# Patient Record
Sex: MALE | Race: ASIAN | NOT HISPANIC OR LATINO
[De-identification: names, ages, dates, MRNs, and addresses within clinical notes are randomized per-mention and may not be internally consistent; named-entity substitution may affect disease eponyms.]

---

## 2017-06-21 ENCOUNTER — RESULT CHARGE (OUTPATIENT)
Age: 77
End: 2017-06-21

## 2017-06-22 ENCOUNTER — APPOINTMENT (OUTPATIENT)
Dept: INTERNAL MEDICINE | Facility: CLINIC | Age: 77
End: 2017-06-22

## 2017-06-22 ENCOUNTER — NON-APPOINTMENT (OUTPATIENT)
Age: 77
End: 2017-06-22

## 2017-06-22 VITALS
HEIGHT: 65.5 IN | WEIGHT: 184 LBS | DIASTOLIC BLOOD PRESSURE: 71 MMHG | SYSTOLIC BLOOD PRESSURE: 123 MMHG | BODY MASS INDEX: 30.29 KG/M2

## 2017-06-22 DIAGNOSIS — G89.29 LOW BACK PAIN: ICD-10-CM

## 2017-06-22 DIAGNOSIS — M62.9 DISORDER OF MUSCLE, UNSPECIFIED: ICD-10-CM

## 2017-06-22 DIAGNOSIS — Z87.891 PERSONAL HISTORY OF NICOTINE DEPENDENCE: ICD-10-CM

## 2017-06-22 DIAGNOSIS — M54.5 LOW BACK PAIN: ICD-10-CM

## 2017-06-27 LAB
25(OH)D3 SERPL-MCNC: 24.7 NG/ML
ALBUMIN SERPL ELPH-MCNC: 3.9 G/DL
ALP BLD-CCNC: 68 U/L
ALT SERPL-CCNC: 44 U/L
ANION GAP SERPL CALC-SCNC: 12 MMOL/L
APPEARANCE: CLEAR
AST SERPL-CCNC: 33 U/L
BACTERIA: NEGATIVE
BASOPHILS # BLD AUTO: 0.03 K/UL
BASOPHILS NFR BLD AUTO: 0.6 %
BILIRUB SERPL-MCNC: 1 MG/DL
BILIRUBIN URINE: NEGATIVE
BLOOD URINE: ABNORMAL
BUN SERPL-MCNC: 15 MG/DL
CALCIUM SERPL-MCNC: 8.9 MG/DL
CHLORIDE SERPL-SCNC: 106 MMOL/L
CHOLEST SERPL-MCNC: 181 MG/DL
CHOLEST/HDLC SERPL: 3.7 RATIO
CO2 SERPL-SCNC: 25 MMOL/L
COLOR: YELLOW
CREAT SERPL-MCNC: 1.13 MG/DL
EOSINOPHIL # BLD AUTO: 0.18 K/UL
EOSINOPHIL NFR BLD AUTO: 3.5 %
GLUCOSE QUALITATIVE U: NORMAL MG/DL
GLUCOSE SERPL-MCNC: 111 MG/DL
HBA1C MFR BLD HPLC: 5.8 %
HCT VFR BLD CALC: 39.3 %
HDLC SERPL-MCNC: 49 MG/DL
HGB BLD-MCNC: 13 G/DL
IMM GRANULOCYTES NFR BLD AUTO: 0.2 %
KETONES URINE: NEGATIVE
LDLC SERPL CALC-MCNC: 109 MG/DL
LEUKOCYTE ESTERASE URINE: NEGATIVE
LYMPHOCYTES # BLD AUTO: 1.58 K/UL
LYMPHOCYTES NFR BLD AUTO: 30.4 %
MAN DIFF?: NORMAL
MCHC RBC-ENTMCNC: 29.8 PG
MCHC RBC-ENTMCNC: 33.1 GM/DL
MCV RBC AUTO: 90.1 FL
MICROSCOPIC-UA: NORMAL
MONOCYTES # BLD AUTO: 0.59 K/UL
MONOCYTES NFR BLD AUTO: 11.3 %
NEUTROPHILS # BLD AUTO: 2.81 K/UL
NEUTROPHILS NFR BLD AUTO: 54 %
NITRITE URINE: NEGATIVE
PH URINE: 6
PLATELET # BLD AUTO: 165 K/UL
POTASSIUM SERPL-SCNC: 4.7 MMOL/L
PROT SERPL-MCNC: 7.3 G/DL
PROTEIN URINE: NEGATIVE MG/DL
PSA FREE FLD-MCNC: 37.6
PSA FREE SERPL-MCNC: 1.81 NG/ML
PSA SERPL-MCNC: 4.81 NG/ML
RBC # BLD: 4.36 M/UL
RBC # FLD: 15.3 %
RED BLOOD CELLS URINE: 1 /HPF
SODIUM SERPL-SCNC: 143 MMOL/L
SPECIFIC GRAVITY URINE: 1.02
SQUAMOUS EPITHELIAL CELLS: 1 /HPF
TRIGL SERPL-MCNC: 114 MG/DL
TSH SERPL-ACNC: 1.72 UIU/ML
URATE SERPL-MCNC: 8.8 MG/DL
UROBILINOGEN URINE: 1 MG/DL
WBC # FLD AUTO: 5.2 K/UL
WHITE BLOOD CELLS URINE: 1 /HPF

## 2017-12-13 DIAGNOSIS — Z78.9 OTHER SPECIFIED HEALTH STATUS: ICD-10-CM

## 2017-12-15 ENCOUNTER — APPOINTMENT (OUTPATIENT)
Dept: INTERNAL MEDICINE | Facility: CLINIC | Age: 77
End: 2017-12-15
Payer: MEDICARE

## 2017-12-15 VITALS
HEIGHT: 65.5 IN | OXYGEN SATURATION: 98 % | WEIGHT: 184 LBS | SYSTOLIC BLOOD PRESSURE: 124 MMHG | BODY MASS INDEX: 30.29 KG/M2 | DIASTOLIC BLOOD PRESSURE: 60 MMHG | HEART RATE: 85 BPM

## 2017-12-15 PROCEDURE — 99214 OFFICE O/P EST MOD 30 MIN: CPT | Mod: 25

## 2017-12-15 PROCEDURE — G0008: CPT

## 2017-12-15 PROCEDURE — 90688 IIV4 VACCINE SPLT 0.5 ML IM: CPT

## 2017-12-15 RX ORDER — TRIAMCINOLONE ACETONIDE 1 MG/G
0.1 OINTMENT TOPICAL TWICE DAILY
Qty: 2 | Refills: 1 | Status: ACTIVE | COMMUNITY
Start: 2017-12-15 | End: 1900-01-01

## 2018-03-25 ENCOUNTER — FORM ENCOUNTER (OUTPATIENT)
Age: 78
End: 2018-03-25

## 2018-03-26 ENCOUNTER — APPOINTMENT (OUTPATIENT)
Dept: RADIOLOGY | Facility: IMAGING CENTER | Age: 78
End: 2018-03-26

## 2018-03-26 ENCOUNTER — OUTPATIENT (OUTPATIENT)
Dept: OUTPATIENT SERVICES | Facility: HOSPITAL | Age: 78
LOS: 1 days | End: 2018-03-26
Payer: MEDICARE

## 2018-03-26 DIAGNOSIS — M54.5 LOW BACK PAIN: ICD-10-CM

## 2018-03-26 DIAGNOSIS — R05 COUGH: ICD-10-CM

## 2018-03-26 PROCEDURE — 72020 X-RAY EXAM OF SPINE 1 VIEW: CPT | Mod: 26

## 2018-03-26 PROCEDURE — 71046 X-RAY EXAM CHEST 2 VIEWS: CPT | Mod: 26

## 2018-03-26 PROCEDURE — 71046 X-RAY EXAM CHEST 2 VIEWS: CPT

## 2018-03-26 PROCEDURE — 72020 X-RAY EXAM OF SPINE 1 VIEW: CPT

## 2018-06-25 ENCOUNTER — APPOINTMENT (OUTPATIENT)
Dept: INTERNAL MEDICINE | Facility: CLINIC | Age: 78
End: 2018-06-25
Payer: MEDICARE

## 2018-06-25 VITALS
BODY MASS INDEX: 29.63 KG/M2 | HEIGHT: 65.25 IN | HEART RATE: 67 BPM | WEIGHT: 180 LBS | DIASTOLIC BLOOD PRESSURE: 60 MMHG | SYSTOLIC BLOOD PRESSURE: 120 MMHG | OXYGEN SATURATION: 98 %

## 2018-06-25 DIAGNOSIS — R73.01 IMPAIRED FASTING GLUCOSE: ICD-10-CM

## 2018-06-25 DIAGNOSIS — K13.70 UNSPECIFIED LESIONS OF ORAL MUCOSA: ICD-10-CM

## 2018-06-25 PROCEDURE — G0439: CPT

## 2018-06-29 LAB
25(OH)D3 SERPL-MCNC: 25.2 NG/ML
ALBUMIN SERPL ELPH-MCNC: 4 G/DL
ALP BLD-CCNC: 77 U/L
ALT SERPL-CCNC: 29 U/L
ANION GAP SERPL CALC-SCNC: 16 MMOL/L
AST SERPL-CCNC: 25 U/L
BASOPHILS # BLD AUTO: 0.02 K/UL
BASOPHILS NFR BLD AUTO: 0.4 %
BILIRUB SERPL-MCNC: 0.6 MG/DL
BUN SERPL-MCNC: 17 MG/DL
CALCIUM SERPL-MCNC: 9.1 MG/DL
CHLORIDE SERPL-SCNC: 106 MMOL/L
CHOLEST SERPL-MCNC: 163 MG/DL
CHOLEST/HDLC SERPL: 3.4 RATIO
CO2 SERPL-SCNC: 22 MMOL/L
CREAT SERPL-MCNC: 1.01 MG/DL
EOSINOPHIL # BLD AUTO: 0.15 K/UL
EOSINOPHIL NFR BLD AUTO: 2.7 %
GLUCOSE SERPL-MCNC: 102 MG/DL
HBA1C MFR BLD HPLC: 5.8 %
HCT VFR BLD CALC: 38.9 %
HDLC SERPL-MCNC: 48 MG/DL
HGB BLD-MCNC: 13 G/DL
IMM GRANULOCYTES NFR BLD AUTO: 0.2 %
LDLC SERPL CALC-MCNC: 97 MG/DL
LYMPHOCYTES # BLD AUTO: 1.68 K/UL
LYMPHOCYTES NFR BLD AUTO: 30.3 %
MAN DIFF?: NORMAL
MCHC RBC-ENTMCNC: 30.4 PG
MCHC RBC-ENTMCNC: 33.4 GM/DL
MCV RBC AUTO: 90.9 FL
MONOCYTES # BLD AUTO: 0.45 K/UL
MONOCYTES NFR BLD AUTO: 8.1 %
NEUTROPHILS # BLD AUTO: 3.23 K/UL
NEUTROPHILS NFR BLD AUTO: 58.3 %
PLATELET # BLD AUTO: 183 K/UL
POTASSIUM SERPL-SCNC: 4.3 MMOL/L
PROT SERPL-MCNC: 7.5 G/DL
PSA SERPL-MCNC: 5.52 NG/ML
RBC # BLD: 4.28 M/UL
RBC # FLD: 15.2 %
SODIUM SERPL-SCNC: 144 MMOL/L
TRIGL SERPL-MCNC: 90 MG/DL
TSH SERPL-ACNC: 2.08 UIU/ML
URATE SERPL-MCNC: 6.4 MG/DL
WBC # FLD AUTO: 5.54 K/UL

## 2018-07-02 PROBLEM — K13.70 ORAL MUCOSAL LESION: Status: ACTIVE | Noted: 2018-06-25

## 2018-07-02 NOTE — HEALTH RISK ASSESSMENT
[HIV test declined] : HIV test declined [] : No [No falls in past year] : Patient reported no falls in the past year [0] : 2) Feeling down, depressed, or hopeless: Not at all (0) [[id number]] : 0 [Fully functional (bathing, dressing, toileting, transferring, walking, feeding)] : Fully functional (bathing, dressing, toileting, transferring, walking, feeding) [Fully functional (using the telephone, shopping, preparing meals, housekeeping, doing laundry, using] : Fully functional and needs no help or supervision to perform IADLs (using the telephone, shopping, preparing meals, housekeeping, doing laundry, using transportation, managing medications and managing finances) [BoneDensityDate] : 07/16 [ColonoscopyDate] : 08/16 [HepatitisCDate] : 06/14 [Discussed at today's visit] : Advance Directives Discussed at today's visit

## 2018-07-02 NOTE — HISTORY OF PRESENT ILLNESS
[FreeTextEntry1] : 77 year old male with history of hyperlipidemia, was taking Simvastatin in the past, but had elevated LFTs in the past - currently off his statin.  Here for repeat fasting blood work.\par Other issues:\par 1. lipid-off statin-f/u labs-fair diet\par 2. Hx of gout-off allopurinol-stable-trying to stay on low purine diet. Coffee-2 cups. tea 3-4 cups\par 3. Onychomycosis-both big toenails involved with residual scarring noted at tip of nails, normal nail growing at base. Requesting more penlac\par 4. BPH/elevated PSA -stable nocturia-borderline elevated PSA in 2017 but did not f/u with urology as advised. Last saw urologist Dr Reddy in 2014\par 5. MiId low back pain R>L for few years, xray compression fx. Plays basketball few times a week\par 6. Rare BRBPR-possibly from hemorrhoids-colonoscopy with dr flowers 2007 negative, 8/4/2016-polyp in ascending colon-pathology tubular adenoma

## 2018-07-05 ENCOUNTER — APPOINTMENT (OUTPATIENT)
Dept: UROLOGY | Facility: CLINIC | Age: 78
End: 2018-07-05
Payer: MEDICARE

## 2018-07-05 VITALS
WEIGHT: 182 LBS | HEIGHT: 65 IN | TEMPERATURE: 98.2 F | SYSTOLIC BLOOD PRESSURE: 133 MMHG | HEART RATE: 75 BPM | BODY MASS INDEX: 30.32 KG/M2 | DIASTOLIC BLOOD PRESSURE: 65 MMHG

## 2018-07-05 PROCEDURE — 99213 OFFICE O/P EST LOW 20 MIN: CPT

## 2018-07-24 PROBLEM — Z78.9 ALCOHOL USE: Status: ACTIVE | Noted: 2017-12-13

## 2019-01-10 ENCOUNTER — APPOINTMENT (OUTPATIENT)
Dept: INTERNAL MEDICINE | Facility: CLINIC | Age: 79
End: 2019-01-10
Payer: MEDICARE

## 2019-01-10 VITALS
OXYGEN SATURATION: 96 % | BODY MASS INDEX: 28.99 KG/M2 | WEIGHT: 174 LBS | DIASTOLIC BLOOD PRESSURE: 70 MMHG | HEART RATE: 67 BPM | HEIGHT: 65 IN | SYSTOLIC BLOOD PRESSURE: 134 MMHG

## 2019-01-10 DIAGNOSIS — K92.1 MELENA: ICD-10-CM

## 2019-01-10 DIAGNOSIS — Z87.891 PERSONAL HISTORY OF NICOTINE DEPENDENCE: ICD-10-CM

## 2019-01-10 PROCEDURE — G0008: CPT

## 2019-01-10 PROCEDURE — 90662 IIV NO PRSV INCREASED AG IM: CPT

## 2019-01-10 PROCEDURE — 99213 OFFICE O/P EST LOW 20 MIN: CPT | Mod: 25

## 2019-01-10 RX ORDER — PREDNISONE 20 MG/1
20 TABLET ORAL
Qty: 10 | Refills: 0 | Status: DISCONTINUED | COMMUNITY
Start: 2017-12-13 | End: 2019-01-10

## 2019-01-10 NOTE — HISTORY OF PRESENT ILLNESS
[FreeTextEntry1] : 78 year old male with BPH and elevated PSA, here for 6 monht f/u.\par Needs flu vaccine\par Routine labs,\par 1. lipid-off statin-f/u labs-fair diet\par 2. Hx of gout-back on allopurinol-stable-trying to stay on low purine diet. Coffee-2 cups. tea 3-4 cups\par 3. Onychomycosis-both big toenails involved with residual scarring noted at tip of nails, normal nail growing at base. trial of penlac\par 4. BPH/elevated PSA -stable nocturiax2-borderline elevated PSA in 2017 -urologist Dr Reddy\par 5. Hx low back painfor few years, xray compression fx. Plays basketball few times a week\par 6. Rare BRBPR-possibly from hemorrhoids-colonoscopy with dr flowers 2007 negative, 8/4/2016-polyp in ascending colon-pathology tubular adenoma

## 2019-01-16 LAB
ALBUMIN SERPL ELPH-MCNC: 4.2 G/DL
ALP BLD-CCNC: 79 U/L
ALT SERPL-CCNC: 19 U/L
ANION GAP SERPL CALC-SCNC: 11 MMOL/L
AST SERPL-CCNC: 24 U/L
BASOPHILS # BLD AUTO: 0.02 K/UL
BASOPHILS NFR BLD AUTO: 0.5 %
BILIRUB SERPL-MCNC: 0.9 MG/DL
BUN SERPL-MCNC: 19 MG/DL
CALCIUM SERPL-MCNC: 9.5 MG/DL
CHLORIDE SERPL-SCNC: 107 MMOL/L
CHOLEST SERPL-MCNC: 159 MG/DL
CHOLEST/HDLC SERPL: 3.2 RATIO
CO2 SERPL-SCNC: 25 MMOL/L
CREAT SERPL-MCNC: 1.16 MG/DL
EOSINOPHIL # BLD AUTO: 0.13 K/UL
EOSINOPHIL NFR BLD AUTO: 3 %
GLUCOSE SERPL-MCNC: 103 MG/DL
HCT VFR BLD CALC: 38 %
HDLC SERPL-MCNC: 49 MG/DL
HGB BLD-MCNC: 12.6 G/DL
IMM GRANULOCYTES NFR BLD AUTO: 0.2 %
LDLC SERPL CALC-MCNC: 95 MG/DL
LYMPHOCYTES # BLD AUTO: 1.42 K/UL
LYMPHOCYTES NFR BLD AUTO: 32.3 %
MAN DIFF?: NORMAL
MCHC RBC-ENTMCNC: 30 PG
MCHC RBC-ENTMCNC: 33.2 GM/DL
MCV RBC AUTO: 90.5 FL
MONOCYTES # BLD AUTO: 0.34 K/UL
MONOCYTES NFR BLD AUTO: 7.7 %
NEUTROPHILS # BLD AUTO: 2.47 K/UL
NEUTROPHILS NFR BLD AUTO: 56.3 %
PLATELET # BLD AUTO: 169 K/UL
POTASSIUM SERPL-SCNC: 4.1 MMOL/L
PROT SERPL-MCNC: 7.5 G/DL
RBC # BLD: 4.2 M/UL
RBC # FLD: 15.3 %
SODIUM SERPL-SCNC: 143 MMOL/L
TRIGL SERPL-MCNC: 77 MG/DL
URATE SERPL-MCNC: 7.1 MG/DL
WBC # FLD AUTO: 4.39 K/UL

## 2019-07-11 ENCOUNTER — APPOINTMENT (OUTPATIENT)
Dept: INTERNAL MEDICINE | Facility: CLINIC | Age: 79
End: 2019-07-11
Payer: MEDICARE

## 2019-07-11 VITALS
SYSTOLIC BLOOD PRESSURE: 120 MMHG | WEIGHT: 185 LBS | OXYGEN SATURATION: 95 % | HEIGHT: 65 IN | DIASTOLIC BLOOD PRESSURE: 70 MMHG | HEART RATE: 63 BPM | BODY MASS INDEX: 30.82 KG/M2

## 2019-07-11 DIAGNOSIS — L30.9 DERMATITIS, UNSPECIFIED: ICD-10-CM

## 2019-07-11 DIAGNOSIS — R35.1 NOCTURIA: ICD-10-CM

## 2019-07-11 PROCEDURE — G0442 ANNUAL ALCOHOL SCREEN 15 MIN: CPT | Mod: 59

## 2019-07-11 PROCEDURE — G0444 DEPRESSION SCREEN ANNUAL: CPT | Mod: 59

## 2019-07-11 PROCEDURE — 99397 PER PM REEVAL EST PAT 65+ YR: CPT

## 2019-07-15 PROBLEM — L30.9 DERMATITIS, ECZEMATOID: Status: ACTIVE | Noted: 2017-06-22

## 2019-07-15 NOTE — COUNSELING
[Healthy eating counseling provided] : healthy eating [ - Annual Alcohol Misuse Screening] : Annual Alcohol Misuse Screening [Activity counseling provided] : activity [ - Annual Depression Screening] : Annual Depression Screening

## 2019-07-15 NOTE — HISTORY OF PRESENT ILLNESS
[FreeTextEntry1] : 79 year old male with history of hyperlipidemia, was taking Simvastatin in the past, but had elevated LFTs in the past - currently off his statin.  Here for repeat fasting blood work.\par Other issues:\par 1. lipid-off statin-f/u labs-fair diet\par 2. Hx of gout-off allopurinol-stable-trying to stay on low purine diet. Coffee-2 cups. tea 3-4 cups\par 3. Onychomycosis-both big toenails involved with residual scarring noted at tip of nails, normal nail growing at base. Requesting more penlac\par 4. BPH/elevated PSA -stable nocturia-borderline elevated PSA in 2017, f/u with urologist Dr Reddy noted-no pSA needed, routine jose f/u as stable symptoms\par 5. MiId low back pain R>L for few years, xray compression fx. Plays basketball few times a week\par 6. Rare BRBPR-possibly from hemorrhoids-colonoscopy with dr flowers 2007 negative, 8/4/2016-polyp in ascending colon-pathology tubular adenoma, f/u as per GI\par 7. Dry patchy are on left palm -does not use moisturing or steroid cream by derm. does ontlike rinking water

## 2019-07-15 NOTE — PHYSICAL EXAM
[No Acute Distress] : no acute distress [Well Nourished] : well nourished [Normal Sclera/Conjunctiva] : normal sclera/conjunctiva [Well Developed] : well developed [Well-Appearing] : well-appearing [EOMI] : extraocular movements intact [PERRL] : pupils equal round and reactive to light [Normal Outer Ear/Nose] : the outer ears and nose were normal in appearance [Normal Oropharynx] : the oropharynx was normal [No JVD] : no jugular venous distention [Supple] : supple [No Lymphadenopathy] : no lymphadenopathy [Thyroid Normal, No Nodules] : the thyroid was normal and there were no nodules present [No Respiratory Distress] : no respiratory distress  [No Accessory Muscle Use] : no accessory muscle use [Clear to Auscultation] : lungs were clear to auscultation bilaterally [Normal Rate] : normal rate  [Regular Rhythm] : with a regular rhythm [No Murmur] : no murmur heard [Normal S1, S2] : normal S1 and S2 [No Carotid Bruits] : no carotid bruits [No Abdominal Bruit] : a ~M bruit was not heard ~T in the abdomen [No Varicosities] : no varicosities [Pedal Pulses Present] : the pedal pulses are present [No Edema] : there was no peripheral edema [No Extremity Clubbing/Cyanosis] : no extremity clubbing/cyanosis [No Palpable Aorta] : no palpable aorta [Soft] : abdomen soft [Non Tender] : non-tender [Non-distended] : non-distended [No Masses] : no abdominal mass palpated [No HSM] : no HSM [Normal Bowel Sounds] : normal bowel sounds [Normal Posterior Cervical Nodes] : no posterior cervical lymphadenopathy [Normal Anterior Cervical Nodes] : no anterior cervical lymphadenopathy [No Spinal Tenderness] : no spinal tenderness [No CVA Tenderness] : no CVA  tenderness [No Joint Swelling] : no joint swelling [Grossly Normal Strength/Tone] : grossly normal strength/tone [Normal Gait] : normal gait [No Rash] : no rash [No Focal Deficits] : no focal deficits [Coordination Grossly Intact] : coordination grossly intact [Normal Affect] : the affect was normal [Normal Insight/Judgement] : insight and judgment were intact [Deep Tendon Reflexes (DTR)] : deep tendon reflexes were 2+ and symmetric [de-identified] : circulat quarter shaper area on left palm

## 2019-07-15 NOTE — HEALTH RISK ASSESSMENT
[Yes] : Yes [] : No [Monthly or less (1 pt)] : Monthly or less (1 point) [No falls in past year] : Patient reported no falls in the past year [0] : 1) Little interest or pleasure doing things: Not at all (0) [1] : 2) Feeling down, depressed, or hopeless for several days (1) [[id number]] : 1 [HIV test declined] : HIV test declined [Fully functional (bathing, dressing, toileting, transferring, walking, feeding)] : Fully functional (bathing, dressing, toileting, transferring, walking, feeding) [Fully functional (using the telephone, shopping, preparing meals, housekeeping, doing laundry, using] : Fully functional and needs no help or supervision to perform IADLs (using the telephone, shopping, preparing meals, housekeeping, doing laundry, using transportation, managing medications and managing finances) [BoneDensityDate] : 07/16 [ColonoscopyDate] : 08/16 [HepatitisCDate] : 06/14 [Discussed at today's visit] : Advance Directives Discussed at today's visit

## 2019-07-22 LAB
25(OH)D3 SERPL-MCNC: 24.4 NG/ML
ALBUMIN SERPL ELPH-MCNC: 4.3 G/DL
ALP BLD-CCNC: 73 U/L
ALT SERPL-CCNC: 25 U/L
ANION GAP SERPL CALC-SCNC: 12 MMOL/L
APPEARANCE: CLEAR
AST SERPL-CCNC: 22 U/L
BACTERIA: NEGATIVE
BASOPHILS # BLD AUTO: 0.05 K/UL
BASOPHILS NFR BLD AUTO: 1 %
BILIRUB SERPL-MCNC: 0.7 MG/DL
BILIRUBIN URINE: NEGATIVE
BLOOD URINE: NORMAL
BUN SERPL-MCNC: 16 MG/DL
CALCIUM SERPL-MCNC: 9.4 MG/DL
CHLORIDE SERPL-SCNC: 106 MMOL/L
CHOLEST SERPL-MCNC: 177 MG/DL
CHOLEST/HDLC SERPL: 3.7 RATIO
CO2 SERPL-SCNC: 22 MMOL/L
COLOR: NORMAL
CREAT SERPL-MCNC: 1.06 MG/DL
EOSINOPHIL # BLD AUTO: 0.06 K/UL
EOSINOPHIL NFR BLD AUTO: 1.2 %
ESTIMATED AVERAGE GLUCOSE: 111 MG/DL
GLUCOSE QUALITATIVE U: NEGATIVE
GLUCOSE SERPL-MCNC: 109 MG/DL
HBA1C MFR BLD HPLC: 5.5 %
HCT VFR BLD CALC: 39.6 %
HDLC SERPL-MCNC: 48 MG/DL
HGB BLD-MCNC: 12.7 G/DL
HYALINE CASTS: 1 /LPF
IMM GRANULOCYTES NFR BLD AUTO: 0.4 %
KETONES URINE: NEGATIVE
LDLC SERPL CALC-MCNC: 106 MG/DL
LEUKOCYTE ESTERASE URINE: NEGATIVE
LYMPHOCYTES # BLD AUTO: 1.32 K/UL
LYMPHOCYTES NFR BLD AUTO: 25.7 %
MAN DIFF?: NORMAL
MCHC RBC-ENTMCNC: 30.3 PG
MCHC RBC-ENTMCNC: 32.1 GM/DL
MCV RBC AUTO: 94.5 FL
MICROSCOPIC-UA: NORMAL
MONOCYTES # BLD AUTO: 0.51 K/UL
MONOCYTES NFR BLD AUTO: 9.9 %
NEUTROPHILS # BLD AUTO: 3.18 K/UL
NEUTROPHILS NFR BLD AUTO: 61.8 %
NITRITE URINE: NEGATIVE
PH URINE: 6
PLATELET # BLD AUTO: 168 K/UL
POTASSIUM SERPL-SCNC: 4.3 MMOL/L
PROT SERPL-MCNC: 7.1 G/DL
PROTEIN URINE: NEGATIVE
RBC # BLD: 4.19 M/UL
RBC # FLD: 14.8 %
RED BLOOD CELLS URINE: 1 /HPF
SODIUM SERPL-SCNC: 140 MMOL/L
SPECIFIC GRAVITY URINE: 1.01
SQUAMOUS EPITHELIAL CELLS: 1 /HPF
TRIGL SERPL-MCNC: 115 MG/DL
TSH SERPL-ACNC: 1.51 UIU/ML
URATE SERPL-MCNC: 6.8 MG/DL
UROBILINOGEN URINE: NORMAL
WBC # FLD AUTO: 5.14 K/UL
WHITE BLOOD CELLS URINE: 2 /HPF

## 2019-12-17 ENCOUNTER — APPOINTMENT (OUTPATIENT)
Dept: INTERNAL MEDICINE | Facility: CLINIC | Age: 79
End: 2019-12-17
Payer: MEDICARE

## 2019-12-17 VITALS
OXYGEN SATURATION: 97 % | DIASTOLIC BLOOD PRESSURE: 70 MMHG | SYSTOLIC BLOOD PRESSURE: 124 MMHG | BODY MASS INDEX: 29.32 KG/M2 | HEIGHT: 65 IN | TEMPERATURE: 98.4 F | WEIGHT: 176 LBS | HEART RATE: 75 BPM

## 2019-12-17 DIAGNOSIS — R05 COUGH: ICD-10-CM

## 2019-12-17 DIAGNOSIS — Z23 ENCOUNTER FOR IMMUNIZATION: ICD-10-CM

## 2019-12-17 PROCEDURE — 99213 OFFICE O/P EST LOW 20 MIN: CPT | Mod: 25

## 2019-12-17 PROCEDURE — G0008: CPT

## 2019-12-17 PROCEDURE — 90662 IIV NO PRSV INCREASED AG IM: CPT

## 2019-12-17 RX ORDER — BENZONATATE 100 MG/1
100 CAPSULE ORAL 3 TIMES DAILY
Qty: 21 | Refills: 0 | Status: COMPLETED | COMMUNITY
Start: 2019-12-17 | End: 2019-12-24

## 2019-12-17 NOTE — PHYSICAL EXAM
[No Acute Distress] : no acute distress [PERRL] : pupils equal round and reactive to light [Normal Outer Ear/Nose] : the outer ears and nose were normal in appearance [Normal TMs] : both tympanic membranes were normal [Normal Oropharynx] : the oropharynx was normal [Normal Nasal Mucosa] : the nasal mucosa was normal [No JVD] : no jugular venous distention [No Lymphadenopathy] : no lymphadenopathy [Supple] : supple [No Respiratory Distress] : no respiratory distress  [No Accessory Muscle Use] : no accessory muscle use [Clear to Auscultation] : lungs were clear to auscultation bilaterally [Regular Rhythm] : with a regular rhythm [Normal Rate] : normal rate  [Soft] : abdomen soft [Normal S1, S2] : normal S1 and S2 [de-identified] : Bilateral cerumen throat mildly erythematous no exudates

## 2019-12-17 NOTE — REVIEW OF SYSTEMS
[Fever] : no fever [Earache] : no earache [Hearing Loss] : no hearing loss [Nosebleeds] : nosebleeds [Postnasal Drip] : no postnasal drip [Nasal Discharge] : no nasal discharge [Sore Throat] : no sore throat [Wheezing] : wheezing [Hoarseness] : no hoarseness [Shortness Of Breath] : no shortness of breath [Dyspnea on Exertion] : not dyspnea on exertion [Cough] : cough [Negative] : Genitourinary

## 2019-12-17 NOTE — HISTORY OF PRESENT ILLNESS
[FreeTextEntry8] : \par CC: dry cough mild shortness of breath for two months\par \par HPI: patient is a 79-year-old  male with history of elevated PSA BPH, hyperlipidemia, hyperuricemia, prediabetes who presents with a two month history of cough after returning from [facility] patient was seen in urgent care center treated with medications slowly improved however notes persistent cough epistaxis and some wheezy like feeling denies fever, chills, sick contacts.

## 2020-02-04 ENCOUNTER — APPOINTMENT (OUTPATIENT)
Dept: INTERNAL MEDICINE | Facility: CLINIC | Age: 80
End: 2020-02-04
Payer: MEDICARE

## 2020-02-04 VITALS
OXYGEN SATURATION: 98 % | SYSTOLIC BLOOD PRESSURE: 140 MMHG | TEMPERATURE: 98.7 F | BODY MASS INDEX: 29.99 KG/M2 | HEIGHT: 65 IN | HEART RATE: 74 BPM | WEIGHT: 180 LBS | DIASTOLIC BLOOD PRESSURE: 60 MMHG

## 2020-02-04 PROCEDURE — 99213 OFFICE O/P EST LOW 20 MIN: CPT

## 2020-03-02 NOTE — PHYSICAL EXAM
[Normal Oropharynx] : the oropharynx was normal [Normal Outer Ear/Nose] : the outer ears and nose were normal in appearance [Normal TMs] : both tympanic membranes were normal [Normal] : normal rate, regular rhythm, normal S1 and S2 and no murmur heard [No Edema] : there was no peripheral edema [de-identified] : boggy  turbinates with scnt yelow secretions

## 2020-03-02 NOTE — HISTORY OF PRESENT ILLNESS
[FreeTextEntry8] : Her with wife, both with simiar resp symptoms, here to evalaute dry cough, bland phlegm for past 1-2 weeks.\par Improving, cough worse at night,  post nasla drip, blowing nose with yelow secretion\par no fever/chill, no travel hx\par Contact with 3 grandchilden with had viral illneses

## 2020-03-02 NOTE — REVIEW OF SYSTEMS
[Shortness Of Breath] : shortness of breath [Wheezing] : no wheezing [Negative] : Constitutional [Cough] : cough [FreeTextEntry4] : see hpi

## 2020-09-09 ENCOUNTER — APPOINTMENT (OUTPATIENT)
Dept: INTERNAL MEDICINE | Facility: CLINIC | Age: 80
End: 2020-09-09
Payer: MEDICARE

## 2020-09-09 VITALS
BODY MASS INDEX: 30.49 KG/M2 | DIASTOLIC BLOOD PRESSURE: 60 MMHG | SYSTOLIC BLOOD PRESSURE: 140 MMHG | OXYGEN SATURATION: 98 % | WEIGHT: 183 LBS | HEART RATE: 68 BPM | HEIGHT: 65 IN

## 2020-09-09 PROCEDURE — 90662 IIV NO PRSV INCREASED AG IM: CPT

## 2020-09-09 PROCEDURE — G0442 ANNUAL ALCOHOL SCREEN 15 MIN: CPT

## 2020-09-09 PROCEDURE — G0439: CPT | Mod: 25

## 2020-09-09 PROCEDURE — G0444 DEPRESSION SCREEN ANNUAL: CPT

## 2020-09-09 PROCEDURE — G0008: CPT

## 2020-09-18 LAB
25(OH)D3 SERPL-MCNC: 20.9 NG/ML
ALBUMIN SERPL ELPH-MCNC: 4.3 G/DL
ALP BLD-CCNC: 84 U/L
ALT SERPL-CCNC: 27 U/L
ANION GAP SERPL CALC-SCNC: 13 MMOL/L
AST SERPL-CCNC: 21 U/L
BASOPHILS # BLD AUTO: 0.06 K/UL
BASOPHILS NFR BLD AUTO: 1.2 %
BILIRUB SERPL-MCNC: 0.8 MG/DL
BUN SERPL-MCNC: 19 MG/DL
CALCIUM SERPL-MCNC: 9.5 MG/DL
CHLORIDE SERPL-SCNC: 105 MMOL/L
CHOLEST SERPL-MCNC: 163 MG/DL
CHOLEST/HDLC SERPL: 3.7 RATIO
CO2 SERPL-SCNC: 22 MMOL/L
CREAT SERPL-MCNC: 1.06 MG/DL
EOSINOPHIL # BLD AUTO: 0.16 K/UL
EOSINOPHIL NFR BLD AUTO: 3.1 %
ESTIMATED AVERAGE GLUCOSE: 117 MG/DL
GLUCOSE SERPL-MCNC: 119 MG/DL
HBA1C MFR BLD HPLC: 5.7 %
HCT VFR BLD CALC: 39.2 %
HDLC SERPL-MCNC: 44 MG/DL
HGB BLD-MCNC: 12.7 G/DL
IMM GRANULOCYTES NFR BLD AUTO: 0.2 %
LDLC SERPL CALC-MCNC: 91 MG/DL
LYMPHOCYTES # BLD AUTO: 1.8 K/UL
LYMPHOCYTES NFR BLD AUTO: 34.9 %
MAN DIFF?: NORMAL
MCHC RBC-ENTMCNC: 30.4 PG
MCHC RBC-ENTMCNC: 32.4 GM/DL
MCV RBC AUTO: 93.8 FL
MONOCYTES # BLD AUTO: 0.66 K/UL
MONOCYTES NFR BLD AUTO: 12.8 %
NEUTROPHILS # BLD AUTO: 2.47 K/UL
NEUTROPHILS NFR BLD AUTO: 47.8 %
PLATELET # BLD AUTO: 185 K/UL
POTASSIUM SERPL-SCNC: 4.1 MMOL/L
PROT SERPL-MCNC: 6.9 G/DL
PSA FREE FLD-MCNC: 29 %
PSA FREE SERPL-MCNC: 1.75 NG/ML
PSA SERPL-MCNC: 6.11 NG/ML
RBC # BLD: 4.18 M/UL
RBC # FLD: 14.8 %
SARS-COV-2 IGG SERPL IA-ACNC: <0.1 INDEX
SARS-COV-2 IGG SERPL QL IA: NEGATIVE
SODIUM SERPL-SCNC: 140 MMOL/L
TRIGL SERPL-MCNC: 139 MG/DL
TSH SERPL-ACNC: 1.56 UIU/ML
URATE SERPL-MCNC: 6.4 MG/DL
WBC # FLD AUTO: 5.16 K/UL

## 2020-09-18 NOTE — HISTORY OF PRESENT ILLNESS
[FreeTextEntry1] : 80 year old male with history of hyperlipidemia, was taking Simvastatin in the past, but had elevated LFTs in the past - currently off his statin.  Here for repeat fasting blood work.\par Other issues:\par 1. lipid-off statin-f/u labs-fair diet\par 2. Hx of gout-off allopurinol-stable-trying to stay on low purine diet. Coffee-2 cups. tea 3-4 cups\par 3. Onychomycosis-both big toenails involved with residual scarring noted at tip of nails, normal nail growing at base. Requesting more penlac\par 4. BPH/elevated PSA -stable nocturia-borderline elevated PSA in 2017, f/u with urologist Dr Reddy noted-no pSA needed, routine jose f/u as stable symptoms\par 5. MiId low back pain R>L for few years, xray compression fx. Plays basketball few times a week\par 6. Rare BRBPR-possibly from hemorrhoids-colonoscopy with dr floewrs 2007 negative, 8/4/2016-polyp in ascending colon-pathology tubular adenoma, f/u as per GI\par 7. Dry patchy are on left palm -does not use moisturing or steroid cream by derm. does ontlike rinking water

## 2020-09-18 NOTE — PHYSICAL EXAM
[No Acute Distress] : no acute distress [Well Nourished] : well nourished [Well Developed] : well developed [Well-Appearing] : well-appearing [Normal Sclera/Conjunctiva] : normal sclera/conjunctiva [PERRL] : pupils equal round and reactive to light [EOMI] : extraocular movements intact [Normal Outer Ear/Nose] : the outer ears and nose were normal in appearance [Normal Oropharynx] : the oropharynx was normal [No JVD] : no jugular venous distention [Supple] : supple [No Lymphadenopathy] : no lymphadenopathy [No Respiratory Distress] : no respiratory distress  [Clear to Auscultation] : lungs were clear to auscultation bilaterally [Thyroid Normal, No Nodules] : the thyroid was normal and there were no nodules present [No Accessory Muscle Use] : no accessory muscle use [Normal Rate] : normal rate  [Regular Rhythm] : with a regular rhythm [No Murmur] : no murmur heard [Normal S1, S2] : normal S1 and S2 [No Abdominal Bruit] : a ~M bruit was not heard ~T in the abdomen [No Carotid Bruits] : no carotid bruits [No Varicosities] : no varicosities [Pedal Pulses Present] : the pedal pulses are present [No Edema] : there was no peripheral edema [No Extremity Clubbing/Cyanosis] : no extremity clubbing/cyanosis [Soft] : abdomen soft [Non Tender] : non-tender [No Palpable Aorta] : no palpable aorta [No Masses] : no abdominal mass palpated [Non-distended] : non-distended [No HSM] : no HSM [Normal Posterior Cervical Nodes] : no posterior cervical lymphadenopathy [Normal Bowel Sounds] : normal bowel sounds [No CVA Tenderness] : no CVA  tenderness [No Spinal Tenderness] : no spinal tenderness [Normal Anterior Cervical Nodes] : no anterior cervical lymphadenopathy [No Joint Swelling] : no joint swelling [Grossly Normal Strength/Tone] : grossly normal strength/tone [Normal Gait] : normal gait [No Rash] : no rash [No Focal Deficits] : no focal deficits [Coordination Grossly Intact] : coordination grossly intact [Normal Affect] : the affect was normal [Deep Tendon Reflexes (DTR)] : deep tendon reflexes were 2+ and symmetric [Normal Insight/Judgement] : insight and judgment were intact [de-identified] : circulat quarter shaper area on left palm

## 2020-09-18 NOTE — HEALTH RISK ASSESSMENT
[Yes] : Yes [Monthly or less (1 pt)] : Monthly or less (1 point) [No falls in past year] : Patient reported no falls in the past year [HIV test declined] : HIV test declined [Fully functional (bathing, dressing, toileting, transferring, walking, feeding)] : Fully functional (bathing, dressing, toileting, transferring, walking, feeding) [Fully functional (using the telephone, shopping, preparing meals, housekeeping, doing laundry, using] : Fully functional and needs no help or supervision to perform IADLs (using the telephone, shopping, preparing meals, housekeeping, doing laundry, using transportation, managing medications and managing finances) [Discussed at today's visit] : Advance Directives Discussed at today's visit [] : No [0] : 2) Feeling down, depressed, or hopeless: Not at all (0) [Audit-CScore] : 1 [[id number]] : 0 [BoneDensityDate] : 07/16 [ColonoscopyDate] : 08/16 [HepatitisCDate] : 06/14

## 2020-10-05 ENCOUNTER — APPOINTMENT (OUTPATIENT)
Dept: UROLOGY | Facility: CLINIC | Age: 80
End: 2020-10-05
Payer: MEDICARE

## 2020-10-05 VITALS — TEMPERATURE: 98.1 F

## 2020-10-05 DIAGNOSIS — Z87.09 PERSONAL HISTORY OF OTHER DISEASES OF THE RESPIRATORY SYSTEM: ICD-10-CM

## 2020-10-05 PROCEDURE — 51798 US URINE CAPACITY MEASURE: CPT

## 2020-10-05 PROCEDURE — 99213 OFFICE O/P EST LOW 20 MIN: CPT | Mod: 25

## 2020-10-05 NOTE — LETTER BODY
[FreeTextEntry1] : Sruthi Ma MD\par [address] [facility]\[place] [address]\par P [phone]\par F [phone]\par \par Dear Dr. Ma,\par \par Reason for visit: Elevated PSA. BPH.\par \par This is a 77 -year-old gentleman with a history of acute sinusitis, presenting with elevated PSA and symptoms of BPH. He returns for follow-up, last seen by me 2 years ago. Since his last visit, patient reports he has weak uroflow, frequency, and hesitancy. He denies any hematuria or urinary incontinence. His symptoms are aggravated by hydration. He denies any alleviating factors. He has not tried any medical therapy previously. Patient denies any changes in health. He reports no pain. He denies any hematuria or dysuria. He denies any urinary difficulties. The patient is entirely asymptomatic. The past medical history, family history and social history are unchanged. All other review of systems are negative. Patient denies any changes in medications. Medication list was reconciled.\par \par On examination, the patient is a healthy-appearing gentleman in no acute distress. He is alert and oriented follows commands. He has normal mood and affect. He is normocephalic. Neck is supple. Oral no thrush. Respirations are unlabored. His abdomen is soft and nontender. Bladder is nonpalpable. No CVA tenderness. Neurologically he is grossly intact. No peripheral edema. Skin without gross abnormality. He has normal male external genitalia. Normal meatus. Bilateral testes are descended intrascrotally and normal to palpation. On rectal examination, there is normal sphincter tone. The prostate is clinically benign without focal induration or nodularity.\par \par His recent CMP demonstrated normal renal functions, creatinine 1.06. His PSA was 6.11, which is elevated. \par \par Assessment: Elevated PSA. BPH.\par \par I counseled the patient on the various etiology of his symptoms. I discussed the natural history of BPH and the treatment options available. I discussed the options of conservative management with fluid in dietary restrictions, herbal therapy, medical therapy, and minimally invasive procedures. Risk and benefits were discussed. I answered his questions. I recommended he begin a trial of Flomax. I discussed the potential side effects of the medication. I counseled the patient on its use and side effects. If the patient develops any side effects, the patient will discontinue the medication and contact me. In terms of his elevated PSA, I discussed the risk of occult malignancy. Risks and alternatives were discussed. I answered the patient questions. The patient will follow-up in one year and will contact me with any questions or concerns. Thank you for the opportunity to participate in the care of Mr. LOGAN. I will keep you updated on his progress.\par \par Plan: Trial of Flomax. Follow up in 1 year.

## 2020-10-05 NOTE — ADDENDUM
[FreeTextEntry1] : Entered by Jose Cary, acting as scribe for Dr. Don Reddy.\par \par The documentation recorded by the scribe accurately reflects the service I personally performed and the decisions made by me.\par

## 2020-12-23 PROBLEM — Z87.09 HISTORY OF ACUTE SINUSITIS: Status: RESOLVED | Noted: 2020-02-04 | Resolved: 2020-12-23

## 2021-01-26 ENCOUNTER — RX RENEWAL (OUTPATIENT)
Age: 81
End: 2021-01-26

## 2021-03-18 ENCOUNTER — APPOINTMENT (OUTPATIENT)
Dept: UROLOGY | Facility: CLINIC | Age: 81
End: 2021-03-18
Payer: MEDICARE

## 2021-03-18 PROCEDURE — 99072 ADDL SUPL MATRL&STAF TM PHE: CPT

## 2021-03-18 PROCEDURE — 99214 OFFICE O/P EST MOD 30 MIN: CPT

## 2021-03-20 LAB
ANION GAP SERPL CALC-SCNC: 12 MMOL/L
BUN SERPL-MCNC: 17 MG/DL
CALCIUM SERPL-MCNC: 9.1 MG/DL
CHLORIDE SERPL-SCNC: 107 MMOL/L
CO2 SERPL-SCNC: 21 MMOL/L
CREAT SERPL-MCNC: 1.09 MG/DL
GLUCOSE SERPL-MCNC: 147 MG/DL
POTASSIUM SERPL-SCNC: 3.9 MMOL/L
PSA FREE FLD-MCNC: 30 %
PSA FREE SERPL-MCNC: 1.81 NG/ML
PSA SERPL-MCNC: 6.11 NG/ML
SODIUM SERPL-SCNC: 140 MMOL/L

## 2021-03-20 NOTE — LETTER BODY
[FreeTextEntry1] : Sruthi Ma MD\par [address] [facility]\[place] [address]\par P [phone]\par F [phone]\par \par Dear Dr. Ma,\par \par Reason for visit: Elevated PSA. BPH.\par \par This is a 80 year-old gentleman with a history of acute sinusitis, presenting with elevated PSA and chronic BPH. The patient returns for follow-up. Since he was last seen, the patient reports taking Flomax QD regularly without any side effects or difficulties with the medication. However, he complains of weak urine flow and progressive urinary symptoms despite the medication. He denies any hematuria or urinary incontinence. Patient denies any other changes in health. He reports no pain. He denies any hematuria or dysuria. The patient is entirely asymptomatic. The past medical history, family history and social history are unchanged. All other review of systems are negative. Patient denies any changes in medications. Medication list was reconciled.\par \par On examination, the patient is a healthy-appearing gentleman in no acute distress. He is alert and oriented follows commands. He has normal mood and affect. He is normocephalic. Neck is supple. Oral no thrush. Respirations are unlabored. His abdomen is soft and nontender. Bladder is nonpalpable. No CVA tenderness. Neurologically he is grossly intact. No peripheral edema. Skin without gross abnormality. He has normal male external genitalia. Normal meatus. Bilateral testes are descended intrascrotally and normal to palpation. On rectal examination, there is normal sphincter tone. The prostate is clinically benign without focal induration or nodularity.\par \par His last PSA in Sept 2020 was 6.11, which is elevated.\par \par Assessment: Elevated PSA. BPH, progressive symptoms on Flomax QD.\par \par I counseled the patient. In terms of his BPH, he has progressive symptoms. I recommended the patient increase taking Flomax to BID. I renewed the patient's prescription for Flomax today. I encouraged the patient to continue medications regularly as directed. In terms of his elevated PSA, I recommended he repeat PSA and BMP today to ensure stability. I discussed with the patient that if his PSA remains elevated, then he may consider undergoing prostate MRI. Risks and alternatives were discussed. I answered the patient questions. The patient will follow-up in 6 months and will contact me with any questions or concerns. Thank you for the opportunity to participate in the care of Mr. LOGAN. I will keep you updated on his progress.\par \par Plan: Increase Flomax to BID. BMP. PSA. Follow-up in 6 months.

## 2021-06-12 ENCOUNTER — NON-APPOINTMENT (OUTPATIENT)
Age: 81
End: 2021-06-12

## 2021-09-14 ENCOUNTER — APPOINTMENT (OUTPATIENT)
Dept: INTERNAL MEDICINE | Facility: CLINIC | Age: 81
End: 2021-09-14

## 2021-10-21 ENCOUNTER — RX RENEWAL (OUTPATIENT)
Age: 81
End: 2021-10-21

## 2022-03-04 ENCOUNTER — NON-APPOINTMENT (OUTPATIENT)
Age: 82
End: 2022-03-04

## 2022-03-04 ENCOUNTER — APPOINTMENT (OUTPATIENT)
Dept: INTERNAL MEDICINE | Facility: CLINIC | Age: 82
End: 2022-03-04
Payer: MEDICARE

## 2022-03-04 VITALS
BODY MASS INDEX: 29.16 KG/M2 | WEIGHT: 175 LBS | SYSTOLIC BLOOD PRESSURE: 130 MMHG | DIASTOLIC BLOOD PRESSURE: 80 MMHG | HEART RATE: 77 BPM | HEIGHT: 65 IN | OXYGEN SATURATION: 98 %

## 2022-03-04 DIAGNOSIS — E55.9 VITAMIN D DEFICIENCY, UNSPECIFIED: ICD-10-CM

## 2022-03-04 PROCEDURE — G0442 ANNUAL ALCOHOL SCREEN 15 MIN: CPT

## 2022-03-04 PROCEDURE — G0439: CPT

## 2022-03-04 PROCEDURE — G0444 DEPRESSION SCREEN ANNUAL: CPT

## 2022-03-04 PROCEDURE — 93000 ELECTROCARDIOGRAM COMPLETE: CPT | Mod: 59

## 2022-03-05 RX ORDER — MUPIROCIN 20 MG/G
2 OINTMENT TOPICAL 3 TIMES DAILY
Qty: 2 | Refills: 2 | Status: DISCONTINUED | COMMUNITY
Start: 2017-06-22 | End: 2022-03-05

## 2022-03-05 RX ORDER — AZITHROMYCIN 250 MG/1
250 TABLET, FILM COATED ORAL
Qty: 1 | Refills: 0 | Status: DISCONTINUED | COMMUNITY
Start: 2020-02-04 | End: 2022-03-05

## 2022-03-05 RX ORDER — ALBUTEROL SULFATE 90 UG/1
108 (90 BASE) AEROSOL, METERED RESPIRATORY (INHALATION) EVERY 6 HOURS
Qty: 1 | Refills: 3 | Status: DISCONTINUED | COMMUNITY
Start: 2019-12-17 | End: 2022-03-05

## 2022-03-05 RX ORDER — FLUOCINONIDE 0.05 MG/G
0.05 OINTMENT TOPICAL
Qty: 1 | Refills: 3 | Status: DISCONTINUED | COMMUNITY
Start: 2017-06-22 | End: 2022-03-05

## 2022-03-05 NOTE — PHYSICAL EXAM

## 2022-03-05 NOTE — HISTORY OF PRESENT ILLNESS
[FreeTextEntry1] : 81 year old male with history of hyperlipidemia, was taking Simvastatin in the past, but had elevated LFTs in the past - currently off his statin.  Here for repeat fasting blood work.\par Notes occasioanl palpitations-drinks 1 cup coffee but 4-5 cups of oolong tea sreaped all day\par Other issues:\par 1. HLD-off statin-f/u labs-fair diet\par 2. Hx of gout-off allopurinol-stable-trying to stay on low purine diet. Coffee-2 cups. tea 3-4 cups\par 3. Onychomycosis-both big toenails involved-used penlac\par 4. BPH/elevated but stable PSA 6 -annual f/u due March 2022 with urology-on Tamsulosin.4mg bid\par 5. colonoscopy with dr flowers 2007 negative, 8/4/2016-polyp in ascending colon-pathology tubular adenoma, f/u as per GI\par 6. eczema -left palm\par

## 2022-03-05 NOTE — HEALTH RISK ASSESSMENT
[Yes] : Yes [Monthly or less (1 pt)] : Monthly or less (1 point) [No falls in past year] : Patient reported no falls in the past year [0] : 2) Feeling down, depressed, or hopeless: Not at all (0) [HIV test declined] : HIV test declined [Fully functional (bathing, dressing, toileting, transferring, walking, feeding)] : Fully functional (bathing, dressing, toileting, transferring, walking, feeding) [Fully functional (using the telephone, shopping, preparing meals, housekeeping, doing laundry, using] : Fully functional and needs no help or supervision to perform IADLs (using the telephone, shopping, preparing meals, housekeeping, doing laundry, using transportation, managing medications and managing finances) [Discussed at today's visit] : Advance Directives Discussed at today's visit [Former] : Former [PHQ-2 Negative - No further assessment needed] : PHQ-2 Negative - No further assessment needed [Audit-CScore] : 1 [[id number]] : 0 [BoneDensityDate] : 07/16 [ColonoscopyDate] : 08/16 [HepatitisCDate] : 06/14

## 2022-03-06 LAB
25(OH)D3 SERPL-MCNC: 22.1 NG/ML
ALBUMIN SERPL ELPH-MCNC: 4.2 G/DL
ALP BLD-CCNC: 83 U/L
ALT SERPL-CCNC: 16 U/L
ANION GAP SERPL CALC-SCNC: 11 MMOL/L
APPEARANCE: CLEAR
AST SERPL-CCNC: 17 U/L
BACTERIA: NEGATIVE
BASOPHILS # BLD AUTO: 0.02 K/UL
BASOPHILS NFR BLD AUTO: 0.4 %
BILIRUB SERPL-MCNC: 1.3 MG/DL
BILIRUBIN URINE: NEGATIVE
BLOOD URINE: NORMAL
BUN SERPL-MCNC: 18 MG/DL
CALCIUM SERPL-MCNC: 9.4 MG/DL
CHLORIDE SERPL-SCNC: 105 MMOL/L
CHOLEST SERPL-MCNC: 167 MG/DL
CO2 SERPL-SCNC: 24 MMOL/L
COLOR: YELLOW
CREAT SERPL-MCNC: 1.16 MG/DL
EGFR: 64 ML/MIN/1.73M2
EOSINOPHIL # BLD AUTO: 0.09 K/UL
EOSINOPHIL NFR BLD AUTO: 1.8 %
ESTIMATED AVERAGE GLUCOSE: 120 MG/DL
GLUCOSE QUALITATIVE U: NEGATIVE
GLUCOSE SERPL-MCNC: 108 MG/DL
HBA1C MFR BLD HPLC: 5.8 %
HCT VFR BLD CALC: 38.3 %
HDLC SERPL-MCNC: 46 MG/DL
HGB BLD-MCNC: 12.7 G/DL
HYALINE CASTS: 0 /LPF
IMM GRANULOCYTES NFR BLD AUTO: 0.2 %
KETONES URINE: NEGATIVE
LDLC SERPL CALC-MCNC: 91 MG/DL
LEUKOCYTE ESTERASE URINE: ABNORMAL
LYMPHOCYTES # BLD AUTO: 1.32 K/UL
LYMPHOCYTES NFR BLD AUTO: 25.8 %
MAN DIFF?: NORMAL
MCHC RBC-ENTMCNC: 31 PG
MCHC RBC-ENTMCNC: 33.2 GM/DL
MCV RBC AUTO: 93.4 FL
MICROSCOPIC-UA: NORMAL
MONOCYTES # BLD AUTO: 0.64 K/UL
MONOCYTES NFR BLD AUTO: 12.5 %
NEUTROPHILS # BLD AUTO: 3.03 K/UL
NEUTROPHILS NFR BLD AUTO: 59.3 %
NITRITE URINE: NEGATIVE
NONHDLC SERPL-MCNC: 120 MG/DL
PH URINE: 6
PLATELET # BLD AUTO: 150 K/UL
POTASSIUM SERPL-SCNC: 4.3 MMOL/L
PROT SERPL-MCNC: 6.9 G/DL
PROTEIN URINE: NEGATIVE
PSA FREE FLD-MCNC: 35 %
PSA FREE SERPL-MCNC: 2.13 NG/ML
PSA SERPL-MCNC: 6.02 NG/ML
RBC # BLD: 4.1 M/UL
RBC # FLD: 14.6 %
RED BLOOD CELLS URINE: 4 /HPF
SODIUM SERPL-SCNC: 140 MMOL/L
SPECIFIC GRAVITY URINE: 1.02
SQUAMOUS EPITHELIAL CELLS: 0 /HPF
TRIGL SERPL-MCNC: 148 MG/DL
TSH SERPL-ACNC: 1.92 UIU/ML
URATE SERPL-MCNC: 6 MG/DL
UROBILINOGEN URINE: NORMAL
WBC # FLD AUTO: 5.11 K/UL
WHITE BLOOD CELLS URINE: 3 /HPF

## 2022-03-21 ENCOUNTER — APPOINTMENT (OUTPATIENT)
Dept: UROLOGY | Facility: CLINIC | Age: 82
End: 2022-03-21
Payer: MEDICARE

## 2022-03-21 VITALS
SYSTOLIC BLOOD PRESSURE: 173 MMHG | WEIGHT: 160 LBS | HEART RATE: 73 BPM | DIASTOLIC BLOOD PRESSURE: 97 MMHG | BODY MASS INDEX: 26.66 KG/M2 | HEIGHT: 65 IN

## 2022-03-21 PROCEDURE — 99214 OFFICE O/P EST MOD 30 MIN: CPT

## 2022-03-21 NOTE — ADDENDUM
[FreeTextEntry1] : Entered by Addy Canchola, acting as scribe for Dr. Don Reddy.\par \par The documentation recorded by the scribe accurately reflects the service I personally performed and the decisions made by me.

## 2022-03-21 NOTE — HISTORY OF PRESENT ILLNESS
[FreeTextEntry1] : Please refer to U[facility] Consult note \par \par \par Follow-up BPH.  He has progressive symptoms on Flomax twice daily.  Add Proscar.\par Follow-up elevated PSA.  PSA 6.01.  This is age-appropriate.  Repeat PSA 1 year.\par Follow-up 1 year

## 2022-03-21 NOTE — LETTER BODY
[FreeTextEntry1] : Sruthi Ma MD\par [address] [facility]\[place] [address]\par P [phone]\par F [phone]\par \par Dear Dr. Ma,\par \par Reason for visit: Elevated PSA. BPH.\par \par This is a 81 year-old gentleman with a history of acute sinusitis, presenting with elevated PSA and chronic BPH. The patient returns for follow-up. Since he was last seen, the patient reports taking Flomax BID regularly without any side effects or difficulties with the medication. However, he continues to complain of weak urine flow and progressive urinary symptoms despite the medication. He denies any hematuria or urinary incontinence. Patient denies any other changes in health. He reports no pain. He denies any hematuria or dysuria. The past medical history, family history and social history are unchanged. All other review of systems are negative. Patient denies any changes in medications. Medication list was reconciled.\par \par On examination, the patient is a healthy-appearing gentleman in no acute distress. He is alert and oriented follows commands. He has normal mood and affect. He is normocephalic. Neck is supple. Oral no thrush. Respirations are unlabored. His abdomen is soft and nontender. Bladder is nonpalpable. No CVA tenderness. Neurologically he is grossly intact. No peripheral edema. Skin without gross abnormality. He has normal male external genitalia. Normal meatus. Bilateral testes are descended intrascrotally and normal to palpation. On rectal examination, there is normal sphincter tone. The prostate is clinically benign without focal induration or nodularity.\par \par His CMP demonstrated normal renal functions, creatinine 1.16. His PSA was 6.02, which is elevated but age appropriate. His urinalysis was unremarkable. \par \par Assessment: Elevated PSA. BPH, progressive symptoms on Flomax BID.\par \par I counseled the patient. In terms of his BPH, he has progressive symptoms despite medical therapy. I recommended the patient begin a trial of Proscar. I discussed the potential side effects of the medication. I counseled the patient on its use and side effects. If the patient develops any side effects, the patient will discontinue the medication and contact me.  I also recommended the patient continue taking Flomax BID. I renewed the patient's prescription for Flomax today. I encouraged the patient to continue medications regularly as directed. In terms of his elevated PSA, I reassured the patient as his PSA was age appropriate. I encouraged the patient to follow up in 1 year for repeat PSA testing to ensure stability.  Risks and alternatives were discussed. I answered the patient questions. The patient will follow-up in 6 months and will contact me with any questions or concerns. Thank you for the opportunity to participate in the care of Mr. LOGAN. I will keep you updated on his progress.\par \par Plan: Trial of Proscar. Continue Flomax BID. Follow-up in 1 year.

## 2022-03-29 ENCOUNTER — APPOINTMENT (OUTPATIENT)
Dept: CARDIOLOGY | Facility: CLINIC | Age: 82
End: 2022-03-29
Payer: MEDICARE

## 2022-03-29 ENCOUNTER — NON-APPOINTMENT (OUTPATIENT)
Age: 82
End: 2022-03-29

## 2022-03-29 VITALS — SYSTOLIC BLOOD PRESSURE: 151 MMHG | DIASTOLIC BLOOD PRESSURE: 71 MMHG

## 2022-03-29 VITALS
HEART RATE: 101 BPM | BODY MASS INDEX: 29.29 KG/M2 | RESPIRATION RATE: 18 BRPM | OXYGEN SATURATION: 95 % | WEIGHT: 176 LBS | TEMPERATURE: 97.6 F | DIASTOLIC BLOOD PRESSURE: 75 MMHG | SYSTOLIC BLOOD PRESSURE: 152 MMHG

## 2022-03-29 DIAGNOSIS — R07.9 CHEST PAIN, UNSPECIFIED: ICD-10-CM

## 2022-03-29 DIAGNOSIS — R42 DIZZINESS AND GIDDINESS: ICD-10-CM

## 2022-03-29 PROCEDURE — 93015 CV STRESS TEST SUPVJ I&R: CPT

## 2022-03-29 PROCEDURE — 99204 OFFICE O/P NEW MOD 45 MIN: CPT | Mod: 25

## 2022-03-29 PROCEDURE — 93000 ELECTROCARDIOGRAM COMPLETE: CPT | Mod: 59

## 2022-03-29 PROCEDURE — 93306 TTE W/DOPPLER COMPLETE: CPT

## 2022-03-29 RX ORDER — ASPIRIN 81 MG/1
81 TABLET ORAL DAILY
Qty: 60 | Refills: 1 | Status: ACTIVE | COMMUNITY
Start: 2022-03-29 | End: 1900-01-01

## 2022-03-29 NOTE — DISCUSSION/SUMMARY
[Non-specific ECG Changes] : abnormal ECG [Bundle Branch Block] : ~T bundle branch block [Coronary Artery Disease] : coronary artery disease [None] : There are no changes in medication management [Coronary Artery Catheterization] : coronary artery catheterization [Hypertension] : hypertension [Stable] : stable [Medication Changes Per Orders] : Medication changes are as documented in orders [Exercise Regimen] : an exercise regimen [Acetaminophen Avoidance] : acetaminophen  avoidance [Low Sodium Diet] : low sodium diet [NSAIDs Avoidance] : non-steroidal anti-inflammatory drugs avoidance [Patient] : the patient [Family] : the patient's family [Minutes Spent: ___] : for [unfilled] ~Uminutes [With Me] : with me [___ Month(s)] : in [unfilled] month(s) [de-identified] : abnormal stress ECG responses with SOB, ST depression, and poor  tolerance. [de-identified] : exercise  induced hypertension [de-identified] : adding beta-blocker.

## 2022-03-29 NOTE — CARDIOLOGY SUMMARY
[de-identified] : 3-: RSR with  incomplete RBBB, nonspecific STTC [de-identified] : 3-: GXT done with  very limited exertion/tolerance ST depression at V3-5 [de-identified] : 3-: TTE preliminary findings  negative

## 2022-03-29 NOTE — REASON FOR VISIT
[Symptom and Test Evaluation] : symptom and test evaluation [Hypertension] : hypertension [Spouse] : spouse

## 2022-03-29 NOTE — REVIEW OF SYSTEMS
[Fever] : no fever [Headache] : no headache [Chills] : no chills [Feeling Fatigued] : not feeling fatigued [Blurry Vision] : no blurred vision [Seeing Double (Diplopia)] : no diplopia [Eye Pain] : no eye pain [Earache] : no earache [Discharge From Ears] : no discharge from the ears [Hearing Loss] : no hearing loss [Mouth Sores] : no mouth sores [Sore Throat] : no sore throat [Sinus Pressure] : no sinus pressure [Tinnitus] : no tinnitus [Vertigo] : no vertigo [SOB] : no shortness of breath [Dyspnea on exertion] : dyspnea during exertion [Chest Discomfort] : chest discomfort [Lower Ext Edema] : no extremity edema [Leg Claudication] : no intermittent leg claudication [Palpitations] : no palpitations [Orthopnea] : no orthopnea [PND] : no PND [Syncope] : no syncope [Cough] : no cough [Wheezing] : no wheezing [Coughing Up Blood] : no hemoptysis [Snoring] : no snoring [Abdominal Pain] : no abdominal pain [Nausea] : no nausea [Vomiting] : no vomiting [Heartburn] : no heartburn [Change in Appetite] : no change in appetite [Change In The Stool] : no change in stool [Dysphagia] : no dysphagia [Diarrhea] : diarrhea [Constipation] : no constipation [Blood in stool] : no blood in stoo [Urinary Frequency] : no change in urinary frequency [Hematuria] : no hematuria [Pain During Urination] : no dysuria [Nocturia] : no nocturia [Joint Pain] : no joint pain [Joint Swelling] : no joint swelling [Joint Stiffness] : no joint stiffness [Muscle Cramps] : no muscle cramps [Myalgia] : no myalgia [Rash] : no rash [Itching] : no itching [Change In Color Of Skin] : change in skin color [Skin Lesions] : no skin lesions [Telangiectasias] : no telangiectasias [Dizziness] : no dizziness [Tremor] : no tremor was seen [Numbness (Hypoesthesia)] : no numbness [Convulsions] : no convulsions [Tingling (Paresthesia)] : no tingling [Weakness] : no weakness [Limb Weakness (Paresis)] : no limb weakness (Paresis) [Speech Disturbance] : no speech disturbance [Confusion] : no confusion was observed [Memory Lapses Or Loss] : no memory lapses or loss [Depression] : no depression [Anxiety] : no anxiety [Under Stress] : not under stress [Suicidal] : not suicidal [Easy Bleeding] : no tendency for easy bleeding [Swollen Glands] : no swollen glands [Easy Bruising] : no tendency for easy bruising [Negative] : Heme/Lymph [FreeTextEntry5] : dizziness

## 2022-03-29 NOTE — HISTORY OF PRESENT ILLNESS
[FreeTextEntry1] : Patient, a  81 year old male with medical treatment for prostate problem and uric acid is here for the evaluation of manifestation with  chest pressure, something wrong of the heart beat, dizziness and exertional exacerbation of symptoms for some time. He claims  he is  sedentary, but without true chest pain except dyspnea and pressure sensation. He  has history of smoking.

## 2022-03-30 ENCOUNTER — RX RENEWAL (OUTPATIENT)
Age: 82
End: 2022-03-30

## 2022-04-07 ENCOUNTER — OUTPATIENT (OUTPATIENT)
Dept: OUTPATIENT SERVICES | Facility: HOSPITAL | Age: 82
LOS: 1 days | End: 2022-04-07
Payer: MEDICARE

## 2022-04-07 DIAGNOSIS — Z11.52 ENCOUNTER FOR SCREENING FOR COVID-19: ICD-10-CM

## 2022-04-07 LAB — SARS-COV-2 RNA SPEC QL NAA+PROBE: SIGNIFICANT CHANGE UP

## 2022-04-07 PROCEDURE — U0003: CPT

## 2022-04-07 PROCEDURE — U0005: CPT

## 2022-04-07 PROCEDURE — C9803: CPT

## 2022-04-26 ENCOUNTER — OUTPATIENT (OUTPATIENT)
Dept: OUTPATIENT SERVICES | Facility: HOSPITAL | Age: 82
LOS: 1 days | End: 2022-04-26
Payer: MEDICARE

## 2022-04-26 ENCOUNTER — TRANSCRIPTION ENCOUNTER (OUTPATIENT)
Age: 82
End: 2022-04-26

## 2022-04-26 VITALS
TEMPERATURE: 99 F | HEIGHT: 66 IN | SYSTOLIC BLOOD PRESSURE: 165 MMHG | OXYGEN SATURATION: 98 % | WEIGHT: 315 LBS | DIASTOLIC BLOOD PRESSURE: 72 MMHG | RESPIRATION RATE: 16 BRPM | HEART RATE: 76 BPM

## 2022-04-26 VITALS
DIASTOLIC BLOOD PRESSURE: 66 MMHG | OXYGEN SATURATION: 100 % | SYSTOLIC BLOOD PRESSURE: 139 MMHG | HEART RATE: 62 BPM | RESPIRATION RATE: 13 BRPM

## 2022-04-26 DIAGNOSIS — R94.39 ABNORMAL RESULT OF OTHER CARDIOVASCULAR FUNCTION STUDY: ICD-10-CM

## 2022-04-26 LAB
ANION GAP SERPL CALC-SCNC: 15 MMOL/L — SIGNIFICANT CHANGE UP (ref 5–17)
BUN SERPL-MCNC: 16 MG/DL — SIGNIFICANT CHANGE UP (ref 7–23)
CALCIUM SERPL-MCNC: 9.5 MG/DL — SIGNIFICANT CHANGE UP (ref 8.4–10.5)
CHLORIDE SERPL-SCNC: 107 MMOL/L — SIGNIFICANT CHANGE UP (ref 96–108)
CO2 SERPL-SCNC: 21 MMOL/L — LOW (ref 22–31)
CREAT SERPL-MCNC: 0.97 MG/DL — SIGNIFICANT CHANGE UP (ref 0.5–1.3)
EGFR: 79 ML/MIN/1.73M2 — SIGNIFICANT CHANGE UP
GLUCOSE SERPL-MCNC: 112 MG/DL — HIGH (ref 70–99)
HCT VFR BLD CALC: 37.9 % — LOW (ref 39–50)
HGB BLD-MCNC: 12.6 G/DL — LOW (ref 13–17)
MCHC RBC-ENTMCNC: 30.1 PG — SIGNIFICANT CHANGE UP (ref 27–34)
MCHC RBC-ENTMCNC: 33.2 GM/DL — SIGNIFICANT CHANGE UP (ref 32–36)
MCV RBC AUTO: 90.5 FL — SIGNIFICANT CHANGE UP (ref 80–100)
NRBC # BLD: 0 /100 WBCS — SIGNIFICANT CHANGE UP (ref 0–0)
PLATELET # BLD AUTO: 203 K/UL — SIGNIFICANT CHANGE UP (ref 150–400)
POTASSIUM SERPL-MCNC: 3.9 MMOL/L — SIGNIFICANT CHANGE UP (ref 3.5–5.3)
POTASSIUM SERPL-SCNC: 3.9 MMOL/L — SIGNIFICANT CHANGE UP (ref 3.5–5.3)
RBC # BLD: 4.19 M/UL — LOW (ref 4.2–5.8)
RBC # FLD: 14.3 % — SIGNIFICANT CHANGE UP (ref 10.3–14.5)
SODIUM SERPL-SCNC: 143 MMOL/L — SIGNIFICANT CHANGE UP (ref 135–145)
WBC # BLD: 5.74 K/UL — SIGNIFICANT CHANGE UP (ref 3.8–10.5)
WBC # FLD AUTO: 5.74 K/UL — SIGNIFICANT CHANGE UP (ref 3.8–10.5)

## 2022-04-26 PROCEDURE — C1887: CPT

## 2022-04-26 PROCEDURE — 93454 CORONARY ARTERY ANGIO S&I: CPT

## 2022-04-26 PROCEDURE — 93454 CORONARY ARTERY ANGIO S&I: CPT | Mod: 26

## 2022-04-26 PROCEDURE — 80048 BASIC METABOLIC PNL TOTAL CA: CPT

## 2022-04-26 PROCEDURE — 99152 MOD SED SAME PHYS/QHP 5/>YRS: CPT

## 2022-04-26 PROCEDURE — 93005 ELECTROCARDIOGRAM TRACING: CPT

## 2022-04-26 PROCEDURE — 85027 COMPLETE CBC AUTOMATED: CPT

## 2022-04-26 PROCEDURE — 93010 ELECTROCARDIOGRAM REPORT: CPT

## 2022-04-26 PROCEDURE — C1894: CPT

## 2022-04-26 PROCEDURE — 36415 COLL VENOUS BLD VENIPUNCTURE: CPT

## 2022-04-26 PROCEDURE — C1769: CPT

## 2022-04-26 RX ORDER — SODIUM CHLORIDE 9 MG/ML
500 INJECTION INTRAMUSCULAR; INTRAVENOUS; SUBCUTANEOUS
Refills: 0 | Status: DISCONTINUED | OUTPATIENT
Start: 2022-04-26 | End: 2022-05-10

## 2022-04-26 RX ORDER — TAMSULOSIN HYDROCHLORIDE 0.4 MG/1
1 CAPSULE ORAL
Qty: 0 | Refills: 0 | DISCHARGE

## 2022-04-26 RX ORDER — ASPIRIN/CALCIUM CARB/MAGNESIUM 324 MG
1 TABLET ORAL
Qty: 0 | Refills: 0 | DISCHARGE

## 2022-04-26 RX ORDER — OMEGA-3 ACID ETHYL ESTERS 1 G
1 CAPSULE ORAL
Qty: 0 | Refills: 0 | DISCHARGE

## 2022-04-26 RX ORDER — ALLOPURINOL 300 MG
1 TABLET ORAL
Qty: 0 | Refills: 0 | DISCHARGE

## 2022-04-26 RX ORDER — FINASTERIDE 5 MG/1
1 TABLET, FILM COATED ORAL
Qty: 0 | Refills: 0 | DISCHARGE

## 2022-04-26 RX ORDER — SODIUM CHLORIDE 9 MG/ML
250 INJECTION INTRAMUSCULAR; INTRAVENOUS; SUBCUTANEOUS ONCE
Refills: 0 | Status: COMPLETED | OUTPATIENT
Start: 2022-04-26 | End: 2022-04-26

## 2022-04-26 RX ADMIN — SODIUM CHLORIDE 75 MILLILITER(S): 9 INJECTION INTRAMUSCULAR; INTRAVENOUS; SUBCUTANEOUS at 11:21

## 2022-04-26 RX ADMIN — SODIUM CHLORIDE 750 MILLILITER(S): 9 INJECTION INTRAMUSCULAR; INTRAVENOUS; SUBCUTANEOUS at 11:21

## 2022-04-26 NOTE — H&P CARDIOLOGY - HISTORY OF PRESENT ILLNESS
This is a 80y/o Mandarin/English speaking Chinese male with no known implantable devices noted Covid 19 negative Vaccinated with Pfizer and Booster in Nov 2021 with PMHX of HTN, HLD BPH , Gout and former smoker. Pt presented to Cardiologist  This is a 80y/o Mandarin/English speaking Chinese male with no known implantable devices noted Covid 19 negative Vaccinated with Pfizer and Booster in Nov 2021 with PMHX of HTN, HLD BPH , Gout and former smoker. Pt presented to Cardiologist Dr. Luis Antonio Carpio with recent evaluation of manifestations with chest pressure +pressure sensation and dyspnea on exertion. Pt had +stress test on 3/29/22 revealed ST depressions at V3-V5 . Now presents for scheduled LHC today with Dr. Osorio . Yamile CP free no sob no palpitations no lightheadedness of dizziness noted.

## 2022-04-26 NOTE — ASU DISCHARGE PLAN (ADULT/PEDIATRIC) - CARE PROVIDER_API CALL
Luis Antonio Carpio; MBBS)  Cardiovascular Disease; Internal Medicine  [address] [place] [address], [address] [facility], [address] [place] [address]  Phone: [phone]  Fax: [phone]  Follow Up Time:

## 2022-04-26 NOTE — ASU PATIENT PROFILE, ADULT - FALL HARM RISK - UNIVERSAL INTERVENTIONS
Bed in lowest position, wheels locked, appropriate side rails in place/Call bell, personal items and telephone in reach/Instruct patient to call for assistance before getting out of bed or chair/Non-slip footwear when patient is out of bed/[place] to call system/Physically safe environment - no spills, clutter or unnecessary equipment/Purposeful Proactive Rounding/Room/bathroom lighting operational, light cord in reach

## 2022-04-26 NOTE — ASU DISCHARGE PLAN (ADULT/PEDIATRIC) - ASU DC SPECIAL INSTRUCTIONSFT
Wound Care:   the day AFTER your procedure remove bandage GENTLY, and clean using  mild soap and gentle warm, water stream, pat dry. leave OPEN to air. YOU MAY SHOWER   DO NOT apply lotions, creams, ointments, powder, perfumes to your incision site  DO NOT SOAK your site for 1 week ( no baths, no pools, no tubs, etc...)  Check  your groin and /or wrist daily.A small amount of bruising, and soarness are normal    ACTIVITY: for 24 hours   - DO NOT DRIVE  - DO NOT make any important decisions or sign legal documents   - DO NOT operate heavy machineries   - you may resume sexual activity in 48 hours, unless otherwise instructed by your cardiologist     If your procedure was done through the WRIST: for the NEXT 3DAYS:  - avoid pushing, pulling, with that affected wrist   - avoid repeated movement of that hand and wrist ( eg: typing, hammering)  - DO NOT LIFT anything more than 5 lbs     If your procedure was done through the GROIN: for the NEXT 5 DAYS  - Limit climbing stairs, DO NOT soak in bathtub or pool  - no strenuous activities, pushing, pulling, straining  - Do not lift anything 10lbs or heavier     MEDICATION:   take your medications as explained ( see discharge paperwork)   If you received a STENT, you will be taking antiplatelet medications to KEEP YOUR STENT OPEN ( eg: Aspirin, Plavix, Brilinta, Effient, etc).  Take as prescribed DO NOT STOP taking them without consulting with your cardiologist first.     Follow heart healthy diet recommended by your doctor, , if you smoke STOP SMOKING ( may call [phone] for center of tobacco control if you need assistance)     CALL your doctor to make appointment in 2 WEEKS     ***CALL YOUR DOCTOR***  if you experience: fever, chills, body aches, or severe pain, swelling, redness, heat or yellow discharge at incision site  If you experience Bleeding or excruciating pain at the procedural site, swelling ( golf ball size) at your procedural site  If you experience CHEST PAIN  If you experience extremity numbness, tingling, temperature change ( of your procedural site)   If you are unable to reach your doctor, you may contact:   -Cardiology Office at [facility] at [phone] or   - [facility] [phone]  - [facility] [phone]

## 2022-04-26 NOTE — ASU DISCHARGE PLAN (ADULT/PEDIATRIC) - NS MD DC FALL RISK RISK
For information on Fall & Injury Prevention, visit: [email or web address] OR  [email or web address] OR  https://www.cdc.gov/steadi/patient.html

## 2022-04-26 NOTE — H&P CARDIOLOGY - NSICDXPASTMEDICALHX_GEN_ALL_CORE_FT
PAST MEDICAL HISTORY:  Former smoker     Gout     History of BPH     HLD (hyperlipidemia)     HTN (hypertension)

## 2022-04-28 DIAGNOSIS — Z11.59 ENCOUNTER FOR SCREENING FOR OTHER VIRAL DISEASES: ICD-10-CM

## 2022-04-29 ENCOUNTER — NON-APPOINTMENT (OUTPATIENT)
Age: 82
End: 2022-04-29

## 2022-04-29 LAB — SARS-COV-2 N GENE NPH QL NAA+PROBE: DETECTED

## 2022-05-13 PROBLEM — Z87.891 PERSONAL HISTORY OF NICOTINE DEPENDENCE: Chronic | Status: ACTIVE | Noted: 2022-04-26

## 2022-05-13 PROBLEM — Z87.438 PERSONAL HISTORY OF OTHER DISEASES OF MALE GENITAL ORGANS: Chronic | Status: ACTIVE | Noted: 2022-04-26

## 2022-05-13 PROBLEM — M10.9 GOUT, UNSPECIFIED: Chronic | Status: ACTIVE | Noted: 2022-04-26

## 2022-05-13 PROBLEM — E78.5 HYPERLIPIDEMIA, UNSPECIFIED: Chronic | Status: ACTIVE | Noted: 2022-04-26

## 2022-05-13 PROBLEM — I10 ESSENTIAL (PRIMARY) HYPERTENSION: Chronic | Status: ACTIVE | Noted: 2022-04-26

## 2022-05-16 ENCOUNTER — NON-APPOINTMENT (OUTPATIENT)
Age: 82
End: 2022-05-16

## 2022-05-16 ENCOUNTER — APPOINTMENT (OUTPATIENT)
Dept: CARDIOLOGY | Facility: CLINIC | Age: 82
End: 2022-05-16
Payer: MEDICARE

## 2022-05-16 VITALS
TEMPERATURE: 97.8 F | DIASTOLIC BLOOD PRESSURE: 76 MMHG | OXYGEN SATURATION: 95 % | SYSTOLIC BLOOD PRESSURE: 150 MMHG | WEIGHT: 174 LBS | RESPIRATION RATE: 18 BRPM | HEART RATE: 107 BPM | BODY MASS INDEX: 28.96 KG/M2

## 2022-05-16 VITALS — DIASTOLIC BLOOD PRESSURE: 71 MMHG | SYSTOLIC BLOOD PRESSURE: 139 MMHG

## 2022-05-16 DIAGNOSIS — R06.02 SHORTNESS OF BREATH: ICD-10-CM

## 2022-05-16 DIAGNOSIS — R00.2 PALPITATIONS: ICD-10-CM

## 2022-05-16 DIAGNOSIS — Z98.890 OTHER SPECIFIED POSTPROCEDURAL STATES: ICD-10-CM

## 2022-05-16 PROCEDURE — 93000 ELECTROCARDIOGRAM COMPLETE: CPT | Mod: 59

## 2022-05-16 PROCEDURE — 99214 OFFICE O/P EST MOD 30 MIN: CPT

## 2022-05-16 RX ORDER — COVID-19 MOLECULAR TEST ASSAY
KIT MISCELLANEOUS
Qty: 8 | Refills: 0 | Status: ACTIVE | COMMUNITY
Start: 2022-04-27

## 2022-05-16 RX ORDER — METOPROLOL TARTRATE 50 MG/1
50 TABLET, FILM COATED ORAL TWICE DAILY
Qty: 180 | Refills: 3 | Status: ACTIVE | COMMUNITY
Start: 2022-03-29 | End: 1900-01-01

## 2022-05-16 NOTE — HISTORY OF PRESENT ILLNESS
[FreeTextEntry1] : Patient, a  81 year old male with medical treatment for prostate problem and uric acid was here for the evaluation of manifestation with  chest pressure, something wrong of the heart beat, dizziness and exertional exacerbation of symptoms for some time. He claims  he is  sedentary, but without true chest pain except dyspnea and pressure sensation. He  has history of smoking.\par He had angiogram done on 4-. The result discussed and explained.

## 2022-05-16 NOTE — REASON FOR VISIT
[Symptom and Test Evaluation] : symptom and test evaluation [Hypertension] : hypertension [Spouse] : spouse [Other: ____] : [unfilled]

## 2022-05-16 NOTE — DISCUSSION/SUMMARY
[Non-specific ECG Changes] : abnormal ECG [Bundle Branch Block] : ~T bundle branch block [Coronary Artery Disease] : coronary artery disease [None] : There are no changes in medication management [Coronary Artery Catheterization] : coronary artery catheterization [Hypertension] : hypertension [Stable] : stable [Medication Changes Per Orders] : Medication changes are as documented in orders [Acetaminophen Avoidance] : acetaminophen  avoidance [Low Sodium Diet] : low sodium diet [NSAIDs Avoidance] : non-steroidal anti-inflammatory drugs avoidance [Patient] : the patient [Family] : the patient's family [Minutes Spent: ___] : for [unfilled] ~Uminutes [With Me] : with me [___ Month(s)] : in [unfilled] month(s) [Dietary Modification] : dietary modification [Exercise Regimen] : an exercise regimen [Weight Reduction] : weight reduction [de-identified] : abnormal stress ECG responses with SOB, ST depression, and poor  tolerance. negative cardiac findings ( 4-) [de-identified] : exercise  induced hypertension [de-identified] : adding beta-blocker.

## 2022-05-16 NOTE — CARDIOLOGY SUMMARY
[de-identified] : 3-: RSR with  incomplete RBBB, nonspecific STTC 5- no interval changes. [de-identified] : 3-: GXT done with  very limited exertion/tolerance ST depression at V3-5 [de-identified] : 3-: TTE  findings  negative, EF 70%, mild LAE,  mod. AR

## 2022-05-16 NOTE — REVIEW OF SYSTEMS
[Dyspnea on exertion] : dyspnea during exertion [Negative] : Heme/Lymph [Fever] : no fever [Headache] : no headache [Chills] : no chills [Feeling Fatigued] : not feeling fatigued [Blurry Vision] : no blurred vision [Seeing Double (Diplopia)] : no diplopia [Eye Pain] : no eye pain [Earache] : no earache [Discharge From Ears] : no discharge from the ears [Hearing Loss] : no hearing loss [Mouth Sores] : no mouth sores [Sore Throat] : no sore throat [Sinus Pressure] : no sinus pressure [Tinnitus] : no tinnitus [Vertigo] : no vertigo [SOB] : no shortness of breath [Chest Discomfort] : no chest discomfort [Lower Ext Edema] : no extremity edema [Leg Claudication] : no intermittent leg claudication [Palpitations] : no palpitations [Orthopnea] : no orthopnea [PND] : no PND [Syncope] : no syncope [Cough] : no cough [Wheezing] : no wheezing [Coughing Up Blood] : no hemoptysis [Snoring] : no snoring [Abdominal Pain] : no abdominal pain [Nausea] : no nausea [Vomiting] : no vomiting [Heartburn] : no heartburn [Change in Appetite] : no change in appetite [Change In The Stool] : no change in stool [Dysphagia] : no dysphagia [Diarrhea] : diarrhea [Constipation] : no constipation [Blood in stool] : no blood in stoo [Urinary Frequency] : no change in urinary frequency [Hematuria] : no hematuria [Pain During Urination] : no dysuria [Nocturia] : no nocturia [Joint Pain] : no joint pain [Joint Swelling] : no joint swelling [Joint Stiffness] : no joint stiffness [Muscle Cramps] : no muscle cramps [Myalgia] : no myalgia [Rash] : no rash [Itching] : no itching [Change In Color Of Skin] : change in skin color [Skin Lesions] : no skin lesions [Telangiectasias] : no telangiectasias [Dizziness] : no dizziness [Tremor] : no tremor was seen [Numbness (Hypoesthesia)] : no numbness [Convulsions] : no convulsions [Tingling (Paresthesia)] : no tingling [Weakness] : no weakness [Limb Weakness (Paresis)] : no limb weakness (Paresis) [Speech Disturbance] : no speech disturbance [Confusion] : no confusion was observed [Memory Lapses Or Loss] : no memory lapses or loss [Depression] : no depression [Anxiety] : no anxiety [Under Stress] : not under stress [Suicidal] : not suicidal [Easy Bleeding] : no tendency for easy bleeding [Swollen Glands] : no swollen glands [Easy Bruising] : no tendency for easy bruising [FreeTextEntry5] : dizziness

## 2022-07-07 ENCOUNTER — RX RENEWAL (OUTPATIENT)
Age: 82
End: 2022-07-07

## 2022-07-07 RX ORDER — METOPROLOL TARTRATE 25 MG/1
25 TABLET, FILM COATED ORAL
Qty: 180 | Refills: 1 | Status: ACTIVE | COMMUNITY
Start: 2022-07-07 | End: 1900-01-01

## 2022-10-25 NOTE — ASU PATIENT PROFILE, ADULT - PRO INTERPRETER NEED 2
Patient was here in office & educated on Peripheral angiogram for Dx: PAD. Procedure is scheduled for 10/25/22 @ 1300, w/arrival @ 1100, @ [facility]. Pre-admission orders were given to patient for labs & CXR, which are due 10/25//22 @ [address] [facility]. Procedure and risks were explained to patient. Consent forms were signed. Instructions were given to patient to remain NPO after midnight the night before procedure. Patient may take morning meds the morning of procedure with small amount of water. Patient is asked to call hospital @ [phone], 1 to 2 days before procedure to pre-register. Patient was notified that procedure could be delayed due to an emergency. Patient voiced understanding.  Copies of consent, pre-testing orders, & instructions scanned into media English

## 2022-11-01 ENCOUNTER — MED ADMIN CHARGE (OUTPATIENT)
Age: 82
End: 2022-11-01

## 2022-11-01 ENCOUNTER — APPOINTMENT (OUTPATIENT)
Dept: INTERNAL MEDICINE | Facility: CLINIC | Age: 82
End: 2022-11-01

## 2022-11-01 PROCEDURE — G0008: CPT

## 2022-11-01 PROCEDURE — 90662 IIV NO PRSV INCREASED AG IM: CPT

## 2023-03-07 ENCOUNTER — APPOINTMENT (OUTPATIENT)
Dept: INTERNAL MEDICINE | Facility: CLINIC | Age: 83
End: 2023-03-07
Payer: MEDICARE

## 2023-03-07 VITALS
HEART RATE: 87 BPM | WEIGHT: 174 LBS | DIASTOLIC BLOOD PRESSURE: 60 MMHG | HEIGHT: 65 IN | SYSTOLIC BLOOD PRESSURE: 140 MMHG | OXYGEN SATURATION: 97 % | BODY MASS INDEX: 28.99 KG/M2

## 2023-03-07 VITALS — SYSTOLIC BLOOD PRESSURE: 136 MMHG | DIASTOLIC BLOOD PRESSURE: 70 MMHG

## 2023-03-07 DIAGNOSIS — E79.0 HYPERURICEMIA W/OUT SIGNS OF INFLAMMATORY ARTHRITIS AND TOPHACEOUS DISEASE: ICD-10-CM

## 2023-03-07 PROCEDURE — G0444 DEPRESSION SCREEN ANNUAL: CPT

## 2023-03-07 PROCEDURE — G0439: CPT

## 2023-03-07 NOTE — PHYSICAL EXAM

## 2023-03-07 NOTE — HISTORY OF PRESENT ILLNESS
[FreeTextEntry1] : 82 year old male with history of hyperlipidemia, was taking Simvastatin in the past, but had elevated LFTs in the past - currently off his statin.  Here for repeat fasting blood work.\par Notes occasional palpitations-drinks 1 cup coffee but 4-5 cups of oolong tea steaped all day\par Other issues:\par 1. HTN/HLD-off statin-f/u labs-fair diet. Abnormal EST March 2022, negative cardiac cath April 2022, started on Metoprolol-50mg bid in 2022\par 2. Hx of gout-off allopurinol-stable-trying to stay on low purine diet. Coffee-2 cups. tea 3-4 cups\par 3. Onychomycosis-both big toenails involved-used penlac\par 4. BPH/elevated but stable PSA 6 -annual f/u with urology-on Tamsulosin.4mg bid and Finasteride\par 5. colonoscopy with dr flowers 2007 negative, 8/4/2016-polyp in ascending colon-pathology tubular adenoma, f/u as per GI\par 6. eczema -left palm\par

## 2023-03-07 NOTE — HEALTH RISK ASSESSMENT
[Yes] : Yes [Monthly or less (1 pt)] : Monthly or less (1 point) [No falls in past year] : Patient reported no falls in the past year [0] : 2) Feeling down, depressed, or hopeless: Not at all (0) [PHQ-2 Negative - No further assessment needed] : PHQ-2 Negative - No further assessment needed [HIV test declined] : HIV test declined [Fully functional (bathing, dressing, toileting, transferring, walking, feeding)] : Fully functional (bathing, dressing, toileting, transferring, walking, feeding) [Fully functional (using the telephone, shopping, preparing meals, housekeeping, doing laundry, using] : Fully functional and needs no help or supervision to perform IADLs (using the telephone, shopping, preparing meals, housekeeping, doing laundry, using transportation, managing medications and managing finances) [Discussed at today's visit] : Advance Directives Discussed at today's visit [Former] : Former [Audit-CScore] : 1 [[id number]] : 0 [BoneDensityDate] : 07/16 [ColonoscopyDate] : 08/16 [HepatitisCDate] : 06/14

## 2023-04-04 ENCOUNTER — RX RENEWAL (OUTPATIENT)
Age: 83
End: 2023-04-04

## 2023-05-11 ENCOUNTER — APPOINTMENT (OUTPATIENT)
Dept: UROLOGY | Facility: CLINIC | Age: 83
End: 2023-05-11
Payer: MEDICARE

## 2023-05-11 VITALS — DIASTOLIC BLOOD PRESSURE: 69 MMHG | SYSTOLIC BLOOD PRESSURE: 170 MMHG | OXYGEN SATURATION: 96 % | HEART RATE: 89 BPM

## 2023-05-11 DIAGNOSIS — R94.39 ABNORMAL RESULT OF OTHER CARDIOVASCULAR FUNCTION STUDY: ICD-10-CM

## 2023-05-11 PROCEDURE — 99214 OFFICE O/P EST MOD 30 MIN: CPT

## 2023-05-11 RX ORDER — TAMSULOSIN HYDROCHLORIDE 0.4 MG/1
0.4 CAPSULE ORAL
Qty: 180 | Refills: 3 | Status: ACTIVE | COMMUNITY
Start: 2020-10-05 | End: 1900-01-01

## 2023-05-13 DIAGNOSIS — R74.01 ELEVATION OF LEVELS OF LIVER TRANSAMINASE LEVELS: ICD-10-CM

## 2023-05-13 LAB
ANION GAP SERPL CALC-SCNC: 12 MMOL/L
BUN SERPL-MCNC: 16 MG/DL
CALCIUM SERPL-MCNC: 9.2 MG/DL
CHLORIDE SERPL-SCNC: 109 MMOL/L
CO2 SERPL-SCNC: 23 MMOL/L
CREAT SERPL-MCNC: 1.15 MG/DL
EGFR: 64 ML/MIN/1.73M2
GLUCOSE SERPL-MCNC: 147 MG/DL
POTASSIUM SERPL-SCNC: 3.9 MMOL/L
PSA FREE FLD-MCNC: 30 %
PSA FREE SERPL-MCNC: 0.75 NG/ML
PSA SERPL-MCNC: 2.47 NG/ML
SODIUM SERPL-SCNC: 143 MMOL/L

## 2023-05-13 NOTE — LETTER BODY
[FreeTextEntry1] : Sruthi Ma MD\par [address] [facility]\[place] [address]\par P [phone]\par F [phone]\par \par Dear Dr. Ma,\par \par Reason for visit: Elevated PSA. BPH.\par \par This is an 82 year-old gentleman with a history of acute sinusitis, presenting with elevated PSA and chronic BPH. The patient returns for follow-up. Since he was last seen, the patient reports taking Flomax BID regularly without any side effects or difficulties with the medication. He denies any hematuria or urinary incontinence. Patient denies any other changes in health. He reports no pain. He denies any hematuria or dysuria. The past medical history, family history and social history are unchanged. All other review of systems are negative. Patient denies any changes in medications. Medication list was reconciled.\par \par On examination, the patient is a well-appearing gentleman in no acute distress. He is alert and oriented follows commands. He has normal mood and affect. He is normocephalic. Neck is supple. Oral no thrush. Respirations are unlabored. His abdomen is soft and nontender. Bladder is nonpalpable. No CVA tenderness. Neurologically he is grossly intact. No peripheral edema. Skin without gross abnormality. He has normal male external genitalia. Normal meatus. Bilateral testes are descended intrascrotally and normal to palpation. On rectal examination, there is normal sphincter tone. The prostate is clinically benign without focal induration or nodularity.\par \par His PSA was 6.02, which is elevated but age appropriate.\par \par Assessment: Elevated PSA. BPH. \par \par I counseled the patient. In terms of his BPH, I recommended that he continue taking Flomax BID. If the patient develops any side effects, the patient will discontinue the medication and contact me. I encouraged the patient to continue medications regularly as directed. In terms of his elevated PSA, I reassured the patient as his PSA was age appropriate. I encouraged the patient to follow up in 1 year for repeat PSA testing to ensure stability.  Risks and alternatives were discussed. I answered the patient questions. The patient will follow-up in 6 months and will contact me with any questions or concerns. Thank you for the opportunity to participate in the care of Mr. LOGAN. I will keep you updated on his progress.\par \par Plan: Continue Flomax BID. Follow-up in 1 year.

## 2023-05-13 NOTE — ADDENDUM
[FreeTextEntry1] : Entered by Mike Winn, acting as scribe for Dr. Don Reddy.\par The documentation recorded by the scribe accurately reflects the service I personally performed and the decisions made by me.

## 2023-09-05 ENCOUNTER — APPOINTMENT (OUTPATIENT)
Dept: INTERNAL MEDICINE | Facility: CLINIC | Age: 83
End: 2023-09-05
Payer: MEDICARE

## 2023-09-05 ENCOUNTER — OUTPATIENT (OUTPATIENT)
Dept: OUTPATIENT SERVICES | Facility: HOSPITAL | Age: 83
LOS: 1 days | End: 2023-09-05
Payer: MEDICARE

## 2023-09-05 VITALS
WEIGHT: 181 LBS | OXYGEN SATURATION: 98 % | HEIGHT: 65 IN | BODY MASS INDEX: 30.16 KG/M2 | DIASTOLIC BLOOD PRESSURE: 60 MMHG | HEART RATE: 81 BPM | SYSTOLIC BLOOD PRESSURE: 124 MMHG

## 2023-09-05 DIAGNOSIS — I10 ESSENTIAL (PRIMARY) HYPERTENSION: ICD-10-CM

## 2023-09-05 PROCEDURE — 99213 OFFICE O/P EST LOW 20 MIN: CPT

## 2023-09-05 PROCEDURE — G0463: CPT

## 2023-11-14 PROBLEM — I10 HYPERTENSION: Status: ACTIVE | Noted: 2022-03-29

## 2024-02-21 ENCOUNTER — RX RENEWAL (OUTPATIENT)
Age: 84
End: 2024-02-21

## 2024-03-03 ENCOUNTER — RX RENEWAL (OUTPATIENT)
Age: 84
End: 2024-03-03

## 2024-03-03 RX ORDER — FINASTERIDE 5 MG/1
5 TABLET, FILM COATED ORAL DAILY
Qty: 90 | Refills: 1 | Status: ACTIVE | COMMUNITY
Start: 2022-03-21 | End: 1900-01-01

## 2024-03-14 ENCOUNTER — OUTPATIENT (OUTPATIENT)
Dept: OUTPATIENT SERVICES | Facility: HOSPITAL | Age: 84
LOS: 1 days | End: 2024-03-14
Payer: MEDICARE

## 2024-03-14 ENCOUNTER — APPOINTMENT (OUTPATIENT)
Dept: INTERNAL MEDICINE | Facility: CLINIC | Age: 84
End: 2024-03-14
Payer: MEDICARE

## 2024-03-14 ENCOUNTER — NON-APPOINTMENT (OUTPATIENT)
Age: 84
End: 2024-03-14

## 2024-03-14 VITALS
BODY MASS INDEX: 29.49 KG/M2 | HEIGHT: 65 IN | OXYGEN SATURATION: 98 % | DIASTOLIC BLOOD PRESSURE: 60 MMHG | HEART RATE: 73 BPM | WEIGHT: 177 LBS | SYSTOLIC BLOOD PRESSURE: 140 MMHG

## 2024-03-14 DIAGNOSIS — Z00.00 ENCOUNTER FOR GENERAL ADULT MEDICAL EXAMINATION WITHOUT ABNORMAL FINDINGS: ICD-10-CM

## 2024-03-14 DIAGNOSIS — E78.5 HYPERLIPIDEMIA, UNSPECIFIED: ICD-10-CM

## 2024-03-14 DIAGNOSIS — M10.9 GOUT, UNSPECIFIED: ICD-10-CM

## 2024-03-14 DIAGNOSIS — Z00.00 ENCOUNTER FOR GENERAL ADULT MEDICAL EXAMINATION W/OUT ABNORMAL FINDINGS: ICD-10-CM

## 2024-03-14 DIAGNOSIS — R97.20 ELEVATED PROSTATE SPECIFIC ANTIGEN [PSA]: ICD-10-CM

## 2024-03-14 DIAGNOSIS — I10 ESSENTIAL (PRIMARY) HYPERTENSION: ICD-10-CM

## 2024-03-14 DIAGNOSIS — N40.0 BENIGN PROSTATIC HYPERPLASIA WITHOUT LOWER URINARY TRACT SYMPTOMS: ICD-10-CM

## 2024-03-14 DIAGNOSIS — Z13.31 ENCOUNTER FOR SCREENING FOR DEPRESSION: ICD-10-CM

## 2024-03-14 LAB
APPEARANCE: CLEAR
BACTERIA: NEGATIVE /HPF
BILIRUBIN URINE: NEGATIVE
BLOOD URINE: ABNORMAL
CAST: 0 /LPF
COLOR: YELLOW
EPITHELIAL CELLS: 0 /HPF
ESTIMATED AVERAGE GLUCOSE: 117 MG/DL
GLUCOSE QUALITATIVE U: NEGATIVE MG/DL
HBA1C MFR BLD HPLC: 5.7 %
HCT VFR BLD CALC: 38.9 %
HGB BLD-MCNC: 13.1 G/DL
KETONES URINE: NEGATIVE MG/DL
LEUKOCYTE ESTERASE URINE: NEGATIVE
MCHC RBC-ENTMCNC: 30.2 PG
MCHC RBC-ENTMCNC: 33.7 GM/DL
MCV RBC AUTO: 89.6 FL
MICROSCOPIC-UA: NORMAL
NITRITE URINE: NEGATIVE
PH URINE: 5.5
PLATELET # BLD AUTO: 162 K/UL
PROTEIN URINE: NEGATIVE MG/DL
PSA SERPL-MCNC: 2.56 NG/ML
RBC # BLD: 4.34 M/UL
RBC # FLD: 14.9 %
RED BLOOD CELLS URINE: 2 /HPF
REVIEW: NORMAL
SPECIFIC GRAVITY URINE: 1.02
UROBILINOGEN URINE: 0.2 MG/DL
WBC # FLD AUTO: 4.56 K/UL
WHITE BLOOD CELLS URINE: 0 /HPF

## 2024-03-14 PROCEDURE — 93010 ELECTROCARDIOGRAM REPORT: CPT

## 2024-03-14 PROCEDURE — G0444 DEPRESSION SCREEN ANNUAL: CPT

## 2024-03-14 PROCEDURE — G0439: CPT

## 2024-03-15 LAB
ALBUMIN SERPL ELPH-MCNC: 4.2 G/DL
ALP BLD-CCNC: 90 U/L
ALT SERPL-CCNC: 31 U/L
ANION GAP SERPL CALC-SCNC: 14 MMOL/L
AST SERPL-CCNC: 28 U/L
BILIRUB SERPL-MCNC: 0.8 MG/DL
BUN SERPL-MCNC: 15 MG/DL
CALCIUM SERPL-MCNC: 9.1 MG/DL
CHLORIDE SERPL-SCNC: 107 MMOL/L
CHOLEST SERPL-MCNC: 166 MG/DL
CO2 SERPL-SCNC: 20 MMOL/L
CREAT SERPL-MCNC: 1.07 MG/DL
EGFR: 70 ML/MIN/1.73M2
GLUCOSE SERPL-MCNC: 137 MG/DL
HDLC SERPL-MCNC: 51 MG/DL
LDLC SERPL CALC-MCNC: 85 MG/DL
NONHDLC SERPL-MCNC: 115 MG/DL
POTASSIUM SERPL-SCNC: 4 MMOL/L
PROT SERPL-MCNC: 7 G/DL
SODIUM SERPL-SCNC: 141 MMOL/L
TRIGL SERPL-MCNC: 176 MG/DL
TSH SERPL-ACNC: 2.37 UIU/ML

## 2024-03-22 NOTE — HEALTH RISK ASSESSMENT
[Yes] : Yes [Monthly or less (1 pt)] : Monthly or less (1 point) [No falls in past year] : Patient reported no falls in the past year [0] : 2) Feeling down, depressed, or hopeless: Not at all (0) [PHQ-2 Negative - No further assessment needed] : PHQ-2 Negative - No further assessment needed [Fully functional (using the telephone, shopping, preparing meals, housekeeping, doing laundry, using] : Fully functional and needs no help or supervision to perform IADLs (using the telephone, shopping, preparing meals, housekeeping, doing laundry, using transportation, managing medications and managing finances) [HIV test declined] : HIV test declined [Fully functional (bathing, dressing, toileting, transferring, walking, feeding)] : Fully functional (bathing, dressing, toileting, transferring, walking, feeding) [Former] : Former [Discussed at today's visit] : Advance Directives Discussed at today's visit [[id number]] : 0 [Audit-CScore] : 1 [BoneDensityDate] : 07/16 [ColonoscopyDate] : 08/16 [HepatitisCDate] : 06/14

## 2024-03-22 NOTE — HISTORY OF PRESENT ILLNESS
[FreeTextEntry1] : 83 year old male with history of hyperlipidemia, was taking Simvastatin in the past, but had elevated LFTs in the past - currently off his statin.  Here for repeat fasting blood work. Notes occasional palpitations-drinks 1 cup coffee but 4-5 cups of oolong tea steaped all day Other issues: 1. HTN/HLD-off statin-f/u labs-fair diet. Abnormal EST March 2022, negative cardiac cath April 2022, started on Metoprolol-50mg bid in 2022 2. Hx of gout-off allopurinol-stable-trying to stay on low purine diet. Coffee-2 cups. tea 3-4 cups 3. Onychomycosis-both big toenails involved-used penlac 4. BPH/elevated but stable PSA 6 -annual f/u with urology-on Tamsulosin.4mg bid and Finasteride 5. colonoscopy with dr flowers 2007 negative, 8/4/2016-polyp in ascending colon-pathology tubular adenoma, f/u as per GI 6. eczema -left palm stable

## 2024-03-22 NOTE — PHYSICAL EXAM
[Well Nourished] : well nourished [No Acute Distress] : no acute distress [Well Developed] : well developed [Well-Appearing] : well-appearing [Normal Sclera/Conjunctiva] : normal sclera/conjunctiva [PERRL] : pupils equal round and reactive to light [Normal Outer Ear/Nose] : the outer ears and nose were normal in appearance [EOMI] : extraocular movements intact [No JVD] : no jugular venous distention [Normal Oropharynx] : the oropharynx was normal [Supple] : supple [Thyroid Normal, No Nodules] : the thyroid was normal and there were no nodules present [No Respiratory Distress] : no respiratory distress  [No Lymphadenopathy] : no lymphadenopathy [Clear to Auscultation] : lungs were clear to auscultation bilaterally [No Accessory Muscle Use] : no accessory muscle use [Normal Rate] : normal rate  [Regular Rhythm] : with a regular rhythm [Normal S1, S2] : normal S1 and S2 [No Abdominal Bruit] : a ~M bruit was not heard ~T in the abdomen [No Murmur] : no murmur heard [No Carotid Bruits] : no carotid bruits [No Varicosities] : no varicosities [Pedal Pulses Present] : the pedal pulses are present [No Extremity Clubbing/Cyanosis] : no extremity clubbing/cyanosis [No Edema] : there was no peripheral edema [No Palpable Aorta] : no palpable aorta [Soft] : abdomen soft [Non Tender] : non-tender [Non-distended] : non-distended [No Masses] : no abdominal mass palpated [No HSM] : no HSM [Normal Posterior Cervical Nodes] : no posterior cervical lymphadenopathy [Normal Bowel Sounds] : normal bowel sounds [No Spinal Tenderness] : no spinal tenderness [Normal Anterior Cervical Nodes] : no anterior cervical lymphadenopathy [No CVA Tenderness] : no CVA  tenderness [No Rash] : no rash [No Joint Swelling] : no joint swelling [Grossly Normal Strength/Tone] : grossly normal strength/tone [Normal Gait] : normal gait [No Focal Deficits] : no focal deficits [Coordination Grossly Intact] : coordination grossly intact [Normal Affect] : the affect was normal [Normal Insight/Judgement] : insight and judgment were intact [Deep Tendon Reflexes (DTR)] : deep tendon reflexes were 2+ and symmetric [de-identified] : circulat quarter shaper area on left palm

## 2024-05-01 ENCOUNTER — APPOINTMENT (OUTPATIENT)
Dept: INTERNAL MEDICINE | Facility: CLINIC | Age: 84
End: 2024-05-01
Payer: MEDICARE

## 2024-05-01 ENCOUNTER — OUTPATIENT (OUTPATIENT)
Dept: OUTPATIENT SERVICES | Facility: HOSPITAL | Age: 84
LOS: 1 days | End: 2024-05-01
Payer: MEDICARE

## 2024-05-01 VITALS
BODY MASS INDEX: 29.49 KG/M2 | OXYGEN SATURATION: 98 % | DIASTOLIC BLOOD PRESSURE: 60 MMHG | HEIGHT: 65 IN | WEIGHT: 177 LBS | HEART RATE: 80 BPM | SYSTOLIC BLOOD PRESSURE: 144 MMHG

## 2024-05-01 DIAGNOSIS — R04.0 EPISTAXIS: ICD-10-CM

## 2024-05-01 DIAGNOSIS — H91.90 UNSPECIFIED HEARING LOSS, UNSPECIFIED EAR: ICD-10-CM

## 2024-05-01 DIAGNOSIS — R05.9 COUGH, UNSPECIFIED: ICD-10-CM

## 2024-05-01 DIAGNOSIS — I10 ESSENTIAL (PRIMARY) HYPERTENSION: ICD-10-CM

## 2024-05-01 PROCEDURE — 99214 OFFICE O/P EST MOD 30 MIN: CPT

## 2024-05-01 PROCEDURE — G2211 COMPLEX E/M VISIT ADD ON: CPT

## 2024-05-01 PROCEDURE — G0463: CPT

## 2024-05-01 NOTE — HISTORY OF PRESENT ILLNESS
[de-identified] : hearing reduced on left ear, with rining in ears x over a month NOse running from allergoes and sometimes bloody secretions couogh phlegm  with certain foods  CXR

## 2024-05-20 PROBLEM — H91.90 DECREASED HEARING, UNSPECIFIED LATERALITY: Status: ACTIVE | Noted: 2024-05-01

## 2024-06-13 ENCOUNTER — APPOINTMENT (OUTPATIENT)
Dept: OTOLARYNGOLOGY | Facility: CLINIC | Age: 84
End: 2024-06-13
Payer: MEDICARE

## 2024-06-13 VITALS — HEART RATE: 79 BPM | DIASTOLIC BLOOD PRESSURE: 63 MMHG | SYSTOLIC BLOOD PRESSURE: 140 MMHG | TEMPERATURE: 97.6 F

## 2024-06-13 DIAGNOSIS — H90.3 SENSORINEURAL HEARING LOSS, BILATERAL: ICD-10-CM

## 2024-06-13 DIAGNOSIS — J34.2 DEVIATED NASAL SEPTUM: ICD-10-CM

## 2024-06-13 DIAGNOSIS — H93.293 OTHER ABNORMAL AUDITORY PERCEPTIONS, BILATERAL: ICD-10-CM

## 2024-06-13 DIAGNOSIS — R04.0 EPISTAXIS: ICD-10-CM

## 2024-06-13 PROCEDURE — 31231 NASAL ENDOSCOPY DX: CPT

## 2024-06-13 PROCEDURE — 92557 COMPREHENSIVE HEARING TEST: CPT

## 2024-06-13 PROCEDURE — 99203 OFFICE O/P NEW LOW 30 MIN: CPT | Mod: 25

## 2024-06-13 PROCEDURE — 92567 TYMPANOMETRY: CPT

## 2024-06-13 NOTE — REVIEW OF SYSTEMS
[Ear Noises] : ear noises [As Noted in HPI] : as noted in HPI [Nasal Congestion] : nasal congestion [Nose Bleeds] : nose bleeds [Negative] : Heme/Lymph

## 2024-06-13 NOTE — CONSULT LETTER
[Dear  ___] : Dear  [unfilled], [Consult Letter:] : I had the pleasure of evaluating your patient, [unfilled]. [Please see my note below.] : Please see my note below. [Consult Closing:] : Thank you very much for allowing me to participate in the care of this patient.  If you have any questions, please do not hesitate to contact me. [Sincerely,] : Sincerely, [FreeTextEntry3] : Madhu Talley MD [facility] Associated Professor, [place]

## 2024-06-13 NOTE — HISTORY OF PRESENT ILLNESS
[de-identified] : Patient reports intermittent noises in the left ear for the last few months at least. He feels that the hearing in the left ear has diminished progressively over the years as well  He also has issues with bloody noses in the winter. When he cleans the nose after a nosebleed he notes its sore inside the nostril. He uses an antibiotic cream from time to time but then a few days after stopping the cream it will run.  He does not use any nasal sprays right now. No recent bleeding from the nose

## 2024-06-13 NOTE — ASSESSMENT
[FreeTextEntry1] : Patient diminished hearing here for evaluation and possibly hearing aids on examination minimal amount of wax which was curetted out audiogram showed asymmetrical moderate sensorineural hearing loss bilaterally sent her for an MRI to rule out an acoustic neuroma and referred him over to her hearing aid dispensary to evaluate him for hearing aid.  Patient gives a history of having nosebleeds in the winter endoscopically has a deviated nasal septum to the right.

## 2024-06-17 ENCOUNTER — APPOINTMENT (OUTPATIENT)
Dept: ULTRASOUND IMAGING | Facility: IMAGING CENTER | Age: 84
End: 2024-06-17
Payer: MEDICARE

## 2024-06-17 ENCOUNTER — APPOINTMENT (OUTPATIENT)
Dept: UROLOGY | Facility: CLINIC | Age: 84
End: 2024-06-17
Payer: MEDICARE

## 2024-06-17 ENCOUNTER — OUTPATIENT (OUTPATIENT)
Dept: OUTPATIENT SERVICES | Facility: HOSPITAL | Age: 84
LOS: 1 days | End: 2024-06-17
Payer: MEDICARE

## 2024-06-17 DIAGNOSIS — N45.2 ORCHITIS: ICD-10-CM

## 2024-06-17 DIAGNOSIS — N52.9 MALE ERECTILE DYSFUNCTION, UNSPECIFIED: ICD-10-CM

## 2024-06-17 DIAGNOSIS — N40.0 BENIGN PROSTATIC HYPERPLASIA WITHOUT LOWER URINARY TRACT SYMPMS: ICD-10-CM

## 2024-06-17 PROCEDURE — 51798 US URINE CAPACITY MEASURE: CPT

## 2024-06-17 PROCEDURE — G2211 COMPLEX E/M VISIT ADD ON: CPT

## 2024-06-17 PROCEDURE — 99214 OFFICE O/P EST MOD 30 MIN: CPT

## 2024-06-17 PROCEDURE — 76870 US EXAM SCROTUM: CPT

## 2024-06-17 PROCEDURE — 76870 US EXAM SCROTUM: CPT | Mod: 26

## 2024-06-17 RX ORDER — SULFAMETHOXAZOLE AND TRIMETHOPRIM 800; 160 MG/1; MG/1
800-160 TABLET ORAL
Qty: 20 | Refills: 0 | Status: ACTIVE | COMMUNITY
Start: 2024-06-17 | End: 1900-01-01

## 2024-06-18 LAB
APPEARANCE: ABNORMAL
BACTERIA: ABNORMAL /HPF
BILIRUBIN URINE: ABNORMAL
BLOOD URINE: ABNORMAL
CAST: 4 /LPF
COARSE GRANULAR CASTS: PRESENT
COLOR: NORMAL
EPITHELIAL CELLS: 1 /HPF
GLUCOSE QUALITATIVE U: NEGATIVE MG/DL
KETONES URINE: ABNORMAL MG/DL
LEUKOCYTE ESTERASE URINE: ABNORMAL
MICROSCOPIC-UA: NORMAL
NITRITE URINE: POSITIVE
PH URINE: 5.5
PROTEIN URINE: 100 MG/DL
RED BLOOD CELLS URINE: 6 /HPF
REVIEW: NORMAL
SPECIFIC GRAVITY URINE: 1.03
URINE CYTOLOGY: NORMAL
UROBILINOGEN URINE: 1 MG/DL
WHITE BLOOD CELLS URINE: >998 /HPF

## 2024-06-18 NOTE — LETTER BODY
[FreeTextEntry1] : [place] [address] P [phone] F [phone]    Dear Dr. Ma,    Reason for visit: Elevated PSA. BPH. Testicular pain.    This is an 83 year-old gentleman with a history of acute sinusitis, presenting with elevated PSA and chronic BPH. The patient returns for follow-up. Since he was last seen, the patient reports taking Flomax BID and Proscar regularly without any side effects or difficulties with the medication. He reports stable symptoms on medication.  He denies any hematuria or urinary incontinence. He complains of left testicular pain after eating at a barbecue. He reports no pain. He denies any hematuria or dysuria. The past medical history, family history and social history are unchanged. All other review of systems are negative. Patient denies any changes in medications. Medication list was reconciled.    On examination, the patient is a well-appearing gentleman in no acute distress. He is alert and oriented follows commands. He has normal mood and affect. He is normocephalic. Neck is supple. Oral no thrush. Respirations are unlabored. His abdomen is soft and nontender. Bladder is nonpalpable. No CVA tenderness. Neurologically he is grossly intact. No peripheral edema. Skin without gross abnormality. He has normal male external genitalia. Normal meatus. Bilateral testes are descended intrascrotally and normal to palpation. On testicular examination, patient has left orchitis.   His PSA was 6.02, which is elevated but age appropriate.   Post-void residual on bladder scan today was 5 cc.   Assessment: Elevated PSA. BPH. Left orchitis.    I counseled the patient. In terms of his BPH, I recommended that he continue taking Flomax BID and Proscar. If the patient develops any side effects, the patient will discontinue the medication and contact me. I encouraged the patient to continue medications regularly as directed. In terms of his left orchitis, I recommended the patient obtain urinalysis and urine culture to look for infections. I recommended the patient start a trial of Bactrim. I discussed the potential side effects of the medication. I counseled the patient on its use and side effects. If the patient develops any side effects, the patient will discontinue medication and contact me. I recommended the patient obtain scrotal ultrasound to ensure stability. I counseled the patient regarding the procedure. The patient will take the necessary preparations for the procedure. Risks and alternatives were discussed. I answered the patient questions. The patient will follow-up as directed and will contact me with any questions or concerns. Thank you for the opportunity to participate in the care of Mr. LOGAN. I will keep you updated on his progress.  Patient will continue longitudinal care for their complex and chronic condition.  Plan: Trial of Bactrim. Continue Flomax BID and Proscar. Urinalysis. Urine culture. Scrotal ultrasound. Follow-up in 2 weeks.

## 2024-06-18 NOTE — ADDENDUM
[FreeTextEntry1] : Entered by Jose Luis Scherer, acting as scribe for Dr. Don Reddy. The documentation recorded by the scribe accurately reflects the service I personally performed and the decisions made by me.

## 2024-06-18 NOTE — HISTORY OF PRESENT ILLNESS
[FreeTextEntry1] : Patient complains of left testicular pain after eating at a barbecue. PVR 5 cc.  In terms of BPH, continue Flomax Proscar.  On exam patient has left orchitis.  Urine culture.  Bactrim.  Scrotal ultrasound.  Follow-up 2 weeks.  Please refer to URO Consult Note.

## 2024-06-21 LAB — BACTERIA UR CULT: ABNORMAL

## 2024-06-21 RX ORDER — AMOXICILLIN AND CLAVULANATE POTASSIUM 875; 125 MG/1; MG/1
875-125 TABLET, COATED ORAL TWICE DAILY
Qty: 28 | Refills: 0 | Status: ACTIVE | COMMUNITY
Start: 2024-06-21 | End: 1900-01-01

## 2024-06-29 DIAGNOSIS — N45.2 ORCHITIS: ICD-10-CM

## 2024-06-29 DIAGNOSIS — R97.20 ELEVATED PROSTATE, SPECIFIC ANTIGEN [PSA]: ICD-10-CM

## 2024-07-01 ENCOUNTER — OUTPATIENT (OUTPATIENT)
Dept: OUTPATIENT SERVICES | Facility: HOSPITAL | Age: 84
LOS: 1 days | End: 2024-07-01
Payer: MEDICARE

## 2024-07-01 ENCOUNTER — APPOINTMENT (OUTPATIENT)
Dept: MRI IMAGING | Facility: CLINIC | Age: 84
End: 2024-07-01
Payer: MEDICARE

## 2024-07-01 ENCOUNTER — APPOINTMENT (OUTPATIENT)
Dept: UROLOGY | Facility: CLINIC | Age: 84
End: 2024-07-01
Payer: MEDICARE

## 2024-07-01 DIAGNOSIS — K50.80 CROHN'S DISEASE OF BOTH SMALL AND LARGE INTESTINE WITHOUT COMPLICATIONS: ICD-10-CM

## 2024-07-01 DIAGNOSIS — H90.3 SENSORINEURAL HEARING LOSS, BILATERAL: ICD-10-CM

## 2024-07-01 LAB
APPEARANCE: ABNORMAL
BACTERIA: ABNORMAL /HPF
BILIRUBIN URINE: ABNORMAL
BLOOD URINE: ABNORMAL
CAST: 8 /LPF
COLOR: ABNORMAL
EPITHELIAL CELLS: 1 /HPF
FINE GRANULAR CASTS: PRESENT
GLUCOSE QUALITATIVE U: NEGATIVE MG/DL
HYALINE CASTS: PRESENT
KETONES URINE: NEGATIVE MG/DL
LEUKOCYTE ESTERASE URINE: ABNORMAL
MICROSCOPIC-UA: NORMAL
NITRITE URINE: NEGATIVE
PH URINE: 5.5
PROTEIN URINE: 30 MG/DL
RED BLOOD CELLS URINE: 2 /HPF
REVIEW: NORMAL
SPECIFIC GRAVITY URINE: 1.02
UROBILINOGEN URINE: 1 MG/DL
WHITE BLOOD CELLS URINE: 160 /HPF

## 2024-07-01 PROCEDURE — G2211 COMPLEX E/M VISIT ADD ON: CPT

## 2024-07-01 PROCEDURE — 70553 MRI BRAIN STEM W/O & W/DYE: CPT | Mod: 26

## 2024-07-01 PROCEDURE — 99214 OFFICE O/P EST MOD 30 MIN: CPT

## 2024-07-01 PROCEDURE — 51798 US URINE CAPACITY MEASURE: CPT

## 2024-07-01 PROCEDURE — A9585: CPT

## 2024-07-01 PROCEDURE — 70553 MRI BRAIN STEM W/O & W/DYE: CPT

## 2024-07-02 LAB — BACTERIA UR CULT: ABNORMAL

## 2024-07-03 ENCOUNTER — NON-APPOINTMENT (OUTPATIENT)
Age: 84
End: 2024-07-03

## 2024-07-06 DIAGNOSIS — K92.1 MELENA: ICD-10-CM

## 2024-07-06 RX ORDER — AMOXICILLIN AND CLAVULANATE POTASSIUM 875; 125 MG/1; MG/1
875-125 TABLET, COATED ORAL TWICE DAILY
Qty: 10 | Refills: 0 | Status: ACTIVE | COMMUNITY
Start: 2024-07-06 | End: 1900-01-01

## 2024-07-08 ENCOUNTER — NON-APPOINTMENT (OUTPATIENT)
Age: 84
End: 2024-07-08

## 2024-07-15 ENCOUNTER — NON-APPOINTMENT (OUTPATIENT)
Age: 84
End: 2024-07-15

## 2024-07-18 ENCOUNTER — INPATIENT (INPATIENT)
Facility: HOSPITAL | Age: 84
LOS: 21 days | Discharge: NURSING, REHAB OR LONG-TERM CARE | End: 2024-08-09
Attending: INTERNAL MEDICINE | Admitting: INTERNAL MEDICINE
Payer: MEDICARE

## 2024-07-18 VITALS
OXYGEN SATURATION: 99 % | HEART RATE: 90 BPM | DIASTOLIC BLOOD PRESSURE: 72 MMHG | TEMPERATURE: 97 F | SYSTOLIC BLOOD PRESSURE: 136 MMHG | RESPIRATION RATE: 17 BRPM

## 2024-07-18 PROCEDURE — 99285 EMERGENCY DEPT VISIT HI MDM: CPT

## 2024-07-18 PROCEDURE — 71045 X-RAY EXAM CHEST 1 VIEW: CPT | Mod: 26

## 2024-07-18 RX ORDER — BACTERIOSTATIC SODIUM CHLORIDE 0.9 %
500 VIAL (ML) INJECTION ONCE
Refills: 0 | Status: COMPLETED | OUTPATIENT
Start: 2024-07-18 | End: 2024-07-18

## 2024-07-18 RX ORDER — ACETAMINOPHEN 500 MG
1000 TABLET ORAL ONCE
Refills: 0 | Status: COMPLETED | OUTPATIENT
Start: 2024-07-18 | End: 2024-07-18

## 2024-07-18 NOTE — ED PROVIDER NOTE - PHYSICAL EXAMINATION
PHYSICAL EXAM:  GENERAL: In no acute distress  HENMT: Atraumatic, dry mucous membranes  EYES: Clear bilaterally, PERRL, EOMs intact b/l  HEART: Slightly tachycardic and regular rhythm, S1/S2  RESPIRATORY: Clear to auscultation bilaterally, no wheezes/rhonchi/rales  ABDOMEN: Soft, mild suprapubic ttp, nondistended, b/l CVA ttp  EXTREMITIES: B/l non-pitting lower extremity edema  NEURO: A&Ox4, no focal neuro deficits, moving all extremities symmetrically   SKIN: Skin normal color for race, warm, dry

## 2024-07-18 NOTE — ED ADULT TRIAGE NOTE - CHIEF COMPLAINT QUOTE
patient c/o back pain, patient was diagnosed with a UTI in the beginning of July, had +ecoli in his urine- patient has been noncompliant with antibiotics and his symptoms are worsening. per report from [facility]- his UTI is resistant to bactrim and ampicillin. denies past medical history

## 2024-07-18 NOTE — ED PROVIDER NOTE - CLINICAL SUMMARY MEDICAL DECISION MAKING FREE TEXT BOX
84 male past medical history hypertension, hyperlipidemia, BPH, chronic back pain presenting with urinary symptoms, b/l back pain, and generalized weakness. DDx includes but not limited to: UTI, ascending UTI, pyelo, other infectious source, renal failure, CHF. Plan: blood work, UA, CXR, viral swab,  IVF, ofirmev. Will re-assess.

## 2024-07-18 NOTE — ED PROVIDER NOTE - ATTENDING CONTRIBUTION TO CARE
I personally made the management plan, and take responsibility for the patient's management. I have discussed with and reviewed the Resident's note and agree with the History, ROS, Physical Exam and MDM unless otherwise indicated. A brief summary of my personal evaluation and impression can be found below.     84-year-old male with a past medical history of hypertension, hyperlipidemia, BPH presenting with concern of lethargy, decreased appetite, decreased p.o. intake, suprapubic discomfort and back pain.  Patient was seeing his urologist who diagnosed him with a UTI around the beginning of July and he was started on Augmentin and Bactrim.  The pills were too big so he did not take the medications.  His son came to visit a few days ago and noticed that he has become very thin and is less active than he usually is. Pt has chronic back pain but it has been worse than prior. He has been taking tylenol and motrin for pain. No F/C/N/V/D. Per son he can get up as needed to go to the bathroom but is slower than prior. Wife is a poor historian due to memory loss.    General: cachectic and pale appearing, no acute distress  Head: Atraumatic, normocephalic  Eyes: EOM grossly in tact, no scleral icterus, no discharge  ENT: moist mucous membranes    Respiratory: CTAB, no wheezing, normal respiratory effort  CV: RRR, good s1/s2, no S3, Extremities warm and well perfused  Abdominal: Soft, non-distended, non-tender, no masses  Extremities: No edema, no deformities  Skin: warm and dry. No rashes I personally made the management plan, and take responsibility for the patient's management. I have discussed with and reviewed the Resident's note and agree with the History, ROS, Physical Exam and MDM unless otherwise indicated. A brief summary of my personal evaluation and impression can be found below.     84-year-old male with a past medical history of hypertension, hyperlipidemia, BPH presenting with concern of lethargy, decreased appetite, decreased p.o. intake, suprapubic discomfort and back pain.  Patient was seeing his urologist who diagnosed him with a UTI around the beginning of July and he was started on Augmentin and Bactrim.  The pills were too big so he did not take the medications.  His son came to visit a few days ago and noticed that he has become very thin and is less active than he usually is. Pt has chronic back pain but it has been worse than prior. He has been taking tylenol and motrin for pain. No F/C/N/V/D. Per son he can get up as needed to go to the bathroom but is slower than prior. Wife is a poor historian due to memory loss.    General: cachectic and pale appearing, no acute distress  Head: Atraumatic, normocephalic  Eyes: EOM grossly in tact, no scleral icterus, no discharge  ENT: dry mucous membranes  Respiratory: CTAB, no wheezing, normal respiratory effort  CV: RRR, good s1/s2, no S3, Extremities warm and well perfused  Abdominal: Soft, non-distended, non-tender, no masses  Extremities: No edema, no deformities  Skin: warm and dry. No rashes    Differential diagnosis includes but is not limited to UTI, malignancy, electrolyte abnormality, failure to thrive. will check urine studies, urine cultures, labs, cxr, viral swab. if UA negative and no other clear etiology for his sx would check CTAP and consider CTH. dispo pending w/u.

## 2024-07-18 NOTE — ED PROVIDER NOTE - OBJECTIVE STATEMENT
84 male past medical history hypertension, hyperlipidemia, BPH, chronic back pain presenting with urinary symptoms and back pain.  Son and wife at bedside.  Patient was diagnosed with a UTI at the beginning of July and has not been taking the antibiotic because he says that the pill is too large for him to swallow.  Coming in today because he has had worsening dysuria and urinary frequency.  Also endorsing lower abdominal pain and bilateral back pain.  Patient has not been eating or drinking much for the past couple of days and has generalized weakness.  No falls, fever, chest pain, shortness of breath.  His urologist is Dr. Don Reddy.  Outpatient urine culture was resistant to Bactrim and ampicillin.

## 2024-07-19 ENCOUNTER — NON-APPOINTMENT (OUTPATIENT)
Age: 84
End: 2024-07-19

## 2024-07-19 DIAGNOSIS — N12 TUBULO-INTERSTITIAL NEPHRITIS, NOT SPECIFIED AS ACUTE OR CHRONIC: ICD-10-CM

## 2024-07-19 DIAGNOSIS — N39.0 URINARY TRACT INFECTION, SITE NOT SPECIFIED: ICD-10-CM

## 2024-07-19 DIAGNOSIS — I10 ESSENTIAL (PRIMARY) HYPERTENSION: ICD-10-CM

## 2024-07-19 DIAGNOSIS — E88.09 OTHER DISORDERS OF PLASMA-PROTEIN METABOLISM, NOT ELSEWHERE CLASSIFIED: ICD-10-CM

## 2024-07-19 DIAGNOSIS — E87.1 HYPO-OSMOLALITY AND HYPONATREMIA: ICD-10-CM

## 2024-07-19 DIAGNOSIS — Z29.9 ENCOUNTER FOR PROPHYLACTIC MEASURES, UNSPECIFIED: ICD-10-CM

## 2024-07-19 DIAGNOSIS — N40.0 BENIGN PROSTATIC HYPERPLASIA WITHOUT LOWER URINARY TRACT SYMPTOMS: ICD-10-CM

## 2024-07-19 DIAGNOSIS — D64.9 ANEMIA, UNSPECIFIED: ICD-10-CM

## 2024-07-19 DIAGNOSIS — J98.11 ATELECTASIS: ICD-10-CM

## 2024-07-19 LAB
ALBUMIN SERPL ELPH-MCNC: 2.4 G/DL — LOW (ref 3.3–5)
ALP SERPL-CCNC: 83 U/L — SIGNIFICANT CHANGE UP (ref 40–120)
ALT FLD-CCNC: 21 U/L — SIGNIFICANT CHANGE UP (ref 4–41)
ANION GAP SERPL CALC-SCNC: 13 MMOL/L — SIGNIFICANT CHANGE UP (ref 7–14)
APPEARANCE UR: ABNORMAL
AST SERPL-CCNC: 30 U/L — SIGNIFICANT CHANGE UP (ref 4–40)
BACTERIA # UR AUTO: ABNORMAL /HPF
BASE EXCESS BLDV CALC-SCNC: -2 MMOL/L — SIGNIFICANT CHANGE UP (ref -2–3)
BASOPHILS # BLD AUTO: 0.03 K/UL — SIGNIFICANT CHANGE UP (ref 0–0.2)
BASOPHILS NFR BLD AUTO: 0.3 % — SIGNIFICANT CHANGE UP (ref 0–2)
BILIRUB SERPL-MCNC: 1.2 MG/DL — SIGNIFICANT CHANGE UP (ref 0.2–1.2)
BILIRUB UR-MCNC: NEGATIVE — SIGNIFICANT CHANGE UP
BUN SERPL-MCNC: 24 MG/DL — HIGH (ref 7–23)
CA-I SERPL-SCNC: 1.22 MMOL/L — SIGNIFICANT CHANGE UP (ref 1.15–1.33)
CALCIUM SERPL-MCNC: 8.2 MG/DL — LOW (ref 8.4–10.5)
CAST: 4 /LPF — SIGNIFICANT CHANGE UP (ref 0–4)
CHLORIDE BLDV-SCNC: 98 MMOL/L — SIGNIFICANT CHANGE UP (ref 96–108)
CHLORIDE SERPL-SCNC: 95 MMOL/L — LOW (ref 98–107)
CO2 BLDV-SCNC: 24.3 MMOL/L — SIGNIFICANT CHANGE UP (ref 22–26)
CO2 SERPL-SCNC: 20 MMOL/L — LOW (ref 22–31)
COLOR SPEC: YELLOW — SIGNIFICANT CHANGE UP
CREAT SERPL-MCNC: 0.89 MG/DL — SIGNIFICANT CHANGE UP (ref 0.5–1.3)
DIFF PNL FLD: NEGATIVE — SIGNIFICANT CHANGE UP
EGFR: 86 ML/MIN/1.73M2 — SIGNIFICANT CHANGE UP
EOSINOPHIL # BLD AUTO: 0.02 K/UL — SIGNIFICANT CHANGE UP (ref 0–0.5)
EOSINOPHIL NFR BLD AUTO: 0.2 % — SIGNIFICANT CHANGE UP (ref 0–6)
FLUAV AG NPH QL: SIGNIFICANT CHANGE UP
FLUBV AG NPH QL: SIGNIFICANT CHANGE UP
GAS PNL BLDV: 126 MMOL/L — LOW (ref 136–145)
GAS PNL BLDV: SIGNIFICANT CHANGE UP
GLUCOSE BLDV-MCNC: 106 MG/DL — HIGH (ref 70–99)
GLUCOSE SERPL-MCNC: 106 MG/DL — HIGH (ref 70–99)
GLUCOSE UR QL: NEGATIVE MG/DL — SIGNIFICANT CHANGE UP
HCO3 BLDV-SCNC: 23 MMOL/L — SIGNIFICANT CHANGE UP (ref 22–29)
HCT VFR BLD CALC: 21.6 % — LOW (ref 39–50)
HCT VFR BLDA CALC: 22 % — LOW (ref 39–51)
HGB BLD CALC-MCNC: 7.4 G/DL — LOW (ref 12.6–17.4)
HGB BLD-MCNC: 7.4 G/DL — LOW (ref 13–17)
IANC: 6.43 K/UL — SIGNIFICANT CHANGE UP (ref 1.8–7.4)
IMM GRANULOCYTES NFR BLD AUTO: 0.6 % — SIGNIFICANT CHANGE UP (ref 0–0.9)
KETONES UR-MCNC: ABNORMAL MG/DL
LACTATE BLDV-MCNC: 2 MMOL/L — SIGNIFICANT CHANGE UP (ref 0.5–2)
LEUKOCYTE ESTERASE UR-ACNC: ABNORMAL
LYMPHOCYTES # BLD AUTO: 1.58 K/UL — SIGNIFICANT CHANGE UP (ref 1–3.3)
LYMPHOCYTES # BLD AUTO: 18 % — SIGNIFICANT CHANGE UP (ref 13–44)
MCHC RBC-ENTMCNC: 30.6 PG — SIGNIFICANT CHANGE UP (ref 27–34)
MCHC RBC-ENTMCNC: 34.3 GM/DL — SIGNIFICANT CHANGE UP (ref 32–36)
MCV RBC AUTO: 89.3 FL — SIGNIFICANT CHANGE UP (ref 80–100)
MONOCYTES # BLD AUTO: 0.68 K/UL — SIGNIFICANT CHANGE UP (ref 0–0.9)
MONOCYTES NFR BLD AUTO: 7.7 % — SIGNIFICANT CHANGE UP (ref 2–14)
NEUTROPHILS # BLD AUTO: 6.43 K/UL — SIGNIFICANT CHANGE UP (ref 1.8–7.4)
NEUTROPHILS NFR BLD AUTO: 73.2 % — SIGNIFICANT CHANGE UP (ref 43–77)
NITRITE UR-MCNC: POSITIVE
NRBC # BLD: 0 /100 WBCS — SIGNIFICANT CHANGE UP (ref 0–0)
NRBC # FLD: 0 K/UL — SIGNIFICANT CHANGE UP (ref 0–0)
NT-PROBNP SERPL-SCNC: 693 PG/ML — HIGH
PCO2 BLDV: 40 MMHG — LOW (ref 42–55)
PH BLDV: 7.37 — SIGNIFICANT CHANGE UP (ref 7.32–7.43)
PH UR: 5.5 — SIGNIFICANT CHANGE UP (ref 5–8)
PLATELET # BLD AUTO: 253 K/UL — SIGNIFICANT CHANGE UP (ref 150–400)
PO2 BLDV: 44 MMHG — SIGNIFICANT CHANGE UP (ref 25–45)
POTASSIUM BLDV-SCNC: 4 MMOL/L — SIGNIFICANT CHANGE UP (ref 3.5–5.1)
POTASSIUM SERPL-MCNC: 4.3 MMOL/L — SIGNIFICANT CHANGE UP (ref 3.5–5.3)
POTASSIUM SERPL-SCNC: 4.3 MMOL/L — SIGNIFICANT CHANGE UP (ref 3.5–5.3)
PROT SERPL-MCNC: 6.2 G/DL — SIGNIFICANT CHANGE UP (ref 6–8.3)
PROT UR-MCNC: 30 MG/DL
RBC # BLD: 2.42 M/UL — LOW (ref 4.2–5.8)
RBC # FLD: 18.6 % — HIGH (ref 10.3–14.5)
RBC CASTS # UR COMP ASSIST: 2 /HPF — SIGNIFICANT CHANGE UP (ref 0–4)
RSV RNA NPH QL NAA+NON-PROBE: SIGNIFICANT CHANGE UP
SAO2 % BLDV: 68.5 % — SIGNIFICANT CHANGE UP (ref 67–88)
SARS-COV-2 RNA SPEC QL NAA+PROBE: SIGNIFICANT CHANGE UP
SODIUM SERPL-SCNC: 128 MMOL/L — LOW (ref 135–145)
SP GR SPEC: 1.02 — SIGNIFICANT CHANGE UP (ref 1–1.03)
SQUAMOUS # UR AUTO: 0 /HPF — SIGNIFICANT CHANGE UP (ref 0–5)
UROBILINOGEN FLD QL: 1 MG/DL — SIGNIFICANT CHANGE UP (ref 0.2–1)
WBC # BLD: 8.79 K/UL — SIGNIFICANT CHANGE UP (ref 3.8–10.5)
WBC # FLD AUTO: 8.79 K/UL — SIGNIFICANT CHANGE UP (ref 3.8–10.5)
WBC UR QL: 218 /HPF — HIGH (ref 0–5)

## 2024-07-19 PROCEDURE — 99223 1ST HOSP IP/OBS HIGH 75: CPT

## 2024-07-19 RX ORDER — BACTERIOSTATIC SODIUM CHLORIDE 0.9 %
500 VIAL (ML) INJECTION ONCE
Refills: 0 | Status: COMPLETED | OUTPATIENT
Start: 2024-07-19 | End: 2024-07-19

## 2024-07-19 RX ORDER — ACETAMINOPHEN 500 MG
650 TABLET ORAL EVERY 6 HOURS
Refills: 0 | Status: DISCONTINUED | OUTPATIENT
Start: 2024-07-19 | End: 2024-07-20

## 2024-07-19 RX ORDER — MELATONIN 3 MG
3 TABLET ORAL AT BEDTIME
Refills: 0 | Status: DISCONTINUED | OUTPATIENT
Start: 2024-07-19 | End: 2024-08-09

## 2024-07-19 RX ADMIN — Medication 500 MILLILITER(S): at 00:54

## 2024-07-19 RX ADMIN — Medication 100 MILLIGRAM(S): at 03:44

## 2024-07-19 RX ADMIN — Medication 100 MILLIGRAM(S): at 05:37

## 2024-07-19 RX ADMIN — Medication 500 MILLILITER(S): at 05:38

## 2024-07-19 RX ADMIN — Medication 400 MILLIGRAM(S): at 00:53

## 2024-07-19 NOTE — H&P ADULT - HISTORY OF PRESENT ILLNESS
HPI:    84M PMH essential HTN, BPH, presents with a week of painful urination and back pain. Mr Swapna was diagnosed with a UTI at the beginning of this month but has not been able to take the antibiotics he was prescribed because the pill was too large to swallow. Over the last week, he has continued to have burning on urination and increased urinary frequency. He also has diffuse back pain, which does not radiate, and is 8/10 in severity at its worse, achy in character, improved after receiving abx.    PAST MEDICAL & SURGICAL HISTORY:  Essential HTN  BPH    Review of Systems:   CONSTITUTIONAL: No fever, weight loss, or fatigue  EYES: No eye pain, visual disturbances, or discharge  ENMT:  No difficulty hearing, tinnitus, vertigo; No sinus or throat pain  NECK: No pain or stiffness  BREASTS: No pain, masses, or nipple discharge  RESPIRATORY: No cough, wheezing, chills or hemoptysis; No shortness of breath  CARDIOVASCULAR: No chest pain, palpitations, dizziness, or leg swelling  GASTROINTESTINAL: No abdominal or epigastric pain. No nausea, vomiting, or hematemesis; No diarrhea or constipation. No melena or hematochezia.  GENITOURINARY: +dysuria, +frequency, no hematuria, or incontinence  NEUROLOGICAL: No headaches, memory loss, loss of strength, numbness, or tremors  SKIN: No itching, burning, rashes, or lesions   LYMPH NODES: No enlarged glands  ENDOCRINE: No heat or cold intolerance; No hair loss  MUSCULOSKELETAL: No joint pain or swelling; No muscle, or extremity pain, +back pain  PSYCHIATRIC: No depression, anxiety, mood swings, or difficulty sleeping  HEME/LYMPH: No easy bruising, or bleeding gums  ALLERGY AND IMMUNOLOGIC: No hives or eczema    Allergies    No Known Allergies    Intolerances        Social History:   Lives with wife and adult sons. Retired - used to work in book business. Drinks a glass of wine on occasion. No tobacco or drug use.    FAMILY HISTORY:  No pertinent medical history in first degree relatives.    MEDICATIONS  (STANDING):    MEDICATIONS  (PRN):  acetaminophen     Tablet .. 650 milliGRAM(s) Oral every 6 hours PRN Temp greater or equal to 38C (100.4F), Mild Pain (1 - 3)  melatonin 3 milliGRAM(s) Oral at bedtime PRN Insomnia      T(C): 36.8 (24 @ 09:35), Max: 37.1 (24 @ 03:53)  HR: 91 (24 @ 09:35) (88 - 108)  BP: 101/58 (24 @ 09:35) (101/58 - 150/94)  RR: 16 (24 @ 09:35) (16 - 18)  SpO2: 98% (24 @ 09:35) (98% - 100%)    CAPILLARY BLOOD GLUCOSE        I&O's Summary    2024 07:01  -  2024 11:28  --------------------------------------------------------  IN: 0 mL / OUT: 100 mL / NET: -100 mL        PHYSICAL EXAM:  GENERAL: NAD, lying in bed  HEAD:  Atraumatic, Normocephalic  EYES: EOMI, PERRLA, conjunctiva and sclera clear  NECK: Supple, No elevated JVD  CHEST/LUNG: Clear to auscultation bilaterally; No wheeze  HEART: Regular rate and rhythm; No murmurs, rubs, or gallops  ABDOMEN: Soft, Nontender, Nondistended; Bowel sounds present  EXTREMITIES:  2+ Peripheral Pulses, No clubbing, cyanosis, or edema  PSYCH: AAOx3  NEUROLOGY: CN II-XII grossly intact, moving all extremities  SKIN: No rashes or lesions    LABS:                        7.4    8.79  )-----------( 253      ( 2024 00:45 )             21.6         128<L>  |  95<L>  |  24<H>  ----------------------------<  106<H>  4.3   |  20<L>  |  0.89    Ca    8.2<L>      2024 00:45    TPro  6.2  /  Alb  2.4<L>  /  TBili  1.2  /  DBili  x   /  AST  30  /  ALT  21  /  AlkPhos  83  19          Urinalysis Basic - ( 2024 00:45 )    Color: Yellow / Appearance: Cloudy / S.018 / pH: x  Gluc: 106 mg/dL / Ketone: Trace mg/dL  / Bili: Negative / Urobili: 1.0 mg/dL   Blood: x / Protein: 30 mg/dL / Nitrite: Positive   Leuk Esterase: Large / RBC: 2 /HPF /  /HPF   Sq Epi: x / Non Sq Epi: 0 /HPF / Bacteria: Too Numerous to count /HPF        Urinalysis with Rflx Culture (collected 24 @ 00:45)        RADIOLOGY & ADDITIONAL TESTS:    ECG Personally Reviewed - CXR - likely atelectasis     Imaging Personally Reviewed:    Consultant(s) Notes Reviewed:      Care Discussed with Consultants/Other Providers:

## 2024-07-19 NOTE — H&P ADULT - PROBLEM SELECTOR PLAN 2
No prior CBC available. No reports of rectal bleeding.   -check iron studies, B12, folate  -will need age-appropriate malignancy screening as outpatient  -trend CBC daily, maintain Hgb >7.0

## 2024-07-19 NOTE — PATIENT PROFILE ADULT - FALL HARM RISK - HARM RISK INTERVENTIONS

## 2024-07-19 NOTE — ED ADULT NURSE NOTE - NSFALLUNIVINTERV_ED_ALL_ED
Bed/Stretcher in lowest position, wheels locked, appropriate side rails in place/Call bell, personal items and telephone in reach/Instruct patient to call for assistance before getting out of bed/chair/stretcher/Non-slip footwear applied when patient is off stretcher/[place] to call system/Physically safe environment - no spills, clutter or unnecessary equipment/Purposeful proactive rounding/Room/bathroom lighting operational, light cord in reach

## 2024-07-19 NOTE — H&P ADULT - PROBLEM SELECTOR PLAN 1
UA concerning for UTI. Mr Swapna has dysuria and back pack pain which have improved since receiving IV antibiotics. He was unable to tolerate PO antibiotics as the pills were too big to swallow.  -Will c/w CTX IV 1g q24h  -f/u UCx, sensitivities  -if back pain worsens/recurs, will obtain CT a/p with IV contrast to evaluate for possible abscess vs other cause of back pain

## 2024-07-19 NOTE — ED ADULT NURSE REASSESSMENT NOTE - NS ED NURSE REASSESS COMMENT FT1
Break coverage RN: Pt A&Ox3, resting in stretcher. Pt denies chest pain, headache, dizziness, SOB, chills, N/V/D. respirations even and unlabored. Labs drawn and sent, medicated per MAR. safety maintained throughout, call bell in reach Break coverage RN: Pt A&Ox3, resting in stretcher. Pt denies chest pain, headache, dizziness, SOB, chills, N/V/D. respirations even and unlabored. Labs drawn and sent, medicated per MAR. Okay per MD [place] for next dose of abx. safety maintained throughout, call bell in reach

## 2024-07-19 NOTE — H&P ADULT - PROBLEM SELECTOR PLAN 3
Moderate hyponatremia on labs; he is grossly euvolemic. Received IVNS in ED.  -Will repeat BMP in AM and encourage PO hydration to thirst

## 2024-07-19 NOTE — ED ADULT NURSE NOTE - CHIEF COMPLAINT QUOTE
patient c/o back pain, patient was diagnosed with a UTI in the beginning of July, had +ecoli in his urine- patient has been noncompliant with antibiotics and his symptoms are worsening. per report from [facility]- his UTI is resistant to bactrim and ampicillin. denies past medical history

## 2024-07-19 NOTE — H&P ADULT - ASSESSMENT
84M PMH essential HTN, BPH, presents with a week of painful urination and back pain. He is admitted for management of likely pyelonephritis.

## 2024-07-20 LAB
-  COAGULASE NEGATIVE STAPHYLOCOCCUS: SIGNIFICANT CHANGE UP
24R-OH-CALCIDIOL SERPL-MCNC: 36.9 NG/ML — SIGNIFICANT CHANGE UP (ref 30–80)
A1C WITH ESTIMATED AVERAGE GLUCOSE RESULT: 4.9 % — SIGNIFICANT CHANGE UP (ref 4–5.6)
ANION GAP SERPL CALC-SCNC: 8 MMOL/L — SIGNIFICANT CHANGE UP (ref 7–14)
BASOPHILS # BLD AUTO: 0.04 K/UL — SIGNIFICANT CHANGE UP (ref 0–0.2)
BASOPHILS NFR BLD AUTO: 0.5 % — SIGNIFICANT CHANGE UP (ref 0–2)
BLD GP AB SCN SERPL QL: NEGATIVE — SIGNIFICANT CHANGE UP
BUN SERPL-MCNC: 20 MG/DL — SIGNIFICANT CHANGE UP (ref 7–23)
CALCIUM SERPL-MCNC: 8.1 MG/DL — LOW (ref 8.4–10.5)
CHLORIDE SERPL-SCNC: 97 MMOL/L — LOW (ref 98–107)
CHLORIDE UR-SCNC: 39 MMOL/L — SIGNIFICANT CHANGE UP
CO2 SERPL-SCNC: 22 MMOL/L — SIGNIFICANT CHANGE UP (ref 22–31)
CREAT SERPL-MCNC: 0.84 MG/DL — SIGNIFICANT CHANGE UP (ref 0.5–1.3)
CULTURE RESULTS: ABNORMAL
EGFR: 87 ML/MIN/1.73M2 — SIGNIFICANT CHANGE UP
EOSINOPHIL # BLD AUTO: 0.08 K/UL — SIGNIFICANT CHANGE UP (ref 0–0.5)
EOSINOPHIL NFR BLD AUTO: 0.9 % — SIGNIFICANT CHANGE UP (ref 0–6)
ESTIMATED AVERAGE GLUCOSE: 94 — SIGNIFICANT CHANGE UP
FERRITIN SERPL-MCNC: 1344 NG/ML — HIGH (ref 30–400)
FOLATE SERPL-MCNC: 2 NG/ML — LOW (ref 3.1–17.5)
GLUCOSE SERPL-MCNC: 100 MG/DL — HIGH (ref 70–99)
GRAM STN FLD: ABNORMAL
HCT VFR BLD CALC: 19.6 % — CRITICAL LOW (ref 39–50)
HCT VFR BLD CALC: 24.2 % — LOW (ref 39–50)
HGB BLD-MCNC: 6.6 G/DL — CRITICAL LOW (ref 13–17)
HGB BLD-MCNC: 8 G/DL — LOW (ref 13–17)
IANC: 6.87 K/UL — SIGNIFICANT CHANGE UP (ref 1.8–7.4)
IMM GRANULOCYTES NFR BLD AUTO: 0.7 % — SIGNIFICANT CHANGE UP (ref 0–0.9)
IRON SATN MFR SERPL: 13 % — LOW (ref 14–50)
IRON SATN MFR SERPL: 15 UG/DL — LOW (ref 45–165)
LYMPHOCYTES # BLD AUTO: 1.11 K/UL — SIGNIFICANT CHANGE UP (ref 1–3.3)
LYMPHOCYTES # BLD AUTO: 12.7 % — LOW (ref 13–44)
MAGNESIUM SERPL-MCNC: 1.7 MG/DL — SIGNIFICANT CHANGE UP (ref 1.6–2.6)
MCHC RBC-ENTMCNC: 29.9 PG — SIGNIFICANT CHANGE UP (ref 27–34)
MCHC RBC-ENTMCNC: 30.3 PG — SIGNIFICANT CHANGE UP (ref 27–34)
MCHC RBC-ENTMCNC: 33.1 GM/DL — SIGNIFICANT CHANGE UP (ref 32–36)
MCHC RBC-ENTMCNC: 33.7 GM/DL — SIGNIFICANT CHANGE UP (ref 32–36)
MCV RBC AUTO: 89.9 FL — SIGNIFICANT CHANGE UP (ref 80–100)
MCV RBC AUTO: 90.3 FL — SIGNIFICANT CHANGE UP (ref 80–100)
METHOD TYPE: SIGNIFICANT CHANGE UP
MONOCYTES # BLD AUTO: 0.58 K/UL — SIGNIFICANT CHANGE UP (ref 0–0.9)
MONOCYTES NFR BLD AUTO: 6.6 % — SIGNIFICANT CHANGE UP (ref 2–14)
NEUTROPHILS # BLD AUTO: 6.87 K/UL — SIGNIFICANT CHANGE UP (ref 1.8–7.4)
NEUTROPHILS NFR BLD AUTO: 78.6 % — HIGH (ref 43–77)
NRBC # BLD: 0 /100 WBCS — SIGNIFICANT CHANGE UP (ref 0–0)
NRBC # BLD: 0 /100 WBCS — SIGNIFICANT CHANGE UP (ref 0–0)
NRBC # FLD: 0 K/UL — SIGNIFICANT CHANGE UP (ref 0–0)
NRBC # FLD: 0 K/UL — SIGNIFICANT CHANGE UP (ref 0–0)
ORGANISM # SPEC MICROSCOPIC CNT: ABNORMAL
ORGANISM # SPEC MICROSCOPIC CNT: ABNORMAL
OSMOLALITY UR: 503 MOSM/KG — SIGNIFICANT CHANGE UP (ref 50–1200)
PHOSPHATE SERPL-MCNC: 2.6 MG/DL — SIGNIFICANT CHANGE UP (ref 2.5–4.5)
PLATELET # BLD AUTO: 211 K/UL — SIGNIFICANT CHANGE UP (ref 150–400)
PLATELET # BLD AUTO: 258 K/UL — SIGNIFICANT CHANGE UP (ref 150–400)
POTASSIUM SERPL-MCNC: 3.5 MMOL/L — SIGNIFICANT CHANGE UP (ref 3.5–5.3)
POTASSIUM SERPL-SCNC: 3.5 MMOL/L — SIGNIFICANT CHANGE UP (ref 3.5–5.3)
POTASSIUM UR-SCNC: 32.5 MMOL/L — SIGNIFICANT CHANGE UP
RBC # BLD: 2.18 M/UL — LOW (ref 4.2–5.8)
RBC # BLD: 2.68 M/UL — LOW (ref 4.2–5.8)
RBC # FLD: 18.5 % — HIGH (ref 10.3–14.5)
RBC # FLD: 18.6 % — HIGH (ref 10.3–14.5)
RH IG SCN BLD-IMP: POSITIVE — SIGNIFICANT CHANGE UP
SODIUM SERPL-SCNC: 127 MMOL/L — LOW (ref 135–145)
SODIUM UR-SCNC: 27 MMOL/L — SIGNIFICANT CHANGE UP
SPECIMEN SOURCE: SIGNIFICANT CHANGE UP
SPECIMEN SOURCE: SIGNIFICANT CHANGE UP
TIBC SERPL-MCNC: 119 UG/DL — LOW (ref 220–430)
UIBC SERPL-MCNC: 104 UG/DL — LOW (ref 110–370)
VIT B12 SERPL-MCNC: 386 PG/ML — SIGNIFICANT CHANGE UP (ref 200–900)
WBC # BLD: 10.07 K/UL — SIGNIFICANT CHANGE UP (ref 3.8–10.5)
WBC # BLD: 8.74 K/UL — SIGNIFICANT CHANGE UP (ref 3.8–10.5)
WBC # FLD AUTO: 10.07 K/UL — SIGNIFICANT CHANGE UP (ref 3.8–10.5)
WBC # FLD AUTO: 8.74 K/UL — SIGNIFICANT CHANGE UP (ref 3.8–10.5)

## 2024-07-20 PROCEDURE — 74177 CT ABD & PELVIS W/CONTRAST: CPT | Mod: 26

## 2024-07-20 PROCEDURE — 99233 SBSQ HOSP IP/OBS HIGH 50: CPT

## 2024-07-20 RX ORDER — ACETAMINOPHEN 500 MG
650 TABLET ORAL EVERY 6 HOURS
Refills: 0 | Status: DISCONTINUED | OUTPATIENT
Start: 2024-07-20 | End: 2024-08-05

## 2024-07-20 RX ORDER — BACTERIOSTATIC SODIUM CHLORIDE 0.9 %
500 VIAL (ML) INJECTION ONCE
Refills: 0 | Status: COMPLETED | OUTPATIENT
Start: 2024-07-20 | End: 2024-07-20

## 2024-07-20 RX ORDER — MULTIVITAMIN/IRON/FOLIC ACID 18MG-0.4MG
1 TABLET ORAL DAILY
Refills: 0 | Status: DISCONTINUED | OUTPATIENT
Start: 2024-07-20 | End: 2024-08-09

## 2024-07-20 RX ORDER — BACTERIOSTATIC SODIUM CHLORIDE 0.9 %
2 VIAL (ML) INJECTION EVERY 8 HOURS
Refills: 0 | Status: COMPLETED | OUTPATIENT
Start: 2024-07-20 | End: 2024-07-23

## 2024-07-20 RX ADMIN — Medication 2 GRAM(S): at 13:03

## 2024-07-20 RX ADMIN — Medication 2 GRAM(S): at 21:08

## 2024-07-20 RX ADMIN — Medication 250 MILLILITER(S): at 13:03

## 2024-07-20 RX ADMIN — Medication 650 MILLIGRAM(S): at 15:24

## 2024-07-20 RX ADMIN — Medication 650 MILLIGRAM(S): at 01:17

## 2024-07-20 RX ADMIN — Medication 650 MILLIGRAM(S): at 00:17

## 2024-07-20 RX ADMIN — Medication 1 MILLIGRAM(S): at 13:02

## 2024-07-20 RX ADMIN — Medication 650 MILLIGRAM(S): at 16:55

## 2024-07-20 RX ADMIN — Medication 100 MILLIGRAM(S): at 06:17

## 2024-07-20 RX ADMIN — Medication 650 MILLIGRAM(S): at 23:48

## 2024-07-20 RX ADMIN — Medication 650 MILLIGRAM(S): at 22:48

## 2024-07-20 NOTE — DIETITIAN INITIAL EVALUATION ADULT - OTHER INFO
84M PMH HTN, BPH admitted for UTI.     Patient met bedside with spouse and son present. Son endorsed patient with very poor intake of food and fluids recently. Poor PO intake declined significantly in early July. Son stated weight loss was rapid in July however, patient has been losing weight gradually since April. UBW unknown to son or patient, though denied weight of 80.3 kg from 05/2024 per HIE. Stated patient is 5'7". Patient denied food allergies or nausea/vomitting/diarrhea/constipation at present. During visit, patient was eating lunch mostly consuming mashed potato and carrots. Verbalized he has some pain/difficulty with swallowing and prefers softer foods. Also noted he sometimes coughs when drinking liquids. Denies issues when he eats "slow". Patient consumes mostly carbohydrates at home (rice, noodles) per son. Patient and family is amenable to explore modified food texture diet and addition of snacks/oral nutrition supplements.

## 2024-07-20 NOTE — DIETITIAN INITIAL EVALUATION ADULT - ORAL NUTRITION SUPPLEMENTS
Ensure Clear TID (240 calories, 8g protein each)  Ensure Clear TID (240 calories, 8g protein each), Orgain 1xd (220 calories, 16g protein)

## 2024-07-20 NOTE — PHYSICAL THERAPY INITIAL EVALUATION ADULT - WEIGHT-BEARING RESTRICTIONS: STAND/SIT, REHAB EVAL
Form received at [facility] on 11/4/2019.     Please note that it takes 7-10 business days for completion.     Signed authorization received with form.       full weight-bearing

## 2024-07-20 NOTE — DIETITIAN INITIAL EVALUATION ADULT - PERTINENT LABORATORY DATA
07-20    127<L>  |  97<L>  |  20  ----------------------------<  100<H>  3.5   |  22  |  0.84    Ca    8.1<L>      20 Jul 2024 05:55  Phos  2.6     07-20  Mg     1.70     07-20    TPro  6.2  /  Alb  2.4<L>  /  TBili  1.2  /  DBili  x   /  AST  30  /  ALT  21  /  AlkPhos  83  07-19  A1C with Estimated Average Glucose Result: 4.9 % (07-20-24 @ 05:55)

## 2024-07-20 NOTE — PROGRESS NOTE ADULT - PROBLEM SELECTOR PLAN 1
UA concerning for UTI. Mr Swapna has dysuria and back pack pain which have improved since receiving IV antibiotics. He was unable to tolerate PO antibiotics as the pills were too big to swallow.  -Will c/w CTX IV 1g q24h  -f/u UCx, sensitivities  -if back pain worsens/recurs, will obtain CT a/p with IV contrast to evaluate for possible abscess vs other cause of back pain  - BCx 7/19 coag neg staph 1/2 bottles likely contaminant from procurement , repeat BCx UA concerning for UTI. Mr Swapna has dysuria and back pack pain which have improved since receiving IV antibiotics. He was unable to tolerate PO antibiotics as the pills were too big to swallow.  -Will c/w CTX IV 1g q24h  -f/u UCx, sensitivities  - obtain CT a/p with IV contrast to evaluate for possible abscess vs other cause of back pain  - BCx 7/19 coag neg staph 1/2 bottles likely contaminant from procurement , repeat BCx UA concerning for UTI. Mr Swapna has dysuria and back pack pain which have improved since receiving IV antibiotics. He was unable to tolerate PO antibiotics as the pills were too big to swallow.  -Will c/w CTX IV 1g q24h  -f/u UCx, sensitivities  - obtain CT a/p with IV contrast to evaluate for possible abscess vs other cause of back pain  - BCx 7/19 coag neg staph 1/2 bottles likely contaminant from procurement , repeat BCx x2 in am  - updated son 7/20, concerned about weight loss/back pain, reports pt had colonoscopy years ago , SLP kermit

## 2024-07-20 NOTE — PHYSICAL THERAPY INITIAL EVALUATION ADULT - ADDITIONAL COMMENTS
Pt lives in a  apartment no falls, ambulates with cane, resides with family, elevator access.   Patients Current SpO2: 99%

## 2024-07-20 NOTE — DIETITIAN INITIAL EVALUATION ADULT - NUTRITIONGOAL OUTCOME1
Adequate oral intake (>75% at most meals, 100% oral nutrition supplement)   Maintain weight (+-2%)   Minimize signs and symptoms of malnutrition

## 2024-07-20 NOTE — DIETITIAN INITIAL EVALUATION ADULT - SIGNS/SYMPTOMS
<75% nutrition needs >=1mo, severe muscle/fat losses <50% nutrition needs >=5 days, moderate muscle/fat losses

## 2024-07-20 NOTE — DIETITIAN INITIAL EVALUATION ADULT - PERSON TAUGHT/METHOD
Education provided on honoring patient food preferences and providing encouragement at meal times to optimize intake. Advised to encourage supplement intake to meet nutritional needs. Explored patient food preferences to implement as able during hospitalization. Discussed meal texture/types of foods offered at institution. Patient and family verbalized understanding and denied further questions./verbal instruction/individual instruction/patient instructed/spouse instructed/son instructed

## 2024-07-20 NOTE — PROGRESS NOTE ADULT - PROBLEM SELECTOR PLAN 2
No prior CBC available. No reports of rectal bleeding.   -iron panel ferritin 1344, tsat 13%, B12 386, folate low at 2, start folic acid 1mg qd  vit D level ok 36.9  -will need age-appropriate malignancy screening as outpatient  -trend CBC daily, maintain Hgb >7.0 No prior CBC available. No reports of rectal bleeding.   -iron panel ferritin 1344, tsat 13%, B12 386, folate low at 2, start folic acid 1mg qd  vit D level ok 36.9  -ordered spep/upep with immunofixation, LDH/haptoglobin, retic count  -will need age-appropriate malignancy screening as outpatient  -trend CBC daily, maintain Hgb >7.0

## 2024-07-20 NOTE — DIETITIAN INITIAL EVALUATION ADULT - PHYSCIAL ASSESSMENT
Pt presents to  today with c/o x 2 days nasal congestion, coughing productive, mylagias, chills/sweatiness, headaches.   Using Tylenol/Nyquil to treat last Tylenol 0800.  She is Covid vaccinated, did not receive flu shot.   Requests testing for both.   Meds verified.   Denies known Latex allergy or symptoms of Latex sensitivity.     Global severe muscle/fat losses./underweight

## 2024-07-20 NOTE — DIETITIAN INITIAL EVALUATION ADULT - PERTINENT MEDS FT
MEDICATIONS  (STANDING):  cefTRIAXone   IVPB 1000 milliGRAM(s) IV Intermittent every 24 hours  folic acid 1 milliGRAM(s) Oral daily  sodium chloride 2 Gram(s) Oral every 8 hours    MEDICATIONS  (PRN):  acetaminophen     Tablet .. 650 milliGRAM(s) Oral every 6 hours PRN Temp greater or equal to 38C (100.4F), Mild Pain (1 - 3), Moderate Pain (4 - 6)  melatonin 3 milliGRAM(s) Oral at bedtime PRN Insomnia

## 2024-07-21 ENCOUNTER — NON-APPOINTMENT (OUTPATIENT)
Age: 84
End: 2024-07-21

## 2024-07-21 LAB
ADD ON TEST-SPECIMEN IN LAB: SIGNIFICANT CHANGE UP
ANION GAP SERPL CALC-SCNC: 9 MMOL/L — SIGNIFICANT CHANGE UP (ref 7–14)
BASOPHILS # BLD AUTO: 0.04 K/UL — SIGNIFICANT CHANGE UP (ref 0–0.2)
BASOPHILS NFR BLD AUTO: 0.6 % — SIGNIFICANT CHANGE UP (ref 0–2)
BLD GP AB SCN SERPL QL: NEGATIVE — SIGNIFICANT CHANGE UP
BUN SERPL-MCNC: 18 MG/DL — SIGNIFICANT CHANGE UP (ref 7–23)
CALCIUM SERPL-MCNC: 8 MG/DL — LOW (ref 8.4–10.5)
CHLORIDE SERPL-SCNC: 101 MMOL/L — SIGNIFICANT CHANGE UP (ref 98–107)
CO2 SERPL-SCNC: 20 MMOL/L — LOW (ref 22–31)
CREAT SERPL-MCNC: 0.83 MG/DL — SIGNIFICANT CHANGE UP (ref 0.5–1.3)
CRP SERPL-MCNC: 102 MG/L — HIGH
EGFR: 87 ML/MIN/1.73M2 — SIGNIFICANT CHANGE UP
EOSINOPHIL # BLD AUTO: 0.11 K/UL — SIGNIFICANT CHANGE UP (ref 0–0.5)
EOSINOPHIL NFR BLD AUTO: 1.7 % — SIGNIFICANT CHANGE UP (ref 0–6)
GLUCOSE SERPL-MCNC: 92 MG/DL — SIGNIFICANT CHANGE UP (ref 70–99)
HAPTOGLOB SERPL-MCNC: 139 MG/DL — SIGNIFICANT CHANGE UP (ref 34–200)
HCT VFR BLD CALC: 19.2 % — CRITICAL LOW (ref 39–50)
HCT VFR BLD CALC: 23 % — LOW (ref 39–50)
HGB BLD-MCNC: 6.8 G/DL — CRITICAL LOW (ref 13–17)
HGB BLD-MCNC: 7.6 G/DL — LOW (ref 13–17)
IANC: 4.35 K/UL — SIGNIFICANT CHANGE UP (ref 1.8–7.4)
IMM GRANULOCYTES NFR BLD AUTO: 0.5 % — SIGNIFICANT CHANGE UP (ref 0–0.9)
LDH SERPL L TO P-CCNC: 176 U/L — SIGNIFICANT CHANGE UP (ref 135–225)
LYMPHOCYTES # BLD AUTO: 1.47 K/UL — SIGNIFICANT CHANGE UP (ref 1–3.3)
LYMPHOCYTES # BLD AUTO: 22.4 % — SIGNIFICANT CHANGE UP (ref 13–44)
MCHC RBC-ENTMCNC: 30 PG — SIGNIFICANT CHANGE UP (ref 27–34)
MCHC RBC-ENTMCNC: 31.3 PG — SIGNIFICANT CHANGE UP (ref 27–34)
MCHC RBC-ENTMCNC: 33 GM/DL — SIGNIFICANT CHANGE UP (ref 32–36)
MCHC RBC-ENTMCNC: 35.4 GM/DL — SIGNIFICANT CHANGE UP (ref 32–36)
MCV RBC AUTO: 88.5 FL — SIGNIFICANT CHANGE UP (ref 80–100)
MCV RBC AUTO: 90.9 FL — SIGNIFICANT CHANGE UP (ref 80–100)
MONOCYTES # BLD AUTO: 0.57 K/UL — SIGNIFICANT CHANGE UP (ref 0–0.9)
MONOCYTES NFR BLD AUTO: 8.7 % — SIGNIFICANT CHANGE UP (ref 2–14)
MRSA PCR RESULT.: SIGNIFICANT CHANGE UP
NEUTROPHILS # BLD AUTO: 4.35 K/UL — SIGNIFICANT CHANGE UP (ref 1.8–7.4)
NEUTROPHILS NFR BLD AUTO: 66.1 % — SIGNIFICANT CHANGE UP (ref 43–77)
NRBC # BLD: 0 /100 WBCS — SIGNIFICANT CHANGE UP (ref 0–0)
NRBC # BLD: 0 /100 WBCS — SIGNIFICANT CHANGE UP (ref 0–0)
NRBC # FLD: 0 K/UL — SIGNIFICANT CHANGE UP (ref 0–0)
NRBC # FLD: 0 K/UL — SIGNIFICANT CHANGE UP (ref 0–0)
PLATELET # BLD AUTO: 223 K/UL — SIGNIFICANT CHANGE UP (ref 150–400)
PLATELET # BLD AUTO: 262 K/UL — SIGNIFICANT CHANGE UP (ref 150–400)
POTASSIUM SERPL-MCNC: 3.9 MMOL/L — SIGNIFICANT CHANGE UP (ref 3.5–5.3)
POTASSIUM SERPL-SCNC: 3.9 MMOL/L — SIGNIFICANT CHANGE UP (ref 3.5–5.3)
RBC # BLD: 2.17 M/UL — LOW (ref 4.2–5.8)
RBC # BLD: 2.17 M/UL — LOW (ref 4.2–5.8)
RBC # BLD: 2.53 M/UL — LOW (ref 4.2–5.8)
RBC # FLD: 18.4 % — HIGH (ref 10.3–14.5)
RBC # FLD: 18.5 % — HIGH (ref 10.3–14.5)
RETICS #: 84.7 K/UL — SIGNIFICANT CHANGE UP (ref 25–125)
RETICS/RBC NFR: 3.9 % — HIGH (ref 0.5–2.5)
RH IG SCN BLD-IMP: POSITIVE — SIGNIFICANT CHANGE UP
S AUREUS DNA NOSE QL NAA+PROBE: DETECTED
SODIUM SERPL-SCNC: 130 MMOL/L — LOW (ref 135–145)
WBC # BLD: 6.57 K/UL — SIGNIFICANT CHANGE UP (ref 3.8–10.5)
WBC # BLD: 8.28 K/UL — SIGNIFICANT CHANGE UP (ref 3.8–10.5)
WBC # FLD AUTO: 6.57 K/UL — SIGNIFICANT CHANGE UP (ref 3.8–10.5)
WBC # FLD AUTO: 8.28 K/UL — SIGNIFICANT CHANGE UP (ref 3.8–10.5)

## 2024-07-21 PROCEDURE — 99232 SBSQ HOSP IP/OBS MODERATE 35: CPT

## 2024-07-21 PROCEDURE — 86334 IMMUNOFIX E-PHORESIS SERUM: CPT | Mod: 26

## 2024-07-21 PROCEDURE — 84165 PROTEIN E-PHORESIS SERUM: CPT | Mod: 26

## 2024-07-21 RX ORDER — MUPIROCIN CALCIUM 20 MG/G
1 CREAM TOPICAL
Refills: 0 | Status: DISCONTINUED | OUTPATIENT
Start: 2024-07-21 | End: 2024-07-21

## 2024-07-21 RX ADMIN — Medication 100 MILLIGRAM(S): at 06:13

## 2024-07-21 RX ADMIN — Medication 2 GRAM(S): at 13:20

## 2024-07-21 RX ADMIN — Medication 650 MILLIGRAM(S): at 18:05

## 2024-07-21 RX ADMIN — Medication 2 GRAM(S): at 22:25

## 2024-07-21 RX ADMIN — Medication 650 MILLIGRAM(S): at 17:05

## 2024-07-21 RX ADMIN — Medication 2 GRAM(S): at 06:13

## 2024-07-21 RX ADMIN — Medication 1 MILLIGRAM(S): at 11:10

## 2024-07-21 NOTE — DISCHARGE NOTE PROVIDER - NSDCCPCAREPLAN_GEN_ALL_CORE_FT
PRINCIPAL DISCHARGE DIAGNOSIS  Diagnosis: UTI (urinary tract infection)  Assessment and Plan of Treatment: completed treatment.      SECONDARY DISCHARGE DIAGNOSES  Diagnosis: Osteomyelitis of spine  Assessment and Plan of Treatment: IV abx, recs pending.    Diagnosis: Syndrome of inappropriate ADH (SIADH) secretion  Assessment and Plan of Treatment: salt tab, stable. outpatient monitor of Na level.    Diagnosis: Anemia  Assessment and Plan of Treatment: due to chornic disease. monitor with PCP.     PRINCIPAL DISCHARGE DIAGNOSIS  Diagnosis: Osteomyelitis of spine  Assessment and Plan of Treatment: you were found to have infection of spine and bx were neg. You were treated with antibiotics and you started experiencing weakness of LT lower ext and repeat imaging showed abscess formation and were transferred to [facility] for further care.      SECONDARY DISCHARGE DIAGNOSES  Diagnosis: UTI (urinary tract infection)  Assessment and Plan of Treatment: You were treated with antibiotics    Diagnosis: Anemia  Assessment and Plan of Treatment: due to chornic disease. monitor with PCP.    Diagnosis: Syndrome of inappropriate ADH (SIADH) secretion  Assessment and Plan of Treatment: salt tab, stable. outpatient monitor of Na level.    Diagnosis: Osteomyelitis of spine  Assessment and Plan of Treatment: IV abx, recs pending.

## 2024-07-21 NOTE — DISCHARGE NOTE PROVIDER - NSDCFUSCHEDAPPT_GEN_ALL_CORE_FT
Don Reddy  [facility]  UROLOGY [address] [place] [address]  Scheduled Appointment: 08/12/2024    [facility]  UROLOGY [address] [place] [address]  Scheduled Appointment: 08/12/2024     Radha Galo  [facility]  [facility]-Urology  Scheduled Appointment: 08/12/2024

## 2024-07-21 NOTE — DISCHARGE NOTE PROVIDER - HOSPITAL COURSE
84M PMH essential HTN, BPH, presents with a week of painful urination and back pain. He is admitted for management of likely pyelonephritis.     Problem/Plan - 1:  ·  Problem: Pyelonephritis.   ·  Plan: UA concerning for UTI. Mr Swapna has dysuria and back pack pain which have improved since receiving IV antibiotics. He was unable to tolerate PO antibiotics as the pills were too big to swallow.  -Will c/w CTX IV 1g q24h  - UCx growing E coli, f/u sensitivity  - CT A/p performed, f/u read  - trend CrP, will get CT cervical/thoracic/lumbar spine as pt c/o back pain for months  - BCx 7/19 coag neg staph 1/2 bottles likely contaminant from procurement , repeat BCx sent  - bactroban ointment to nostrils x5 days for MSSA pcr +  - updated son 7/21, concerned about weight loss/back pain, reports pt had colonoscopy years ago , SLP eval pending  - Dispo: inpt, PT recs outpt PT and RW on DC.     Problem/Plan - 2:  ·  Problem: Anemia.   ·  Plan: No prior CBC available. No reports of rectal bleeding.   -iron panel ferritin 1344, tsat 13%, B12 386, folate low at 2, start folic acid 1mg qd  vit D level ok 36.9  -sent spep/upep with immunofixation,  - no sign of hemolysis , haptoglobin 139 wnl, retic count 3.9   -will need age-appropriate malignancy screening as outpatient  -trend CBC daily, maintain Hgb >7.0.     Problem/Plan - 3:  ·  Problem: Hyponatremia.   ·  Plan: Moderate hyponatremia on labs; he is grossly euvolemic. Received IVNS in ED.  - improved to 130  - fluid restriction, salt tab 2g tid , urine Na 27, urine osm 503 c/w SIADH.     Problem/Plan - 4:  ·  Problem: Atelectasis.   ·  Plan: CXR Linear opacities in right lower lung field, likely atelectasis. No focal   consolidation.  -incentive spirometry as tolerated.     Problem/Plan - 5:  ·  Problem: Hypoalbuminemia.   ·  Plan: Severe malnutrition, seen by nutrition  regular diet + supplement.     Problem/Plan - 6:  ·  Problem: Essential hypertension.   ·  Plan: Not taking antihypertensives; BP currently at goal  Will continue to trend BP w/routine VS.     Problem/Plan - 7:  ·  Problem: BPH without urinary obstruction.   ·  Plan: Not on medication for this; no clinical signs of obstruction (voiding w/o difficulty).   84M PMH essential HTN, BPH, presents with a week of painful urination and back pain. He is admitted for management of likely pyelonephritis. Had persistent back pain. MR showed OM T11/12 now pending biopsy. Bcx neg, echo neg. ID following, likely need 6-8 weeks of IV abx.     OM of T spine - IR biopsy Monday, IV abx 6-8 weeks.   Pyelonephritis. secondary to E.coli w/o bacteremia, s/p full course of CTX.   Anemia of chronic disease, low folate - GI C-scope neg.   Hyponatremia secondary to SIADH - s/p salt tab, now stable.    84M PMH essential HTN, BPH, presents with a week of painful urination and back pain. He is admitted for management of likely pyelonephritis. incidentally found to have severe anemia with AoCD. CT showing concerning rectalsigmoid lesion. GI eval started. now c/b dyspnea. CXR c/f fluid overload with b/l pleural effusion. proBNP elevated. clinically not grossly fluid overload. +pleural effusion, echo was normal. MR spine high susp for T11-12 OM.      Problem/Plan - 1:  ·  Problem: Osteomyelitis of spine.   ·  Plan: patient had continue back pain and worsen over past 1 month. initially attributed to pyelo.   CT Tspine showed T11-12 erosive changes.   MR T spine 7/25 Discitis/osteomyelitis is suspected at the T11-12 level, T11-12: Disc bulge and bilateral hypertrophic facet change. This in addition to the epidural enhancement causes moderate narrowing spinal canal. Moderate to severe narrowing of both neural foramen  Elevated inflammatory markers ESR 84, .3  patient has been on CTX 2g qd since 7/19  TTE neg for vegetation   Bcx NGTD.   - Neurosurgery consulted,- no urgent surgical intervention. outpt f/up Dr. Elyssa Finnegan  - s/p IR aspiration of T11/T12 disc space 7/29  IR culture neg, cytopathology inflammatory cells, no malignancy  - ID following. IR culture no growth on abx, continue Ceftriaxone 2g q24h through 9/13  outpt f/up ID Dr. Elizabeth Garcia  - Dc plan to acute rehab per PMR  - c/w pain control. oxy 5mg q4h prn mod, 7.5mg q4h prn severe pain  pain improved with addition of gabapentin 300mg bid for neuropathic pain, flexeril 5mg tid for muscle spasms   - Dispo: medically optimized for DC to rehab, f/u SW, consult IR for PICC line pre-DC  - Updated pt's son and wife at bedside 8/2.     Problem/Plan - 2:  ·  Problem: Dyspnea.   ·  Plan: clinical signs of dyspnea, CT showed small pleural effusion.   CTPA neg for PE, showed loculated effusion on the R side. small left pl eff.   proBNP elevated, denied h/o heart disease. no murmurs.   respiratory improved with lasix 20 ivp x2; echo obtained after lasix showed normal LV function EF 67%, no LVH, no valvular dz.   - resolved   - possibly diastolic HF iso fluid resuscitation and blood transfusion.  - no further lasix.  - monitor clinically.     Problem/Plan - 3:  ·  Problem: Pyelonephritis.   ·  Plan: -Will c/w CTX IV   - UCx growing E coli sensitive to CTX and augmentin.   - BCx 7/19 coag neg staph 1/2 bottles likely contaminant from procurement , repeat BCx from 7/21 NGTD.   - s/p bactroban x5 days for MSSA pcr +  - c/w CTX started on 7/19, IR bx no growth, plan for Ceftriaxone 2g qd through 9/13 per ID, will need PICC line prior to DC.     Problem/Plan - 4:  ·  Problem: Low back pain.   ·  Plan: - trend CrP, will get CT cervical/thoracic/lumbar spine as pt c/o back pain for months, worsening in the past month.   - management of OM as above.   - pain control as above  - lidocaine patch  - acute rehab.     Problem/Plan - 5:  ·  Problem: Anemia.   ·  Plan: - likely anemia of chronic disease in s/o infection   - iron study c/f AoCD, folate low at 2, c/w folic acid 1mg qd, vit D level ok 36.9  - SPEP showing increased polyclonal gamma fraction consistent with chronic inflammatory conditions.   - no sign of hemolysis   - CT showing Mild rectosigmoid wall thickening/hyperemia with presacral fluid, possible proctitis, but patient has no rectal pain. c/f malignancy iso AoCD and severe anemia. - GI consult emailed. f/u rec.   -trend CBC daily, maintain Hgb >7.0, received 1U prbc on 7/22 with appropriate response.  - GI consulted, colonoscopy performed on 7/26 with normal colon, diverticulosis. > bowel regimen and high fiber diet.     83 yo M PMH essential HTN, BPH, presents with a week of painful urination and back pain. He is admitted for management of likely pyelonephritis. incidentally found to have severe anemia with AoCD. CT showing concerning rectalsigmoid lesion. GI eval started. now c/b dyspnea. CXR c/f fluid overload with b/l pleural effusion. proBNP elevated. clinically not grossly fluid overload. +pleural effusion, echo was normal. MR spine high susp for T11-12 OM.      Problem/Plan - 1:  ·  Problem: Osteomyelitis of spine.   ·  Plan: patient had continue back pain and worsen over past 1 month. initially attributed to pyelo.   CT Tspine showed T11-12 erosive changes.   - MR T spine 7/25 Discitis/osteomyelitis is suspected at the T11-12 level, T11-12: Disc bulge and bilateral hypertrophic facet change. This in addition to the epidural enhancement causes moderate narrowing spinal canal. Moderate to severe narrowing of both neural foramen  - Elevated inflammatory markers ESR 84, .3  - TTE neg for vegetation   - Bcx NGTD.   - s/p IR aspiration of T11/T12 disc space 7/29; IR culture neg, cytopathology inflammatory cells, no malignancy  - ID following. IR culture no growth on abx, continue Ceftriaxone 2g q24h through 9/13   - Pt started experiencing new weakness LT LE CT/MRI with concern for epidural abscess and transferred to [facility] for further management      Problem/Plan - 2:  ·  Problem: Dyspnea.   ·  Plan: clinical signs of dyspnea, CT showed small pleural effusion.   CTPA neg for PE, showed loculated effusion on the R side. small left pl eff.   proBNP elevated, denied h/o heart disease. no murmurs.   respiratory improved with lasix 20 ivp x2; echo obtained after lasix showed normal LV function EF 67%, no LVH, no valvular dz.   - resolved   - possibly diastolic HF iso fluid resuscitation and blood transfusion.  - no further lasix.  - monitor clinically.    ·  Problem: Pyelonephritis.   ·  Plan: -Will c/w CTX IV   - UCx growing E coli sensitive to CTX and augmentin.   - BCx 7/19 coag neg staph 1/2 bottles likely contaminant from procurement , repeat BCx from 7/21 NGTD.   - s/p bactroban x5 days for MSSA pcr +  - c/w CTX started on 7/19, IR bx no growth, plan for Ceftriaxone 2g qd through 9/13 per ID  - s/p PICC    ·  Problem: Anemia.   ·  Plan: - likely anemia of chronic disease in s/o infection   - iron study c/f AoCD, folate low at 2, c/w folic acid 1mg qd, vit D level ok 36.9  - SPEP showing increased polyclonal gamma fraction consistent with chronic inflammatory conditions.   - no sign of hemolysis   - CT showing Mild rectosigmoid wall thickening/hyperemia with presacral fluid, possible proctitis, but patient has no rectal pain. c/f malignancy iso AoCD and severe anemia.   -trend CBC daily, maintain Hgb >7.0, received 1U prbc on 7/22 with appropriate response.  - GI consulted, colonoscopy performed on 7/26 with normal colon, diverticulosis. > bowel regimen and high fiber diet.

## 2024-07-21 NOTE — DISCHARGE NOTE PROVIDER - NSDCMRMEDTOKEN_GEN_ALL_CORE_FT
allopurinol 100 mg oral tablet: 1 tab(s) orally once a day (last filled 90 day supply 03/2024)  allopurinol 100 mg oral tablet: 1 tab(s) orally once a day  aspirin 81 mg oral tablet: 1 tab(s) orally once a day  Calcium 600+D oral tablet: 1 tab(s) orally once a day  finasteride 5 mg oral tablet: 1 tab(s) orally once a day  finasteride 5 mg oral tablet: 1 tab(s) orally once a day (filled 90 day supply 06/2024)  Fish Oil 1000 mg oral capsule: 1 cap(s) orally once a day  tamsulosin 0.4 mg oral capsule: 1 cap(s) orally once a day   allopurinol 100 mg oral tablet: 1 tab(s) orally once a day (last filled 90 day supply 03/2024)  aspirin 81 mg oral tablet: 1 tab(s) orally once a day  finasteride 5 mg oral tablet: 1 tab(s) orally once a day (filled 90 day supply 06/2024)  Fish Oil 1000 mg oral capsule: 1 cap(s) orally once a day

## 2024-07-21 NOTE — PROGRESS NOTE ADULT - PROBLEM SELECTOR PLAN 2
No prior CBC available. No reports of rectal bleeding.   -iron panel ferritin 1344, tsat 13%, B12 386, folate low at 2, start folic acid 1mg qd  vit D level ok 36.9  -sent spep/upep with immunofixation,  - no sign of hemolysis , haptoglobin 139 wnl, retic count 3.9   -will need age-appropriate malignancy screening as outpatient  -trend CBC daily, maintain Hgb >7.0

## 2024-07-21 NOTE — PROGRESS NOTE ADULT - PROBLEM SELECTOR PLAN 1
UA concerning for UTI. Mr Swapna has dysuria and back pack pain which have improved since receiving IV antibiotics. He was unable to tolerate PO antibiotics as the pills were too big to swallow.  -Will c/w CTX IV 1g q24h  - UCx growing E coli, f/u sensitivity  - CT A/p performed, f/u read  - will get CT cervical/thoracic/lumbar spine as pt c/o back pain for months  - BCx 7/19 coag neg staph 1/2 bottles likely contaminant from procurement , repeat BCx sent  - updated son 7/21, concerned about weight loss/back pain, reports pt had colonoscopy years ago , SLP eval pending UA concerning for UTI. Mr Swapna has dysuria and back pack pain which have improved since receiving IV antibiotics. He was unable to tolerate PO antibiotics as the pills were too big to swallow.  -Will c/w CTX IV 1g q24h  - UCx growing E coli, f/u sensitivity  - CT A/p performed, f/u read  - will get CT cervical/thoracic/lumbar spine as pt c/o back pain for months  - BCx 7/19 coag neg staph 1/2 bottles likely contaminant from procurement , repeat BCx sent  - updated son 7/21, concerned about weight loss/back pain, reports pt had colonoscopy years ago , SLP eval pending  - Dispo: inpt, PT recs outpt PT and RW on DC UA concerning for UTI. Mr Swapna has dysuria and back pack pain which have improved since receiving IV antibiotics. He was unable to tolerate PO antibiotics as the pills were too big to swallow.  -Will c/w CTX IV 1g q24h  - UCx growing E coli, f/u sensitivity  - CT A/p performed, f/u read  - trend CrP, will get CT cervical/thoracic/lumbar spine as pt c/o back pain for months  - BCx 7/19 coag neg staph 1/2 bottles likely contaminant from procurement , repeat BCx sent  - updated son 7/21, concerned about weight loss/back pain, reports pt had colonoscopy years ago , SLP eval pending  - Dispo: inpt, PT recs outpt PT and RW on DC UA concerning for UTI. Mr Swapna has dysuria and back pack pain which have improved since receiving IV antibiotics. He was unable to tolerate PO antibiotics as the pills were too big to swallow.  -Will c/w CTX IV 1g q24h  - UCx growing E coli, f/u sensitivity  - CT A/p performed, f/u read  - trend CrP, will get CT cervical/thoracic/lumbar spine as pt c/o back pain for months  - BCx 7/19 coag neg staph 1/2 bottles likely contaminant from procurement , repeat BCx sent  - bactroban ointment to nostrils x5 days for MSSA pcr +  - updated son 7/21, concerned about weight loss/back pain, reports pt had colonoscopy years ago , SLP eval pending  - Dispo: inpt, PT recs outpt PT and RW on DC

## 2024-07-21 NOTE — DISCHARGE NOTE PROVIDER - DETAILS OF MALNUTRITION DIAGNOSIS/DIAGNOSES
This patient has been assessed with a concern for Malnutrition and was treated during this hospitalization for the following Nutrition diagnosis/diagnoses:     -  07/20/2024: Severe protein-calorie malnutrition

## 2024-07-22 DIAGNOSIS — R97.20 ELEVATED PROSTATE, SPECIFIC ANTIGEN [PSA]: ICD-10-CM

## 2024-07-22 DIAGNOSIS — J34.2 DEVIATED NASAL SEPTUM: ICD-10-CM

## 2024-07-22 LAB
ANION GAP SERPL CALC-SCNC: 11 MMOL/L — SIGNIFICANT CHANGE UP (ref 7–14)
BASOPHILS # BLD AUTO: 0.02 K/UL — SIGNIFICANT CHANGE UP (ref 0–0.2)
BASOPHILS NFR BLD AUTO: 0.4 % — SIGNIFICANT CHANGE UP (ref 0–2)
BUN SERPL-MCNC: 14 MG/DL — SIGNIFICANT CHANGE UP (ref 7–23)
CALCIUM SERPL-MCNC: 7.9 MG/DL — LOW (ref 8.4–10.5)
CHLORIDE SERPL-SCNC: 103 MMOL/L — SIGNIFICANT CHANGE UP (ref 98–107)
CO2 SERPL-SCNC: 21 MMOL/L — LOW (ref 22–31)
CREAT SERPL-MCNC: 0.85 MG/DL — SIGNIFICANT CHANGE UP (ref 0.5–1.3)
CRP SERPL-MCNC: 90 MG/L — HIGH
EGFR: 87 ML/MIN/1.73M2 — SIGNIFICANT CHANGE UP
EOSINOPHIL # BLD AUTO: 0.11 K/UL — SIGNIFICANT CHANGE UP (ref 0–0.5)
EOSINOPHIL NFR BLD AUTO: 2.2 % — SIGNIFICANT CHANGE UP (ref 0–6)
GLUCOSE SERPL-MCNC: 108 MG/DL — HIGH (ref 70–99)
HCT VFR BLD CALC: 20.1 % — CRITICAL LOW (ref 39–50)
HCT VFR BLD CALC: 21.1 % — LOW (ref 39–50)
HGB BLD-MCNC: 6.7 G/DL — CRITICAL LOW (ref 13–17)
HGB BLD-MCNC: 7.1 G/DL — LOW (ref 13–17)
IANC: 3.09 K/UL — SIGNIFICANT CHANGE UP (ref 1.8–7.4)
IMM GRANULOCYTES NFR BLD AUTO: 0.4 % — SIGNIFICANT CHANGE UP (ref 0–0.9)
LYMPHOCYTES # BLD AUTO: 1.2 K/UL — SIGNIFICANT CHANGE UP (ref 1–3.3)
LYMPHOCYTES # BLD AUTO: 24.2 % — SIGNIFICANT CHANGE UP (ref 13–44)
MCHC RBC-ENTMCNC: 30.2 PG — SIGNIFICANT CHANGE UP (ref 27–34)
MCHC RBC-ENTMCNC: 30.5 PG — SIGNIFICANT CHANGE UP (ref 27–34)
MCHC RBC-ENTMCNC: 33.3 GM/DL — SIGNIFICANT CHANGE UP (ref 32–36)
MCHC RBC-ENTMCNC: 33.6 GM/DL — SIGNIFICANT CHANGE UP (ref 32–36)
MCV RBC AUTO: 90.5 FL — SIGNIFICANT CHANGE UP (ref 80–100)
MCV RBC AUTO: 90.6 FL — SIGNIFICANT CHANGE UP (ref 80–100)
MONOCYTES # BLD AUTO: 0.52 K/UL — SIGNIFICANT CHANGE UP (ref 0–0.9)
MONOCYTES NFR BLD AUTO: 10.5 % — SIGNIFICANT CHANGE UP (ref 2–14)
NEUTROPHILS # BLD AUTO: 3.09 K/UL — SIGNIFICANT CHANGE UP (ref 1.8–7.4)
NEUTROPHILS NFR BLD AUTO: 62.3 % — SIGNIFICANT CHANGE UP (ref 43–77)
NRBC # BLD: 0 /100 WBCS — SIGNIFICANT CHANGE UP (ref 0–0)
NRBC # BLD: 0 /100 WBCS — SIGNIFICANT CHANGE UP (ref 0–0)
NRBC # FLD: 0 K/UL — SIGNIFICANT CHANGE UP (ref 0–0)
NRBC # FLD: 0 K/UL — SIGNIFICANT CHANGE UP (ref 0–0)
PLATELET # BLD AUTO: 232 K/UL — SIGNIFICANT CHANGE UP (ref 150–400)
PLATELET # BLD AUTO: 251 K/UL — SIGNIFICANT CHANGE UP (ref 150–400)
POTASSIUM SERPL-MCNC: 3.3 MMOL/L — LOW (ref 3.5–5.3)
POTASSIUM SERPL-SCNC: 3.3 MMOL/L — LOW (ref 3.5–5.3)
RBC # BLD: 2.22 M/UL — LOW (ref 4.2–5.8)
RBC # BLD: 2.33 M/UL — LOW (ref 4.2–5.8)
RBC # FLD: 18.5 % — HIGH (ref 10.3–14.5)
RBC # FLD: 18.6 % — HIGH (ref 10.3–14.5)
SODIUM SERPL-SCNC: 135 MMOL/L — SIGNIFICANT CHANGE UP (ref 135–145)
WBC # BLD: 4.96 K/UL — SIGNIFICANT CHANGE UP (ref 3.8–10.5)
WBC # BLD: 5.96 K/UL — SIGNIFICANT CHANGE UP (ref 3.8–10.5)
WBC # FLD AUTO: 4.96 K/UL — SIGNIFICANT CHANGE UP (ref 3.8–10.5)
WBC # FLD AUTO: 5.96 K/UL — SIGNIFICANT CHANGE UP (ref 3.8–10.5)

## 2024-07-22 PROCEDURE — 99233 SBSQ HOSP IP/OBS HIGH 50: CPT

## 2024-07-22 RX ORDER — TRAMADOL HCL 50 MG
75 TABLET ORAL EVERY 6 HOURS
Refills: 0 | Status: DISCONTINUED | OUTPATIENT
Start: 2024-07-22 | End: 2024-07-24

## 2024-07-22 RX ORDER — KETOROLAC TROMETHAMINE 10 MG
15 TABLET ORAL ONCE
Refills: 0 | Status: DISCONTINUED | OUTPATIENT
Start: 2024-07-22 | End: 2024-07-22

## 2024-07-22 RX ORDER — POTASSIUM CHLORIDE 1500 MG/1
40 TABLET, EXTENDED RELEASE ORAL EVERY 4 HOURS
Refills: 0 | Status: COMPLETED | OUTPATIENT
Start: 2024-07-22 | End: 2024-07-22

## 2024-07-22 RX ORDER — TRAMADOL HCL 50 MG
50 TABLET ORAL EVERY 6 HOURS
Refills: 0 | Status: DISCONTINUED | OUTPATIENT
Start: 2024-07-22 | End: 2024-07-24

## 2024-07-22 RX ADMIN — Medication 2 GRAM(S): at 14:44

## 2024-07-22 RX ADMIN — Medication 650 MILLIGRAM(S): at 13:02

## 2024-07-22 RX ADMIN — POTASSIUM CHLORIDE 40 MILLIEQUIVALENT(S): 1500 TABLET, EXTENDED RELEASE ORAL at 11:56

## 2024-07-22 RX ADMIN — Medication 2 GRAM(S): at 05:14

## 2024-07-22 RX ADMIN — Medication 650 MILLIGRAM(S): at 05:19

## 2024-07-22 RX ADMIN — Medication 15 MILLIGRAM(S): at 14:32

## 2024-07-22 RX ADMIN — Medication 2 GRAM(S): at 21:34

## 2024-07-22 RX ADMIN — Medication 50 MILLIGRAM(S): at 21:33

## 2024-07-22 RX ADMIN — Medication 1 MILLIGRAM(S): at 11:59

## 2024-07-22 RX ADMIN — Medication 100 MILLIGRAM(S): at 05:15

## 2024-07-22 RX ADMIN — Medication 650 MILLIGRAM(S): at 12:01

## 2024-07-22 RX ADMIN — Medication 15 MILLIGRAM(S): at 15:15

## 2024-07-22 RX ADMIN — Medication 650 MILLIGRAM(S): at 06:19

## 2024-07-22 RX ADMIN — Medication 50 MILLIGRAM(S): at 22:33

## 2024-07-22 RX ADMIN — POTASSIUM CHLORIDE 40 MILLIEQUIVALENT(S): 1500 TABLET, EXTENDED RELEASE ORAL at 14:32

## 2024-07-22 NOTE — PROVIDER CONTACT NOTE (CRITICAL VALUE NOTIFICATION) - ASSESSMENT
Patient vitally stable, no acute sign of distress.
DX : UTI  previous HX. of low HG/HCT
Patient resting in bed. A/O x3-4. V/S per flow sheet. Denies chest pain and SOB.
Patient resting in bed. A/O x3-4. Denies chest pain and SOB. V/S per flow sheet. Denies dizziness

## 2024-07-22 NOTE — PROGRESS NOTE ADULT - PROBLEM SELECTOR PLAN 1
UA concerning for UTI. Mr Swapna has dysuria and back pack pain which have improved since receiving IV antibiotics. He was unable to tolerate PO antibiotics as the pills were too big to swallow.  -Will c/w CTX IV 1g q24h  - UCx growing E coli, f/u sensitivity  - CT A/p performed, f/u read  - trend CrP, will get CT cervical/thoracic/lumbar spine as pt c/o back pain for months  - BCx 7/19 coag neg staph 1/2 bottles likely contaminant from procurement , repeat BCx sent  - bactroban ointment to nostrils x5 days for MSSA pcr +  - updated son 7/21, concerned about weight loss/back pain, reports pt had colonoscopy years ago , SLP eval pending  - Dispo: inpt, PT recs outpt PT and RW on DC

## 2024-07-22 NOTE — PROVIDER CONTACT NOTE (CRITICAL VALUE NOTIFICATION) - SITUATION
Abnormal labs  HG/HCT 6.7/20.1
Low H and H
Hemoglobin 6.6 Hematocrit 19.6
Blood cultures positive in aerobic bottles. Gram positive cocci in clusters

## 2024-07-22 NOTE — PROGRESS NOTE ADULT - PROBLEM SELECTOR PLAN 2
No prior CBC available. No reports of rectal bleeding.   -iron panel ferritin 1344, tsat 13%, B12 386, folate low at 2, start folic acid 1mg qd  vit D level ok 36.9  -sent spep/upep with immunofixation,  - no sign of hemolysis , haptoglobin 139 wnl, retic count 3.9  - CT showing Mild rectosigmoid wall thickening/hyperemia with presacral fluid, possible proctitis, but patient has no rectal pain. c/f malignancy iso AoCD and severe anemia. - GI consult emailed. f/u rec.   -trend CBC daily, maintain Hgb >7.0  - transfuse 1U prbc given CANDELARIA and fatigue.

## 2024-07-22 NOTE — PROVIDER CONTACT NOTE (CRITICAL VALUE NOTIFICATION) - TEST AND RESULT REPORTED:
Blood cultures positive in aerobic bottles. Gram positive cocci in clusters
Hemoglobin 6.8 Hematocrit 19.2
HG/HCT 6.7/20.1
Hemoglobin 6.6 Hematocrit 19.6

## 2024-07-23 DIAGNOSIS — M54.50 LOW BACK PAIN, UNSPECIFIED: ICD-10-CM

## 2024-07-23 DIAGNOSIS — R06.00 DYSPNEA, UNSPECIFIED: ICD-10-CM

## 2024-07-23 LAB
% ALBUMIN: 38.9 % — SIGNIFICANT CHANGE UP
% ALPHA 1: 7.6 % — SIGNIFICANT CHANGE UP
% ALPHA 2: 11.8 % — SIGNIFICANT CHANGE UP
% BETA: 10.8 % — SIGNIFICANT CHANGE UP
% GAMMA: 31 % — SIGNIFICANT CHANGE UP
-  AMOXICILLIN/CLAVULANIC ACID: SIGNIFICANT CHANGE UP
-  AMPICILLIN/SULBACTAM: SIGNIFICANT CHANGE UP
-  AMPICILLIN: SIGNIFICANT CHANGE UP
-  AZTREONAM: SIGNIFICANT CHANGE UP
-  CEFAZOLIN: SIGNIFICANT CHANGE UP
-  CEFEPIME: SIGNIFICANT CHANGE UP
-  CEFOXITIN: SIGNIFICANT CHANGE UP
-  CEFTRIAXONE: SIGNIFICANT CHANGE UP
-  CEFUROXIME: SIGNIFICANT CHANGE UP
-  CIPROFLOXACIN: SIGNIFICANT CHANGE UP
-  ERTAPENEM: SIGNIFICANT CHANGE UP
-  GENTAMICIN: SIGNIFICANT CHANGE UP
-  IMIPENEM: SIGNIFICANT CHANGE UP
-  LEVOFLOXACIN: SIGNIFICANT CHANGE UP
-  MEROPENEM: SIGNIFICANT CHANGE UP
-  NITROFURANTOIN: SIGNIFICANT CHANGE UP
-  PIPERACILLIN/TAZOBACTAM: SIGNIFICANT CHANGE UP
-  TOBRAMYCIN: SIGNIFICANT CHANGE UP
-  TRIMETHOPRIM/SULFAMETHOXAZOLE: SIGNIFICANT CHANGE UP
ADD ON TEST-SPECIMEN IN LAB: SIGNIFICANT CHANGE UP
ALBUMIN SERPL ELPH-MCNC: 2.41 G/DL — LOW (ref 3.3–4.4)
ALBUMIN/GLOB SERPL ELPH: 0.6 RATIO — SIGNIFICANT CHANGE UP
ALPHA1 GLOB SERPL ELPH-MCNC: 0.47 G/DL — HIGH (ref 0.1–0.3)
ALPHA2 GLOB SERPL ELPH-MCNC: 0.7 G/DL — SIGNIFICANT CHANGE UP (ref 0.6–1)
ANION GAP SERPL CALC-SCNC: 8 MMOL/L — SIGNIFICANT CHANGE UP (ref 7–14)
B-GLOBULIN SERPL ELPH-MCNC: 0.67 G/DL — SIGNIFICANT CHANGE UP (ref 0.6–1.1)
BUN SERPL-MCNC: 16 MG/DL — SIGNIFICANT CHANGE UP (ref 7–23)
CALCIUM SERPL-MCNC: 7.9 MG/DL — LOW (ref 8.4–10.5)
CHLORIDE SERPL-SCNC: 101 MMOL/L — SIGNIFICANT CHANGE UP (ref 98–107)
CO2 SERPL-SCNC: 22 MMOL/L — SIGNIFICANT CHANGE UP (ref 22–31)
CREAT SERPL-MCNC: 0.9 MG/DL — SIGNIFICANT CHANGE UP (ref 0.5–1.3)
CULTURE RESULTS: ABNORMAL
EGFR: 85 ML/MIN/1.73M2 — SIGNIFICANT CHANGE UP
GAMMA GLOBULIN: 1.92 G/DL — HIGH (ref 0.7–1.7)
GLUCOSE SERPL-MCNC: 99 MG/DL — SIGNIFICANT CHANGE UP (ref 70–99)
HCT VFR BLD CALC: 21.8 % — LOW (ref 39–50)
HGB BLD-MCNC: 7.7 G/DL — LOW (ref 13–17)
MAGNESIUM SERPL-MCNC: 1.6 MG/DL — SIGNIFICANT CHANGE UP (ref 1.6–2.6)
MCHC RBC-ENTMCNC: 30.3 PG — SIGNIFICANT CHANGE UP (ref 27–34)
MCHC RBC-ENTMCNC: 35.3 GM/DL — SIGNIFICANT CHANGE UP (ref 32–36)
MCV RBC AUTO: 85.8 FL — SIGNIFICANT CHANGE UP (ref 80–100)
METHOD TYPE: SIGNIFICANT CHANGE UP
NRBC # BLD: 0 /100 WBCS — SIGNIFICANT CHANGE UP (ref 0–0)
NRBC # FLD: 0 K/UL — SIGNIFICANT CHANGE UP (ref 0–0)
NT-PROBNP SERPL-SCNC: 2385 PG/ML — HIGH
ORGANISM # SPEC MICROSCOPIC CNT: ABNORMAL
ORGANISM # SPEC MICROSCOPIC CNT: ABNORMAL
PHOSPHATE SERPL-MCNC: 2.5 MG/DL — SIGNIFICANT CHANGE UP (ref 2.5–4.5)
PLATELET # BLD AUTO: 237 K/UL — SIGNIFICANT CHANGE UP (ref 150–400)
POTASSIUM SERPL-MCNC: 3.9 MMOL/L — SIGNIFICANT CHANGE UP (ref 3.5–5.3)
POTASSIUM SERPL-SCNC: 3.9 MMOL/L — SIGNIFICANT CHANGE UP (ref 3.5–5.3)
PROT PATTERN SERPL ELPH-IMP: SIGNIFICANT CHANGE UP
PROT SERPL-MCNC: 6.2 G/DL — SIGNIFICANT CHANGE UP (ref 6–8.3)
RBC # BLD: 2.54 M/UL — LOW (ref 4.2–5.8)
RBC # FLD: 19.7 % — HIGH (ref 10.3–14.5)
SODIUM SERPL-SCNC: 131 MMOL/L — LOW (ref 135–145)
SPECIMEN SOURCE: SIGNIFICANT CHANGE UP
TROPONIN T, HIGH SENSITIVITY RESULT: 14 NG/L — SIGNIFICANT CHANGE UP
WBC # BLD: 5.67 K/UL — SIGNIFICANT CHANGE UP (ref 3.8–10.5)
WBC # FLD AUTO: 5.67 K/UL — SIGNIFICANT CHANGE UP (ref 3.8–10.5)

## 2024-07-23 PROCEDURE — 72126 CT NECK SPINE W/DYE: CPT | Mod: 26

## 2024-07-23 PROCEDURE — 99222 1ST HOSP IP/OBS MODERATE 55: CPT | Mod: GC

## 2024-07-23 PROCEDURE — 72129 CT CHEST SPINE W/DYE: CPT | Mod: 26

## 2024-07-23 PROCEDURE — 71045 X-RAY EXAM CHEST 1 VIEW: CPT | Mod: 26

## 2024-07-23 PROCEDURE — 72132 CT LUMBAR SPINE W/DYE: CPT | Mod: 26

## 2024-07-23 PROCEDURE — 99233 SBSQ HOSP IP/OBS HIGH 50: CPT

## 2024-07-23 PROCEDURE — 71275 CT ANGIOGRAPHY CHEST: CPT | Mod: 26

## 2024-07-23 RX ORDER — SENNOSIDES 8.6 MG/1
2 TABLET ORAL AT BEDTIME
Refills: 0 | Status: DISCONTINUED | OUTPATIENT
Start: 2024-07-23 | End: 2024-08-09

## 2024-07-23 RX ORDER — IPRATROPIUM BROMIDE AND ALBUTEROL SULFATE 2.5; .5 MG/3ML; MG/3ML
3 SOLUTION RESPIRATORY (INHALATION) EVERY 6 HOURS
Refills: 0 | Status: DISCONTINUED | OUTPATIENT
Start: 2024-07-23 | End: 2024-08-09

## 2024-07-23 RX ORDER — FUROSEMIDE 10 MG/ML
20 INJECTION, SOLUTION INTRAVENOUS ONCE
Refills: 0 | Status: COMPLETED | OUTPATIENT
Start: 2024-07-23 | End: 2024-07-23

## 2024-07-23 RX ORDER — LORATADINE 10 MG
17 TABLET,DISINTEGRATING ORAL DAILY
Refills: 0 | Status: DISCONTINUED | OUTPATIENT
Start: 2024-07-23 | End: 2024-07-29

## 2024-07-23 RX ORDER — MAGNESIUM SULFATE 500 MG/ML
1 VIAL (ML) INJECTION ONCE
Refills: 0 | Status: COMPLETED | OUTPATIENT
Start: 2024-07-23 | End: 2024-07-23

## 2024-07-23 RX ADMIN — Medication 50 MILLIGRAM(S): at 13:52

## 2024-07-23 RX ADMIN — IPRATROPIUM BROMIDE AND ALBUTEROL SULFATE 3 MILLILITER(S): 2.5; .5 SOLUTION RESPIRATORY (INHALATION) at 16:03

## 2024-07-23 RX ADMIN — Medication 2 GRAM(S): at 06:12

## 2024-07-23 RX ADMIN — Medication 650 MILLIGRAM(S): at 18:11

## 2024-07-23 RX ADMIN — FUROSEMIDE 20 MILLIGRAM(S): 10 INJECTION, SOLUTION INTRAVENOUS at 14:16

## 2024-07-23 RX ADMIN — Medication 1 MILLIGRAM(S): at 12:19

## 2024-07-23 RX ADMIN — Medication 650 MILLIGRAM(S): at 17:24

## 2024-07-23 RX ADMIN — Medication 50 MILLIGRAM(S): at 12:19

## 2024-07-23 RX ADMIN — Medication 100 GRAM(S): at 23:03

## 2024-07-23 RX ADMIN — IPRATROPIUM BROMIDE AND ALBUTEROL SULFATE 3 MILLILITER(S): 2.5; .5 SOLUTION RESPIRATORY (INHALATION) at 20:03

## 2024-07-23 RX ADMIN — Medication 100 MILLIGRAM(S): at 05:26

## 2024-07-23 NOTE — SWALLOW BEDSIDE ASSESSMENT ADULT - ADDITIONAL RECOMMENDATIONS
Medical team advised to reconsult this service if there is change in patient's medical status or observed tolerance of recommended PO diet. This service to follow as schedule permits to assess diet tolerance.

## 2024-07-23 NOTE — CONSULT NOTE ADULT - SUBJECTIVE AND OBJECTIVE BOX
Chief Complaint:  Patient is a 84y old  Male who presents with a chief complaint of back pain, dysuria (22 Jul 2024 15:14)      HPI:  ILENE BARCENAS is a 84year old Male with history of HTN, HLD BPH presenting to the hospital w/ sepsis in the setting of pyelonephritis, GI consulted in the setting of severe anemia w/ CT A/P w/ findings of rectosigmoid thickening w/ possible proctitis.     Spoke with the patient and son Raleigh Barcenas regarding patient's history. The patient has had a hx of intermittent blood in the toilet, though it was unclear if it was blood from his urine or stool especially w/ his hx of UTI. This has not been regular.   He does note that he has been having intermittent constipation and straining w/ BMs, though presently does not have any reports of hematochezia, melena, diarrhea, stomach pain, nausea, vomiting, weight loss. The patient only complaint is for back pain in the setting of his pyelonephritis.    Hgb in March 2024 w/ Hgb 13.1. Per family, patient does not have a hx of anemia.     The patient is unsure if he's ever had a colonoscopy, per son he may have had one over 15 years ago. No family hx of GI cancers. No prior surgeries reported.     ROS:   General:  No fevers, chills, night sweats  Eyes:  Good vision, no reported pain  ENT:  No sore throat, pain, runny nose  CV:  No pain, palpitations  Pulm:  No dyspnea, cough  GI:  See HPI, otherwise negative  :  No incontinence, nocturia  Muscle:  No reported pain, weakness  Neuro:  No memory problems  Psych:  No insomnia, psychosis  Endocrine:  No polyuria, polydipsia  Heme:  No petechiae, ecchymosis, easy bruisability  Skin:  No reported rash    PMHX/PSHX:      Allergies:  No Known Allergies      Home Medications: reviewed  Hospital Medications:  acetaminophen     Tablet .. 650 milliGRAM(s) Oral every 6 hours PRN  cefTRIAXone   IVPB 1000 milliGRAM(s) IV Intermittent every 24 hours  folic acid 1 milliGRAM(s) Oral daily  melatonin 3 milliGRAM(s) Oral at bedtime PRN  traMADol 50 milliGRAM(s) Oral every 6 hours PRN  traMADol 75 milliGRAM(s) Oral every 6 hours PRN      Social History:   Tobacco: Denies  EtOH: Denies  Illicit Drugs: Denies    Family history:      Denies family history of colon cancer/polyps, stomach cancer/polyps, pancreatic cancer/masses, liver cancer/disease, ovarian cancer and endometrial cancer.    PHYSICAL EXAM:   Vital Signs:  Vital Signs Last 24 Hrs  T(C): 36.6 (23 Jul 2024 05:20), Max: 37 (22 Jul 2024 09:53)  T(F): 97.9 (23 Jul 2024 05:20), Max: 98.6 (22 Jul 2024 09:53)  HR: 75 (23 Jul 2024 05:20) (75 - 83)  BP: 126/56 (23 Jul 2024 05:20) (121/50 - 149/63)  BP(mean): --  RR: 18 (23 Jul 2024 05:20) (16 - 18)  SpO2: 97% (23 Jul 2024 05:20) (97% - 99%)    Parameters below as of 23 Jul 2024 05:20  Patient On (Oxygen Delivery Method): room air      Daily     Daily     GENERAL: no acute distress  NEURO: alert  HEENT: anicteric sclera, no conjunctival pallor appreciated  CHEST: no respiratory distress, no accessory muscle use  CARDIAC: regular rate, +S1/S2  ABDOMEN: soft, nondistended, nontender, no rebound or guarding  EXTREMITIES: warm, well perfused, no edema  SKIN: no lesions noted  RECTAL: No hemorrhoids, no masses, no blood.     LABS: reviewed                        7.7    5.67  )-----------( 237      ( 23 Jul 2024 07:07 )             21.8     07-23    131<L>  |  101  |  16  ----------------------------<  99  3.9   |  22  |  0.90    Ca    7.9<L>      23 Jul 2024 07:07  Phos  2.5     07-23  Mg     1.60     07-23          Culture - Blood (collected 21 Jul 2024 05:15)  Source: .Blood Blood-Peripheral  Preliminary Report (22 Jul 2024 11:12):    No growth at 24 hours    Culture - Blood (collected 21 Jul 2024 05:00)  Source: .Blood Blood-Peripheral  Preliminary Report (22 Jul 2024 11:12):    No growth at 24 hours    Culture - Blood (collected 20 Jul 2024 22:50)  Source: .Blood Blood-Peripheral  Preliminary Report (23 Jul 2024 06:01):    No growth at 48 Hours    Culture - Blood (collected 20 Jul 2024 22:35)  Source: .Blood Blood-Peripheral  Preliminary Report (23 Jul 2024 06:01):    No growth at 48 Hours        Diagnostic Studies: see sunrise for full report    < from: CT Abdomen and Pelvis w/ IV Cont (07.20.24 @ 19:06) >  FINDINGS:  LOWER CHEST: Small bilateral pleural effusions with intrafissural fluid   on the right and mild bibasilar atelectasis.. Linear atelectasis of the   right lower lobe. Aortic and coronary artery calcifications.    LIVER: Within normal limits.  BILE DUCTS: Normal caliber.  GALLBLADDER: Within normal limits.  SPLEEN: Within normal limits.  PANCREAS: Within normal limits.  ADRENALS: Within normal limits.  KIDNEYS/URETERS: Small subcentimeter bilateral renal hypoattenuating   lesions are too small to characterize.    BLADDER: Within normal limits.  REPRODUCTIVE ORGANS: Mildly enlarged prostate with prominent bilateral   seminal vesicles.    BOWEL: Mild rectosigmoid wall thickening/mucosal hyperemia and   surrounding of lumbar changes Appendix is not visualized.  PERITONEUM/RETROPERITONEUM: Trace ascites. No pneumoperitoneum or fluid   collection.  VESSELS: Atherosclerotic changes.  LYMPH NODES: No lymphadenopathy or hematoma.  ABDOMINAL WALL: Within normal limits.  BONES: Degenerative changes.    IMPRESSION:  Mild rectosigmoid wall thickening/hyperemia with presacral fluid,   question proctitis.    Small bilateral pleural effusions.    < end of copied text >                 Chief Complaint:  Patient is a 84y old  Male who presents with a chief complaint of back pain, dysuria (22 Jul 2024 15:14)      HPI:  ILENE BARCENAS is a 84year old Male with history of HTN, HLD BPH presenting to the hospital w/ sepsis in the setting of pyelonephritis, GI consulted in the setting of severe anemia w/ CT A/P w/ findings of rectosigmoid thickening w/ possible proctitis.     Spoke with the patient and son Raleigh Barcenas regarding patient's history. The patient has had a hx of intermittent blood in the toilet, though it was unclear if it was blood from his urine or stool especially w/ his hx of UTI. This has not been regular.   He does note that he has been having intermittent constipation and straining w/ BMs, though presently does not have any reports of hematochezia, melena, diarrhea, stomach pain, nausea, vomiting, weight loss. The patient only complaint is for back pain in the setting of his pyelonephritis.    Hgb in March 2024 w/ Hgb 13.1. Per family, patient does not have a hx of anemia. Currently in patient, there has been no reports of hematochezia, melena. Last BM was 3 days ago and per patient was brown. However, his Hgb has been between 6.9-7.4 and required 1u PRBC on 7/22 w/ improvement from 6.9 --> 7.7. Iron studies consistent w/ iron deficiency anemia.     The patient is unsure if he's ever had a colonoscopy, per son he may have had one over 15 years ago. No family hx of GI cancers. No prior surgeries reported.     ROS:   General:  No fevers, chills, night sweats  Eyes:  Good vision, no reported pain  ENT:  No sore throat, pain, runny nose  CV:  No pain, palpitations  Pulm:  No dyspnea, cough  GI:  See HPI, otherwise negative  :  No incontinence, nocturia  Muscle:  No reported pain, weakness  Neuro:  No memory problems  Psych:  No insomnia, psychosis  Endocrine:  No polyuria, polydipsia  Heme:  No petechiae, ecchymosis, easy bruisability  Skin:  No reported rash    PMHX/PSHX:      Allergies:  No Known Allergies      Home Medications: reviewed  Hospital Medications:  acetaminophen     Tablet .. 650 milliGRAM(s) Oral every 6 hours PRN  cefTRIAXone   IVPB 1000 milliGRAM(s) IV Intermittent every 24 hours  folic acid 1 milliGRAM(s) Oral daily  melatonin 3 milliGRAM(s) Oral at bedtime PRN  traMADol 50 milliGRAM(s) Oral every 6 hours PRN  traMADol 75 milliGRAM(s) Oral every 6 hours PRN      Social History:   Tobacco: Denies  EtOH: Denies  Illicit Drugs: Denies    Family history:      Denies family history of colon cancer/polyps, stomach cancer/polyps, pancreatic cancer/masses, liver cancer/disease, ovarian cancer and endometrial cancer.    PHYSICAL EXAM:   Vital Signs:  Vital Signs Last 24 Hrs  T(C): 36.6 (23 Jul 2024 05:20), Max: 37 (22 Jul 2024 09:53)  T(F): 97.9 (23 Jul 2024 05:20), Max: 98.6 (22 Jul 2024 09:53)  HR: 75 (23 Jul 2024 05:20) (75 - 83)  BP: 126/56 (23 Jul 2024 05:20) (121/50 - 149/63)  BP(mean): --  RR: 18 (23 Jul 2024 05:20) (16 - 18)  SpO2: 97% (23 Jul 2024 05:20) (97% - 99%)    Parameters below as of 23 Jul 2024 05:20  Patient On (Oxygen Delivery Method): room air      Daily     Daily     GENERAL: no acute distress  NEURO: alert  HEENT: anicteric sclera, no conjunctival pallor appreciated  CHEST: no respiratory distress, no accessory muscle use  CARDIAC: regular rate, +S1/S2  ABDOMEN: soft, nondistended, nontender, no rebound or guarding  EXTREMITIES: warm, well perfused, no edema  SKIN: no lesions noted  RECTAL: No hemorrhoids, no masses, no blood.     LABS: reviewed                        7.7    5.67  )-----------( 237      ( 23 Jul 2024 07:07 )             21.8     07-23    131<L>  |  101  |  16  ----------------------------<  99  3.9   |  22  |  0.90    Ca    7.9<L>      23 Jul 2024 07:07  Phos  2.5     07-23  Mg     1.60     07-23          Culture - Blood (collected 21 Jul 2024 05:15)  Source: .Blood Blood-Peripheral  Preliminary Report (22 Jul 2024 11:12):    No growth at 24 hours    Culture - Blood (collected 21 Jul 2024 05:00)  Source: .Blood Blood-Peripheral  Preliminary Report (22 Jul 2024 11:12):    No growth at 24 hours    Culture - Blood (collected 20 Jul 2024 22:50)  Source: .Blood Blood-Peripheral  Preliminary Report (23 Jul 2024 06:01):    No growth at 48 Hours    Culture - Blood (collected 20 Jul 2024 22:35)  Source: .Blood Blood-Peripheral  Preliminary Report (23 Jul 2024 06:01):    No growth at 48 Hours        Diagnostic Studies: see sunrise for full report    < from: CT Abdomen and Pelvis w/ IV Cont (07.20.24 @ 19:06) >  FINDINGS:  LOWER CHEST: Small bilateral pleural effusions with intrafissural fluid   on the right and mild bibasilar atelectasis.. Linear atelectasis of the   right lower lobe. Aortic and coronary artery calcifications.    LIVER: Within normal limits.  BILE DUCTS: Normal caliber.  GALLBLADDER: Within normal limits.  SPLEEN: Within normal limits.  PANCREAS: Within normal limits.  ADRENALS: Within normal limits.  KIDNEYS/URETERS: Small subcentimeter bilateral renal hypoattenuating   lesions are too small to characterize.    BLADDER: Within normal limits.  REPRODUCTIVE ORGANS: Mildly enlarged prostate with prominent bilateral   seminal vesicles.    BOWEL: Mild rectosigmoid wall thickening/mucosal hyperemia and   surrounding of lumbar changes Appendix is not visualized.  PERITONEUM/RETROPERITONEUM: Trace ascites. No pneumoperitoneum or fluid   collection.  VESSELS: Atherosclerotic changes.  LYMPH NODES: No lymphadenopathy or hematoma.  ABDOMINAL WALL: Within normal limits.  BONES: Degenerative changes.    IMPRESSION:  Mild rectosigmoid wall thickening/hyperemia with presacral fluid,   question proctitis.    Small bilateral pleural effusions.    < end of copied text >

## 2024-07-23 NOTE — SWALLOW BEDSIDE ASSESSMENT ADULT - MODE OF PRESENTATION
self fed Patient observed to independently self-administer small sips of thin liquids./cup/self fed spoon/fed by clinician

## 2024-07-23 NOTE — SWALLOW BEDSIDE ASSESSMENT ADULT - ORAL PHASE
Within functional limits Trace lingual surface and anterior sulci stasis post primary-swallow; Cleared with cued lingual sweep and liquid wash with thin liquids.

## 2024-07-23 NOTE — SWALLOW BEDSIDE ASSESSMENT ADULT - SWALLOW EVAL: DIAGNOSIS
1. Mild oral stage for soft and bite-size as characterized by adequate oral acceptance/containment, adequate mastication and bolus manipulation, adequate anterior-posterior transfer, and reduced oral clearance with trace lingual surface and anterior sulci stasis post primary-swallow (which cleared with cued lingual sweep and liquid wash with thin liquids). 2. Functional oral stage for puree and thin liquids as characterized by adequate oral acceptance/containment, adequate anterior-posterior transfer, and adequate oral clearance. 3. Mild pharyngeal stage suspected for puree and soft and bite-size as characterized by initiation of the pharyngeal swallow trigger and +hyolaryngeal excursion upon digital palpation. Patient with multiple (2-3) swallows per bolus presentation, which may be suggestive of pharyngeal clearance deficits.

## 2024-07-23 NOTE — SWALLOW BEDSIDE ASSESSMENT ADULT - SLP PATIENT PROFILE REVIEW
GI Consult 7/23: "...Recommendations: - Recommend testing for IgA TTG Ab, Total serum IgA to r/o celiac disease - Please replete with folic acid 1g QD - Extensive discussion with patient's son Raleigh Barcenas [phone] who patient requested I discuss his care with. Discussed that given no overt bleeding that colonoscopy could be performed as an in patient vs outpatient, however both sons [place] and the patient has been regularly cancelling his appointments and not following up with his doctors. As such, they are favoring that he could obtain the colonoscopy as an in patient, even w/ patient's baseline frailty and current recovery from pyelonephritis. - Please start patient on Miralax QD and senna - Plan for clear liquid Diet starting 7/24, and will start Golytely prep on 7/24 w/ plans for colonoscopy 7/25-26 depending on stools and how pt tolerates prep."

## 2024-07-23 NOTE — SWALLOW BEDSIDE ASSESSMENT ADULT - H & P REVIEW
CT Chest 7/23: "IMPRESSION: No pulmonary embolism. Small to moderate multiloculated right and small free left pleural effusions with atelectasis of right middle and bilateral lower lobes; Lung/pleura: There is minimal secretion in the upper trachea. Aeration of CT Chest 7/23: "IMPRESSION: No pulmonary embolism. Small to moderate multiloculated right and small free left pleural effusions with atelectasis of right middle and bilateral lower lobes; Lung/pleura: There is minimal secretion in the upper trachea."

## 2024-07-23 NOTE — PROGRESS NOTE ADULT - PROBLEM SELECTOR PLAN 2
-Will c/w CTX IV 1g q24h  - UCx growing E coli sensitive to CTX and augmentin.   - BCx 7/19 coag neg staph 1/2 bottles likely contaminant from procurement , repeat BCx from 7/21 NGTD.   - bactroban ointment to nostrils x5 days for MSSA pcr +  - updated son 7/21, concerned about weight loss/back pain, reports pt had colonoscopy years ago  - CT a/p w/o acute path, incidental finding of hyperattenuation in sigmoidrectal region. > GI consult.

## 2024-07-23 NOTE — SWALLOW BEDSIDE ASSESSMENT ADULT - SWALLOW EVAL: PROGNOSIS
4. Functional pharyngeal stage for thin liquids as characterized by initiation of the pharyngeal swallow trigger and +hyolaryngeal excursion upon digital palpation. No overt s/sx aspiration/penetration observed post oral-intake PO trials thin liquids. Patient denied globus/stuck sensation following PO trials, although stated the sensation "doesn't happen all of the time."

## 2024-07-23 NOTE — SWALLOW BEDSIDE ASSESSMENT ADULT - ASR SWALLOW RECOMMEND DIAG
2. Consider Cinesophagram at MD's discretion given findings on recent Chest CT ("There is minimal secretion in the upper trachea") and if concerned findings are aspiration/dysphagia-related. 2. Cinesophagram given patient report of intermittent globus/"stuck" sensation in the throat post oral-intake solids./VFSS/MBS

## 2024-07-23 NOTE — SWALLOW BEDSIDE ASSESSMENT ADULT - COMMENTS
Progress Note-Hospitalist 7/22: "84M PMH essential HTN, BPH, presents with a week of painful urination and back pain. He is admitted for management of likely pyelonephritis. incidentally found to have severe anemia with AoCD. CT showing concerning rectalsigmoid lesions."    CXR 7/18: "IMPRESSION: Linear opacities in right lower lung field, likely atelectasis. No focal consolidation." Progress Note-Hospitalist 7/22: "84M PMH essential HTN, BPH, presents with a week of painful urination and back pain. He is admitted for management of likely pyelonephritis. incidentally found to have severe anemia with AoCD. CT showing concerning rectalsigmoid lesions."    CXR 7/18: "IMPRESSION: Linear opacities in right lower lung field, likely atelectasis. No focal consolidation."    Patient received awake in bed, on room air, and in no acute distress. Son and wife present at bedside. Patient Mandarin-speaking, although able to comprehend/communicate in basic English. Son at bedside provided translation to patient's native language as needed per patient and family preference/wishes. Patient endorsed prior difficulties swallow. Stated that regular solids, "get stuck" (patient pointed to throat to indicate location where sensation occurs). Stated, "I cut it small - that helps." Denied difficulties with liquids and said, "I drink slow." Agreeable to PO trials with SLP. Progress Note-Hospitalist 7/22: "84M PMH essential HTN, BPH, presents with a week of painful urination and back pain. He is admitted for management of likely pyelonephritis. incidentally found to have severe anemia with AoCD. CT showing concerning rectalsigmoid lesions."    CXR 7/18: "IMPRESSION: Linear opacities in right lower lung field, likely atelectasis. No focal consolidation."    Patient received awake in bed, on room air, and in no acute distress. Son and wife present at bedside. Patient Mandarin-speaking, although able to comprehend/communicate in basic English. Son at bedside provided translation to patient's native language as needed per patient and family preference/wishes. Patient endorsed prior difficulties swallowing. Stated that regular solids, "get stuck" (patient pointed to throat to indicate location where sensation occurs). Stated, "I cut it small - that helps." Denied difficulties with liquids and said, "I drink slow." Agreeable to PO trials with SLP.

## 2024-07-23 NOTE — SWALLOW BEDSIDE ASSESSMENT ADULT - PHARYNGEAL PHASE
2-3 swallows per bolus./Multiple swallows Within functional limits 2-3 swallows per bolus presentation./Multiple swallows

## 2024-07-23 NOTE — CONSULT NOTE ADULT - ASSESSMENT
ILENE LOGAN is a 84y Male who was consulted for ***. The patient has a PMH of ***, admitted to the hospital for ***.     #Severe Normocytic Anemia  #Rectosigmoid Thickening on CT   Patient w/ new severe normocytic anemia     Recommendations:  -       All recommendations are preliminary until attending attestation present.    Discussed with ***.       Note incomplete until finalized by attending signature/attestation.    Jony Mitchell  GI/Hepatology Fellow, PGY-4    MONDAY-FRIDAY 8AM-5PM:  Please message via [employer] or email [email or web address]     On Weekends/Holidays (All day) and Weekdays after 5 PM to 8 AM  For nonurgent consults please email:  Please email [email or web address]  For urgent consults:  Please contact on call GI team. See Amion schedule ([facility]), [employer] paging system ([facility]), or call hospital  ([facility])      ILENE LOGAN is a 84y Male who was consulted for ***. The patient has a PMH of ***, admitted to the hospital for ***.     #Severe Normocytic Anemia  #Rectosigmoid Thickening on CT   Patient w/ new severe normocytic anemia compared w/ 3 months ago w/ labs consistent with JANICE. No prior hx of anemia, baseline hgb is 13.1, however currently Hgb 6.9-7, required 1 units pRBC 7/22, but w/ appropriate correction 6.9 --> 7.7.  Rectal exam w/o evidence of bleeding. CT A/P w/ mild rectosgimoid wall thickening/hyperemia w/ pre-sacral fluid, questionable proctitis. Differential includes proctitis vs malignancy vs diverticulosis vs angioectastia, less likely but possible UGIB component PUD, gastric malignancy (though none present on imaging).       Recommendations:  -       All recommendations are preliminary until attending attestation present.    Discussed with ***.       Note incomplete until finalized by attending signature/attestation.    Jony Mitchell  GI/Hepatology Fellow, PGY-4    MONDAY-FRIDAY 8AM-5PM:  Please message via [employer] or email [email or web address]     On Weekends/Holidays (All day) and Weekdays after 5 PM to 8 AM  For nonurgent consults please email:  Please email [email or web address]  For urgent consults:  Please contact on call GI team. See Amion schedule ([facility]), [employer] paging system ([facility]), or call hospital  ([facility])     LENORA LOGAN is a 84year old Male with history of HTN, HLD BPH presenting to the hospital w/ sepsis in the setting of pyelonephritis, w/ GI consulted in the setting of new severe normocytic anemia and rectosigmoid thickening on CT.     #Severe Normocytic Anemia  #Rectosigmoid Thickening on CT   Patient w/ new severe normocytic anemia compared w/ 3 months ago w/ labs consistent with JANICE. No prior hx of anemia, baseline hgb is 13.1, however currently Hgb 6.9-7, required 1 units pRBC 7/22, but w/ appropriate correction 6.9 --> 7.7.  Rectal exam w/o evidence of bleeding. CT A/P w/ mild rectosgimoid wall thickening/hyperemia w/ pre-sacral fluid, questionable proctitis. While no gross bleeding apparent, high concern for occult GIB w/ new severe anemia and possible intermittent hematochezia. Differential includes proctitis vs rectosigmoid malignancy vs diverticulosis vs angioectastia, less likely but possible UGIB component PUD, gastric malignancy (though none present on imaging).       Recommendations:  -       All recommendations are preliminary until attending attestation present.    Discussed with ***.       Note incomplete until finalized by attending signature/attestation.    Jony Mitchell  GI/Hepatology Fellow, PGY-4    MONDAY-FRIDAY 8AM-5PM:  Please message via [employer] or email [email or web address]     On Weekends/Holidays (All day) and Weekdays after 5 PM to 8 AM  For nonurgent consults please email:  Please email [email or web address]  For urgent consults:  Please contact on call GI team. See Amion schedule ([facility]), [employer] paging system ([facility]), or call hospital  ([facility])     LENORA LOGAN is a 84year old Male with history of HTN, HLD BPH presenting to the hospital w/ sepsis in the setting of pyelonephritis, w/ GI consulted in the setting of new severe normocytic anemia and rectosigmoid thickening on CT.     #Severe Normocytic Anemia  #Rectosigmoid Thickening on CT   #Folate deficiency   #?Intermittent hematochezia  Anemia likely multifactorial in the setting of JANICE (w/ possible GI compoment), folate deficiency, and possible anemia of chronic disease w/ inflammation iso pyelonephritis. No prior hx of anemia, baseline hgb is 13.1, however currently Hgb 6.9-7, required 1 units pRBC 7/22, but w/ appropriate correction 6.9 --> 7.7.  Rectal exam w/o evidence of bleeding. CT A/P w/ mild rectosgimoid wall thickening/hyperemia w/ pre-sacral fluid, questionable proctitis. While no gross bleeding apparent, concern for occult GIB w/ new severe anemia and possible intermittent hematochezia. Differential for includes stercoral proctitis (given hx of intermittent constipation) vs internal hemorrhoids vs colonic malignancy, less likely but possible diverticulosis vs angioectastia, Will need to r/o malabsorptive disease, ? celiac given folate deficiency.       Recommendations:  - Recommend testing for IgA TTG Ab, Total serum IgA to r/o celiac disease   - Will discuss w/ family regarding in-patient vs outpatient colonoscopy - though if patient were to undergo colonoscopy given need for prep and current hx of constipation, may need 2 days of prep earliest would be 7/25      All recommendations are preliminary until attending attestation present.    Discussed with Dr. Mcgee      Note incomplete until finalized by attending signature/attestation.    Jony Mitchell  GI/Hepatology Fellow, PGY-4    MONDAY-FRIDAY 8AM-5PM:  Please message via [employer] or email [email or web address] [place] [email or web address]     On Weekends/Holidays (All day) and Weekdays after 5 PM to 8 AM  For nonurgent consults please email:  Please email [email or web address] [place] [email or web address]  For urgent consults:  Please contact on call GI team. See Amion schedule ([facility]), [employer] paging system ([facility]), or call hospital  ([facility])     LENORA BARCENAS is a 84year old Male with history of HTN, HLD BPH presenting to the hospital w/ sepsis in the setting of pyelonephritis, w/ GI consulted in the setting of new severe normocytic anemia and rectosigmoid thickening on CT.     #Severe Normocytic Anemia  #Rectosigmoid Thickening on CT   #Folate deficiency   #?Intermittent hematochezia  Anemia likely multifactorial in the setting of JANICE (w/ possible GI compoment), folate deficiency, and possible anemia of chronic disease w/ inflammation iso pyelonephritis. No prior hx of anemia, baseline hgb is 13.1, however currently Hgb 6.9-7, required 1 units pRBC 7/22, but w/ appropriate correction 6.9 --> 7.7.  Rectal exam w/o evidence of bleeding. CT A/P w/ mild rectosgimoid wall thickening/hyperemia w/ pre-sacral fluid, questionable proctitis. While no gross bleeding apparent, concern for occult GIB w/ new severe anemia and possible intermittent hematochezia. Differential for includes stercoral proctitis (given hx of intermittent constipation) vs internal hemorrhoids vs colonic malignancy, less likely but possible diverticulosis vs angioectastia, Will need to r/o malabsorptive disease, ? celiac given folate deficiency.       Recommendations:  - Recommend testing for IgA TTG Ab, Total serum IgA to r/o celiac disease   - PLease replete with folic acid 1g QD  - Extensive discussion with patient's son Raleigh Barcenas [phone] who patient requested I discuss his care with. Discussed that given no overt bleeding that colonoscopy could be performed as an in patient vs outpatient, however both sons live out of state and the patient has been regularly cancelling his appointments and not following up with his doctors. As such, they are favoring that he could obtain the colonoscopy as an in patient, even w/ patient's baseline frailty and current recovery from pyelonephritis.    - Will plan for tentative colonoscopy on 7/24, however may need to be rescheduled to 7/25 if not yet w/ clear stools by 7/24.  - Recommend CLD       All recommendations are preliminary until attending attestation present.    Discussed with Dr. Mcgee      Note incomplete until finalized by attending signature/attestation.    Jony Mitchell  GI/Hepatology Fellow, PGY-4    MONDAY-FRIDAY 8AM-5PM:  Please message via [employer] or email [email or web address]     On Weekends/Holidays (All day) and Weekdays after 5 PM to 8 AM  For nonurgent consults please email:  Please email [email or web address]  For urgent consults:  Please contact on call GI team. See Amion schedule ([facility]), [employer] paging system ([facility]), or call hospital  ([facility])     LENORA BARCENAS is a 84year old Male with history of HTN, HLD BPH presenting to the hospital w/ sepsis in the setting of pyelonephritis, w/ GI consulted in the setting of new severe normocytic anemia and rectosigmoid thickening on CT.     #Severe Normocytic Anemia  #Rectosigmoid Thickening on CT   #Folate deficiency   #?Intermittent hematochezia  Anemia likely multifactorial in the setting of JANICE (w/ possible GI compoment), folate deficiency, and possible anemia of chronic disease w/ inflammation iso pyelonephritis. No prior hx of anemia, baseline hgb is 13.1, however currently Hgb 6.9-7, required 1 units pRBC 7/22, but w/ appropriate correction 6.9 --> 7.7.  Rectal exam w/o evidence of bleeding. CT A/P w/ mild rectosgimoid wall thickening/hyperemia w/ pre-sacral fluid, questionable proctitis. While no gross bleeding apparent, concern for occult GIB w/ new severe anemia and possible intermittent hematochezia. Differential for includes stercoral proctitis (given hx of intermittent constipation) vs internal hemorrhoids vs colonic malignancy, less likely but possible diverticulosis vs angioectastia, Will need to r/o malabsorptive disease, ? celiac given folate deficiency.       Recommendations:  - Recommend testing for IgA TTG Ab, Total serum IgA to r/o celiac disease   - Please replete with folic acid 1g QD  - Extensive discussion with patient's son Raleigh Barcenas [phone] who patient requested I discuss his care with. Discussed that given no overt bleeding that colonoscopy could be performed as an in patient vs outpatient, however both sons live out of state and the patient has been regularly cancelling his appointments and not following up with his doctors. As such, they are favoring that he could obtain the colonoscopy as an in patient, even w/ patient's baseline frailty and current recovery from pyelonephritis.  - Please start patient on Miralax QD and senna  - Plan for clear liquid Diet starting 7/24, and will start Golytely prep on 7/24 w/ plans for colonoscopy 7/25-26 depending on stools and how pt tolerates prep.       All recommendations are preliminary until attending attestation present.    Discussed with Dr. Mcgee      Note incomplete until finalized by attending signature/attestation.    Jony Mitchell  GI/Hepatology Fellow, PGY-4    MONDAY-FRIDAY 8AM-5PM:  Please message via [employer] or email [email or web address]     On Weekends/Holidays (All day) and Weekdays after 5 PM to 8 AM  For nonurgent consults please email:  Please email [email or web address]  For urgent consults:  Please contact on call GI team. See Amion schedule ([facility]), [employer] paging system ([facility]), or call hospital  ([facility])     LENORA BARCENAS is a 84year old Male with history of HTN, HLD BPH presenting to the hospital w/ sepsis in the setting of pyelonephritis, w/ GI consulted in the setting of new severe normocytic anemia and rectosigmoid thickening on CT.     #Severe Normocytic Anemia  #Rectosigmoid Thickening on CT   #Folate deficiency   #?Intermittent hematochezia  Anemia likely multifactorial in the setting of JANICE (w/ possible GI compoment), folate deficiency, and possible anemia of chronic disease w/ inflammation iso pyelonephritis. No prior hx of anemia, baseline hgb is 13.1, however currently Hgb 6.9-7, required 1 units pRBC 7/22, but w/ appropriate correction 6.9 --> 7.7.  Rectal exam w/o evidence of bleeding. CT A/P w/ mild rectosgimoid wall thickening/hyperemia w/ pre-sacral fluid, questionable proctitis. While no gross bleeding apparent, concern for occult GIB w/ new severe anemia and possible intermittent hematochezia. Differential for includes stercoral proctitis (given hx of intermittent constipation) vs internal hemorrhoids vs colonic malignancy, less likely but possible diverticulosis vs angioectastia, Will need to r/o malabsorptive disease, ? celiac given folate deficiency.       Recommendations:  - Recommend testing for IgA TTG Ab, Total serum IgA to r/o celiac disease   - Please replete with folic acid 1mg QD  - Extensive discussion with patient's son Raleigh Barcenas [phone] who patient requested I discuss his care with. Discussed that given no overt bleeding that colonoscopy could be performed as an in patient vs outpatient, however both sons live out of state and the patient has been regularly cancelling his appointments and not following up with his doctors. As such, they are favoring that he could obtain the colonoscopy as an in patient, even w/ patient's baseline frailty and current recovery from pyelonephritis.  - Please start patient on Miralax QD and senna  - Plan for clear liquid Diet starting 7/24, and will start Golytely prep on 7/24 w/ plans for colonoscopy 7/25-26 depending on stools and how pt tolerates prep.       All recommendations are preliminary until attending attestation present.    Discussed with Dr. Mcgee      Note incomplete until finalized by attending signature/attestation.    Jony Mitchell  GI/Hepatology Fellow, PGY-4    MONDAY-FRIDAY 8AM-5PM:  Please message via [email or web address] or email [email or web address]     On Weekends/Holidays (All day) and Weekdays after 5 PM to 8 AM  For nonurgent consults please email:  Please email [email or web address]  For urgent consults:  Please contact on call GI team. See Amion schedule ([facility]), [employer] paging system ([facility]), or call hospital  ([facility])

## 2024-07-23 NOTE — SWALLOW BEDSIDE ASSESSMENT ADULT - ASR SWALLOW REFERRAL
GI workup to assess/rule-out esophageal dysphagia component which can exacerbate the swallowing mechanism.

## 2024-07-23 NOTE — CONSULT NOTE ADULT - ATTENDING COMMENTS
Patient seen/examined.  Assessment/recommendations as noted above.  Suspect anemia multifactorial–possible component of iron deficiency attributed to occult GI blood loss but also noted to have folate deficiency as well as likely component of anemia of chronic disease in view of ongoing pyelonephritis.  Recommendations as noted.  Doubt celiac disease but recommend TTG IgA.  Folate repletion with folic acid 1 mg daily.  Question as to whether he had been experiencing hematochezia PTA although son not clear if this was  or GI in origin.  Either way, family request inpatient colonoscopy will be scheduled which will also permit assessment of rectosigmoid thickening noted on CT.  Currently constipated–begin senna 2 tabs at bedtime in conjunction with MiraLAX 17 g daily

## 2024-07-23 NOTE — PROGRESS NOTE ADULT - PROBLEM SELECTOR PLAN 1
has trouble completing sentences today.   lungs with decreased bs and basilar rales.   CT from last week showed small pleural effusion.   CTPA neg for PE, showed loculated effusion on the R side. small left pl eff.   proBNP elevated, ?CHF  - tele  - IV lasix 20mg x1 and monitor I&O.   - TTE.

## 2024-07-23 NOTE — SWALLOW BEDSIDE ASSESSMENT ADULT - CONSISTENCIES ADMINISTERED
2 teaspoons (patient declined additional trials despite education provided)./pureed ~ 2 ounces (patient declined additional)./thin liquid 1/4 cracker (patient declined additional)./soft & bite-sized

## 2024-07-24 LAB
ANION GAP SERPL CALC-SCNC: 9 MMOL/L — SIGNIFICANT CHANGE UP (ref 7–14)
BUN SERPL-MCNC: 16 MG/DL — SIGNIFICANT CHANGE UP (ref 7–23)
CALCIUM SERPL-MCNC: 8.2 MG/DL — LOW (ref 8.4–10.5)
CHLORIDE SERPL-SCNC: 99 MMOL/L — SIGNIFICANT CHANGE UP (ref 98–107)
CO2 SERPL-SCNC: 24 MMOL/L — SIGNIFICANT CHANGE UP (ref 22–31)
CREAT SERPL-MCNC: 0.96 MG/DL — SIGNIFICANT CHANGE UP (ref 0.5–1.3)
CULTURE RESULTS: SIGNIFICANT CHANGE UP
EGFR: 79 ML/MIN/1.73M2 — SIGNIFICANT CHANGE UP
GLUCOSE SERPL-MCNC: 108 MG/DL — HIGH (ref 70–99)
HCT VFR BLD CALC: 22.7 % — LOW (ref 39–50)
HGB BLD-MCNC: 7.7 G/DL — LOW (ref 13–17)
IGA FLD-MCNC: 424 MG/DL — HIGH (ref 70–400)
IGG FLD-MCNC: 1445 MG/DL — SIGNIFICANT CHANGE UP (ref 700–1600)
IGM SERPL-MCNC: 74 MG/DL — SIGNIFICANT CHANGE UP (ref 40–230)
MAGNESIUM SERPL-MCNC: 1.9 MG/DL — SIGNIFICANT CHANGE UP (ref 1.6–2.6)
MCHC RBC-ENTMCNC: 29.4 PG — SIGNIFICANT CHANGE UP (ref 27–34)
MCHC RBC-ENTMCNC: 33.9 GM/DL — SIGNIFICANT CHANGE UP (ref 32–36)
MCV RBC AUTO: 86.6 FL — SIGNIFICANT CHANGE UP (ref 80–100)
NRBC # BLD: 0 /100 WBCS — SIGNIFICANT CHANGE UP (ref 0–0)
NRBC # FLD: 0 K/UL — SIGNIFICANT CHANGE UP (ref 0–0)
PHOSPHATE SERPL-MCNC: 3.6 MG/DL — SIGNIFICANT CHANGE UP (ref 2.5–4.5)
PLATELET # BLD AUTO: 260 K/UL — SIGNIFICANT CHANGE UP (ref 150–400)
POTASSIUM SERPL-MCNC: 3.6 MMOL/L — SIGNIFICANT CHANGE UP (ref 3.5–5.3)
POTASSIUM SERPL-SCNC: 3.6 MMOL/L — SIGNIFICANT CHANGE UP (ref 3.5–5.3)
RBC # BLD: 2.62 M/UL — LOW (ref 4.2–5.8)
RBC # FLD: 19.1 % — HIGH (ref 10.3–14.5)
SODIUM SERPL-SCNC: 132 MMOL/L — LOW (ref 135–145)
SPECIMEN SOURCE: SIGNIFICANT CHANGE UP
WBC # BLD: 5.07 K/UL — SIGNIFICANT CHANGE UP (ref 3.8–10.5)
WBC # FLD AUTO: 5.07 K/UL — SIGNIFICANT CHANGE UP (ref 3.8–10.5)

## 2024-07-24 PROCEDURE — 74230 X-RAY XM SWLNG FUNCJ C+: CPT | Mod: 26

## 2024-07-24 PROCEDURE — 99231 SBSQ HOSP IP/OBS SF/LOW 25: CPT | Mod: GC

## 2024-07-24 PROCEDURE — 99233 SBSQ HOSP IP/OBS HIGH 50: CPT

## 2024-07-24 RX ORDER — LIDOCAINE 5% 5 G/100G
1 CREAM TOPICAL DAILY
Refills: 0 | Status: DISCONTINUED | OUTPATIENT
Start: 2024-07-24 | End: 2024-08-09

## 2024-07-24 RX ORDER — TRAMADOL HCL 50 MG
75 TABLET ORAL EVERY 6 HOURS
Refills: 0 | Status: DISCONTINUED | OUTPATIENT
Start: 2024-07-24 | End: 2024-07-24

## 2024-07-24 RX ORDER — POTASSIUM CHLORIDE 1500 MG/1
40 TABLET, EXTENDED RELEASE ORAL ONCE
Refills: 0 | Status: COMPLETED | OUTPATIENT
Start: 2024-07-24 | End: 2024-07-24

## 2024-07-24 RX ORDER — TRAMADOL HCL 50 MG
75 TABLET ORAL EVERY 8 HOURS
Refills: 0 | Status: DISCONTINUED | OUTPATIENT
Start: 2024-07-24 | End: 2024-07-25

## 2024-07-24 RX ORDER — MAGNESIUM SULFATE 500 MG/ML
1 VIAL (ML) INJECTION ONCE
Refills: 0 | Status: COMPLETED | OUTPATIENT
Start: 2024-07-24 | End: 2024-07-24

## 2024-07-24 RX ORDER — SOD SULF/SODIUM/NAHCO3/KCL/PEG
4000 SOLUTION, RECONSTITUTED, ORAL ORAL ONCE
Refills: 0 | Status: COMPLETED | OUTPATIENT
Start: 2024-07-24 | End: 2024-07-24

## 2024-07-24 RX ORDER — FUROSEMIDE 10 MG/ML
20 INJECTION, SOLUTION INTRAVENOUS ONCE
Refills: 0 | Status: COMPLETED | OUTPATIENT
Start: 2024-07-24 | End: 2024-07-24

## 2024-07-24 RX ORDER — PROBIOTIC PRODUCT - TAB 1B-250 MG
1 TAB ORAL DAILY
Refills: 0 | Status: DISCONTINUED | OUTPATIENT
Start: 2024-07-24 | End: 2024-08-09

## 2024-07-24 RX ORDER — TRAMADOL HCL 50 MG
75 TABLET ORAL EVERY 8 HOURS
Refills: 0 | Status: DISCONTINUED | OUTPATIENT
Start: 2024-07-24 | End: 2024-07-24

## 2024-07-24 RX ADMIN — LIDOCAINE 5% 1 PATCH: 5 CREAM TOPICAL at 19:00

## 2024-07-24 RX ADMIN — IPRATROPIUM BROMIDE AND ALBUTEROL SULFATE 3 MILLILITER(S): 2.5; .5 SOLUTION RESPIRATORY (INHALATION) at 14:24

## 2024-07-24 RX ADMIN — Medication 100 MILLIGRAM(S): at 05:17

## 2024-07-24 RX ADMIN — SENNOSIDES 2 TABLET(S): 8.6 TABLET ORAL at 21:16

## 2024-07-24 RX ADMIN — POTASSIUM CHLORIDE 40 MILLIEQUIVALENT(S): 1500 TABLET, EXTENDED RELEASE ORAL at 09:29

## 2024-07-24 RX ADMIN — LIDOCAINE 5% 1 PATCH: 5 CREAM TOPICAL at 13:33

## 2024-07-24 RX ADMIN — Medication 650 MILLIGRAM(S): at 10:23

## 2024-07-24 RX ADMIN — Medication 17 GRAM(S): at 11:24

## 2024-07-24 RX ADMIN — Medication 4000 MILLILITER(S): at 17:28

## 2024-07-24 RX ADMIN — FUROSEMIDE 20 MILLIGRAM(S): 10 INJECTION, SOLUTION INTRAVENOUS at 12:34

## 2024-07-24 RX ADMIN — IPRATROPIUM BROMIDE AND ALBUTEROL SULFATE 3 MILLILITER(S): 2.5; .5 SOLUTION RESPIRATORY (INHALATION) at 03:00

## 2024-07-24 RX ADMIN — Medication 650 MILLIGRAM(S): at 09:23

## 2024-07-24 RX ADMIN — Medication 75 MILLIGRAM(S): at 21:15

## 2024-07-24 RX ADMIN — Medication 100 MILLIGRAM(S): at 12:37

## 2024-07-24 RX ADMIN — Medication 100 GRAM(S): at 09:40

## 2024-07-24 RX ADMIN — Medication 1 MILLIGRAM(S): at 09:22

## 2024-07-24 RX ADMIN — IPRATROPIUM BROMIDE AND ALBUTEROL SULFATE 3 MILLILITER(S): 2.5; .5 SOLUTION RESPIRATORY (INHALATION) at 21:07

## 2024-07-24 NOTE — PROGRESS NOTE ADULT - ATTENDING COMMENTS
Assessment/recommendations as noted.  Plan for colonoscopy 6/25 to evaluate anemia, hematochezia and rectosigmoid thickening on CT.

## 2024-07-24 NOTE — SWALLOW VFSS/MBS ASSESSMENT ADULT - DIAGNOSTIC IMPRESSIONS
1. Mild Oral Stage for puree, soft/bite solids, moderately thick liquids, mildly thick liquids, and thin liquids characterized by adequate oral containment, adequate bolus manipulation, slow mastication for soft/bite solids likely exacerbated by incomplete dentition state, adequate anterior to posterior transfer, and adequate oral clearance.   2. Mild Pharyngeal Stage for puree, soft/bite solids, moderately thick liquids, mildly thick liquids and thin liquids characterized by adequate initiation of the pharyngeal swallow, reduced laryngeal elevation, reduced laryngeal vestibule closure, reduced base of tongue retraction, reduced epiglottic deflection, and reduced pharyngeal contractility. There are trace to mild pharyngeal clearance deficits vallecular>pyriforms post primary swallow. Spontaneous reswallow partially assists with pharyngeal clearance. There is laryngeal penetration without retrieval for mildly thick liquids leaving trace residue in the laryngeal vestibule migrating to the level of the vocal folds during a large cup sip. There is laryngeal penetration with retrieval for thin liquids with airway protection maintained. There is No Aspiration before, during, or after the swallow for puree, moderately thick liquids and thin liquids. 1. Mild Oral Stage for puree, soft/bite solids, moderately thick liquids, mildly thick liquids, and thin liquids characterized by adequate oral containment, adequate bolus manipulation, slow mastication for soft/bite solids likely exacerbated by incomplete dentition state, adequate anterior to posterior transfer, and adequate oral clearance.   2. Mild Pharyngeal Stage for puree, soft/bite solids, moderately thick liquids, mildly thick liquids and thin liquids characterized by adequate initiation of the pharyngeal swallow, reduced laryngeal elevation, reduced laryngeal vestibule closure, reduced base of tongue retraction, reduced epiglottic deflection, and reduced pharyngeal contractility. There are trace to mild pharyngeal clearance deficits vallecular>pyriforms post primary swallow. Spontaneous reswallow partially assists with pharyngeal clearance. There is laryngeal penetration without retrieval for mildly thick liquids leaving trace residue in the laryngeal vestibule migrating to the level of the vocal folds during a large cup sip. There is laryngeal penetration with retrieval for thin liquids with airway protection maintained. There is No Aspiration before, during, or after the swallow for puree, soft/bite solids, moderately thick liquids and thin liquids.

## 2024-07-24 NOTE — PROGRESS NOTE ADULT - PROBLEM SELECTOR PLAN 2
-Will c/w CTX IV 1g q24h  - UCx growing E coli sensitive to CTX and augmentin.   - BCx 7/19 coag neg staph 1/2 bottles likely contaminant from procurement , repeat BCx from 7/21 NGTD.   - bactroban ointment to nostrils x5 days for MSSA pcr +  - updated son 7/21, concerned about weight loss/back pain, reports pt had colonoscopy years ago  - CT a/p w/o acute path, incidental finding of hyperattenuation in sigmoidrectal region. GI plan for inpatient C-scope.

## 2024-07-24 NOTE — SWALLOW VFSS/MBS ASSESSMENT ADULT - ROSENBEK'S PENETRATION ASPIRATION SCALE
(1) no aspiration, contrast does not enter airway PA 1 to 3 PA 3 to 5 (2) contrast enters airway, remains above the vocal cords, no residue remains (penetration)

## 2024-07-24 NOTE — SWALLOW VFSS/MBS ASSESSMENT ADULT - RECOMMENDED CONSISTENCY
1. From an Oropharyngeal Standpoint, Soft and Bite Size Solids with Thin Liquids pending GI Clearance  2. Swallowing Guidelines: Seated Upright during Mealtimes; Slow Pacing; Take Small Bites; SINGLE/SMALL SIPS; One Bite/Sip at a Time  3. Aspiration and Reflux Precautions   4. Maintain Adequate Oral Hygiene

## 2024-07-24 NOTE — SWALLOW VFSS/MBS ASSESSMENT ADULT - COMMENTS
Progress Note- Hospitalist 7/23: "84M PMH essential HTN, BPH, presents with a week of painful urination and back pain. He is admitted for management of likely pyelonephritis. incidentally found to have severe anemia with AoCD. CT showing concerning rectalsigmoid lesion. GI eval started. now c/b dyspnea. CXR c/f fluid overload with b/l pleural effusion. proBNP elevated. ?CHF vs PE."    See GI Consult.     Patient seen for a clinical swallow evaluation on 7/23 with recommendations of "From an Oropharyngeal Standpoint, Continue Soft and Bite-Size with Thin Liquids" and a Cinesophagram (see report for details)     Patient received in Radiology Suite this AM for a Cinesophagram. Patient transferred to a specialized seating unit with lateral view projection. Patient is Mandarin and English speaking and declined . Patient communicating needs/wants in English.

## 2024-07-24 NOTE — PROGRESS NOTE ADULT - PROBLEM SELECTOR PLAN 1
has trouble completing sentences today.   lungs with decreased bs and basilar rales.   CT from last week showed small pleural effusion.   CTPA neg for PE, showed loculated effusion on the R side. small left pl eff.   proBNP elevated, ?CHF, denied h/o heart disease. no murmurs.   - tele, strict I&O.   - respiratory improved with lasix 20, still have rales on lower lungs. given additional lasix 20 IVP today.   - TTE pending.

## 2024-07-24 NOTE — SWALLOW VFSS/MBS ASSESSMENT ADULT - ADDITIONAL RECOMMENDATIONS
This department to follow up as schedule permits to assess for diet tolerance/swallow therapy. Patient will benefit swallow therapy pending discharge planning (e.g. Homecare vs. Rehab vs. [facility] [phone]). Medical team further advised to reconsult if there is a change in medical status and/or observed change in patient's tolerance of recommended PO diet.

## 2024-07-25 ENCOUNTER — TRANSCRIPTION ENCOUNTER (OUTPATIENT)
Age: 84
End: 2024-07-25

## 2024-07-25 ENCOUNTER — RESULT REVIEW (OUTPATIENT)
Age: 84
End: 2024-07-25

## 2024-07-25 LAB
ADD ON TEST-SPECIMEN IN LAB: SIGNIFICANT CHANGE UP
ADD ON TEST-SPECIMEN IN LAB: SIGNIFICANT CHANGE UP
ALBUMIN SERPL ELPH-MCNC: 2.6 G/DL — LOW (ref 3.3–5)
ALP SERPL-CCNC: 95 U/L — SIGNIFICANT CHANGE UP (ref 40–120)
ALT FLD-CCNC: 34 U/L — SIGNIFICANT CHANGE UP (ref 4–41)
ANION GAP SERPL CALC-SCNC: 10 MMOL/L — SIGNIFICANT CHANGE UP (ref 7–14)
APTT BLD: 32.9 SEC — SIGNIFICANT CHANGE UP (ref 24.5–35.6)
AST SERPL-CCNC: 48 U/L — HIGH (ref 4–40)
BILIRUB DIRECT SERPL-MCNC: 0.2 MG/DL — SIGNIFICANT CHANGE UP (ref 0–0.3)
BILIRUB INDIRECT FLD-MCNC: 0.4 MG/DL — SIGNIFICANT CHANGE UP (ref 0–1)
BILIRUB SERPL-MCNC: 0.6 MG/DL — SIGNIFICANT CHANGE UP (ref 0.2–1.2)
BLD GP AB SCN SERPL QL: NEGATIVE — SIGNIFICANT CHANGE UP
BUN SERPL-MCNC: 14 MG/DL — SIGNIFICANT CHANGE UP (ref 7–23)
CALCIUM SERPL-MCNC: 8.3 MG/DL — LOW (ref 8.4–10.5)
CHLORIDE SERPL-SCNC: 96 MMOL/L — LOW (ref 98–107)
CO2 SERPL-SCNC: 26 MMOL/L — SIGNIFICANT CHANGE UP (ref 22–31)
CREAT SERPL-MCNC: 0.94 MG/DL — SIGNIFICANT CHANGE UP (ref 0.5–1.3)
D DIMER BLD IA.RAPID-MCNC: 2955 NG/ML DDU — HIGH
EGFR: 81 ML/MIN/1.73M2 — SIGNIFICANT CHANGE UP
GLUCOSE SERPL-MCNC: 123 MG/DL — HIGH (ref 70–99)
HCT VFR BLD CALC: 23.4 % — LOW (ref 39–50)
HGB BLD-MCNC: 8 G/DL — LOW (ref 13–17)
INR BLD: 1.1 RATIO — SIGNIFICANT CHANGE UP (ref 0.85–1.18)
INTERPRETATION SERPL IFE-IMP: SIGNIFICANT CHANGE UP
MAGNESIUM SERPL-MCNC: 1.8 MG/DL — SIGNIFICANT CHANGE UP (ref 1.6–2.6)
MCHC RBC-ENTMCNC: 29.6 PG — SIGNIFICANT CHANGE UP (ref 27–34)
MCHC RBC-ENTMCNC: 34.2 GM/DL — SIGNIFICANT CHANGE UP (ref 32–36)
MCV RBC AUTO: 86.7 FL — SIGNIFICANT CHANGE UP (ref 80–100)
METHYLMALONATE SERPL-SCNC: 439 NMOL/L — HIGH (ref 0–378)
NRBC # BLD: 0 /100 WBCS — SIGNIFICANT CHANGE UP (ref 0–0)
NRBC # FLD: 0 K/UL — SIGNIFICANT CHANGE UP (ref 0–0)
PHOSPHATE SERPL-MCNC: 3.7 MG/DL — SIGNIFICANT CHANGE UP (ref 2.5–4.5)
PLATELET # BLD AUTO: 261 K/UL — SIGNIFICANT CHANGE UP (ref 150–400)
POTASSIUM SERPL-MCNC: 4.1 MMOL/L — SIGNIFICANT CHANGE UP (ref 3.5–5.3)
POTASSIUM SERPL-SCNC: 4.1 MMOL/L — SIGNIFICANT CHANGE UP (ref 3.5–5.3)
PROT SERPL-MCNC: 6.1 G/DL — SIGNIFICANT CHANGE UP (ref 6–8.3)
PROTHROM AB SERPL-ACNC: 12.4 SEC — SIGNIFICANT CHANGE UP (ref 9.5–13)
RBC # BLD: 2.7 M/UL — LOW (ref 4.2–5.8)
RBC # FLD: 19.5 % — HIGH (ref 10.3–14.5)
RH IG SCN BLD-IMP: POSITIVE — SIGNIFICANT CHANGE UP
SODIUM SERPL-SCNC: 132 MMOL/L — LOW (ref 135–145)
T3 SERPL-MCNC: 77 NG/DL — LOW (ref 80–200)
T4 FREE SERPL-MCNC: 1.4 NG/DL — SIGNIFICANT CHANGE UP (ref 0.9–1.8)
TSH SERPL-MCNC: 4.54 UIU/ML — HIGH (ref 0.27–4.2)
TTG IGA SER-ACNC: 0.5 U/ML — SIGNIFICANT CHANGE UP
TTG IGA SER-ACNC: NEGATIVE — SIGNIFICANT CHANGE UP
TTG IGG SER IA-ACNC: NEGATIVE — SIGNIFICANT CHANGE UP
TTG IGG SER-ACNC: <0.8 U/ML — SIGNIFICANT CHANGE UP
WBC # BLD: 6 K/UL — SIGNIFICANT CHANGE UP (ref 3.8–10.5)
WBC # FLD AUTO: 6 K/UL — SIGNIFICANT CHANGE UP (ref 3.8–10.5)

## 2024-07-25 PROCEDURE — 99233 SBSQ HOSP IP/OBS HIGH 50: CPT

## 2024-07-25 PROCEDURE — 93306 TTE W/DOPPLER COMPLETE: CPT | Mod: 26

## 2024-07-25 PROCEDURE — 76376 3D RENDER W/INTRP POSTPROCES: CPT | Mod: 26

## 2024-07-25 PROCEDURE — 72157 MRI CHEST SPINE W/O & W/DYE: CPT | Mod: 26

## 2024-07-25 PROCEDURE — 93970 EXTREMITY STUDY: CPT | Mod: 26

## 2024-07-25 PROCEDURE — 93356 MYOCRD STRAIN IMG SPCKL TRCK: CPT

## 2024-07-25 RX ORDER — FAMOTIDINE 40 MG/1
20 TABLET, FILM COATED ORAL ONCE
Refills: 0 | Status: DISCONTINUED | OUTPATIENT
Start: 2024-07-25 | End: 2024-08-02

## 2024-07-25 RX ORDER — PANTOPRAZOLE SODIUM 20 MG/1
40 TABLET, DELAYED RELEASE ORAL AT BEDTIME
Refills: 0 | Status: DISCONTINUED | OUTPATIENT
Start: 2024-07-25 | End: 2024-07-26

## 2024-07-25 RX ORDER — SOD SULF/SODIUM/NAHCO3/KCL/PEG
2000 SOLUTION, RECONSTITUTED, ORAL ORAL ONCE
Refills: 0 | Status: COMPLETED | OUTPATIENT
Start: 2024-07-25 | End: 2024-07-25

## 2024-07-25 RX ORDER — ONDANSETRON HCL/PF 4 MG/2 ML
4 VIAL (ML) INJECTION EVERY 8 HOURS
Refills: 0 | Status: DISCONTINUED | OUTPATIENT
Start: 2024-07-25 | End: 2024-08-09

## 2024-07-25 RX ORDER — SOD SULF/SODIUM/NAHCO3/KCL/PEG
4000 SOLUTION, RECONSTITUTED, ORAL ORAL ONCE
Refills: 0 | Status: DISCONTINUED | OUTPATIENT
Start: 2024-07-25 | End: 2024-07-25

## 2024-07-25 RX ADMIN — LIDOCAINE 5% 1 PATCH: 5 CREAM TOPICAL at 19:56

## 2024-07-25 RX ADMIN — PANTOPRAZOLE SODIUM 40 MILLIGRAM(S): 20 TABLET, DELAYED RELEASE ORAL at 22:13

## 2024-07-25 RX ADMIN — SENNOSIDES 2 TABLET(S): 8.6 TABLET ORAL at 22:13

## 2024-07-25 RX ADMIN — PROBIOTIC PRODUCT - TAB 1 TABLET(S): TAB at 11:23

## 2024-07-25 RX ADMIN — Medication 100 MILLIGRAM(S): at 11:31

## 2024-07-25 RX ADMIN — IPRATROPIUM BROMIDE AND ALBUTEROL SULFATE 3 MILLILITER(S): 2.5; .5 SOLUTION RESPIRATORY (INHALATION) at 08:09

## 2024-07-25 RX ADMIN — Medication 2 MILLIGRAM(S): at 04:50

## 2024-07-25 RX ADMIN — LIDOCAINE 5% 1 PATCH: 5 CREAM TOPICAL at 20:30

## 2024-07-25 RX ADMIN — IPRATROPIUM BROMIDE AND ALBUTEROL SULFATE 3 MILLILITER(S): 2.5; .5 SOLUTION RESPIRATORY (INHALATION) at 15:08

## 2024-07-25 RX ADMIN — LIDOCAINE 5% 1 PATCH: 5 CREAM TOPICAL at 11:28

## 2024-07-25 RX ADMIN — Medication 2000 MILLILITER(S): at 17:40

## 2024-07-25 RX ADMIN — Medication 2 MILLIGRAM(S): at 05:50

## 2024-07-25 RX ADMIN — Medication 75 MILLIGRAM(S): at 11:23

## 2024-07-25 RX ADMIN — Medication 75 MILLIGRAM(S): at 12:23

## 2024-07-25 RX ADMIN — Medication 1 MILLIGRAM(S): at 11:23

## 2024-07-25 NOTE — PROGRESS NOTE ADULT - PROBLEM SELECTOR PLAN 2
-Will c/w CTX IV   - UCx growing E coli sensitive to CTX and augmentin.   - BCx 7/19 coag neg staph 1/2 bottles likely contaminant from procurement , repeat BCx from 7/21 NGTD.   - bactroban ointment to nostrils x5 days for MSSA pcr +  - updated son 7/21, concerned about weight loss/back pain, reports pt had colonoscopy years ago  - CT a/p w/o acute path.

## 2024-07-25 NOTE — PROGRESS NOTE ADULT - PROBLEM SELECTOR PLAN 1
has trouble completing sentences today.   lungs with decreased bs and basilar rales.   CT from last week showed small pleural effusion.   CTPA neg for PE, showed loculated effusion on the R side. small left pl eff.   proBNP elevated, ?CHF, denied h/o heart disease. no murmurs.   - tele, strict I&O.   - respiratory improved with lasix 20 ivp x2  - TTE grossly normal LV funciton. no valvular dz. after lasix x2.   - possibly diastolic HF iso fluid resuscitation.   - monitor clinically.

## 2024-07-26 LAB
ALBUMIN SERPL ELPH-MCNC: 2.8 G/DL — LOW (ref 3.3–5)
ALP SERPL-CCNC: 98 U/L — SIGNIFICANT CHANGE UP (ref 40–120)
ALT FLD-CCNC: 30 U/L — SIGNIFICANT CHANGE UP (ref 4–41)
ANION GAP SERPL CALC-SCNC: 13 MMOL/L — SIGNIFICANT CHANGE UP (ref 7–14)
APTT BLD: 32.3 SEC — SIGNIFICANT CHANGE UP (ref 24.5–35.6)
AST SERPL-CCNC: 33 U/L — SIGNIFICANT CHANGE UP (ref 4–40)
BILIRUB DIRECT SERPL-MCNC: 0.2 MG/DL — SIGNIFICANT CHANGE UP (ref 0–0.3)
BILIRUB INDIRECT FLD-MCNC: 0.4 MG/DL — SIGNIFICANT CHANGE UP (ref 0–1)
BILIRUB SERPL-MCNC: 0.6 MG/DL — SIGNIFICANT CHANGE UP (ref 0.2–1.2)
BUN SERPL-MCNC: 14 MG/DL — SIGNIFICANT CHANGE UP (ref 7–23)
CALCIUM SERPL-MCNC: 8.6 MG/DL — SIGNIFICANT CHANGE UP (ref 8.4–10.5)
CHLORIDE SERPL-SCNC: 98 MMOL/L — SIGNIFICANT CHANGE UP (ref 98–107)
CO2 SERPL-SCNC: 25 MMOL/L — SIGNIFICANT CHANGE UP (ref 22–31)
CREAT SERPL-MCNC: 1 MG/DL — SIGNIFICANT CHANGE UP (ref 0.5–1.3)
CRP SERPL-MCNC: 107.3 MG/L — HIGH
CULTURE RESULTS: SIGNIFICANT CHANGE UP
EGFR: 75 ML/MIN/1.73M2 — SIGNIFICANT CHANGE UP
GLUCOSE SERPL-MCNC: 130 MG/DL — HIGH (ref 70–99)
HCT VFR BLD CALC: 26.6 % — LOW (ref 39–50)
HGB BLD-MCNC: 8.7 G/DL — LOW (ref 13–17)
INR BLD: 1.06 RATIO — SIGNIFICANT CHANGE UP (ref 0.85–1.18)
MAGNESIUM SERPL-MCNC: 2 MG/DL — SIGNIFICANT CHANGE UP (ref 1.6–2.6)
MCHC RBC-ENTMCNC: 29.4 PG — SIGNIFICANT CHANGE UP (ref 27–34)
MCHC RBC-ENTMCNC: 32.7 GM/DL — SIGNIFICANT CHANGE UP (ref 32–36)
MCV RBC AUTO: 89.9 FL — SIGNIFICANT CHANGE UP (ref 80–100)
NRBC # BLD: 0 /100 WBCS — SIGNIFICANT CHANGE UP (ref 0–0)
NRBC # FLD: 0 K/UL — SIGNIFICANT CHANGE UP (ref 0–0)
PHOSPHATE SERPL-MCNC: 3.3 MG/DL — SIGNIFICANT CHANGE UP (ref 2.5–4.5)
PLATELET # BLD AUTO: 287 K/UL — SIGNIFICANT CHANGE UP (ref 150–400)
POTASSIUM SERPL-MCNC: 4 MMOL/L — SIGNIFICANT CHANGE UP (ref 3.5–5.3)
POTASSIUM SERPL-SCNC: 4 MMOL/L — SIGNIFICANT CHANGE UP (ref 3.5–5.3)
PROT SERPL-MCNC: 6.8 G/DL — SIGNIFICANT CHANGE UP (ref 6–8.3)
PROTHROM AB SERPL-ACNC: 11.9 SEC — SIGNIFICANT CHANGE UP (ref 9.5–13)
RBC # BLD: 2.96 M/UL — LOW (ref 4.2–5.8)
RBC # FLD: 19.3 % — HIGH (ref 10.3–14.5)
SODIUM SERPL-SCNC: 136 MMOL/L — SIGNIFICANT CHANGE UP (ref 135–145)
SPECIMEN SOURCE: SIGNIFICANT CHANGE UP
WBC # BLD: 5.28 K/UL — SIGNIFICANT CHANGE UP (ref 3.8–10.5)
WBC # FLD AUTO: 5.28 K/UL — SIGNIFICANT CHANGE UP (ref 3.8–10.5)

## 2024-07-26 PROCEDURE — 45378 DIAGNOSTIC COLONOSCOPY: CPT | Mod: GC

## 2024-07-26 PROCEDURE — 99233 SBSQ HOSP IP/OBS HIGH 50: CPT

## 2024-07-26 PROCEDURE — 99223 1ST HOSP IP/OBS HIGH 75: CPT

## 2024-07-26 RX ADMIN — IPRATROPIUM BROMIDE AND ALBUTEROL SULFATE 3 MILLILITER(S): 2.5; .5 SOLUTION RESPIRATORY (INHALATION) at 21:50

## 2024-07-26 RX ADMIN — Medication 100 MILLIGRAM(S): at 13:13

## 2024-07-26 RX ADMIN — IPRATROPIUM BROMIDE AND ALBUTEROL SULFATE 3 MILLILITER(S): 2.5; .5 SOLUTION RESPIRATORY (INHALATION) at 09:40

## 2024-07-26 RX ADMIN — PROBIOTIC PRODUCT - TAB 1 TABLET(S): TAB at 13:12

## 2024-07-26 RX ADMIN — Medication 650 MILLIGRAM(S): at 01:40

## 2024-07-26 RX ADMIN — Medication 650 MILLIGRAM(S): at 00:40

## 2024-07-26 RX ADMIN — IPRATROPIUM BROMIDE AND ALBUTEROL SULFATE 3 MILLILITER(S): 2.5; .5 SOLUTION RESPIRATORY (INHALATION) at 15:44

## 2024-07-26 RX ADMIN — LIDOCAINE 5% 1 PATCH: 5 CREAM TOPICAL at 17:52

## 2024-07-26 RX ADMIN — Medication 1 MILLIGRAM(S): at 13:12

## 2024-07-26 RX ADMIN — IPRATROPIUM BROMIDE AND ALBUTEROL SULFATE 3 MILLILITER(S): 2.5; .5 SOLUTION RESPIRATORY (INHALATION) at 03:22

## 2024-07-26 RX ADMIN — LIDOCAINE 5% 1 PATCH: 5 CREAM TOPICAL at 13:12

## 2024-07-26 NOTE — CONSULT NOTE ADULT - ASSESSMENT
83 yo M PMH essential HTN, BPH, presented on 7/18 with painful urination and back pain x 1wk. He is admitted for management of likely pyelonephritis. Found to be anemic as well. MRI T-spine done for back pain showed Discitis/osteomyelitis at the T11-12 level. CRP elevated to 90-100s and WBC normal. Initial blood culture positive but subsequent blood cultures have been negative. IR consulted for Bx tentatively planning for 7/29 pending ID recs.

## 2024-07-26 NOTE — CONSULT NOTE ADULT - SUBJECTIVE AND OBJECTIVE BOX
NEUROSURGERY CONSULT    HPI: 84M PMH essential HTN, BPH, presents with a week of painful urination and back pain. Over the last week, he has continued to have burning on urination and increased urinary frequency. He also has diffuse back pain, which does not radiate, and is 8/10 in severity at its worse, achy in character, improved after receiving abx.      RADIOLOGY:   < from: MR Thoracic Spine w/wo IV Cont (07.25.24 @ 21:31) >  Impression: Discitis/osteomyelitis is suspected at the T11-12 level as   described above.    < end of copied text >    MEDS:  acetaminophen     Tablet .. 650 milliGRAM(s) Oral every 6 hours PRN  albuterol/ipratropium for Nebulization 3 milliLiter(s) Nebulizer every 6 hours  cefTRIAXone   IVPB 2000 milliGRAM(s) IV Intermittent every 24 hours  cefTRIAXone   IVPB      famotidine Injectable 20 milliGRAM(s) IV Push once PRN  folic acid 1 milliGRAM(s) Oral daily  lactobacillus acidophilus 1 Tablet(s) Oral daily  lidocaine   4% Patch 1 Patch Transdermal daily  melatonin 3 milliGRAM(s) Oral at bedtime PRN  ondansetron Injectable 4 milliGRAM(s) IV Push every 8 hours PRN  oxycodone    5 mG/acetaminophen 325 mG 1 Tablet(s) Oral every 6 hours PRN  polyethylene glycol 3350 17 Gram(s) Oral daily  senna 2 Tablet(s) Oral at bedtime      Vital Signs Last 24 Hrs  T(C): 36.1 (26 Jul 2024 12:03), Max: 36.7 (25 Jul 2024 22:15)  T(F): 97 (26 Jul 2024 12:03), Max: 98 (25 Jul 2024 22:15)  HR: 72 (26 Jul 2024 12:33) (64 - 93)  BP: 143/59 (26 Jul 2024 12:33) (111/58 - 143/59)  BP(mean): --  RR: 17 (26 Jul 2024 12:33) (13 - 18)  SpO2: 99% (26 Jul 2024 12:33) (97% - 100%)    Parameters below as of 26 Jul 2024 12:33  Patient On (Oxygen Delivery Method): room air        LABS:                        8.7    5.28  )-----------( 287      ( 26 Jul 2024 04:19 )             26.6     07-26    136  |  98  |  14  ----------------------------<  130<H>  4.0   |  25  |  1.00    Ca    8.6      26 Jul 2024 04:19  Phos  3.3     07-26  Mg     2.00     07-26    TPro  6.8  /  Alb  2.8<L>  /  TBili  0.6  /  DBili  0.2  /  AST  33  /  ALT  30  /  AlkPhos  98  07-26    PT/INR - ( 26 Jul 2024 04:19 )   PT: 11.9 sec;   INR: 1.06 ratio         PTT - ( 26 Jul 2024 04:19 )  PTT:32.3 sec      PHYSICAL EXAM:  AOx3, Following Commands, Face symmetrical  EOMI, PERRL, CN 2-12 grossly intact     RUE MOTOR:   Delt 5/5  Bicep 5/5  Tricep 5/5      HG 5/5      LUE MOTOR:   Delt 5/5  Bicep 5/5   Tricep 5/5     HG 5/5     RLE MOTOR:   HF 5/5            KF 5/5          KE 5/5         DF 5/5         PF 5/5    EHL 5/5    LLE MOTOR:   HF 5/5        KF 5/5          KE 5/5            DF 5/5            PF 5/5       EHL 5/5      SENSATION: intact to light touch     REFLEXES:  No clonus  No Hoffmans  DTRs: normal

## 2024-07-26 NOTE — CONSULT NOTE ADULT - PROBLEM SELECTOR RECOMMENDATION 9
- No acute Neurosurgical intervention at this time   - ID consult for abx recommendations     [id number]    Case discussed with attending neurosurgeon Dr. Bergeron

## 2024-07-26 NOTE — CONSULT NOTE ADULT - SUBJECTIVE AND OBJECTIVE BOX
Interventional Radiology    Evaluate for Procedure:     HPI: 84M who presented with a week of painful urination and back pain, admitted for likely pyelonephritis. CT and MR showing likely T11/12 discitis/osteomyelitis. IR consulted for possible biopsy.     Allergies: No Known Allergies    Medications (Abx/Cardiac/Anticoagulation/Blood Products)  cefTRIAXone   IVPB: 100 mL/Hr IV Intermittent (07-24 @ 12:37)  cefTRIAXone   IVPB: 100 mL/Hr IV Intermittent (07-25 @ 11:31)  furosemide   Injectable: 20 milliGRAM(s) IV Push (07-24 @ 12:34)    Data:  169  74  T(C): 36.1  HR: 73  BP: 138/54  RR: 16  SpO2: 97%    -WBC 5.28 / HgB 8.7 / Hct 26.6 / Plt 287  -Na 136 / Cl 98 / BUN 14 / Glucose 130  -K 4.0 / CO2 25 / Cr 1.00  -ALT 30 / Alk Phos 98 / T.Bili 0.6  -INR 1.06 / PTT 32.3      Radiology:   MR 7/26/24  Impression: Discitis/osteomyelitis is suspected at the T11-12 level as described above.    CT Thoracic Spine 7/23  IMPRESSION: Multilevel degenerative changes with marginal osteophyte formation uncovertebral joint and facet joint hypertrophic change as well as multilevel disc space narrowing. At T11-T12 erosive changes are identified involving the superior endplate of T12 and to a lesser extent the inferior endplate of T11. Paravertebral soft tissue thickening is noted. Cannot entirely exclude infection versus erosive progressive degenerative change at this level. Consider MR for more complete evaluation.    Assessment/Plan:    84M who presented with a week of painful urination and back pain, admitted for likely pyelonephritis currently being treated with ceftriaxone. CT and MR showing likely T11/12 discitis/osteomyelitis. IR consulted for possible biopsy. Labs and imaging reviewed. CRP elevated to 90-100s and WBC normal. Initial blood culture positive but subsequent blood cultures have been negative. Recommend ID consult to determine whether biopsy is necessary. Discitis/osteomyelitis biopsy in this region is high risk given possible nerve injury and bleeding.   -- IR will tentatively plan for biopsy on Monday 7/29, pending input from ID  -- NPO at midnight day prior to procedure  -- please obtain CBC, BMP, and Caogs in AM on day of procedure  -- hold a.m. anticoagulation  -- please complete IR pre-procedure note  -- please place IR procedure request order under Dr. Daivs

## 2024-07-26 NOTE — CONSULT NOTE ADULT - ASSESSMENT
84M with essential HTN and BPH admitted 7/19/2024 c/o painful urination and back pain.   Patient reportedly had UTI PTA but unable to tolerate antibiotics.  No fever or leukocytosis on presentation.  Urinalysis with pyuria.  UC with E coli.  Patient has been on IV ceftriaxone since 7/19.  Continued back pain.  CT Thoracic Spine noted T11-T12 erosive changes involving the superior endplate of T12 and to a lesser extent the inferior endplate of T11; paravertebral soft tissue thickening is noted (infection versus erosive progressive degenerative change at this level).  This was followed up with MRI that showed  s subtle area of increased T2 prolongation involving the T11/12 disc space with widening the disc spaces identified as well; in addition there is evidence of abnormal T2 prolongation with some enhancement seen involving the T11 and T12 vertebral bodies and posterior elements; possibility of underlying discitis and osteomyelitis; also evidence of prominent soft tissue identified in the paraspinal region with associated enhancement. There is also enhancement seen involving the epidural region is well. These findings are concerning for underlying areas of phlegmon in the setting of infection.      ID asked to help management     84M with essential HTN and BPH admitted 7/19/2024 c/o painful urination and back pain.   Patient reportedly had UTI PTA but unable to tolerate antibiotics.  No fever or leukocytosis on presentation.  Urinalysis with pyuria.  UC with E coli.  Patient has been on IV ceftriaxone since 7/19.  Continued back pain.  CT Thoracic Spine noted T11-T12 erosive changes involving the superior endplate of T12 and to a lesser extent the inferior endplate of T11; paravertebral soft tissue thickening is noted (infection versus erosive progressive degenerative change at this level).  This was followed up with MRI that showed subtle area of increased T2 prolongation involving the T11/12 disc space with widening the disc spaces identified as well; in addition there is evidence of abnormal T2 prolongation with some enhancement seen involving the T11 and T12 vertebral bodies and posterior elements; possibility of underlying discitis and osteomyelitis; also evidence of prominent soft tissue identified in the paraspinal region with associated enhancement. There is also enhancement seen involving the epidural region is well. These findings are concerning for underlying areas of phlegmon in the setting of infection.  .  ID asked to help management    UTI / epididymidis  - D#8 ceftriaxone  - would stop in a couple of days    Vertebral OM  - would like culture of bone and path  - IR to see  - ideally would like culture off antibiotics  - though if performed after ceftriaxone and no organism isolated, can consider 6-8 weeks ceftriaxone  - obtain ESR    I will be transitioning to [facility].  ID colleague to cover.  Please call [phone].

## 2024-07-26 NOTE — CONSULT NOTE ADULT - SUBJECTIVE AND OBJECTIVE BOX
Patient is a 84y old  Male who presents with a chief complaint of back pain, dysuria (26 Jul 2024 13:24)    HPI:  84M with essential HTN and BPH admitted 7/19/2024 c/o painful urination and back pain.   Patient reportedly had UTI PTA but unable to tolerate antibiotics.  No fever or leukocytosis on presentation.  Urinalysis with pyuria.  UC with E coli.  Patient has been on IV ceftriaxone since 7/19.  Continued back pain.  CT then MRI spine done suggestive of T-spine OM / diskitis.  No ESR but CRP elevated.    ID asked to help management    prior hospital charts reviewed [  ]  primary team notes reviewed [x  ]  other consultant notes reviewed [ x ]    PAST MEDICAL & SURGICAL HISTORY:  HTN (hypertension)  HLD (hyperlipidemia)  Former smoker  Gout  History of BPH    Allergies  No Known Allergies    ANTIMICROBIALS:  cefTRIAXone   IVPB 2000 every 24 hours (7/19-)    MEDICATIONS  (STANDING):  albuterol/ipratropium for Nebulization 3 every 6 hours  polyethylene glycol 3350 17 daily  senna 2 at bedtime  PRN  acetaminophen     Tablet .. 650 milliGRAM(s) Oral every 6 hours PRN  famotidine Injectable 20 milliGRAM(s) IV Push once PRN  melatonin 3 milliGRAM(s) Oral at bedtime PRN  ondansetron Injectable 4 milliGRAM(s) IV Push every 8 hours PRN  oxycodone    5 mG/acetaminophen 325 mG 1 Tablet(s) Oral every 6 hours PRN    SOCIAL HISTORY:   lives with wife and adult sons; from [facility] [place] ([place] 28); retired - used to work in [employer] business; drinks a glass of wine on occasion; former smoker    FAMILY HISTORY: n/c    REVIEW OF SYSTEMS  [  ] ROS unobtainable because:    [  ] All other systems negative except as noted below:	    Constitutional:  [ ] fever [ ] chills  [ ] weight loss  [ ] weakness  Skin:  [ ] rash [ ] phlebitis	  Eyes: [ ] icterus [ ] pain  [ ] discharge	  ENMT: [ ] sore throat  [ ] thrush [ ] ulcers [ ] exudates  Respiratory: [ ] dyspnea [ ] hemoptysis [ ] cough [ ] sputum	  Cardiovascular:  [ ] chest pain [ ] palpitations [ ] edema	  Gastrointestinal:  [ ] nausea [ ] vomiting [ ] diarrhea [ ] constipation [ ] pain	  Genitourinary:  [ ] dysuria [ ] frequency [ ] hematuria [ ] discharge [ ] flank pain  [ ] incontinence  Musculoskeletal:  [ ] myalgias [ ] arthralgias [ ] arthritis  [ ] back pain  Neurological:  [ ] headache [ ] seizures  [ ] confusion/altered mental status  Psychiatric:  [ ] anxiety [ ] depression	  Hematology/Lymphatics:  [ ] lymphadenopathy  Endocrine:  [ ] adrenal [ ] thyroid  Allergic/Immunologic:	 [ ] transplant [ ] seasonal    Vital Signs Last 24 Hrs  T(F): 97 (07-26-24 @ 12:03), Max: 99 (07-24-24 @ 21:00)  Vital Signs Last 24 Hrs  HR: 72 (07-26-24 @ 12:33) (64 - 93)  BP: 143/59 (07-26-24 @ 12:33) (111/58 - 143/59)  RR: 17 (07-26-24 @ 12:33)  SpO2: 99% (07-26-24 @ 12:33) (97% - 100%)  Wt(kg): --    PHYSICAL EXAM:  Constitutional: non-toxic  HEAD/EYES: anicteric  ENT:  supple  Cardiovascular:   normal S1, S2  Respiratory:  clear BS bilaterally  GI:  soft, non-tender, normal bowel sounds  :  no almodovar; no swelling b/l scrotum  Musculoskeletal:  no synovitis, point tenderness mid-spine  Neurologic: awake and alert, normal strength, no focal findings  Skin:  no rash  Psychiatric:  awake, alert, appropriate mood                               8.7    5.28  )-----------( 287      ( 26 Jul 2024 04:19 )             26.6 07-26    136  |  98  |  14  ----------------------------<  130  4.0   |  25  |  1.00  Ca    8.6      26 Jul 2024 04:19Phos  3.3     07-26Mg     2.00     07-26  TPro  6.8  /  Alb  2.8  /  TBili  0.6  /  DBili  0.2  /  AST  33  /  ALT  30  /  AlkPhos  98  07-26    C-Reactive Protein: 107.3 (07-26 @ 04:19)  C-Reactive Protein: 90.0 (07-22 @ 06:06)  C-Reactive Protein: 102.0 (07-21 @ 07:30)    Urinalysis with Rflx Culture (07.19.24 @ 00:45)   Urine Appearance: Cloudy  Color: Yellow  Specific Gravity: 1.018  pH Urine: 5.5  Protein, Urine: 30 mg/dL  Glucose Qualitative, Urine: Negative mg/dL  Ketone - Urine: Trace mg/dL  Blood, Urine: Negative  Bilirubin: Negative  Urobilinogen: 1.0 mg/dL  Leukocyte Esterase Concentration: Large  Nitrite: Positive  Urine Microscopic-Add On (NC) (07.19.24 @ 00:45)   Epithelial Cells: 0 /HPF  Bacteria: Too Numerous to count /HPF  White Blood Cell - Urine: 218 /HPF  Cast: 4 /LPF  Red Blood Cell - Urine: 2 /HPF    MICROBIOLOGY:  7/21/2024 BC (-)  7/20/2024 BC (-)  7/19/2024 BC (+)  Growth in aerobic bottle: Staphylococcus hominis  7/19/2024 BC (-)    Culture - Urine (collected 07-19-24 @ 00:45)  Source: Clean Catch  Final Report (07-23-24 @ 08:36):    >100,000 CFU/ml Escherichia coli    <10,000 CFU/ml Normal Urogenital loren present  Organism: Escherichia coli (07-23-24 @ 08:36)  Organism: Escherichia coli (07-23-24 @ 08:36)      Method Type: JULY      -  Amoxicillin/Clavulanic Acid: S <=8/4      -  Ampicillin: R >16 These ampicillin results predict results for amoxicillin      -  Ampicillin/Sulbactam: S 8/4      -  Aztreonam: S <=4      -  Cefazolin: S <=2 For uncomplicated UTI with K. pneumoniae, E. coli, or P. mirablis: JULY <=16 is sensitive and JULY >=32 is resistant. This also predicts results for oral agents cefaclor, cefdinir, cefpodoxime, cefprozil, cefuroxime axetil, cephalexin and locarbef for uncomplicated UTI. Note that some isolates may be susceptible to these agents while testing resistant to cefazolin.      -  Cefepime: S <=2      -  Cefoxitin: S <=8      -  Ceftriaxone: S <=1      -  Cefuroxime: S <=4      -  Ciprofloxacin: S <=0.25      -  Ertapenem: S <=0.5      -  Gentamicin: S <=2      -  Imipenem: S <=1      -  Levofloxacin: S <=0.5      -  Meropenem: S <=1      -  Nitrofurantoin: S <=32 Should not be used to treat pyelonephritis      -  Piperacillin/Tazobactam: S <=8      -  Tobramycin: S <=2      -  Trimethoprim/Sulfamethoxazole: R >2/38    RADIOLOGY:  imaging below personally reviewed and agree with findings    MR Thoracic Spine w/wo IV Cont (07.25.24 @ 21:31) >  There is subtle area of increased T2 prolongation involving the T11/12 disc space with widening the disc spaces identified as well. In addition there is evidence of abnormal T2 prolongation with some enhancement seen   involving the T11 and T12 vertebral bodies and posterior elements. The possibility of underlying discitis and osteomyelitis must be considered. Clinical correlation is recommended. There is also evidence of prominent   soft tissue identified in the paraspinal region with associated enhancement. There is also enhancement seen involving the epidural region is well. These findings are concerning for underlying areas of phlegmon in the setting of infection.  T10-11: Bilateral hypertrophic facet changes seen with left-sided disc bulge. Moderate narrowing left neural foramen  T11-12: Disc bulge and bilateral hypertrophic facet change. This in addition to the epidural enhancement causes moderate narrowing spinal canal. Moderate to severe narrowing of both neural foramen.  The spinal cord demonstrates normal signal.  Bilateral pleural effusions are identified.  There is abnormal signal seen involving both lung bases which could be compatible congestive changes or scarring. This is more prominent on the right side than left. CT scan of chest can be done for further evaluation.  Small right renal cyst is seen.    Impression: Discitis/osteomyelitis is suspected at the T11-12 level as described above.    US Duplex Venous Lower Ext Complete, Bilateral (07.25.24 @ 09:02) >  IMPRESSION:  No evidence of deep venous thrombosis in either lower extremity.    Xray Cinesophagram Swallow Function w/ Contrast (07.24.24 @ 08:58) >  IMPRESSION:  There is laryngeal penetration without retrieval for mildly thick liquids. There was no aspiration before, during, or after swallow for tested consistencies.  For further information and recommendations, please refer to the speech pathologist final report which is available for review in the electronic medical record.    CT Thoracic Spine w/ IV Cont (07.23.24 @ 13:35) >  IMPRESSION: Multilevel degenerative changes with marginal osteophyte formation uncovertebral joint and facet joint hypertrophic change as well as multilevel disc space narrowing. At T11-T12 erosive changes are identified involving the superior endplate of T12 and to a lesser extent the inferior endplate of T11. Paravertebral soft tissue thickening is noted. Cannot entirely exclude infection versus erosive progressive degenerative change at this level. Consider MR for more complete evaluation.      CT Angio Chest PE Protocol w/ IV Cont (07.23.24 @ 13:35) >  IMPRESSION:  No pulmonary embolism.  Small to moderate multiloculated right and small free left pleural effusions with atelectasis of right middle and bilateral lower lobes.    Xray Chest 1 View- PORTABLE-Urgent (Xray Chest 1 View- PORTABLE-Urgent .) (07.23.24 @ 12:45) >  IMPRESSION:  Small bilateral pleural effusions, better evaluated on CT on 7/23/2024.    CT Abdomen and Pelvis w/ IV Cont (07.20.24 @ 19:06) >  IMPRESSION:  Mild rectosigmoid wall thickening/hyperemia with presacral fluid, question proctitis.  Small bilateral pleural effusions.

## 2024-07-27 DIAGNOSIS — M46.20 OSTEOMYELITIS OF VERTEBRA, SITE UNSPECIFIED: ICD-10-CM

## 2024-07-27 LAB
ANION GAP SERPL CALC-SCNC: 13 MMOL/L — SIGNIFICANT CHANGE UP (ref 7–14)
BUN SERPL-MCNC: 14 MG/DL — SIGNIFICANT CHANGE UP (ref 7–23)
CALCIUM SERPL-MCNC: 8.2 MG/DL — LOW (ref 8.4–10.5)
CHLORIDE SERPL-SCNC: 101 MMOL/L — SIGNIFICANT CHANGE UP (ref 98–107)
CO2 SERPL-SCNC: 21 MMOL/L — LOW (ref 22–31)
CREAT SERPL-MCNC: 0.71 MG/DL — SIGNIFICANT CHANGE UP (ref 0.5–1.3)
EGFR: 92 ML/MIN/1.73M2 — SIGNIFICANT CHANGE UP
ERYTHROCYTE [SEDIMENTATION RATE] IN BLOOD: SIGNIFICANT CHANGE UP MM/HR (ref 1–15)
GLUCOSE SERPL-MCNC: 134 MG/DL — HIGH (ref 70–99)
HCT VFR BLD CALC: 24.5 % — LOW (ref 39–50)
HGB BLD-MCNC: 8.1 G/DL — LOW (ref 13–17)
MAGNESIUM SERPL-MCNC: 1.8 MG/DL — SIGNIFICANT CHANGE UP (ref 1.6–2.6)
MCHC RBC-ENTMCNC: 30.1 PG — SIGNIFICANT CHANGE UP (ref 27–34)
MCHC RBC-ENTMCNC: 33.1 GM/DL — SIGNIFICANT CHANGE UP (ref 32–36)
MCV RBC AUTO: 91.1 FL — SIGNIFICANT CHANGE UP (ref 80–100)
NRBC # BLD: 0 /100 WBCS — SIGNIFICANT CHANGE UP (ref 0–0)
NRBC # FLD: 0 K/UL — SIGNIFICANT CHANGE UP (ref 0–0)
PHOSPHATE SERPL-MCNC: 3.1 MG/DL — SIGNIFICANT CHANGE UP (ref 2.5–4.5)
PLATELET # BLD AUTO: 241 K/UL — SIGNIFICANT CHANGE UP (ref 150–400)
POTASSIUM SERPL-MCNC: 3.4 MMOL/L — LOW (ref 3.5–5.3)
POTASSIUM SERPL-SCNC: 3.4 MMOL/L — LOW (ref 3.5–5.3)
RBC # BLD: 2.69 M/UL — LOW (ref 4.2–5.8)
RBC # FLD: 19.5 % — HIGH (ref 10.3–14.5)
SODIUM SERPL-SCNC: 135 MMOL/L — SIGNIFICANT CHANGE UP (ref 135–145)
WBC # BLD: 5.65 K/UL — SIGNIFICANT CHANGE UP (ref 3.8–10.5)
WBC # FLD AUTO: 5.65 K/UL — SIGNIFICANT CHANGE UP (ref 3.8–10.5)

## 2024-07-27 PROCEDURE — 99233 SBSQ HOSP IP/OBS HIGH 50: CPT

## 2024-07-27 RX ORDER — POTASSIUM CHLORIDE 1500 MG/1
40 TABLET, EXTENDED RELEASE ORAL ONCE
Refills: 0 | Status: COMPLETED | OUTPATIENT
Start: 2024-07-27 | End: 2024-07-27

## 2024-07-27 RX ADMIN — LIDOCAINE 5% 1 PATCH: 5 CREAM TOPICAL at 19:35

## 2024-07-27 RX ADMIN — POTASSIUM CHLORIDE 40 MILLIEQUIVALENT(S): 1500 TABLET, EXTENDED RELEASE ORAL at 09:42

## 2024-07-27 RX ADMIN — Medication 100 MILLIGRAM(S): at 12:13

## 2024-07-27 RX ADMIN — LIDOCAINE 5% 1 PATCH: 5 CREAM TOPICAL at 01:21

## 2024-07-27 RX ADMIN — IPRATROPIUM BROMIDE AND ALBUTEROL SULFATE 3 MILLILITER(S): 2.5; .5 SOLUTION RESPIRATORY (INHALATION) at 04:15

## 2024-07-27 RX ADMIN — Medication 1 MILLIGRAM(S): at 12:13

## 2024-07-27 RX ADMIN — IPRATROPIUM BROMIDE AND ALBUTEROL SULFATE 3 MILLILITER(S): 2.5; .5 SOLUTION RESPIRATORY (INHALATION) at 09:34

## 2024-07-27 RX ADMIN — SENNOSIDES 2 TABLET(S): 8.6 TABLET ORAL at 22:36

## 2024-07-27 RX ADMIN — PROBIOTIC PRODUCT - TAB 1 TABLET(S): TAB at 12:13

## 2024-07-27 RX ADMIN — LIDOCAINE 5% 1 PATCH: 5 CREAM TOPICAL at 12:13

## 2024-07-27 NOTE — PROGRESS NOTE ADULT - PROBLEM SELECTOR PLAN 1
patient had continue back pain and worsen over past 1 month. initially attributed to pyelo.   CT Tspine showed T11-12 erosive changes.   MR Tspine showed concern for discitis/OM at this region.   patient has been on CTX since 7/19  TTE neg  Bcx NGTD.   - NSgx - no urgent surgical intervention.   - IR consult for Bone biopsy for culture and path - scheduled for Monday 7/29; NPO after MN on 7/28  - ID following. if Bone culture negative, would treat with CTX for 6-8 weeks. will need PICC prior to d/c.   - c/w pain control. only exacerbates with movement and palpation.

## 2024-07-27 NOTE — PROGRESS NOTE ADULT - PROBLEM SELECTOR PLAN 2
clinical signs of dyspnea, CT showed small pleural effusion.   CTPA neg for PE, showed loculated effusion on the R side. small left pl eff.   proBNP elevated, denied h/o heart disease. no murmurs.   respiratory improved with lasix 20 ivp x2; echo obtained after lasix showed normal LV function, no LVH, no valvular dz.   - possibly diastolic HF iso fluid resuscitation and blood transfusion.   - no further lasix.  - monitor clinically.

## 2024-07-28 LAB
ANION GAP SERPL CALC-SCNC: 9 MMOL/L — SIGNIFICANT CHANGE UP (ref 7–14)
BUN SERPL-MCNC: 13 MG/DL — SIGNIFICANT CHANGE UP (ref 7–23)
CALCIUM SERPL-MCNC: 8.3 MG/DL — LOW (ref 8.4–10.5)
CHLORIDE SERPL-SCNC: 98 MMOL/L — SIGNIFICANT CHANGE UP (ref 98–107)
CO2 SERPL-SCNC: 24 MMOL/L — SIGNIFICANT CHANGE UP (ref 22–31)
CREAT SERPL-MCNC: 0.81 MG/DL — SIGNIFICANT CHANGE UP (ref 0.5–1.3)
EGFR: 88 ML/MIN/1.73M2 — SIGNIFICANT CHANGE UP
ERYTHROCYTE [SEDIMENTATION RATE] IN BLOOD: 84 MM/HR — HIGH (ref 1–15)
GLUCOSE SERPL-MCNC: 107 MG/DL — HIGH (ref 70–99)
HCT VFR BLD CALC: 22.6 % — LOW (ref 39–50)
HGB BLD-MCNC: 7.7 G/DL — LOW (ref 13–17)
MAGNESIUM SERPL-MCNC: 1.7 MG/DL — SIGNIFICANT CHANGE UP (ref 1.6–2.6)
MCHC RBC-ENTMCNC: 30.3 PG — SIGNIFICANT CHANGE UP (ref 27–34)
MCHC RBC-ENTMCNC: 34.1 GM/DL — SIGNIFICANT CHANGE UP (ref 32–36)
MCV RBC AUTO: 89 FL — SIGNIFICANT CHANGE UP (ref 80–100)
NRBC # BLD: 0 /100 WBCS — SIGNIFICANT CHANGE UP (ref 0–0)
NRBC # FLD: 0 K/UL — SIGNIFICANT CHANGE UP (ref 0–0)
PHOSPHATE SERPL-MCNC: 3 MG/DL — SIGNIFICANT CHANGE UP (ref 2.5–4.5)
PLATELET # BLD AUTO: 261 K/UL — SIGNIFICANT CHANGE UP (ref 150–400)
POTASSIUM SERPL-MCNC: 3.8 MMOL/L — SIGNIFICANT CHANGE UP (ref 3.5–5.3)
POTASSIUM SERPL-SCNC: 3.8 MMOL/L — SIGNIFICANT CHANGE UP (ref 3.5–5.3)
RBC # BLD: 2.54 M/UL — LOW (ref 4.2–5.8)
RBC # FLD: 18.7 % — HIGH (ref 10.3–14.5)
SODIUM SERPL-SCNC: 131 MMOL/L — LOW (ref 135–145)
WBC # BLD: 6.96 K/UL — SIGNIFICANT CHANGE UP (ref 3.8–10.5)
WBC # FLD AUTO: 6.96 K/UL — SIGNIFICANT CHANGE UP (ref 3.8–10.5)

## 2024-07-28 PROCEDURE — 99233 SBSQ HOSP IP/OBS HIGH 50: CPT

## 2024-07-28 RX ADMIN — IPRATROPIUM BROMIDE AND ALBUTEROL SULFATE 3 MILLILITER(S): 2.5; .5 SOLUTION RESPIRATORY (INHALATION) at 15:16

## 2024-07-28 RX ADMIN — Medication 650 MILLIGRAM(S): at 13:34

## 2024-07-28 RX ADMIN — Medication 100 MILLIGRAM(S): at 12:42

## 2024-07-28 RX ADMIN — Medication 650 MILLIGRAM(S): at 12:40

## 2024-07-28 RX ADMIN — PROBIOTIC PRODUCT - TAB 1 TABLET(S): TAB at 12:40

## 2024-07-28 RX ADMIN — Medication 3 MILLIGRAM(S): at 22:07

## 2024-07-28 RX ADMIN — Medication 650 MILLIGRAM(S): at 22:06

## 2024-07-28 RX ADMIN — Medication 1 MILLIGRAM(S): at 12:41

## 2024-07-28 RX ADMIN — LIDOCAINE 5% 1 PATCH: 5 CREAM TOPICAL at 00:36

## 2024-07-28 RX ADMIN — Medication 650 MILLIGRAM(S): at 23:05

## 2024-07-28 RX ADMIN — IPRATROPIUM BROMIDE AND ALBUTEROL SULFATE 3 MILLILITER(S): 2.5; .5 SOLUTION RESPIRATORY (INHALATION) at 08:05

## 2024-07-28 RX ADMIN — LIDOCAINE 5% 1 PATCH: 5 CREAM TOPICAL at 12:41

## 2024-07-28 NOTE — PROGRESS NOTE ADULT - CONSTITUTIONAL
normal/well-groomed/no distress

## 2024-07-28 NOTE — PROGRESS NOTE ADULT - BACK EXAM
paraspinal spasm L/paraspinal spasm R/vertebral tenderness
normal shape/cannot attain upright position/ROM limited
normal/normal shape/vertebral tenderness

## 2024-07-28 NOTE — PROGRESS NOTE ADULT - MUSCULOSKELETAL
midline back pain on palpation.
back exam
decreased ROM due to pain
decreased ROM due to pain

## 2024-07-28 NOTE — PROGRESS NOTE ADULT - RESPIRATORY
normal/no wheezes/no rales/no rhonchi/no respiratory distress/breath sounds equal
normal/no wheezes/no rales/no rhonchi/breath sounds equal/rales
normal/no wheezes/no rales/no rhonchi/breath sounds equal/rales
normal/no wheezes/no rales/no rhonchi/no respiratory distress/breath sounds equal
normal/no wheezes/no rales/no rhonchi/no respiratory distress/breath sounds equal
normal/clear to auscultation bilaterally/no wheezes/no rales/no rhonchi
normal/no wheezes/no rales/no rhonchi/no respiratory distress/breath sounds equal

## 2024-07-28 NOTE — PROGRESS NOTE ADULT - CARDIOVASCULAR
normal/regular rate and rhythm/S1 S2 present/no gallops/no rub/no murmur/no JVD/no pedal edema
normal/regular rate and rhythm/S1 S2 present/no gallops/no rub/no murmur/no pedal edema
normal/regular rate and rhythm/S1 S2 present/no gallops/no rub/no murmur/no pedal edema
normal/regular rate and rhythm/S1 S2 present/no gallops/no rub/no murmur/no JVD/no pedal edema

## 2024-07-28 NOTE — PROGRESS NOTE ADULT - GASTROINTESTINAL
normal/soft/nontender/nondistended/normal active bowel sounds

## 2024-07-29 ENCOUNTER — RESULT REVIEW (OUTPATIENT)
Age: 84
End: 2024-07-29

## 2024-07-29 LAB
ANION GAP SERPL CALC-SCNC: 11 MMOL/L — SIGNIFICANT CHANGE UP (ref 7–14)
APTT BLD: 35.1 SEC — SIGNIFICANT CHANGE UP (ref 24.5–35.6)
BLD GP AB SCN SERPL QL: NEGATIVE — SIGNIFICANT CHANGE UP
BUN SERPL-MCNC: 14 MG/DL — SIGNIFICANT CHANGE UP (ref 7–23)
CALCIUM SERPL-MCNC: 8.6 MG/DL — SIGNIFICANT CHANGE UP (ref 8.4–10.5)
CHLORIDE SERPL-SCNC: 98 MMOL/L — SIGNIFICANT CHANGE UP (ref 98–107)
CO2 SERPL-SCNC: 24 MMOL/L — SIGNIFICANT CHANGE UP (ref 22–31)
CREAT SERPL-MCNC: 0.9 MG/DL — SIGNIFICANT CHANGE UP (ref 0.5–1.3)
EGFR: 85 ML/MIN/1.73M2 — SIGNIFICANT CHANGE UP
GLUCOSE SERPL-MCNC: 103 MG/DL — HIGH (ref 70–99)
HCT VFR BLD CALC: 23.4 % — LOW (ref 39–50)
HGB BLD-MCNC: 7.7 G/DL — LOW (ref 13–17)
INR BLD: 1.13 RATIO — SIGNIFICANT CHANGE UP (ref 0.85–1.18)
MAGNESIUM SERPL-MCNC: 1.9 MG/DL — SIGNIFICANT CHANGE UP (ref 1.6–2.6)
MCHC RBC-ENTMCNC: 29.5 PG — SIGNIFICANT CHANGE UP (ref 27–34)
MCHC RBC-ENTMCNC: 32.9 GM/DL — SIGNIFICANT CHANGE UP (ref 32–36)
MCV RBC AUTO: 89.7 FL — SIGNIFICANT CHANGE UP (ref 80–100)
NRBC # BLD: 0 /100 WBCS — SIGNIFICANT CHANGE UP (ref 0–0)
NRBC # FLD: 0 K/UL — SIGNIFICANT CHANGE UP (ref 0–0)
PHOSPHATE SERPL-MCNC: 4.1 MG/DL — SIGNIFICANT CHANGE UP (ref 2.5–4.5)
PLATELET # BLD AUTO: 293 K/UL — SIGNIFICANT CHANGE UP (ref 150–400)
POTASSIUM SERPL-MCNC: 3.6 MMOL/L — SIGNIFICANT CHANGE UP (ref 3.5–5.3)
POTASSIUM SERPL-SCNC: 3.6 MMOL/L — SIGNIFICANT CHANGE UP (ref 3.5–5.3)
PROTHROM AB SERPL-ACNC: 12.7 SEC — SIGNIFICANT CHANGE UP (ref 9.5–13)
RBC # BLD: 2.61 M/UL — LOW (ref 4.2–5.8)
RBC # FLD: 18.3 % — HIGH (ref 10.3–14.5)
RH IG SCN BLD-IMP: POSITIVE — SIGNIFICANT CHANGE UP
SODIUM SERPL-SCNC: 133 MMOL/L — LOW (ref 135–145)
WBC # BLD: 6.16 K/UL — SIGNIFICANT CHANGE UP (ref 3.8–10.5)
WBC # FLD AUTO: 6.16 K/UL — SIGNIFICANT CHANGE UP (ref 3.8–10.5)

## 2024-07-29 PROCEDURE — 99233 SBSQ HOSP IP/OBS HIGH 50: CPT

## 2024-07-29 PROCEDURE — 88305 TISSUE EXAM BY PATHOLOGIST: CPT | Mod: 26

## 2024-07-29 PROCEDURE — 62267 INTERDISCAL PERQ ASPIR DX: CPT

## 2024-07-29 PROCEDURE — 99232 SBSQ HOSP IP/OBS MODERATE 35: CPT

## 2024-07-29 PROCEDURE — 88173 CYTOPATH EVAL FNA REPORT: CPT | Mod: 26

## 2024-07-29 PROCEDURE — 77012 CT SCAN FOR NEEDLE BIOPSY: CPT | Mod: 26

## 2024-07-29 RX ORDER — LORATADINE 10 MG
17 TABLET,DISINTEGRATING ORAL
Refills: 0 | Status: DISCONTINUED | OUTPATIENT
Start: 2024-07-29 | End: 2024-08-09

## 2024-07-29 RX ADMIN — PROBIOTIC PRODUCT - TAB 1 TABLET(S): TAB at 11:48

## 2024-07-29 RX ADMIN — Medication 100 MILLIGRAM(S): at 11:52

## 2024-07-29 RX ADMIN — Medication 1 MILLIGRAM(S): at 11:49

## 2024-07-29 RX ADMIN — IPRATROPIUM BROMIDE AND ALBUTEROL SULFATE 3 MILLILITER(S): 2.5; .5 SOLUTION RESPIRATORY (INHALATION) at 09:28

## 2024-07-29 RX ADMIN — LIDOCAINE 5% 1 PATCH: 5 CREAM TOPICAL at 11:49

## 2024-07-29 RX ADMIN — Medication 3 MILLIGRAM(S): at 22:17

## 2024-07-29 NOTE — PROGRESS NOTE ADULT - PROBLEM SELECTOR PLAN 2
clinical signs of dyspnea, CT showed small pleural effusion.   CTPA neg for PE, showed loculated effusion on the R side. small left pl eff.   proBNP elevated, denied h/o heart disease. no murmurs.   respiratory improved with lasix 20 ivp x2; echo obtained after lasix showed normal LV function EF 67%, no LVH, no valvular dz.   - possibly diastolic HF iso fluid resuscitation and blood transfusion.  - no further lasix.  - monitor clinically.

## 2024-07-29 NOTE — PRE PROCEDURE NOTE - PRE PROCEDURE EVALUATION
Interventional Radiology Pre-Procedure Note    Interventional Radiology Attending Physician:   Diagnosis/Indication: Patient is a 84y old  Male who presents with a chief complaint of back pain, dysuria (28 Jul 2024 11:50)    Procedure: Bone Biopsy    Allergies: No Known Allergies    Medications (Abx/Cardiac/Anticoagulation/Blood Products)    cefTRIAXone   IVPB: 100 mL/Hr IV Intermittent (07-29 @ 11:52)    Data:    T(C): 36.5  HR: 83  BP: 124/59  RR: 17  SpO2: 98%    Exam  General: No acute distress.   Chest: Non labored breathing.    -WBC 6.16 / HgB 7.7 / Hct 23.4 / Plt 293  -Na 133 / Cl 98 / BUN 14 / Glucose 103  -K 3.6 / CO2 24 / Cr 0.90  -ALT -- / Alk Phos -- / T.Bili --  -INR1.13      Imaging: reviewed    Assessment/Plan: 84y Male presents for bone biopsy of T11/12 discitis/osteomyelitis.    Procedure and risks discussed with patient and he is agreeable to proceed. Risks/Benefits/alternatives explained with the patient and/or healthcare proxy and witnessed informed consent obtained.   Interventional Radiology Pre-Procedure Note    Interventional Radiology Attending Physician:   Diagnosis/Indication: Patient is a 84y old  Male who presents with a chief complaint of back pain, dysuria (28 Jul 2024 11:50)    Procedure: T11-12 disc aspiration    Allergies: No Known Allergies    Medications (Abx/Cardiac/Anticoagulation/Blood Products)    cefTRIAXone   IVPB: 100 mL/Hr IV Intermittent (07-29 @ 11:52)    Data:    T(C): 36.5  HR: 83  BP: 124/59  RR: 17  SpO2: 98%    Exam  General: No acute distress.   Chest: Non labored breathing.    -WBC 6.16 / HgB 7.7 / Hct 23.4 / Plt 293  -Na 133 / Cl 98 / BUN 14 / Glucose 103  -K 3.6 / CO2 24 / Cr 0.90  -ALT -- / Alk Phos -- / T.Bili --  -INR1.13      Imaging: reviewed    Assessment/Plan: 84y Male presents for T11/12 disc aspiration.    Procedure and risks discussed with patient and he is agreeable to proceed. Risks/Benefits/alternatives explained with the patient and/or healthcare proxy and witnessed informed consent obtained.

## 2024-07-29 NOTE — PROGRESS NOTE ADULT - PROBLEM SELECTOR PLAN 1
patient had continue back pain and worsen over past 1 month. initially attributed to pyelo.   CT Tspine showed T11-12 erosive changes.   MR T spine 7/25 Discitis/osteomyelitis is suspected at the T11-12 level  Elevated inflammatory markers ESR 84, .3  patient has been on CTX since 7/19  TTE neg  Bcx NGTD.   - NSgx - no urgent surgical intervention.   - s/p IR aspiration of T11/T12 disc space 7/29, f/u IR culture  - ID following. if Bone culture negative, would treat with CTX for 6-8 weeks. will need PICC prior to d/c.   - c/w pain control. only exacerbates with movement and palpation.

## 2024-07-30 LAB
ANION GAP SERPL CALC-SCNC: 9 MMOL/L — SIGNIFICANT CHANGE UP (ref 7–14)
BUN SERPL-MCNC: 19 MG/DL — SIGNIFICANT CHANGE UP (ref 7–23)
CALCIUM SERPL-MCNC: 8.5 MG/DL — SIGNIFICANT CHANGE UP (ref 8.4–10.5)
CHLORIDE SERPL-SCNC: 98 MMOL/L — SIGNIFICANT CHANGE UP (ref 98–107)
CO2 SERPL-SCNC: 25 MMOL/L — SIGNIFICANT CHANGE UP (ref 22–31)
CREAT SERPL-MCNC: 0.8 MG/DL — SIGNIFICANT CHANGE UP (ref 0.5–1.3)
EGFR: 88 ML/MIN/1.73M2 — SIGNIFICANT CHANGE UP
GLUCOSE SERPL-MCNC: 96 MG/DL — SIGNIFICANT CHANGE UP (ref 70–99)
HCT VFR BLD CALC: 25.4 % — LOW (ref 39–50)
HGB BLD-MCNC: 8.6 G/DL — LOW (ref 13–17)
MAGNESIUM SERPL-MCNC: 2 MG/DL — SIGNIFICANT CHANGE UP (ref 1.6–2.6)
MCHC RBC-ENTMCNC: 29.8 PG — SIGNIFICANT CHANGE UP (ref 27–34)
MCHC RBC-ENTMCNC: 33.9 GM/DL — SIGNIFICANT CHANGE UP (ref 32–36)
MCV RBC AUTO: 87.9 FL — SIGNIFICANT CHANGE UP (ref 80–100)
NRBC # BLD: 0 /100 WBCS — SIGNIFICANT CHANGE UP (ref 0–0)
NRBC # FLD: 0 K/UL — SIGNIFICANT CHANGE UP (ref 0–0)
PHOSPHATE SERPL-MCNC: 3.4 MG/DL — SIGNIFICANT CHANGE UP (ref 2.5–4.5)
PLATELET # BLD AUTO: 361 K/UL — SIGNIFICANT CHANGE UP (ref 150–400)
POTASSIUM SERPL-MCNC: 3.9 MMOL/L — SIGNIFICANT CHANGE UP (ref 3.5–5.3)
POTASSIUM SERPL-SCNC: 3.9 MMOL/L — SIGNIFICANT CHANGE UP (ref 3.5–5.3)
RBC # BLD: 2.89 M/UL — LOW (ref 4.2–5.8)
RBC # FLD: 18 % — HIGH (ref 10.3–14.5)
SODIUM SERPL-SCNC: 132 MMOL/L — LOW (ref 135–145)
WBC # BLD: 5.5 K/UL — SIGNIFICANT CHANGE UP (ref 3.8–10.5)
WBC # FLD AUTO: 5.5 K/UL — SIGNIFICANT CHANGE UP (ref 3.8–10.5)

## 2024-07-30 PROCEDURE — 99233 SBSQ HOSP IP/OBS HIGH 50: CPT

## 2024-07-30 PROCEDURE — 99232 SBSQ HOSP IP/OBS MODERATE 35: CPT

## 2024-07-30 RX ORDER — ENOXAPARIN SODIUM 120 MG/.8ML
40 INJECTION SUBCUTANEOUS EVERY 24 HOURS
Refills: 0 | Status: DISCONTINUED | OUTPATIENT
Start: 2024-07-31 | End: 2024-08-09

## 2024-07-30 RX ADMIN — SENNOSIDES 2 TABLET(S): 8.6 TABLET ORAL at 21:13

## 2024-07-30 RX ADMIN — Medication 1 MILLIGRAM(S): at 11:25

## 2024-07-30 RX ADMIN — Medication 2 MILLIGRAM(S): at 22:19

## 2024-07-30 RX ADMIN — PROBIOTIC PRODUCT - TAB 1 TABLET(S): TAB at 11:25

## 2024-07-30 RX ADMIN — Medication 2 MILLIGRAM(S): at 17:24

## 2024-07-30 RX ADMIN — Medication 3 MILLIGRAM(S): at 21:13

## 2024-07-30 RX ADMIN — LIDOCAINE 5% 1 PATCH: 5 CREAM TOPICAL at 11:26

## 2024-07-30 RX ADMIN — IPRATROPIUM BROMIDE AND ALBUTEROL SULFATE 3 MILLILITER(S): 2.5; .5 SOLUTION RESPIRATORY (INHALATION) at 00:27

## 2024-07-30 RX ADMIN — LIDOCAINE 5% 1 PATCH: 5 CREAM TOPICAL at 14:24

## 2024-07-30 RX ADMIN — Medication 2 MILLIGRAM(S): at 12:20

## 2024-07-30 RX ADMIN — Medication 2 MILLIGRAM(S): at 18:24

## 2024-07-30 RX ADMIN — Medication 100 MILLIGRAM(S): at 11:25

## 2024-07-30 RX ADMIN — Medication 650 MILLIGRAM(S): at 21:13

## 2024-07-30 RX ADMIN — Medication 650 MILLIGRAM(S): at 22:13

## 2024-07-30 RX ADMIN — Medication 2 MILLIGRAM(S): at 11:20

## 2024-07-30 RX ADMIN — IPRATROPIUM BROMIDE AND ALBUTEROL SULFATE 3 MILLILITER(S): 2.5; .5 SOLUTION RESPIRATORY (INHALATION) at 04:08

## 2024-07-30 RX ADMIN — Medication 17 GRAM(S): at 06:46

## 2024-07-30 NOTE — PROGRESS NOTE ADULT - PROBLEM SELECTOR PLAN 1
patient had continue back pain and worsen over past 1 month. initially attributed to pyelo.   CT Tspine showed T11-12 erosive changes.   MR T spine 7/25 Discitis/osteomyelitis is suspected at the T11-12 level  Elevated inflammatory markers ESR 84, .3  patient has been on CTX since 7/19  TTE neg  Bcx NGTD.   - NSgx - no urgent surgical intervention.   - pain control: change percocet to morphine 2mg q6h prn severe pain  - s/p IR aspiration of T11/T12 disc space 7/29, f/u IR culture  - ID following. if Bone culture negative, would treat with CTX for 6-8 weeks. will need PICC prior to d/c.   - c/w pain control. only exacerbates with movement and palpation. patient had continue back pain and worsen over past 1 month. initially attributed to pyelo.   CT Tspine showed T11-12 erosive changes.   MR T spine 7/25 Discitis/osteomyelitis is suspected at the T11-12 level  Elevated inflammatory markers ESR 84, .3  patient has been on CTX since 7/19  TTE neg  Bcx NGTD.   - NSgx - no urgent surgical intervention.   - pain control: change percocet to morphine 2mg q6h prn severe pain  - s/p IR aspiration of T11/T12 disc space 7/29, f/u IR culture  - ID following. if Bone culture negative, would treat with CTX for 6-8 weeks. will need PICC prior to d/c.   - c/w pain control. only exacerbates with movement and palpation.  - Updated pt's son 7/30

## 2024-07-31 LAB
ANION GAP SERPL CALC-SCNC: 14 MMOL/L — SIGNIFICANT CHANGE UP (ref 7–14)
BUN SERPL-MCNC: 16 MG/DL — SIGNIFICANT CHANGE UP (ref 7–23)
CALCIUM SERPL-MCNC: 8.6 MG/DL — SIGNIFICANT CHANGE UP (ref 8.4–10.5)
CHLORIDE SERPL-SCNC: 96 MMOL/L — LOW (ref 98–107)
CO2 SERPL-SCNC: 23 MMOL/L — SIGNIFICANT CHANGE UP (ref 22–31)
CREAT SERPL-MCNC: 0.89 MG/DL — SIGNIFICANT CHANGE UP (ref 0.5–1.3)
EGFR: 86 ML/MIN/1.73M2 — SIGNIFICANT CHANGE UP
GLUCOSE SERPL-MCNC: 99 MG/DL — SIGNIFICANT CHANGE UP (ref 70–99)
HCT VFR BLD CALC: 24.6 % — LOW (ref 39–50)
HGB BLD-MCNC: 8.3 G/DL — LOW (ref 13–17)
MAGNESIUM SERPL-MCNC: 2 MG/DL — SIGNIFICANT CHANGE UP (ref 1.6–2.6)
MCHC RBC-ENTMCNC: 30.1 PG — SIGNIFICANT CHANGE UP (ref 27–34)
MCHC RBC-ENTMCNC: 33.7 GM/DL — SIGNIFICANT CHANGE UP (ref 32–36)
MCV RBC AUTO: 89.1 FL — SIGNIFICANT CHANGE UP (ref 80–100)
NRBC # BLD: 0 /100 WBCS — SIGNIFICANT CHANGE UP (ref 0–0)
NRBC # FLD: 0 K/UL — SIGNIFICANT CHANGE UP (ref 0–0)
PHOSPHATE SERPL-MCNC: 3.2 MG/DL — SIGNIFICANT CHANGE UP (ref 2.5–4.5)
PLATELET # BLD AUTO: 332 K/UL — SIGNIFICANT CHANGE UP (ref 150–400)
POTASSIUM SERPL-MCNC: 3.6 MMOL/L — SIGNIFICANT CHANGE UP (ref 3.5–5.3)
POTASSIUM SERPL-SCNC: 3.6 MMOL/L — SIGNIFICANT CHANGE UP (ref 3.5–5.3)
RBC # BLD: 2.76 M/UL — LOW (ref 4.2–5.8)
RBC # FLD: 18.2 % — HIGH (ref 10.3–14.5)
SODIUM SERPL-SCNC: 133 MMOL/L — LOW (ref 135–145)
WBC # BLD: 5.97 K/UL — SIGNIFICANT CHANGE UP (ref 3.8–10.5)
WBC # FLD AUTO: 5.97 K/UL — SIGNIFICANT CHANGE UP (ref 3.8–10.5)

## 2024-07-31 PROCEDURE — 99232 SBSQ HOSP IP/OBS MODERATE 35: CPT

## 2024-07-31 RX ADMIN — PROBIOTIC PRODUCT - TAB 1 TABLET(S): TAB at 11:49

## 2024-07-31 RX ADMIN — Medication 2 MILLIGRAM(S): at 15:59

## 2024-07-31 RX ADMIN — LIDOCAINE 5% 1 PATCH: 5 CREAM TOPICAL at 19:00

## 2024-07-31 RX ADMIN — Medication 2 MILLIGRAM(S): at 11:45

## 2024-07-31 RX ADMIN — Medication 1 MILLIGRAM(S): at 11:49

## 2024-07-31 RX ADMIN — Medication 2 MILLIGRAM(S): at 01:04

## 2024-07-31 RX ADMIN — ENOXAPARIN SODIUM 40 MILLIGRAM(S): 120 INJECTION SUBCUTANEOUS at 06:22

## 2024-07-31 RX ADMIN — Medication 2 MILLIGRAM(S): at 16:59

## 2024-07-31 RX ADMIN — Medication 2 MILLIGRAM(S): at 07:33

## 2024-07-31 RX ADMIN — Medication 17 GRAM(S): at 06:22

## 2024-07-31 RX ADMIN — Medication 2 MILLIGRAM(S): at 12:45

## 2024-07-31 RX ADMIN — Medication 2 MILLIGRAM(S): at 06:59

## 2024-07-31 RX ADMIN — LIDOCAINE 5% 1 PATCH: 5 CREAM TOPICAL at 11:48

## 2024-07-31 RX ADMIN — Medication 2 MILLIGRAM(S): at 00:04

## 2024-07-31 RX ADMIN — Medication 100 MILLIGRAM(S): at 11:46

## 2024-07-31 RX ADMIN — Medication 2 MILLIGRAM(S): at 21:19

## 2024-07-31 NOTE — PROGRESS NOTE ADULT - PROBLEM SELECTOR PLAN 1
patient had continue back pain and worsen over past 1 month. initially attributed to pyelo.   CT Tspine showed T11-12 erosive changes.   MR T spine 7/25 Discitis/osteomyelitis is suspected at the T11-12 level  Elevated inflammatory markers ESR 84, .3  patient has been on CTX 2g qd since 7/19  TTE neg for vegetation   Bcx NGTD.   - NSgx - no urgent surgical intervention.   - pain control: change percocet to morphine 2mg q6h prn severe pain  - s/p IR aspiration of T11/T12 disc space 7/29, f/u IR culture  - ID following. if biopsy culture neg at 48-72h, then would treat with CTX for 6-8 weeks.   consult IR for PICC to be placed pre-DC  PT reeval, prefer rehab, they had recommended home with outpt PT on initial eval  - c/w pain control. inc morphine to 2mg iv q4h prn severe pain patient had continue back pain and worsen over past 1 month. initially attributed to pyelo.   CT Tspine showed T11-12 erosive changes.   MR T spine 7/25 Discitis/osteomyelitis is suspected at the T11-12 level  Elevated inflammatory markers ESR 84, .3  patient has been on CTX 2g qd since 7/19  TTE neg for vegetation   Bcx NGTD.   - NSgx - no urgent surgical intervention.   - s/p IR aspiration of T11/T12 disc space 7/29, f/u IR culture  - ID following. if biopsy culture neg at 48-72h, then would treat with CTX for 6-8 weeks.   consult IR for PICC to be placed pre-DC  PT reeval, prefer rehab, they had recommended home with outpt PT on initial eval  - c/w pain control. inc morphine to 2mg iv q4h prn severe pain

## 2024-08-01 LAB
ANION GAP SERPL CALC-SCNC: 12 MMOL/L — SIGNIFICANT CHANGE UP (ref 7–14)
BUN SERPL-MCNC: 16 MG/DL — SIGNIFICANT CHANGE UP (ref 7–23)
CALCIUM SERPL-MCNC: 8.3 MG/DL — LOW (ref 8.4–10.5)
CHLORIDE SERPL-SCNC: 97 MMOL/L — LOW (ref 98–107)
CO2 SERPL-SCNC: 23 MMOL/L — SIGNIFICANT CHANGE UP (ref 22–31)
CREAT SERPL-MCNC: 0.81 MG/DL — SIGNIFICANT CHANGE UP (ref 0.5–1.3)
EGFR: 88 ML/MIN/1.73M2 — SIGNIFICANT CHANGE UP
GLUCOSE SERPL-MCNC: 97 MG/DL — SIGNIFICANT CHANGE UP (ref 70–99)
HCT VFR BLD CALC: 24.5 % — LOW (ref 39–50)
HGB BLD-MCNC: 8.3 G/DL — LOW (ref 13–17)
MAGNESIUM SERPL-MCNC: 1.9 MG/DL — SIGNIFICANT CHANGE UP (ref 1.6–2.6)
MCHC RBC-ENTMCNC: 30.3 PG — SIGNIFICANT CHANGE UP (ref 27–34)
MCHC RBC-ENTMCNC: 33.9 GM/DL — SIGNIFICANT CHANGE UP (ref 32–36)
MCV RBC AUTO: 89.4 FL — SIGNIFICANT CHANGE UP (ref 80–100)
NON-GYNECOLOGICAL CYTOLOGY STUDY: SIGNIFICANT CHANGE UP
NRBC # BLD: 0 /100 WBCS — SIGNIFICANT CHANGE UP (ref 0–0)
NRBC # FLD: 0 K/UL — SIGNIFICANT CHANGE UP (ref 0–0)
PHOSPHATE SERPL-MCNC: 3.3 MG/DL — SIGNIFICANT CHANGE UP (ref 2.5–4.5)
PLATELET # BLD AUTO: 324 K/UL — SIGNIFICANT CHANGE UP (ref 150–400)
POTASSIUM SERPL-MCNC: 3.7 MMOL/L — SIGNIFICANT CHANGE UP (ref 3.5–5.3)
POTASSIUM SERPL-SCNC: 3.7 MMOL/L — SIGNIFICANT CHANGE UP (ref 3.5–5.3)
RBC # BLD: 2.74 M/UL — LOW (ref 4.2–5.8)
RBC # FLD: 18 % — HIGH (ref 10.3–14.5)
SODIUM SERPL-SCNC: 132 MMOL/L — LOW (ref 135–145)
WBC # BLD: 6.09 K/UL — SIGNIFICANT CHANGE UP (ref 3.8–10.5)
WBC # FLD AUTO: 6.09 K/UL — SIGNIFICANT CHANGE UP (ref 3.8–10.5)

## 2024-08-01 PROCEDURE — 99232 SBSQ HOSP IP/OBS MODERATE 35: CPT

## 2024-08-01 PROCEDURE — 99222 1ST HOSP IP/OBS MODERATE 55: CPT

## 2024-08-01 RX ADMIN — LIDOCAINE 5% 1 PATCH: 5 CREAM TOPICAL at 06:01

## 2024-08-01 RX ADMIN — Medication 650 MILLIGRAM(S): at 15:05

## 2024-08-01 RX ADMIN — Medication 2 MILLIGRAM(S): at 06:55

## 2024-08-01 RX ADMIN — Medication 2 MILLIGRAM(S): at 11:19

## 2024-08-01 RX ADMIN — LIDOCAINE 5% 1 PATCH: 5 CREAM TOPICAL at 11:14

## 2024-08-01 RX ADMIN — Medication 2 MILLIGRAM(S): at 19:39

## 2024-08-01 RX ADMIN — Medication 2 MILLIGRAM(S): at 15:04

## 2024-08-01 RX ADMIN — Medication 1 MILLIGRAM(S): at 11:13

## 2024-08-01 RX ADMIN — Medication 650 MILLIGRAM(S): at 16:09

## 2024-08-01 RX ADMIN — PROBIOTIC PRODUCT - TAB 1 TABLET(S): TAB at 11:13

## 2024-08-01 RX ADMIN — ENOXAPARIN SODIUM 40 MILLIGRAM(S): 120 INJECTION SUBCUTANEOUS at 05:55

## 2024-08-01 RX ADMIN — Medication 2 MILLIGRAM(S): at 05:55

## 2024-08-01 RX ADMIN — Medication 2 MILLIGRAM(S): at 10:19

## 2024-08-01 RX ADMIN — Medication 2 MILLIGRAM(S): at 16:09

## 2024-08-01 RX ADMIN — Medication 2 MILLIGRAM(S): at 23:36

## 2024-08-01 RX ADMIN — Medication 2 MILLIGRAM(S): at 20:39

## 2024-08-01 RX ADMIN — Medication 100 MILLIGRAM(S): at 11:13

## 2024-08-01 NOTE — CONSULT NOTE ADULT - SUBJECTIVE AND OBJECTIVE BOX
Patient is a 84y old  Male who presents with a chief complaint of back pain, dysuria (31 Jul 2024 19:16)        HPI:    84M PMH essential HTN, BPH, presents with a week of painful urination and back pain. Mr Barcenas was diagnosed with a UTI at the beginning of this month but has not been able to take the antibiotics he was prescribed because the pill was too large to swallow. Over the last week, he has continued to have burning on urination and increased urinary frequency. He also has diffuse back pain, which does not radiate, and is 8/10 in severity at its worse, achy in character, improved after receiving abx.       Found on imaging to have enhancement T11 and T 12 vertebral bodies and posterior elements, possible  osteomyelitis, also paraspinal area enhancement concerning for phlegmon.    to complete ceftriaxone through 9/13  fell during admission on 7/31/24. Declined imaging.       REVIEW OF SYSTEMS     PAST MEDICAL & SURGICAL HISTORY  HTN (hypertension)    HLD (hyperlipidemia)    Former smoker    Gout    History of BPH        SOCIAL HISTORY  Smoking - Denied  EtOH - occasional wine  Drugs - Denied    FUNCTIONAL HISTORY  [facility]  Independent with cane    CURRENT FUNCTIONAL STATUS  7/29       Bed Mobility  Bed Mobility Training Rehab Potential: good, to achieve stated therapy goals  Bed Mobility Training Symptoms Noted During/After Treatment: none  Bed Mobility Training Rolling/Turning: minimum assist (75% patient effort);  1 person assist;  nonverbal cues (demo/gestures);  verbal cues;  bed rails  Bed Mobility Training Sit-to-Supine: minimum assist (75% patient effort);  1 person assist;  nonverbal cues (demo/gestures);  verbal cues;  bed rails  Bed Mobility Training Supine-to-Sit: minimum assist (75% patient effort);  1 person assist;  nonverbal cues (demo/gestures);  verbal cues;  bed rails  Bed Mobility Training Limitations: impaired ability to control trunk for mobility;  decreased ability to use legs for bridging/pushing;  decreased ability to use arms for pushing/pulling;  impaired balance;  decreased strength    Sit-Stand Transfer Training  Sit-to-Stand Transfer Training Rehab Potential: good, to achieve stated therapy goals  Sit-to-Stand Transfer Training Symptoms Noted During/After Treatment: none  Transfer Training Sit-to-Stand Transfer: minimum assist (75% patient effort);  1 person assist;  nonverbal cues (demo/gestures);  verbal cues;  weight-bearing as tolerated   rolling walker  Transfer Training Stand-to-Sit Transfer: minimum assist (75% patient effort);  nonverbal cues (demo/gestures);  verbal cues;  1 person assist;  weight-bearing as tolerated   rolling walker  Sit-to-Stand Transfer Training Transfer Safety Analysis: decreased weight-shifting ability;  impaired balance;  decreased strength;  rolling walker    Gait Training  Gait Training Rehab Potential: good, to achieve stated therapy goals  Gait Training Symptoms Noted During/After Treatment: none  Gait Training: contact guard;  1 person assist;  nonverbal cues (demo/gestures);  verbal cues;  weight-bearing as tolerated   rolling walker;  25 feet;  Deferred more despite encouragement   Gait Analysis: 2-point gait   decreased shakira;  decreased weight-shifting ability;  decreased stride length;  decreased step length;  impaired balance;  decreased strength;  25 feet;  rolling walker             RECENT LABS/IMAGING  CBC Full  -  ( 01 Aug 2024 06:14 )  WBC Count : 6.09 K/uL  RBC Count : 2.74 M/uL  Hemoglobin : 8.3 g/dL  Hematocrit : 24.5 %  Platelet Count - Automated : 324 K/uL  Mean Cell Volume : 89.4 fL  Mean Cell Hemoglobin : 30.3 pg  Mean Cell Hemoglobin Concentration : 33.9 gm/dL  Auto Neutrophil # : x  Auto Lymphocyte # : x  Auto Monocyte # : x  Auto Eosinophil # : x  Auto Basophil # : x  Auto Neutrophil % : x  Auto Lymphocyte % : x  Auto Monocyte % : x  Auto Eosinophil % : x  Auto Basophil % : x    08-01    132<L>  |  97<L>  |  16  ----------------------------<  97  3.7   |  23  |  0.81    Ca    8.3<L>      01 Aug 2024 06:14  Phos  3.3     08-01  Mg     1.90     08-01      Urinalysis Basic - ( 01 Aug 2024 06:14 )    Color: x / Appearance: x / SG: x / pH: x  Gluc: 97 mg/dL / Ketone: x  / Bili: x / Urobili: x   Blood: x / Protein: x / Nitrite: x   Leuk Esterase: x / RBC: x / WBC x   Sq Epi: x / Non Sq Epi: x / Bacteria: x        VITALS  T(C): 36.7 (08-01-24 @ 05:50), Max: 36.9 (07-31-24 @ 12:03)  HR: 75 (08-01-24 @ 05:50) (75 - 84)  BP: 136/65 (08-01-24 @ 05:50) (132/60 - 146/74)  RR: 18 (08-01-24 @ 05:50) (18 - 18)  SpO2: 97% (08-01-24 @ 05:50) (97% - 99%)  Wt(kg): --    ALLERGIES  No Known Allergies      MEDICATIONS   acetaminophen     Tablet .. 650 milliGRAM(s) Oral every 6 hours PRN  albuterol/ipratropium for Nebulization 3 milliLiter(s) Nebulizer every 6 hours  cefTRIAXone   IVPB 2000 milliGRAM(s) IV Intermittent every 24 hours  enoxaparin Injectable 40 milliGRAM(s) SubCutaneous every 24 hours  famotidine Injectable 20 milliGRAM(s) IV Push once PRN  folic acid 1 milliGRAM(s) Oral daily  lactobacillus acidophilus 1 Tablet(s) Oral daily  lidocaine   4% Patch 1 Patch Transdermal daily  melatonin 3 milliGRAM(s) Oral at bedtime PRN  morphine  - Injectable 2 milliGRAM(s) IV Push every 4 hours PRN  ondansetron Injectable 4 milliGRAM(s) IV Push every 8 hours PRN  polyethylene glycol 3350 17 Gram(s) Oral two times a day  senna 2 Tablet(s) Oral at bedtime      ----------------------------------------------------------------------------------------  PHYSICAL EXAM  Constitutional - NAD, Comfortable  HEENT - NCAT, EOMI  Neck - Supple, No limited ROM  Chest - CTA bilaterally, No wheeze, No rhonchi, No crackles  Cardiovascular - RRR, S1S2, No murmurs  Abdomen - BS+, Soft, NTND  Extremities - No C/C/E, No calf tenderness   Neurologic Exam -                    Cognitive - Awake, Alert, AAO to self, place, date, year, situation     Communication - Fluent, No dysarthria     Cranial Nerves - CN 2-12 intact     Motor - No focal deficits                    LEFT    UE - ShAB 5/5, EF 5/5, EE 5/5, WE 5/5,  5/5                    RIGHT UE - ShAB 5/5, EF 5/5, EE 5/5, WE 5/5,  5/5                    LEFT    LE - HF 5/5, KE 5/5, DF 5/5, PF 5/5                    RIGHT LE - HF 5/5, KE 5/5, DF 5/5, PF 5/5        Sensory - Intact to LT     Reflexes - DTR Intact, No primitive reflexive     Coordination - FTN intact     OculoVestibular - No saccades, No nystagmus, VOR         Balance - WNL Static  Psychiatric - Mood stable, Affect WNL  ----------------------------------------------------------------------------------------  ASSESSMENT/PLAN    Pain -  DVT PPX -   Rehab -  incomplete note, consult in progress Patient is a 84y old  Male who presents with a chief complaint of back pain, dysuria (31 Jul 2024 19:16)        HPI:    84M PMH essential HTN, BPH, presents with a week of painful urination and back pain. Mr Barcenas was diagnosed with a UTI at the beginning of this month but has not been able to take the antibiotics he was prescribed because the pill was too large to swallow. Over the last week, he has continued to have burning on urination and increased urinary frequency. He also has diffuse back pain, which does not radiate, and is 8/10 in severity at its worse, achy in character, improved after receiving abx.       Found on imaging to have enhancement T11 and T 12 vertebral bodies and posterior elements, possible  osteomyelitis, also paraspinal area enhancement concerning for phlegmon.    to complete ceftriaxone through 9/13  fell during admission on 7/31/24. Declined imaging.       REVIEW OF SYSTEMS   weakness    PAST MEDICAL & SURGICAL HISTORY  HTN (hypertension)    HLD (hyperlipidemia)    Former smoker    Gout    History of BPH        SOCIAL HISTORY  Smoking - Denied  EtOH - occasional wine  Drugs - Denied    FUNCTIONAL HISTORY  Lives with family in apartment with elevator  Independent with cane    CURRENT FUNCTIONAL STATUS  on schedule for PT today    7/29       Bed Mobility  Bed Mobility Training Rehab Potential: good, to achieve stated therapy goals  Bed Mobility Training Symptoms Noted During/After Treatment: none  Bed Mobility Training Rolling/Turning: minimum assist (75% patient effort);  1 person assist;  nonverbal cues (demo/gestures);  verbal cues;  bed rails  Bed Mobility Training Sit-to-Supine: minimum assist (75% patient effort);  1 person assist;  nonverbal cues (demo/gestures);  verbal cues;  bed rails  Bed Mobility Training Supine-to-Sit: minimum assist (75% patient effort);  1 person assist;  nonverbal cues (demo/gestures);  verbal cues;  bed rails  Bed Mobility Training Limitations: impaired ability to control trunk for mobility;  decreased ability to use legs for bridging/pushing;  decreased ability to use arms for pushing/pulling;  impaired balance;  decreased strength    Sit-Stand Transfer Training  Sit-to-Stand Transfer Training Rehab Potential: good, to achieve stated therapy goals  Sit-to-Stand Transfer Training Symptoms Noted During/After Treatment: none  Transfer Training Sit-to-Stand Transfer: minimum assist (75% patient effort);  1 person assist;  nonverbal cues (demo/gestures);  verbal cues;  weight-bearing as tolerated   rolling walker  Transfer Training Stand-to-Sit Transfer: minimum assist (75% patient effort);  nonverbal cues (demo/gestures);  verbal cues;  1 person assist;  weight-bearing as tolerated   rolling walker  Sit-to-Stand Transfer Training Transfer Safety Analysis: decreased weight-shifting ability;  impaired balance;  decreased strength;  rolling walker    Gait Training  Gait Training Rehab Potential: good, to achieve stated therapy goals  Gait Training Symptoms Noted During/After Treatment: none  Gait Training: contact guard;  1 person assist;  nonverbal cues (demo/gestures);  verbal cues;  weight-bearing as tolerated   rolling walker;  25 feet;  Deferred more despite encouragement   Gait Analysis: 2-point gait   decreased shakira;  decreased weight-shifting ability;  decreased stride length;  decreased step length;  impaired balance;  decreased strength;  25 feet;  rolling walker            RECENT LABS/IMAGING  CBC Full  -  ( 01 Aug 2024 06:14 )  WBC Count : 6.09 K/uL  RBC Count : 2.74 M/uL  Hemoglobin : 8.3 g/dL  Hematocrit : 24.5 %  Platelet Count - Automated : 324 K/uL  Mean Cell Volume : 89.4 fL  Mean Cell Hemoglobin : 30.3 pg  Mean Cell Hemoglobin Concentration : 33.9 gm/dL  Auto Neutrophil # : x  Auto Lymphocyte # : x  Auto Monocyte # : x  Auto Eosinophil # : x  Auto Basophil # : x  Auto Neutrophil % : x  Auto Lymphocyte % : x  Auto Monocyte % : x  Auto Eosinophil % : x  Auto Basophil % : x    08-01    132<L>  |  97<L>  |  16  ----------------------------<  97  3.7   |  23  |  0.81    Ca    8.3<L>      01 Aug 2024 06:14  Phos  3.3     08-01  Mg     1.90     08-01      Urinalysis Basic - ( 01 Aug 2024 06:14 )    Color: x / Appearance: x / SG: x / pH: x  Gluc: 97 mg/dL / Ketone: x  / Bili: x / Urobili: x   Blood: x / Protein: x / Nitrite: x   Leuk Esterase: x / RBC: x / WBC x   Sq Epi: x / Non Sq Epi: x / Bacteria: x        VITALS  T(C): 36.7 (08-01-24 @ 05:50), Max: 36.9 (07-31-24 @ 12:03)  HR: 75 (08-01-24 @ 05:50) (75 - 84)  BP: 136/65 (08-01-24 @ 05:50) (132/60 - 146/74)  RR: 18 (08-01-24 @ 05:50) (18 - 18)  SpO2: 97% (08-01-24 @ 05:50) (97% - 99%)  Wt(kg): --    ALLERGIES  No Known Allergies      MEDICATIONS   acetaminophen     Tablet .. 650 milliGRAM(s) Oral every 6 hours PRN  albuterol/ipratropium for Nebulization 3 milliLiter(s) Nebulizer every 6 hours  cefTRIAXone   IVPB 2000 milliGRAM(s) IV Intermittent every 24 hours  enoxaparin Injectable 40 milliGRAM(s) SubCutaneous every 24 hours  famotidine Injectable 20 milliGRAM(s) IV Push once PRN  folic acid 1 milliGRAM(s) Oral daily  lactobacillus acidophilus 1 Tablet(s) Oral daily  lidocaine   4% Patch 1 Patch Transdermal daily  melatonin 3 milliGRAM(s) Oral at bedtime PRN  morphine  - Injectable 2 milliGRAM(s) IV Push every 4 hours PRN  ondansetron Injectable 4 milliGRAM(s) IV Push every 8 hours PRN  polyethylene glycol 3350 17 Gram(s) Oral two times a day  senna 2 Tablet(s) Oral at bedtime      ----------------------------------------------------------------------------------------  PHYSICAL EXAM  Constitutional - NAD, Comfortable  HEENT - NCAT, EOMI  Neck - Supple, No limited ROM  Chest -no respiratory distress  Cardiovascular - RRR, S1S2   Abdomen - BS+, Soft, NTND  Extremities - No C/C/E, No calf tenderness   Neurologic Exam -                    Cognitive - Awake, Alert, AAO to self, place, date, year, situation     Communication - Fluent, No dysarthria     Cranial Nerves - CN 2-12 intact     Motor -                      LEFT    UE - ShAB 5/5, EF 5/5, EE 5/5, WE 5/5,  5/5                    RIGHT UE - ShAB 5/5, EF 5/5, EE 5/5, WE 5/5,  5/5                    LEFT    LE - HF4/5, KE 5/5, DF 5/5, PF 5/5                    RIGHT LE - HF 4/5, KE 5/5, DF 5/5, PF 5/5        Sensory - Intact to LT     Reflexes - DTR Intact 2+ R patella, 1+ L patella       Balance - WNL Static  Psychiatric - Mood stable, Affect WNL  ----------------------------------------------------------------------------------------  ASSESSMENT/PLAN  84 year old male presented with back pain, found on imaging to have enhancement T11 and T 12 vertebral bodies and posterior elements, possible  osteomyelitis, also paraspinal area enhancement concerning for phlegmon.  on ceftriaxone  SLP- evaluated during admission, reported difficulty swallowing at times  Pain - morphine iv prn with bowel regimen, lidocaine patch, tylenol  continue bedside PT  please request OT evaluation  DVT PPX - lovenox    Rehab -  anticipate acute inpatient rehab when medically cleared, if still requiring assistance for mobility.  Patient can tolerate 3 hours per day of therapy with medical supervision.

## 2024-08-01 NOTE — CONSULT NOTE ADULT - CONSULT REASON
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
Severe Anemia
eval for rehab
T11/12 discitis/osteomyelitis biopsy
r/o spinal infection

## 2024-08-02 LAB
ANION GAP SERPL CALC-SCNC: 10 MMOL/L — SIGNIFICANT CHANGE UP (ref 7–14)
BASOPHILS # BLD AUTO: 0.03 K/UL — SIGNIFICANT CHANGE UP (ref 0–0.2)
BASOPHILS NFR BLD AUTO: 0.5 % — SIGNIFICANT CHANGE UP (ref 0–2)
BUN SERPL-MCNC: 20 MG/DL — SIGNIFICANT CHANGE UP (ref 7–23)
CALCIUM SERPL-MCNC: 8.7 MG/DL — SIGNIFICANT CHANGE UP (ref 8.4–10.5)
CHLORIDE SERPL-SCNC: 98 MMOL/L — SIGNIFICANT CHANGE UP (ref 98–107)
CO2 SERPL-SCNC: 26 MMOL/L — SIGNIFICANT CHANGE UP (ref 22–31)
CREAT SERPL-MCNC: 0.72 MG/DL — SIGNIFICANT CHANGE UP (ref 0.5–1.3)
EGFR: 91 ML/MIN/1.73M2 — SIGNIFICANT CHANGE UP
EOSINOPHIL # BLD AUTO: 0.15 K/UL — SIGNIFICANT CHANGE UP (ref 0–0.5)
EOSINOPHIL NFR BLD AUTO: 2.3 % — SIGNIFICANT CHANGE UP (ref 0–6)
GLUCOSE SERPL-MCNC: 116 MG/DL — HIGH (ref 70–99)
HCT VFR BLD CALC: 28.1 % — LOW (ref 39–50)
HGB BLD-MCNC: 9.4 G/DL — LOW (ref 13–17)
IANC: 3.72 K/UL — SIGNIFICANT CHANGE UP (ref 1.8–7.4)
IMM GRANULOCYTES NFR BLD AUTO: 0.2 % — SIGNIFICANT CHANGE UP (ref 0–0.9)
LYMPHOCYTES # BLD AUTO: 1.82 K/UL — SIGNIFICANT CHANGE UP (ref 1–3.3)
LYMPHOCYTES # BLD AUTO: 27.7 % — SIGNIFICANT CHANGE UP (ref 13–44)
MAGNESIUM SERPL-MCNC: 1.9 MG/DL — SIGNIFICANT CHANGE UP (ref 1.6–2.6)
MCHC RBC-ENTMCNC: 30.5 PG — SIGNIFICANT CHANGE UP (ref 27–34)
MCHC RBC-ENTMCNC: 33.5 GM/DL — SIGNIFICANT CHANGE UP (ref 32–36)
MCV RBC AUTO: 91.2 FL — SIGNIFICANT CHANGE UP (ref 80–100)
MONOCYTES # BLD AUTO: 0.85 K/UL — SIGNIFICANT CHANGE UP (ref 0–0.9)
MONOCYTES NFR BLD AUTO: 12.9 % — SIGNIFICANT CHANGE UP (ref 2–14)
NEUTROPHILS # BLD AUTO: 3.72 K/UL — SIGNIFICANT CHANGE UP (ref 1.8–7.4)
NEUTROPHILS NFR BLD AUTO: 56.4 % — SIGNIFICANT CHANGE UP (ref 43–77)
NRBC # BLD: 0 /100 WBCS — SIGNIFICANT CHANGE UP (ref 0–0)
NRBC # FLD: 0 K/UL — SIGNIFICANT CHANGE UP (ref 0–0)
PHOSPHATE SERPL-MCNC: 2.9 MG/DL — SIGNIFICANT CHANGE UP (ref 2.5–4.5)
PLATELET # BLD AUTO: 396 K/UL — SIGNIFICANT CHANGE UP (ref 150–400)
POTASSIUM SERPL-MCNC: 3.8 MMOL/L — SIGNIFICANT CHANGE UP (ref 3.5–5.3)
POTASSIUM SERPL-SCNC: 3.8 MMOL/L — SIGNIFICANT CHANGE UP (ref 3.5–5.3)
RBC # BLD: 3.08 M/UL — LOW (ref 4.2–5.8)
RBC # FLD: 17.9 % — HIGH (ref 10.3–14.5)
SODIUM SERPL-SCNC: 134 MMOL/L — LOW (ref 135–145)
WBC # BLD: 6.58 K/UL — SIGNIFICANT CHANGE UP (ref 3.8–10.5)
WBC # FLD AUTO: 6.58 K/UL — SIGNIFICANT CHANGE UP (ref 3.8–10.5)

## 2024-08-02 PROCEDURE — 99232 SBSQ HOSP IP/OBS MODERATE 35: CPT

## 2024-08-02 RX ORDER — OXYCODONE HYDROCHLORIDE 30 MG/1
5 TABLET ORAL EVERY 4 HOURS
Refills: 0 | Status: DISCONTINUED | OUTPATIENT
Start: 2024-08-02 | End: 2024-08-03

## 2024-08-02 RX ADMIN — Medication 2 MILLIGRAM(S): at 10:06

## 2024-08-02 RX ADMIN — LIDOCAINE 5% 1 PATCH: 5 CREAM TOPICAL at 10:09

## 2024-08-02 RX ADMIN — Medication 17 GRAM(S): at 16:47

## 2024-08-02 RX ADMIN — Medication 2 MILLIGRAM(S): at 06:28

## 2024-08-02 RX ADMIN — OXYCODONE HYDROCHLORIDE 5 MILLIGRAM(S): 30 TABLET ORAL at 21:10

## 2024-08-02 RX ADMIN — Medication 2 MILLIGRAM(S): at 11:06

## 2024-08-02 RX ADMIN — ENOXAPARIN SODIUM 40 MILLIGRAM(S): 120 INJECTION SUBCUTANEOUS at 05:28

## 2024-08-02 RX ADMIN — Medication 1 MILLIGRAM(S): at 10:09

## 2024-08-02 RX ADMIN — PROBIOTIC PRODUCT - TAB 1 TABLET(S): TAB at 10:09

## 2024-08-02 RX ADMIN — OXYCODONE HYDROCHLORIDE 5 MILLIGRAM(S): 30 TABLET ORAL at 16:47

## 2024-08-02 RX ADMIN — Medication 2 MILLIGRAM(S): at 00:36

## 2024-08-02 RX ADMIN — Medication 17 GRAM(S): at 05:28

## 2024-08-02 RX ADMIN — OXYCODONE HYDROCHLORIDE 5 MILLIGRAM(S): 30 TABLET ORAL at 17:47

## 2024-08-02 RX ADMIN — Medication 100 MILLIGRAM(S): at 10:16

## 2024-08-02 RX ADMIN — Medication 2 MILLIGRAM(S): at 05:28

## 2024-08-02 NOTE — PROGRESS NOTE ADULT - PROBLEM SELECTOR PLAN 1
patient had continue back pain and worsen over past 1 month. initially attributed to pyelo.   CT Tspine showed T11-12 erosive changes.   MR T spine 7/25 Discitis/osteomyelitis is suspected at the T11-12 level  Elevated inflammatory markers ESR 84, .3  patient has been on CTX 2g qd since 7/19  TTE neg for vegetation   Bcx NGTD.   - Neurosurgery consulted,- no urgent surgical intervention. outpt f/up Dr. Bergeron  - s/p IR aspiration of T11/T12 disc space 7/29, f/u IR culture  - ID following. IR culture no growth on abx, continue Ceftriaxone 2g q24h through 9/13  outpt f/up ID Dr. Elizabeth Garcia  - Dc plan to acute rehab per PMR  - c/w pain control. inc morphine to 2mg iv q4h prn severe pain  - Dispo: rehab, will need PICC line pre-DC  - Updated pt's son and wife at bedside 8/2

## 2024-08-03 LAB
ANION GAP SERPL CALC-SCNC: 8 MMOL/L — SIGNIFICANT CHANGE UP (ref 7–14)
BUN SERPL-MCNC: 20 MG/DL — SIGNIFICANT CHANGE UP (ref 7–23)
CALCIUM SERPL-MCNC: 8.5 MG/DL — SIGNIFICANT CHANGE UP (ref 8.4–10.5)
CHLORIDE SERPL-SCNC: 97 MMOL/L — LOW (ref 98–107)
CO2 SERPL-SCNC: 25 MMOL/L — SIGNIFICANT CHANGE UP (ref 22–31)
CREAT SERPL-MCNC: 0.76 MG/DL — SIGNIFICANT CHANGE UP (ref 0.5–1.3)
EGFR: 90 ML/MIN/1.73M2 — SIGNIFICANT CHANGE UP
GLUCOSE SERPL-MCNC: 112 MG/DL — HIGH (ref 70–99)
HCT VFR BLD CALC: 26.4 % — LOW (ref 39–50)
HGB BLD-MCNC: 8.8 G/DL — LOW (ref 13–17)
MAGNESIUM SERPL-MCNC: 2 MG/DL — SIGNIFICANT CHANGE UP (ref 1.6–2.6)
MCHC RBC-ENTMCNC: 29.9 PG — SIGNIFICANT CHANGE UP (ref 27–34)
MCHC RBC-ENTMCNC: 33.3 GM/DL — SIGNIFICANT CHANGE UP (ref 32–36)
MCV RBC AUTO: 89.8 FL — SIGNIFICANT CHANGE UP (ref 80–100)
NRBC # BLD: 0 /100 WBCS — SIGNIFICANT CHANGE UP (ref 0–0)
NRBC # FLD: 0 K/UL — SIGNIFICANT CHANGE UP (ref 0–0)
PHOSPHATE SERPL-MCNC: 3.3 MG/DL — SIGNIFICANT CHANGE UP (ref 2.5–4.5)
PLATELET # BLD AUTO: 367 K/UL — SIGNIFICANT CHANGE UP (ref 150–400)
POTASSIUM SERPL-MCNC: 4.3 MMOL/L — SIGNIFICANT CHANGE UP (ref 3.5–5.3)
POTASSIUM SERPL-SCNC: 4.3 MMOL/L — SIGNIFICANT CHANGE UP (ref 3.5–5.3)
RBC # BLD: 2.94 M/UL — LOW (ref 4.2–5.8)
RBC # FLD: 17.9 % — HIGH (ref 10.3–14.5)
SODIUM SERPL-SCNC: 130 MMOL/L — LOW (ref 135–145)
WBC # BLD: 6.13 K/UL — SIGNIFICANT CHANGE UP (ref 3.8–10.5)
WBC # FLD AUTO: 6.13 K/UL — SIGNIFICANT CHANGE UP (ref 3.8–10.5)

## 2024-08-03 PROCEDURE — 99232 SBSQ HOSP IP/OBS MODERATE 35: CPT

## 2024-08-03 RX ORDER — GABAPENTIN 400 MG/1
300 CAPSULE ORAL
Refills: 0 | Status: DISCONTINUED | OUTPATIENT
Start: 2024-08-03 | End: 2024-08-05

## 2024-08-03 RX ORDER — CYCLOBENZAPRINE HCL 10 MG
5 TABLET ORAL THREE TIMES A DAY
Refills: 0 | Status: DISCONTINUED | OUTPATIENT
Start: 2024-08-03 | End: 2024-08-09

## 2024-08-03 RX ORDER — OXYCODONE HYDROCHLORIDE 30 MG/1
7.5 TABLET ORAL EVERY 4 HOURS
Refills: 0 | Status: DISCONTINUED | OUTPATIENT
Start: 2024-08-03 | End: 2024-08-08

## 2024-08-03 RX ORDER — OXYCODONE HYDROCHLORIDE 30 MG/1
5 TABLET ORAL EVERY 4 HOURS
Refills: 0 | Status: DISCONTINUED | OUTPATIENT
Start: 2024-08-03 | End: 2024-08-08

## 2024-08-03 RX ADMIN — LIDOCAINE 5% 1 PATCH: 5 CREAM TOPICAL at 20:28

## 2024-08-03 RX ADMIN — PROBIOTIC PRODUCT - TAB 1 TABLET(S): TAB at 10:40

## 2024-08-03 RX ADMIN — OXYCODONE HYDROCHLORIDE 5 MILLIGRAM(S): 30 TABLET ORAL at 10:39

## 2024-08-03 RX ADMIN — Medication 5 MILLIGRAM(S): at 11:12

## 2024-08-03 RX ADMIN — Medication 17 GRAM(S): at 17:00

## 2024-08-03 RX ADMIN — GABAPENTIN 300 MILLIGRAM(S): 400 CAPSULE ORAL at 17:00

## 2024-08-03 RX ADMIN — OXYCODONE HYDROCHLORIDE 5 MILLIGRAM(S): 30 TABLET ORAL at 11:39

## 2024-08-03 RX ADMIN — LIDOCAINE 5% 1 PATCH: 5 CREAM TOPICAL at 10:39

## 2024-08-03 RX ADMIN — Medication 100 MILLIGRAM(S): at 10:39

## 2024-08-03 RX ADMIN — Medication 5 MILLIGRAM(S): at 22:12

## 2024-08-03 RX ADMIN — Medication 1 MILLIGRAM(S): at 10:40

## 2024-08-03 RX ADMIN — SENNOSIDES 2 TABLET(S): 8.6 TABLET ORAL at 22:10

## 2024-08-03 RX ADMIN — ENOXAPARIN SODIUM 40 MILLIGRAM(S): 120 INJECTION SUBCUTANEOUS at 06:40

## 2024-08-03 RX ADMIN — Medication 17 GRAM(S): at 07:05

## 2024-08-03 NOTE — PROGRESS NOTE ADULT - PROBLEM SELECTOR PLAN 2
clinical signs of dyspnea, CT showed small pleural effusion.   CTPA neg for PE, showed loculated effusion on the R side. small left pl eff.   proBNP elevated, denied h/o heart disease. no murmurs.   respiratory improved with lasix 20 ivp x2; echo obtained after lasix showed normal LV function EF 67%, no LVH, no valvular dz.   - resolved   - possibly diastolic HF iso fluid resuscitation and blood transfusion.  - no further lasix.  - monitor clinically.

## 2024-08-03 NOTE — OCCUPATIONAL THERAPY INITIAL EVALUATION ADULT - GENERAL OBSERVATIONS, REHAB EVAL
Patient received semisupine in bed in NAD; agreeable to participate in OT evaluation. +telemetry monitor. HR

## 2024-08-03 NOTE — OCCUPATIONAL THERAPY INITIAL EVALUATION ADULT - PERTINENT HX OF CURRENT PROBLEM, REHAB EVAL
84 year old male with history of essential HTN, BPH, presents with a week of painful urination and back pain, likely pyelonephritis. Incidentally found to have severe anemia with AoCD. Also c/o dyspnea. CT showed small pleural effusion. MRI spine with high suspicion for T11-12 osteomyelitis.

## 2024-08-03 NOTE — PROGRESS NOTE ADULT - PROBLEM SELECTOR PLAN 1
patient had continue back pain and worsen over past 1 month. initially attributed to pyelo.   CT Tspine showed T11-12 erosive changes.   MR T spine 7/25 Discitis/osteomyelitis is suspected at the T11-12 level, T11-12: Disc bulge and bilateral hypertrophic facet change. This in addition to the epidural enhancement causes moderate narrowing spinal canal. Moderate to severe narrowing of both neural foramen  Elevated inflammatory markers ESR 84, .3  patient has been on CTX 2g qd since 7/19  TTE neg for vegetation   Bcx NGTD.   - Neurosurgery consulted,- no urgent surgical intervention. outpt f/up Dr. Elyssa Finnegan  - s/p IR aspiration of T11/T12 disc space 7/29  IR culture neg, cytopathology inflammatory cells, no malignancy  - ID following. IR culture no growth on abx, continue Ceftriaxone 2g q24h through 9/13  outpt f/up ID Dr. Elizabeth Garcia  - Dc plan to acute rehab per PMR  - c/w pain control. oxy 5mg q4h prn mod, 7.5mg q4h prn severe pain  add gabapentin 300mg bid for neuropathic pain, flexeril 5mg tid for muscle spasms   - Dispo: rehab, will need PICC line pre-DC  - Updated pt's son and wife at bedside 8/2

## 2024-08-04 LAB
ANION GAP SERPL CALC-SCNC: 10 MMOL/L — SIGNIFICANT CHANGE UP (ref 7–14)
BUN SERPL-MCNC: 19 MG/DL — SIGNIFICANT CHANGE UP (ref 7–23)
CALCIUM SERPL-MCNC: 8.7 MG/DL — SIGNIFICANT CHANGE UP (ref 8.4–10.5)
CHLORIDE SERPL-SCNC: 98 MMOL/L — SIGNIFICANT CHANGE UP (ref 98–107)
CO2 SERPL-SCNC: 25 MMOL/L — SIGNIFICANT CHANGE UP (ref 22–31)
CREAT SERPL-MCNC: 0.81 MG/DL — SIGNIFICANT CHANGE UP (ref 0.5–1.3)
EGFR: 88 ML/MIN/1.73M2 — SIGNIFICANT CHANGE UP
GLUCOSE SERPL-MCNC: 131 MG/DL — HIGH (ref 70–99)
HCT VFR BLD CALC: 27.3 % — LOW (ref 39–50)
HGB BLD-MCNC: 9 G/DL — LOW (ref 13–17)
MAGNESIUM SERPL-MCNC: 2.1 MG/DL — SIGNIFICANT CHANGE UP (ref 1.6–2.6)
MCHC RBC-ENTMCNC: 30 PG — SIGNIFICANT CHANGE UP (ref 27–34)
MCHC RBC-ENTMCNC: 33 GM/DL — SIGNIFICANT CHANGE UP (ref 32–36)
MCV RBC AUTO: 91 FL — SIGNIFICANT CHANGE UP (ref 80–100)
NRBC # BLD: 0 /100 WBCS — SIGNIFICANT CHANGE UP (ref 0–0)
NRBC # FLD: 0 K/UL — SIGNIFICANT CHANGE UP (ref 0–0)
PHOSPHATE SERPL-MCNC: 3.6 MG/DL — SIGNIFICANT CHANGE UP (ref 2.5–4.5)
PLATELET # BLD AUTO: 391 K/UL — SIGNIFICANT CHANGE UP (ref 150–400)
POTASSIUM SERPL-MCNC: 4.2 MMOL/L — SIGNIFICANT CHANGE UP (ref 3.5–5.3)
POTASSIUM SERPL-SCNC: 4.2 MMOL/L — SIGNIFICANT CHANGE UP (ref 3.5–5.3)
RBC # BLD: 3 M/UL — LOW (ref 4.2–5.8)
RBC # FLD: 17.9 % — HIGH (ref 10.3–14.5)
SODIUM SERPL-SCNC: 133 MMOL/L — LOW (ref 135–145)
WBC # BLD: 5.52 K/UL — SIGNIFICANT CHANGE UP (ref 3.8–10.5)
WBC # FLD AUTO: 5.52 K/UL — SIGNIFICANT CHANGE UP (ref 3.8–10.5)

## 2024-08-04 PROCEDURE — 99232 SBSQ HOSP IP/OBS MODERATE 35: CPT

## 2024-08-04 RX ADMIN — PROBIOTIC PRODUCT - TAB 1 TABLET(S): TAB at 13:02

## 2024-08-04 RX ADMIN — Medication 1 MILLIGRAM(S): at 13:02

## 2024-08-04 RX ADMIN — LIDOCAINE 5% 1 PATCH: 5 CREAM TOPICAL at 13:02

## 2024-08-04 RX ADMIN — Medication 100 MILLIGRAM(S): at 13:02

## 2024-08-04 RX ADMIN — GABAPENTIN 300 MILLIGRAM(S): 400 CAPSULE ORAL at 17:23

## 2024-08-04 RX ADMIN — Medication 5 MILLIGRAM(S): at 13:02

## 2024-08-04 RX ADMIN — Medication 5 MILLIGRAM(S): at 21:23

## 2024-08-04 RX ADMIN — ENOXAPARIN SODIUM 40 MILLIGRAM(S): 120 INJECTION SUBCUTANEOUS at 05:27

## 2024-08-04 RX ADMIN — Medication 5 MILLIGRAM(S): at 05:28

## 2024-08-04 RX ADMIN — LIDOCAINE 5% 1 PATCH: 5 CREAM TOPICAL at 22:22

## 2024-08-04 RX ADMIN — SENNOSIDES 2 TABLET(S): 8.6 TABLET ORAL at 21:22

## 2024-08-04 RX ADMIN — GABAPENTIN 300 MILLIGRAM(S): 400 CAPSULE ORAL at 05:27

## 2024-08-04 NOTE — PROGRESS NOTE ADULT - PROBLEM SELECTOR PLAN 1
patient had continue back pain and worsen over past 1 month. initially attributed to pyelo.   CT Tspine showed T11-12 erosive changes.   MR T spine 7/25 Discitis/osteomyelitis is suspected at the T11-12 level, T11-12: Disc bulge and bilateral hypertrophic facet change. This in addition to the epidural enhancement causes moderate narrowing spinal canal. Moderate to severe narrowing of both neural foramen  Elevated inflammatory markers ESR 84, .3  patient has been on CTX 2g qd since 7/19  TTE neg for vegetation   Bcx NGTD.   - Neurosurgery consulted,- no urgent surgical intervention. outpt f/up Dr. Elyssa Finnegan  - s/p IR aspiration of T11/T12 disc space 7/29  IR culture neg, cytopathology inflammatory cells, no malignancy  - ID following. IR culture no growth on abx, continue Ceftriaxone 2g q24h through 9/13  outpt f/up ID Dr. Elizabeth Garcia  - Dc plan to acute rehab per PMR  - c/w pain control. oxy 5mg q4h prn mod, 7.5mg q4h prn severe pain  pain improved with addition of gabapentin 300mg bid for neuropathic pain, flexeril 5mg tid for muscle spasms   - Dispo: medically optimized for DC to rehab, f/u SW, consult IR for PICC line pre-DC  - Updated pt's son and wife at bedside 8/2

## 2024-08-05 LAB
ANION GAP SERPL CALC-SCNC: 11 MMOL/L — SIGNIFICANT CHANGE UP (ref 7–14)
BUN SERPL-MCNC: 21 MG/DL — SIGNIFICANT CHANGE UP (ref 7–23)
CALCIUM SERPL-MCNC: 9 MG/DL — SIGNIFICANT CHANGE UP (ref 8.4–10.5)
CHLORIDE SERPL-SCNC: 98 MMOL/L — SIGNIFICANT CHANGE UP (ref 98–107)
CO2 SERPL-SCNC: 24 MMOL/L — SIGNIFICANT CHANGE UP (ref 22–31)
CREAT SERPL-MCNC: 0.82 MG/DL — SIGNIFICANT CHANGE UP (ref 0.5–1.3)
EGFR: 88 ML/MIN/1.73M2 — SIGNIFICANT CHANGE UP
GLUCOSE SERPL-MCNC: 131 MG/DL — HIGH (ref 70–99)
HCT VFR BLD CALC: 27.4 % — LOW (ref 39–50)
HGB BLD-MCNC: 9.2 G/DL — LOW (ref 13–17)
MAGNESIUM SERPL-MCNC: 2 MG/DL — SIGNIFICANT CHANGE UP (ref 1.6–2.6)
MCHC RBC-ENTMCNC: 30.2 PG — SIGNIFICANT CHANGE UP (ref 27–34)
MCHC RBC-ENTMCNC: 33.6 GM/DL — SIGNIFICANT CHANGE UP (ref 32–36)
MCV RBC AUTO: 89.8 FL — SIGNIFICANT CHANGE UP (ref 80–100)
NRBC # BLD: 0 /100 WBCS — SIGNIFICANT CHANGE UP (ref 0–0)
NRBC # FLD: 0 K/UL — SIGNIFICANT CHANGE UP (ref 0–0)
PHOSPHATE SERPL-MCNC: 3.9 MG/DL — SIGNIFICANT CHANGE UP (ref 2.5–4.5)
PLATELET # BLD AUTO: 404 K/UL — HIGH (ref 150–400)
POTASSIUM SERPL-MCNC: 4.1 MMOL/L — SIGNIFICANT CHANGE UP (ref 3.5–5.3)
POTASSIUM SERPL-SCNC: 4.1 MMOL/L — SIGNIFICANT CHANGE UP (ref 3.5–5.3)
RBC # BLD: 3.05 M/UL — LOW (ref 4.2–5.8)
RBC # FLD: 17.5 % — HIGH (ref 10.3–14.5)
SODIUM SERPL-SCNC: 133 MMOL/L — LOW (ref 135–145)
WBC # BLD: 6.43 K/UL — SIGNIFICANT CHANGE UP (ref 3.8–10.5)
WBC # FLD AUTO: 6.43 K/UL — SIGNIFICANT CHANGE UP (ref 3.8–10.5)

## 2024-08-05 PROCEDURE — 99232 SBSQ HOSP IP/OBS MODERATE 35: CPT

## 2024-08-05 RX ORDER — ACETAMINOPHEN 500 MG
650 TABLET ORAL EVERY 6 HOURS
Refills: 0 | Status: COMPLETED | OUTPATIENT
Start: 2024-08-05 | End: 2024-08-07

## 2024-08-05 RX ORDER — GABAPENTIN 400 MG/1
300 CAPSULE ORAL THREE TIMES A DAY
Refills: 0 | Status: DISCONTINUED | OUTPATIENT
Start: 2024-08-05 | End: 2024-08-07

## 2024-08-05 RX ADMIN — LIDOCAINE 5% 1 PATCH: 5 CREAM TOPICAL at 01:40

## 2024-08-05 RX ADMIN — Medication 650 MILLIGRAM(S): at 23:20

## 2024-08-05 RX ADMIN — IPRATROPIUM BROMIDE AND ALBUTEROL SULFATE 3 MILLILITER(S): 2.5; .5 SOLUTION RESPIRATORY (INHALATION) at 10:00

## 2024-08-05 RX ADMIN — Medication 5 MILLIGRAM(S): at 13:18

## 2024-08-05 RX ADMIN — Medication 650 MILLIGRAM(S): at 11:20

## 2024-08-05 RX ADMIN — Medication 17 GRAM(S): at 17:54

## 2024-08-05 RX ADMIN — GABAPENTIN 300 MILLIGRAM(S): 400 CAPSULE ORAL at 05:57

## 2024-08-05 RX ADMIN — LIDOCAINE 5% 1 PATCH: 5 CREAM TOPICAL at 11:20

## 2024-08-05 RX ADMIN — Medication 650 MILLIGRAM(S): at 12:17

## 2024-08-05 RX ADMIN — PROBIOTIC PRODUCT - TAB 1 TABLET(S): TAB at 11:20

## 2024-08-05 RX ADMIN — Medication 5 MILLIGRAM(S): at 05:57

## 2024-08-05 RX ADMIN — Medication 1 MILLIGRAM(S): at 11:20

## 2024-08-05 RX ADMIN — Medication 650 MILLIGRAM(S): at 17:54

## 2024-08-05 RX ADMIN — GABAPENTIN 300 MILLIGRAM(S): 400 CAPSULE ORAL at 21:20

## 2024-08-05 RX ADMIN — IPRATROPIUM BROMIDE AND ALBUTEROL SULFATE 3 MILLILITER(S): 2.5; .5 SOLUTION RESPIRATORY (INHALATION) at 17:38

## 2024-08-05 RX ADMIN — LIDOCAINE 5% 1 PATCH: 5 CREAM TOPICAL at 19:44

## 2024-08-05 RX ADMIN — Medication 100 MILLIGRAM(S): at 10:22

## 2024-08-05 RX ADMIN — Medication 5 MILLIGRAM(S): at 21:20

## 2024-08-05 RX ADMIN — LIDOCAINE 5% 1 PATCH: 5 CREAM TOPICAL at 23:20

## 2024-08-05 RX ADMIN — ENOXAPARIN SODIUM 40 MILLIGRAM(S): 120 INJECTION SUBCUTANEOUS at 05:57

## 2024-08-05 NOTE — PROGRESS NOTE ADULT - PROBLEM SELECTOR PLAN 1
- patient had continue back pain and worsen over past 1 month. initially attributed to pyelo.   - CT Tspine showed T11-12 erosive changes.   - MR T spine 7/25 Discitis/osteomyelitis is suspected at the T11-12 level, T11-12: Disc bulge and bilateral hypertrophic facet change. This in addition to the epidural enhancement causes moderate narrowing spinal canal. Moderate to severe narrowing of both neural foramen  - Elevated inflammatory markers ESR 84, .3  - on CTX 2g qd since 7/19  - TTE neg for vegetation   - Bcx NGTD.   - Neurosurgery consulted,- no urgent surgical intervention. outpt f/up Dr. Elyssa Finnegan  - s/p IR aspiration of T11/T12 disc space 7/29; Cx NGTD; path neg for malignancy  - ID following. IR culture no growth on abx, continue Ceftriaxone 2g q24h through 9/13  outpt f/up ID Dr. Elizabeth Garcia  - Dc plan to acute rehab per PMR  - IR c/s for PICC

## 2024-08-05 NOTE — PROGRESS NOTE ADULT - PROBLEM SELECTOR PLAN 2
- trend CrP, will get CT cervical/thoracic/lumbar spine as pt c/o back pain for months, worsening in the past month.   - management of OM as above.   - MRI T spine Disc bulge and bilateral hypertrophic facet change. This in addition to the epidural enhancement causes moderate narrowing spinal canal. Moderate to severe narrowing of both neural foramen  - lidocaine patch; oxy 5mg q4h prn mod, 7.5mg q4h prn severe pain;  increase gabapentin 300mg TID for neuropathic pain, flexeril 5mg tid for muscle spasms. start tylenol ATC q 6 X 2 days; educated on pain regimen    - acute rehab

## 2024-08-06 ENCOUNTER — TRANSCRIPTION ENCOUNTER (OUTPATIENT)
Age: 84
End: 2024-08-06

## 2024-08-06 LAB
ANION GAP SERPL CALC-SCNC: 11 MMOL/L — SIGNIFICANT CHANGE UP (ref 7–14)
BUN SERPL-MCNC: 21 MG/DL — SIGNIFICANT CHANGE UP (ref 7–23)
CALCIUM SERPL-MCNC: 8.9 MG/DL — SIGNIFICANT CHANGE UP (ref 8.4–10.5)
CHLORIDE SERPL-SCNC: 98 MMOL/L — SIGNIFICANT CHANGE UP (ref 98–107)
CO2 SERPL-SCNC: 23 MMOL/L — SIGNIFICANT CHANGE UP (ref 22–31)
CREAT SERPL-MCNC: 0.72 MG/DL — SIGNIFICANT CHANGE UP (ref 0.5–1.3)
EGFR: 91 ML/MIN/1.73M2 — SIGNIFICANT CHANGE UP
GLUCOSE SERPL-MCNC: 108 MG/DL — HIGH (ref 70–99)
HCT VFR BLD CALC: 28.5 % — LOW (ref 39–50)
HGB BLD-MCNC: 9.2 G/DL — LOW (ref 13–17)
MAGNESIUM SERPL-MCNC: 2 MG/DL — SIGNIFICANT CHANGE UP (ref 1.6–2.6)
MCHC RBC-ENTMCNC: 29.5 PG — SIGNIFICANT CHANGE UP (ref 27–34)
MCHC RBC-ENTMCNC: 32.3 GM/DL — SIGNIFICANT CHANGE UP (ref 32–36)
MCV RBC AUTO: 91.3 FL — SIGNIFICANT CHANGE UP (ref 80–100)
NRBC # BLD: 0 /100 WBCS — SIGNIFICANT CHANGE UP (ref 0–0)
NRBC # FLD: 0 K/UL — SIGNIFICANT CHANGE UP (ref 0–0)
PHOSPHATE SERPL-MCNC: 3.9 MG/DL — SIGNIFICANT CHANGE UP (ref 2.5–4.5)
PLATELET # BLD AUTO: 367 K/UL — SIGNIFICANT CHANGE UP (ref 150–400)
POTASSIUM SERPL-MCNC: 4.4 MMOL/L — SIGNIFICANT CHANGE UP (ref 3.5–5.3)
POTASSIUM SERPL-SCNC: 4.4 MMOL/L — SIGNIFICANT CHANGE UP (ref 3.5–5.3)
RBC # BLD: 3.12 M/UL — LOW (ref 4.2–5.8)
RBC # FLD: 17.5 % — HIGH (ref 10.3–14.5)
SODIUM SERPL-SCNC: 132 MMOL/L — LOW (ref 135–145)
WBC # BLD: 5.84 K/UL — SIGNIFICANT CHANGE UP (ref 3.8–10.5)
WBC # FLD AUTO: 5.84 K/UL — SIGNIFICANT CHANGE UP (ref 3.8–10.5)

## 2024-08-06 PROCEDURE — 36573 INSJ PICC RS&I 5 YR+: CPT

## 2024-08-06 PROCEDURE — 99232 SBSQ HOSP IP/OBS MODERATE 35: CPT

## 2024-08-06 RX ADMIN — Medication 5 MILLIGRAM(S): at 05:25

## 2024-08-06 RX ADMIN — Medication 100 MILLIGRAM(S): at 11:08

## 2024-08-06 RX ADMIN — Medication 650 MILLIGRAM(S): at 05:25

## 2024-08-06 RX ADMIN — OXYCODONE HYDROCHLORIDE 7.5 MILLIGRAM(S): 30 TABLET ORAL at 15:52

## 2024-08-06 RX ADMIN — Medication 1 MILLIGRAM(S): at 11:09

## 2024-08-06 RX ADMIN — OXYCODONE HYDROCHLORIDE 7.5 MILLIGRAM(S): 30 TABLET ORAL at 16:52

## 2024-08-06 RX ADMIN — GABAPENTIN 300 MILLIGRAM(S): 400 CAPSULE ORAL at 13:14

## 2024-08-06 RX ADMIN — Medication 650 MILLIGRAM(S): at 18:09

## 2024-08-06 RX ADMIN — LIDOCAINE 5% 1 PATCH: 5 CREAM TOPICAL at 23:30

## 2024-08-06 RX ADMIN — Medication 650 MILLIGRAM(S): at 17:09

## 2024-08-06 RX ADMIN — GABAPENTIN 300 MILLIGRAM(S): 400 CAPSULE ORAL at 05:25

## 2024-08-06 RX ADMIN — Medication 650 MILLIGRAM(S): at 23:26

## 2024-08-06 RX ADMIN — PROBIOTIC PRODUCT - TAB 1 TABLET(S): TAB at 11:10

## 2024-08-06 RX ADMIN — Medication 5 MILLIGRAM(S): at 21:24

## 2024-08-06 RX ADMIN — Medication 650 MILLIGRAM(S): at 11:11

## 2024-08-06 RX ADMIN — Medication 5 MILLIGRAM(S): at 13:14

## 2024-08-06 RX ADMIN — ENOXAPARIN SODIUM 40 MILLIGRAM(S): 120 INJECTION SUBCUTANEOUS at 06:23

## 2024-08-06 RX ADMIN — GABAPENTIN 300 MILLIGRAM(S): 400 CAPSULE ORAL at 21:24

## 2024-08-06 RX ADMIN — IPRATROPIUM BROMIDE AND ALBUTEROL SULFATE 3 MILLILITER(S): 2.5; .5 SOLUTION RESPIRATORY (INHALATION) at 11:15

## 2024-08-06 RX ADMIN — LIDOCAINE 5% 1 PATCH: 5 CREAM TOPICAL at 17:26

## 2024-08-06 RX ADMIN — LIDOCAINE 5% 1 PATCH: 5 CREAM TOPICAL at 11:09

## 2024-08-06 NOTE — DISCHARGE NOTE NURSING/CASE MANAGEMENT/SOCIAL WORK - NSDPFAC_GEN_ALL_CORE
[facility] - [address] [place], [place] [facility] - [address] [facility], [place] [address]

## 2024-08-06 NOTE — DISCHARGE NOTE NURSING/CASE MANAGEMENT/SOCIAL WORK - PATIENT PORTAL LINK FT
You can access the FollowMyHealth Patient Portal offered by [facility] by registering at the following website: [email or web address]. By joining [facility]’s FollowMyHealth portal, you will also be able to view your health information using other applications (apps) compatible with our system.

## 2024-08-06 NOTE — PROGRESS NOTE ADULT - PROBLEM SELECTOR PLAN 1
- patient had continue back pain and worsen over past 1 month. initially attributed to pyelo.   - CT Tspine showed T11-12 erosive changes.   - MR T spine 7/25 Discitis/osteomyelitis is suspected at the T11-12 level, T11-12: Disc bulge and bilateral hypertrophic facet change. This in addition to the epidural enhancement causes moderate narrowing spinal canal. Moderate to severe narrowing of both neural foramen  - Elevated inflammatory markers ESR 84, .3  - patient has been on CTX 2g qd since 7/19  - TTE neg for vegetation   - Bcx NGTD.   - Neurosurgery consulted,- no urgent surgical intervention. outpt f/up Dr. Elyssa Finnegan  - s/p IR aspiration of T11/T12 disc space 7/29  IR culture neg, cytopathology inflammatory cells, no malignancy  - ID following. IR culture no growth on abx, continue Ceftriaxone 2g q24h through 9/13  outpt f/up ID Dr. Elizabeth Garcia  - Dc plan to acute rehab per PMR  - c/w pain control. oxy 5mg q4h prn mod, 7.5mg q4h prn severe pain  - pain improved with addition of gabapentin 300mg TID  for neuropathic pain, flexeril 5mg tid for muscle spasms and lidocaine patch   - Dispo: medically optimized for DC to rehab, s/p PICC line placement   - rehab requesting updated PT/OT information - patient had continue back pain and worsen over past 1 month. initially attributed to pyelo.   - CT Tspine showed T11-12 erosive changes.   - MR T spine 7/25 Discitis/osteomyelitis is suspected at the T11-12 level, T11-12: Disc bulge and bilateral hypertrophic facet change. This in addition to the epidural enhancement causes moderate narrowing spinal canal. Moderate to severe narrowing of both neural foramen  - Elevated inflammatory markers ESR 84, .3  - patient has been on CTX 2g qd since 7/19  - TTE neg for vegetation   - Bcx NGTD.   - Neurosurgery consulted,- no urgent surgical intervention. outpt f/up Dr. Elyssa Finnegan  - s/p IR aspiration of T11/T12 disc space 7/29  IR culture neg, cytopathology inflammatory cells, no malignancy  - ID following. IR culture no growth on abx, continue Ceftriaxone 2g q24h through 9/13  outpt f/up ID Dr. Elizabeth Garcia  - Dc plan to acute rehab per PMR  - c/w pain control. oxy 5mg q4h prn mod, 7.5mg q4h prn severe pain  - pain improved with addition of gabapentin 300mg TID  for neuropathic pain, flexeril 5mg tid for muscle spasms and lidocaine patch; ATC tylenol x 2 days (8/5- 8/7)  - Dispo: medically optimized for DC to rehab, s/p PICC line placement   - rehab requesting updated PT/OT information

## 2024-08-06 NOTE — DISCHARGE NOTE NURSING/CASE MANAGEMENT/SOCIAL WORK - NSDCPEFALRISK_GEN_ALL_CORE
For information on Fall & Injury Prevention, visit: [email or web address] OR  [email or web address] OR  https://www.cdc.gov/steadi/patient.html

## 2024-08-07 LAB
ANION GAP SERPL CALC-SCNC: 12 MMOL/L — SIGNIFICANT CHANGE UP (ref 7–14)
BUN SERPL-MCNC: 23 MG/DL — SIGNIFICANT CHANGE UP (ref 7–23)
CALCIUM SERPL-MCNC: 8.8 MG/DL — SIGNIFICANT CHANGE UP (ref 8.4–10.5)
CHLORIDE SERPL-SCNC: 97 MMOL/L — LOW (ref 98–107)
CO2 SERPL-SCNC: 23 MMOL/L — SIGNIFICANT CHANGE UP (ref 22–31)
CREAT SERPL-MCNC: 0.78 MG/DL — SIGNIFICANT CHANGE UP (ref 0.5–1.3)
EGFR: 89 ML/MIN/1.73M2 — SIGNIFICANT CHANGE UP
GLUCOSE SERPL-MCNC: 106 MG/DL — HIGH (ref 70–99)
HCT VFR BLD CALC: 27.2 % — LOW (ref 39–50)
HGB BLD-MCNC: 8.9 G/DL — LOW (ref 13–17)
MAGNESIUM SERPL-MCNC: 2.2 MG/DL — SIGNIFICANT CHANGE UP (ref 1.6–2.6)
MCHC RBC-ENTMCNC: 30 PG — SIGNIFICANT CHANGE UP (ref 27–34)
MCHC RBC-ENTMCNC: 32.7 GM/DL — SIGNIFICANT CHANGE UP (ref 32–36)
MCV RBC AUTO: 91.6 FL — SIGNIFICANT CHANGE UP (ref 80–100)
NRBC # BLD: 0 /100 WBCS — SIGNIFICANT CHANGE UP (ref 0–0)
NRBC # FLD: 0 K/UL — SIGNIFICANT CHANGE UP (ref 0–0)
PHOSPHATE SERPL-MCNC: 3.8 MG/DL — SIGNIFICANT CHANGE UP (ref 2.5–4.5)
PLATELET # BLD AUTO: 353 K/UL — SIGNIFICANT CHANGE UP (ref 150–400)
POTASSIUM SERPL-MCNC: 4.1 MMOL/L — SIGNIFICANT CHANGE UP (ref 3.5–5.3)
POTASSIUM SERPL-SCNC: 4.1 MMOL/L — SIGNIFICANT CHANGE UP (ref 3.5–5.3)
RBC # BLD: 2.97 M/UL — LOW (ref 4.2–5.8)
RBC # FLD: 17.2 % — HIGH (ref 10.3–14.5)
SODIUM SERPL-SCNC: 132 MMOL/L — LOW (ref 135–145)
WBC # BLD: 5.74 K/UL — SIGNIFICANT CHANGE UP (ref 3.8–10.5)
WBC # FLD AUTO: 5.74 K/UL — SIGNIFICANT CHANGE UP (ref 3.8–10.5)

## 2024-08-07 PROCEDURE — 99232 SBSQ HOSP IP/OBS MODERATE 35: CPT

## 2024-08-07 RX ORDER — GABAPENTIN 400 MG/1
400 CAPSULE ORAL THREE TIMES A DAY
Refills: 0 | Status: DISCONTINUED | OUTPATIENT
Start: 2024-08-07 | End: 2024-08-09

## 2024-08-07 RX ADMIN — Medication 650 MILLIGRAM(S): at 11:24

## 2024-08-07 RX ADMIN — Medication 5 MILLIGRAM(S): at 21:41

## 2024-08-07 RX ADMIN — OXYCODONE HYDROCHLORIDE 7.5 MILLIGRAM(S): 30 TABLET ORAL at 13:04

## 2024-08-07 RX ADMIN — GABAPENTIN 300 MILLIGRAM(S): 400 CAPSULE ORAL at 11:24

## 2024-08-07 RX ADMIN — GABAPENTIN 300 MILLIGRAM(S): 400 CAPSULE ORAL at 05:25

## 2024-08-07 RX ADMIN — LIDOCAINE 5% 1 PATCH: 5 CREAM TOPICAL at 23:58

## 2024-08-07 RX ADMIN — Medication 650 MILLIGRAM(S): at 05:25

## 2024-08-07 RX ADMIN — Medication 17 GRAM(S): at 16:58

## 2024-08-07 RX ADMIN — Medication 5 MILLIGRAM(S): at 11:25

## 2024-08-07 RX ADMIN — GABAPENTIN 400 MILLIGRAM(S): 400 CAPSULE ORAL at 21:41

## 2024-08-07 RX ADMIN — Medication 100 MILLIGRAM(S): at 11:23

## 2024-08-07 RX ADMIN — Medication 5 MILLIGRAM(S): at 05:25

## 2024-08-07 RX ADMIN — LIDOCAINE 5% 1 PATCH: 5 CREAM TOPICAL at 11:27

## 2024-08-07 RX ADMIN — OXYCODONE HYDROCHLORIDE 7.5 MILLIGRAM(S): 30 TABLET ORAL at 12:04

## 2024-08-07 RX ADMIN — ENOXAPARIN SODIUM 40 MILLIGRAM(S): 120 INJECTION SUBCUTANEOUS at 05:25

## 2024-08-07 RX ADMIN — PROBIOTIC PRODUCT - TAB 1 TABLET(S): TAB at 11:24

## 2024-08-07 RX ADMIN — LIDOCAINE 5% 1 PATCH: 5 CREAM TOPICAL at 19:52

## 2024-08-07 RX ADMIN — Medication 1 MILLIGRAM(S): at 11:24

## 2024-08-07 RX ADMIN — Medication 650 MILLIGRAM(S): at 12:24

## 2024-08-07 NOTE — PROGRESS NOTE ADULT - PROBLEM SELECTOR PLAN 1
- patient had continue back pain and worsen over past 1 month. initially attributed to pyelo.   - CT Tspine showed T11-12 erosive changes.   - MR T spine 7/25 Discitis/osteomyelitis is suspected at the T11-12 level, T11-12: Disc bulge and bilateral hypertrophic facet change. This in addition to the epidural enhancement causes moderate narrowing spinal canal. Moderate to severe narrowing of both neural foramen  - Elevated inflammatory markers ESR 84, .3  - patient has been on CTX 2g qd since 7/19  - TTE neg for vegetation   - Bcx NGTD.   - Neurosurgery consulted,- no urgent surgical intervention. outpt f/up Dr. Elyssa Finnegan  - s/p IR aspiration of T11/T12 disc space 7/29  IR culture neg, cytopathology inflammatory cells, no malignancy  - ID following. IR culture no growth on abx, continue Ceftriaxone 2g q24h through 9/13  outpt f/up ID Dr. Elizabeth Garcia  - c/w pain control. oxy 5mg q4h prn mod, 7.5mg q4h prn severe pain  - pain noted increased of gabapentin 400mg TID  for neuropathic pain, flexeril 5mg tid for muscle spasms and lidocaine patch; ATC tylenol x 2 days (8/5- 8/7)  - s/p PICC line placement   - repeat PT eval post pain meds

## 2024-08-07 NOTE — PROGRESS NOTE ADULT - PROBLEM SELECTOR PLAN 2
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 T11-T12 erosive subsequent MRI with discitis OM with moderate to severe b/l neural formina  - management of OM as above.   - pain control as above  - lidocaine patch

## 2024-08-08 ENCOUNTER — NON-APPOINTMENT (OUTPATIENT)
Age: 84
End: 2024-08-08

## 2024-08-08 PROCEDURE — 99232 SBSQ HOSP IP/OBS MODERATE 35: CPT

## 2024-08-08 PROCEDURE — 72131 CT LUMBAR SPINE W/O DYE: CPT | Mod: 26

## 2024-08-08 PROCEDURE — 72128 CT CHEST SPINE W/O DYE: CPT | Mod: 26

## 2024-08-08 PROCEDURE — 99233 SBSQ HOSP IP/OBS HIGH 50: CPT

## 2024-08-08 RX ORDER — OXYCODONE HYDROCHLORIDE 30 MG/1
5 TABLET ORAL EVERY 4 HOURS
Refills: 0 | Status: DISCONTINUED | OUTPATIENT
Start: 2024-08-08 | End: 2024-08-09

## 2024-08-08 RX ORDER — OXYCODONE HYDROCHLORIDE 30 MG/1
7.5 TABLET ORAL EVERY 4 HOURS
Refills: 0 | Status: DISCONTINUED | OUTPATIENT
Start: 2024-08-08 | End: 2024-08-09

## 2024-08-08 RX ADMIN — ENOXAPARIN SODIUM 40 MILLIGRAM(S): 120 INJECTION SUBCUTANEOUS at 05:42

## 2024-08-08 RX ADMIN — Medication 5 MILLIGRAM(S): at 11:00

## 2024-08-08 RX ADMIN — OXYCODONE HYDROCHLORIDE 5 MILLIGRAM(S): 30 TABLET ORAL at 23:33

## 2024-08-08 RX ADMIN — OXYCODONE HYDROCHLORIDE 5 MILLIGRAM(S): 30 TABLET ORAL at 11:00

## 2024-08-08 RX ADMIN — OXYCODONE HYDROCHLORIDE 5 MILLIGRAM(S): 30 TABLET ORAL at 16:35

## 2024-08-08 RX ADMIN — GABAPENTIN 400 MILLIGRAM(S): 400 CAPSULE ORAL at 11:00

## 2024-08-08 RX ADMIN — OXYCODONE HYDROCHLORIDE 5 MILLIGRAM(S): 30 TABLET ORAL at 22:33

## 2024-08-08 RX ADMIN — SENNOSIDES 2 TABLET(S): 8.6 TABLET ORAL at 21:03

## 2024-08-08 RX ADMIN — Medication 3 MILLIGRAM(S): at 21:02

## 2024-08-08 RX ADMIN — Medication 5 MILLIGRAM(S): at 21:02

## 2024-08-08 RX ADMIN — Medication 100 MILLIGRAM(S): at 11:04

## 2024-08-08 RX ADMIN — GABAPENTIN 400 MILLIGRAM(S): 400 CAPSULE ORAL at 05:42

## 2024-08-08 RX ADMIN — Medication 1 MILLIGRAM(S): at 11:00

## 2024-08-08 RX ADMIN — OXYCODONE HYDROCHLORIDE 5 MILLIGRAM(S): 30 TABLET ORAL at 12:00

## 2024-08-08 RX ADMIN — OXYCODONE HYDROCHLORIDE 5 MILLIGRAM(S): 30 TABLET ORAL at 17:35

## 2024-08-08 RX ADMIN — PROBIOTIC PRODUCT - TAB 1 TABLET(S): TAB at 11:00

## 2024-08-08 RX ADMIN — Medication 5 MILLIGRAM(S): at 05:42

## 2024-08-08 RX ADMIN — GABAPENTIN 400 MILLIGRAM(S): 400 CAPSULE ORAL at 21:02

## 2024-08-08 NOTE — PROGRESS NOTE ADULT - PROBLEM SELECTOR PLAN 1
- patient had continue back pain and worsen over past 1 month. initially attributed to pyelo.   - CT Tspine showed T11-12 erosive changes.   - MR T spine 7/25 Discitis/osteomyelitis is suspected at the T11-12 level, T11-12: Disc bulge and bilateral hypertrophic facet change. This in addition to the epidural enhancement causes moderate narrowing spinal canal. Moderate to severe narrowing of both neural foramen  - Elevated inflammatory markers ESR 84, .3  - patient has been on CTX 2g qd since 7/19  - TTE neg for vegetation   - Bcx NGTD.   - Neurosurgery consulted,- no urgent surgical intervention. outpt f/up Dr. Elyssa Finnegan  - s/p IR aspiration of T11/T12 disc space 7/29  IR culture neg, cytopathology inflammatory cells, no malignancy  - ID following. IR culture no growth on abx, continue Ceftriaxone 2g q24h through 9/13  outpt f/up ID Dr. Elizabeth Garcia  - c/w pain control. oxy 5mg q4h prn mod, 7.5mg q4h prn severe pain  -  8/7 increased gabapentin 600mg TID  for neuropathic pain, flexeril 5mg tid for muscle spasms and lidocaine patch; ATC tylenol x 2 days (8/5- 8/7)  - s/p PICC line placement   - repeat PT eval unable eval due to weakness.   - repeat CT T spine - patient had continue back pain and worsen over past 1 month. initially attributed to pyelo.   - CT Tspine showed T11-12 erosive changes.   - MR T spine 7/25 Discitis/osteomyelitis is suspected at the T11-12 level, T11-12: Disc bulge and bilateral hypertrophic facet change. This in addition to the epidural enhancement causes moderate narrowing spinal canal. Moderate to severe narrowing of both neural foramen  - Elevated inflammatory markers ESR 84, .3  - patient has been on CTX 2g qd since 7/19  - TTE neg for vegetation   - Bcx NGTD.   - Neurosurgery consulted,- no urgent surgical intervention. outpt f/up Dr. Elyssa Finnegan  - s/p IR aspiration of T11/T12 disc space 7/29  IR culture neg, cytopathology inflammatory cells, no malignancy  - ID following. IR culture no growth on abx, continue Ceftriaxone 2g q24h through 9/13  outpt f/up ID Dr. Elizabeth Garcia  - c/w pain control. oxy 5mg q4h prn mod, 7.5mg q4h prn severe pain  -  8/7 increased gabapentin 600mg TID  for neuropathic pain, flexeril 5mg tid for muscle spasms and lidocaine patch; ATC tylenol x 2 days (8/5- 8/7)  - s/p PICC line placement   - repeat PT eval unable to stand due to weakness.   - repeat NS eval

## 2024-08-08 NOTE — PROGRESS NOTE ADULT - PROBLEM SELECTOR PLAN 1
- CT T and L spine  - Rpt CBC, ESR and CRP    [id number]    Case discussed with attending neurosurgeon Dr. Bergeron

## 2024-08-09 ENCOUNTER — TRANSCRIPTION ENCOUNTER (OUTPATIENT)
Age: 84
End: 2024-08-09

## 2024-08-09 VITALS
HEART RATE: 110 BPM | TEMPERATURE: 98 F | DIASTOLIC BLOOD PRESSURE: 75 MMHG | OXYGEN SATURATION: 98 % | RESPIRATION RATE: 18 BRPM | SYSTOLIC BLOOD PRESSURE: 126 MMHG

## 2024-08-09 LAB
ANION GAP SERPL CALC-SCNC: 12 MMOL/L — SIGNIFICANT CHANGE UP (ref 7–14)
BUN SERPL-MCNC: 23 MG/DL — SIGNIFICANT CHANGE UP (ref 7–23)
CALCIUM SERPL-MCNC: 8.8 MG/DL — SIGNIFICANT CHANGE UP (ref 8.4–10.5)
CHLORIDE SERPL-SCNC: 96 MMOL/L — LOW (ref 98–107)
CO2 SERPL-SCNC: 23 MMOL/L — SIGNIFICANT CHANGE UP (ref 22–31)
CREAT SERPL-MCNC: 0.8 MG/DL — SIGNIFICANT CHANGE UP (ref 0.5–1.3)
CRP SERPL-MCNC: 135.7 MG/L — HIGH
EGFR: 88 ML/MIN/1.73M2 — SIGNIFICANT CHANGE UP
ERYTHROCYTE [SEDIMENTATION RATE] IN BLOOD: 105 MM/HR — HIGH (ref 1–15)
GLUCOSE SERPL-MCNC: 131 MG/DL — HIGH (ref 70–99)
HCT VFR BLD CALC: 27.9 % — LOW (ref 39–50)
HGB BLD-MCNC: 9.3 G/DL — LOW (ref 13–17)
MAGNESIUM SERPL-MCNC: 2 MG/DL — SIGNIFICANT CHANGE UP (ref 1.6–2.6)
MCHC RBC-ENTMCNC: 29.6 PG — SIGNIFICANT CHANGE UP (ref 27–34)
MCHC RBC-ENTMCNC: 33.3 GM/DL — SIGNIFICANT CHANGE UP (ref 32–36)
MCV RBC AUTO: 88.9 FL — SIGNIFICANT CHANGE UP (ref 80–100)
NRBC # BLD: 0 /100 WBCS — SIGNIFICANT CHANGE UP (ref 0–0)
NRBC # FLD: 0 K/UL — SIGNIFICANT CHANGE UP (ref 0–0)
PHOSPHATE SERPL-MCNC: 3.7 MG/DL — SIGNIFICANT CHANGE UP (ref 2.5–4.5)
PLATELET # BLD AUTO: 333 K/UL — SIGNIFICANT CHANGE UP (ref 150–400)
POTASSIUM SERPL-MCNC: 4.4 MMOL/L — SIGNIFICANT CHANGE UP (ref 3.5–5.3)
POTASSIUM SERPL-SCNC: 4.4 MMOL/L — SIGNIFICANT CHANGE UP (ref 3.5–5.3)
RBC # BLD: 3.14 M/UL — LOW (ref 4.2–5.8)
RBC # FLD: 16.9 % — HIGH (ref 10.3–14.5)
SODIUM SERPL-SCNC: 131 MMOL/L — LOW (ref 135–145)
WBC # BLD: 7.95 K/UL — SIGNIFICANT CHANGE UP (ref 3.8–10.5)
WBC # FLD AUTO: 7.95 K/UL — SIGNIFICANT CHANGE UP (ref 3.8–10.5)

## 2024-08-09 PROCEDURE — 99233 SBSQ HOSP IP/OBS HIGH 50: CPT

## 2024-08-09 PROCEDURE — 72157 MRI CHEST SPINE W/O & W/DYE: CPT | Mod: 26

## 2024-08-09 RX ORDER — CHLORHEXIDINE GLUCONATE 500 MG/1
1 CLOTH TOPICAL DAILY
Refills: 0 | Status: DISCONTINUED | OUTPATIENT
Start: 2024-08-09 | End: 2024-08-09

## 2024-08-09 RX ADMIN — LIDOCAINE 5% 1 PATCH: 5 CREAM TOPICAL at 09:28

## 2024-08-09 RX ADMIN — Medication 17 GRAM(S): at 17:27

## 2024-08-09 RX ADMIN — Medication 1 MILLIGRAM(S): at 09:27

## 2024-08-09 RX ADMIN — GABAPENTIN 400 MILLIGRAM(S): 400 CAPSULE ORAL at 12:24

## 2024-08-09 RX ADMIN — GABAPENTIN 400 MILLIGRAM(S): 400 CAPSULE ORAL at 20:56

## 2024-08-09 RX ADMIN — ENOXAPARIN SODIUM 40 MILLIGRAM(S): 120 INJECTION SUBCUTANEOUS at 05:24

## 2024-08-09 RX ADMIN — GABAPENTIN 400 MILLIGRAM(S): 400 CAPSULE ORAL at 05:24

## 2024-08-09 RX ADMIN — LIDOCAINE 5% 1 PATCH: 5 CREAM TOPICAL at 17:56

## 2024-08-09 RX ADMIN — OXYCODONE HYDROCHLORIDE 5 MILLIGRAM(S): 30 TABLET ORAL at 14:56

## 2024-08-09 RX ADMIN — SENNOSIDES 2 TABLET(S): 8.6 TABLET ORAL at 20:55

## 2024-08-09 RX ADMIN — Medication 5 MILLIGRAM(S): at 05:24

## 2024-08-09 RX ADMIN — Medication 5 MILLIGRAM(S): at 20:55

## 2024-08-09 RX ADMIN — OXYCODONE HYDROCHLORIDE 5 MILLIGRAM(S): 30 TABLET ORAL at 20:56

## 2024-08-09 RX ADMIN — OXYCODONE HYDROCHLORIDE 5 MILLIGRAM(S): 30 TABLET ORAL at 13:03

## 2024-08-09 RX ADMIN — Medication 100 MILLIGRAM(S): at 12:36

## 2024-08-09 RX ADMIN — Medication 3 MILLIGRAM(S): at 20:56

## 2024-08-09 RX ADMIN — Medication 5 MILLIGRAM(S): at 10:29

## 2024-08-09 RX ADMIN — PROBIOTIC PRODUCT - TAB 1 TABLET(S): TAB at 09:27

## 2024-08-09 NOTE — PROGRESS NOTE ADULT - PROVIDER SPECIALTY LIST ADULT
Anesthesia
Gastroenterology
Infectious Disease
Neurosurgery
Hospitalist
Rehab Medicine
Hospitalist
Rehab Medicine
Hospitalist
Hospitalist
Neurosurgery
Hospitalist

## 2024-08-09 NOTE — CHART NOTE - NSCHARTNOTEFT_GEN_A_CORE
PRE-INTERVENTIONAL RADIOLOGY PROCEDURE NOTE      Patient Age: 85 y/o    Patient Gender: Male    Procedure: T11-T12 Disciitis/OM biopsy    Diagnosis/Indication: diagnostics for source of disciitis/OM    Interventional Radiology Attending Physician: Dr. Davis    Ordering Attending Physician: Dr. Dubon    Pertinent Medical History:  84M who presented with a week of painful urination and back pain, admitted for likely pyelonephritis currently being treated with ceftriaxone. CT and MR showing likely T11/12 discitis/osteomyelitis. IR consulted for possible biopsy. Labs and imaging reviewed. CRP elevated to 90-100s and WBC normal. Initial blood culture positive but subsequent blood cultures have been negative. ID consulted. Discitis/osteomyelitis biopsy in this region is high risk given possible nerve injury and bleeding.     Pertinent labs:                      8.7    5.28  )-----------( 287      ( 26 Jul 2024 04:19 )             26.6       07-26    136  |  98  |  14  ----------------------------<  130<H>  4.0   |  25  |  1.00    Ca    8.6      26 Jul 2024 04:19  Phos  3.3     07-26  Mg     2.00     07-26    TPro  6.8  /  Alb  2.8<L>  /  TBili  0.6  /  DBili  0.2  /  AST  33  /  ALT  30  /  AlkPhos  98  07-26      PT/INR - ( 26 Jul 2024 04:19 )   PT: 11.9 sec;   INR: 1.06 ratio         PTT - ( 26 Jul 2024 04:19 )  PTT:32.3 sec        Patient and Family Aware ? Yes
Source: Patient [x]    Family [ ]     other [x ] Chart review    Current Diet : Diet, Soft and Bite Sized:   High Fiber (HIFIBER)  Supplement Feeding Modality:  Oral  Ensure Plus High Protein Cans or Servings Per Day:  3       Frequency:  Daily (07-26-24 @ 13:28) [Active]    PO intake:   % [x ]   Height (cm): 169 (26 Jul 2024 10:55)  Weight (kg): 70.9kg (7/31, RD obtained weight via bed scale during visit), 74kg (7/26, ?accuracy), 68.5kg (7/22)  BMI (kg/m2): 24.8kg/m2( 7/31)    Nutrition Note:  84M PMH essential HTN, BPH, presents with a week of painful urination and back pain. He is admitted for management of likely pyelonephritis. incidentally found to have severe anemia with AoCD. CT showing concerning rectalsigmoid lesion. GI eval started. now c/b dyspnea. CXR c/f fluid overload with b/l pleural effusion. proBNP elevated. clinically not grossly fluid overload. +pleural effusion, echo was normal. MR spine high susp for T11-12 OM, per chart.     Patient is seen for nutrition follow-up. Patient is Mandarin speaking, RD speaks fluent Mandarin, interview is conducted in Mandarin during visit. Patient states that he did not consume any breakfast since he was sleeping, reports feeling hungry and awaiting for lunch tray. Patient states that his appetite has improved, consuming 50-75% of meals, tolerating Ensure supplement well. Noted patient is on a high fiber diet for diverticulosis. RD provided the patient nutrition education on high fiber diet, discussed recommended foods. Patient is receptive to the information provided.   Per swallow eval dated 7/21, recommended soft and bite sized, thin liquid diet consistency, Patient denies any difficulty chewing or swallowing current diet consistency, any nausea, vomiting during visit. C/o constipation, reports last bowel movement 'a few days ago'. Noted patient is on lactobacillus acidophilus to promote gut health, and bowel regimen is in placed. Continue to monitor bowel movement. Noted patient has weight gain of +2.4kg/+3.5%BW x 1.5 week, continue to monitor weight trend.       __________________ Pertinent Medications__________________   MEDICATIONS  (STANDING):  albuterol/ipratropium for Nebulization 3 milliLiter(s) Nebulizer every 6 hours  enoxaparin Injectable 40 milliGRAM(s) SubCutaneous every 24 hours  folic acid 1 milliGRAM(s) Oral daily  lactobacillus acidophilus 1 Tablet(s) Oral daily  lidocaine   4% Patch 1 Patch Transdermal daily  polyethylene glycol 3350 17 Gram(s) Oral two times a day  senna 2 Tablet(s) Oral at bedtime    MEDICATIONS  (PRN):  acetaminophen     Tablet .. 650 milliGRAM(s) Oral every 6 hours PRN Temp greater or equal to 38C (100.4F), Mild Pain (1 - 3), Moderate Pain (4 - 6)  famotidine Injectable 20 milliGRAM(s) IV Push once PRN abdominal pain  melatonin 3 milliGRAM(s) Oral at bedtime PRN Insomnia  morphine  - Injectable 2 milliGRAM(s) IV Push every 4 hours PRN Severe Pain (7 - 10)  ondansetron Injectable 4 milliGRAM(s) IV Push every 8 hours PRN Nausea and/or Vomiting      __________________ Pertinent Labs__________________   07-31 Na133 mmol/L<L> Glu 99 mg/dL K+ 3.6 mmol/L Cr  0.89 mg/dL BUN 16 mg/dL 07-31 Phos 3.2 mg/dL 07-26 Alb 2.8 g/dL<L>      Edema: As per RN flow sheet, no edema noted at this time.   Skin: Per RN flow sheet, patient has skin tear to L buttock.     Estimated Needs:   [x ] no change since previous assessment  Based on dosing weight- 68.5kg/151lbs  Estimated energy needs: 30-35kcal/kg/day= 4136-2522.5kcal/day  Estimated protein needs: 1.2-1.5gm/kg/day= 82-102gm/day       Previous Nutrition Diagnosis:   [ x] Severe Malnutrition   Nutrition Diagnosis is [x ] ongoing   New Nutrition Diagnosis: [ x] not applicable      Interventions:   Recommend  [ x] Continue to provide high fiber diet. Diet consistency per SLP recommendations.   [ x] Continue to provide Ensure Plus HP 8oz 3x/day (1050kcal, 60gm pro) for nutrient support.   [ x] Monitor bowel movement.   [ x] Encourage PO intake and honor food preferences as able.      Monitoring and Evaluation:   [ x] PO intake [ x] Tolerance to diet prescription [ x] weights [x ] follow up per protocol  [x ] other: bowel movement, skin integrity, labs.
MRI reviewed by Dr jensen who has determined the patient would likely benefit from neurosurgical intervention.  Due to OR availability the patient should be transferred to [facility]
Neurosurgery recalled for eval - will follow up recs  Repeat PT consult ordered
Notified by RN that patient found on his buttocks after lowering himself to the floor.  Patient evaluated at bedside pt reports he was trying to get up to walk to door however starting feeling weak and lowered himself to the ground. He states his pain is a 10/10, however this is unchanged from previous pain threshold prior to incident. He denies head strike, LOC, visual disturbances, active bleeding.  On PE, pt is lying in bed, Lungs CTABL, Cards Normal S1/S2, RRR, Pelvis Mild TTP lumbar paraspinal region, No erythema, ecchymosis, or crepitus noted. Extremities distal pulses and sensation intact.   Pelvic Xray and Lumbar spine xray recommended to patient, however he declines at this time. Explained to patient that imaging is required to r/o fractures, or dislocations, pt understands and declines at this time. IV pain medications administered, bed alarm placed.    Parmjit Moran PA-C  pager [phone]
Patient Age: 84   Patient Gender: male   Procedure (including site / side if known): single lumen picc   Diagnosis / Indication: discitis OM phlegmon   pyelonephritis   Ordering Attending Physician: dr Pritchard   Pertinent medical history: htn bph discitis OM phlegmon pyelonephritis     PICC approved by ACP Supervisor Jaylin Bower
Pt seen for follow-up.     Medical Course:  Per chart, Pt is 85 yo M PMH essential HTN, BPH, presents with a week of painful urination and back pain. He is admitted for management of likely pyelonephritis. incidentally found to have severe anemia with AoCD. CT showing concerning rectalsigmoid lesion. GI eval started. now c/b dyspnea. CXR c/f fluid overload with b/l pleural effusion. proBNP elevated. clinically not grossly fluid overload. +pleural effusion, echo was normal. MR spine high susp for T11-12 OM.    Nutrition Course:  Patient seen at [facility]. No recent episodes of nausea, vomiting, diarrhea or constipation, last BM noted on 7/30 per RN flowsheets. Bowel regimen in place. Continue to monitor BMs and document in RN flow sheet Denies any chewing/swallowing difficulties. No food allergies. Food preferences explored and noted. Intake is 51-75% per RN flowsheets. Feeding skills: Set-up. Pt receiving Ensure plus HP, 3x daily in efforts to optimize macronutrient intake. Labs noted for hyponatremia. Pt ordered for folic acid for micronutrient support.      Diet Prescription:   Pertinent Medications: MEDICATIONS  (STANDING):  acetaminophen     Tablet .. 650 milliGRAM(s) Oral every 6 hours  albuterol/ipratropium for Nebulization 3 milliLiter(s) Nebulizer every 6 hours  cefTRIAXone   IVPB 2000 milliGRAM(s) IV Intermittent every 24 hours  cyclobenzaprine 5 milliGRAM(s) Oral three times a day  enoxaparin Injectable 40 milliGRAM(s) SubCutaneous every 24 hours  folic acid 1 milliGRAM(s) Oral daily  gabapentin 300 milliGRAM(s) Oral three times a day  lactobacillus acidophilus 1 Tablet(s) Oral daily  lidocaine   4% Patch 1 Patch Transdermal daily  polyethylene glycol 3350 17 Gram(s) Oral two times a day  senna 2 Tablet(s) Oral at bedtime    MEDICATIONS  (PRN):  melatonin 3 milliGRAM(s) Oral at bedtime PRN Insomnia  ondansetron Injectable 4 milliGRAM(s) IV Push every 8 hours PRN Nausea and/or Vomiting  oxyCODONE    IR 5 milliGRAM(s) Oral every 4 hours PRN Moderate Pain (4 - 6)  oxyCODONE    IR 7.5 milliGRAM(s) Oral every 4 hours PRN Severe Pain (7 - 10)    Pertinent Labs: 08-06 Na132 mmol/L<L> Glu 108 mg/dL<H> K+ 4.4 mmol/L Cr  0.72 mg/dL BUN 21 mg/dL 08-06 Phos 3.9 mg/dL    Weight: 74kg (7/26)  Weight Assessment: No new weight to assess.   Height: 67in / 169cm  IBW: 148lbs / 67.2kg +/-10%  BMI: 25.9 kg/m^2. Based on dosing wt 74.0 (7/26)    Physical Assessment, per flowsheets:  Edema: No edema noted.   Pressure Injury: No pressure injury noted.   Appearance:     Estimated Needs:   [X] no change since previous assessment  Based on dosing weight- 68.5kg/151lbs  Estimated energy needs: 30-35kcal/kg/day= 8477-7012.5kcal/day  Estimated protein needs: 1.2-1.5gm/kg/day= 82-102gm/day         Previous Nutrition Diagnosis:   [X] Severe Malnutrition   Nutrition Diagnosis is [X] ongoing  [ ] resolved [ ] not applicable   New Nutrition Diagnosis: [X] not applicable     Education:  [ ] Provided  [ ] Provided on previous assessment by RD  [X] Not applicable   [ ] Pt refused     Interventions:   [X] Continue to provide high fiber diet. Diet consistency per SLP recommendations.   [X] Continue to provide Ensure Plus HP 8oz 3x/day (1050kcal, 60gm pro) for nutrient support.   [X] Monitor bowel movement.   [X] Encourage PO intake and honor food preferences as able.     Monitor & Evaluate:   Monitor PO intake, tolerance to diet/supplement, nutrition related lab values, weight trends, BMs/GI distress, hydration status, skin integrity.    Adan Borges RD, CDN. Available on MS teams,  Pager #[phone]
Source: Patient [ x]    Family [ ]     other [ x]Chart review    Current Diet : Diet, NPO after Midnight:      NPO Start Date: 25-Jul-2024,   NPO Start Time: 23:59  Except Medications (07-25-24 @ 14:05) [Active]    Diet, Clear Liquid:   1000mL Fluid Restriction ([id number])  Supplement Feeding Modality:  Oral  Ensure Clear Cans or Servings Per Day:  3       Frequency:  Daily (07-25-24 @ 14:05) [Active]    Height (cm): 169 (24 Jul 2024 17:49)  Weight (kg): 74kg (7/24, ?accuracy), 68.5kg (7/22)  BMI (kg/m2): 23.9(7/22)    Nutrition Note:  84M PMH essential HTN, BPH, presents with a week of painful urination and back pain. He is admitted for management of likely pyelonephritis. incidentally found to have severe anemia with AoCD. CT showing concerning rectalsigmoid lesion. GI eval started. now c/b dyspnea. CXR c/f fluid overload with b/l pleural effusion. proBNP elevated. clinically not grossly fluid overload. +pleural effusion, echo was normal, per chart.     Patient is Mandarin speaking, RD speaks fluent Mandarin, interview is conducted in Mandarin during visit. Patient is NPO for procedure, plan for C-scope with GI during visit. Patient was on a clear liquid diet, Golytely prep on 7/24. Patient reports liquid stools, last bowel movement 7/25. Patient denies any N/V during visit. Patient states that his appetite was poor to fair, prior to NPO, tolerating Ensure Clear supplements. Patient was seen by SLP on 7/24, recommended 'from oropharyngeal standpoint- soft and bite sized with thin liquid, pending GI clearance.' Noted weight obtained on 7/24 - 74kg documented on RN flow sheet, possibly not accurate. Patient does not look overweight, nutritional needs are calculated based on the weight obtained on 7/22- 68.5kg (from RN flow sheet). Recommend obtain reweight, to monitor weight trend. Per RN flow sheet, no edema noted at this time, patient has skin tear to left buttock.     __________________ Pertinent Medications__________________   MEDICATIONS  (STANDING):  albuterol/ipratropium for Nebulization 3 milliLiter(s) Nebulizer every 6 hours  cefTRIAXone   IVPB 2000 milliGRAM(s) IV Intermittent every 24 hours  cefTRIAXone   IVPB      folic acid 1 milliGRAM(s) Oral daily  lactobacillus acidophilus 1 Tablet(s) Oral daily  lidocaine   4% Patch 1 Patch Transdermal daily  pantoprazole  Injectable 40 milliGRAM(s) IV Push at bedtime  polyethylene glycol 3350 17 Gram(s) Oral daily  polyethylene glycol/electrolyte Solution 2000 milliLiter(s) Oral once  senna 2 Tablet(s) Oral at bedtime    MEDICATIONS  (PRN):  acetaminophen     Tablet .. 650 milliGRAM(s) Oral every 6 hours PRN Temp greater or equal to 38C (100.4F), Mild Pain (1 - 3), Moderate Pain (4 - 6)  famotidine Injectable 20 milliGRAM(s) IV Push once PRN abdominal pain  melatonin 3 milliGRAM(s) Oral at bedtime PRN Insomnia  ondansetron Injectable 4 milliGRAM(s) IV Push every 8 hours PRN Nausea and/or Vomiting  oxycodone    5 mG/acetaminophen 325 mG 1 Tablet(s) Oral every 6 hours PRN Severe Pain (7 - 10)      __________________ Pertinent Labs__________________   07-25 Na132 mmol/L<L> Glu 123 mg/dL<H> K+ 4.1 mmol/L Cr  0.94 mg/dL BUN 14 mg/dL 07-25 Phos 3.7 mg/dL 07-25 Alb 2.6 g/dL<L>    Estimated Needs:   [ x] recalculated:   Based on ABW- 68.5kg/151lbs  Estimated energy needs: 30-35kcal/kg/day= 4088-8341.5kcal/day  Estimated protein needs: 1.2-1.5gm/kg/day= 82-102gm/day       Previous Nutrition Diagnosis:   [ x] Severe Malnutrition   Nutrition Diagnosis is [x ] ongoing   New Nutrition Diagnosis: [ x] not applicable    Education: [ x] Provided by RD during last assessment     Interventions:   Recommend  [ x] Advance po diet when medically feasible. Diet consistency defer to team/SLP.   [ x] Continue to provide Ensure Clear 8oz 3x/day (720kcal, 24gm pro) for nutrient support.   [ x] Obtain re-weight, weekly weight, to monitor trend.      Monitoring and Evaluation:   [ x] PO intake [ x] Tolerance to diet prescription [ x] weights [x ] follow up per protocol  [x ] other: bowel movement, skin integrity, labs.

## 2024-08-09 NOTE — PROGRESS NOTE ADULT - PROBLEM SELECTOR PROBLEM 3
Pyelonephritis
Dyspnea
Hyponatremia
Low back pain
Pyelonephritis
Dyspnea
Pyelonephritis
Dyspnea
Hyponatremia
Low back pain
Low back pain
Pyelonephritis
Low back pain
Pyelonephritis
Pyelonephritis
Hyponatremia
Pyelonephritis
Dyspnea

## 2024-08-09 NOTE — PROGRESS NOTE ADULT - PROBLEM SELECTOR PLAN 5
- likely anemia of chronic disease in s/o infection   - iron study c/f AoCD, folate low at 2, c/w folic acid 1mg qd, vit D level ok 36.9  - SPEP showing increased polyclonal gamma fraction consistent with chronic inflammatory conditions.   - no sign of hemolysis   - CT showing Mild rectosigmoid wall thickening/hyperemia with presacral fluid, possible proctitis, but patient has no rectal pain. c/f malignancy iso AoCD and severe anemia. - GI consult emailed. f/u rec.   -trend CBC daily, maintain Hgb >7.0, received 1U prbc on 7/22 with appropriate response.  - GI consulted, colonoscopy performed on 7/26 with normal colon, diverticulosis. > bowel regimen and high fiber diet.
Moderate hyponatremia on labs; he is grossly euvolemic. Received IVNS in ED.  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
No prior CBC available. No reports of rectal bleeding. per patient last blood work with PCP was in March and was told "everything is fine".   - iron study c/f AoCD, folate low at 2, c/w folic acid 1mg qd  vit D level ok 36.9  - SPEP showing increased polyclonal gamma fraction consistent with chronic inflammatory conditions.   - no sign of hemolysis , haptoglobin 139 wnl, retic count 3.9  - CT showing Mild rectosigmoid wall thickening/hyperemia with presacral fluid, possible proctitis, but patient has no rectal pain. c/f malignancy iso AoCD and severe anemia. - GI consult emailed. f/u rec.   -trend CBC daily, maintain Hgb >7.0, received 1U prbc on 7/22 with appropriate response.  - GI plan for inpatient C-scope due to concern of poor outpatient follow up.   - C-scope performed on 7/26 with normal colon, diverticulosis. > bowel regimen and high fiber diet.
No prior CBC available. No reports of rectal bleeding. per patient last blood work with PCP was in March and was told "everything is fine".   - iron study c/f AoCD, folate low at 2, start folic acid 1mg qd  vit D level ok 36.9  - SPEP showing increased polyclonal gamma fraction consistent with chronic inflammatory conditions.   - no sign of hemolysis , haptoglobin 139 wnl, retic count 3.9  - CT showing Mild rectosigmoid wall thickening/hyperemia with presacral fluid, possible proctitis, but patient has no rectal pain. c/f malignancy iso AoCD and severe anemia. - GI consult emailed. f/u rec.   -trend CBC daily, maintain Hgb >7.0, received 1U prbc on 7/22 with appropriate response.  - GI plan for inpatient C-scope due to concern of poor outpatient follow up.   - C-scope performed on 7/26 with normal colon, diverticulosis. > bowel regimen and high fiber diet.
- likely anemia of chronic disease in s/o infection   - iron study c/f AoCD, folate low at 2, c/w folic acid 1mg qd, vit D level ok 36.9  - SPEP showing increased polyclonal gamma fraction consistent with chronic inflammatory conditions.   - no sign of hemolysis   - CT showing Mild rectosigmoid wall thickening/hyperemia with presacral fluid, possible proctitis, but patient has no rectal pain. c/f malignancy iso AoCD and severe anemia. - GI consult emailed. f/u rec.   -trend CBC daily, maintain Hgb >7.0, received 1U prbc on 7/22 with appropriate response.  - GI consulted, colonoscopy performed on 7/26 with normal colon, diverticulosis. > bowel regimen and high fiber diet.
Moderate hyponatremia on labs; he is grossly euvolemic. Received IVNS in ED.  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
Severe malnutrition, seen by nutrition  regular diet + supplement
No prior CBC available. No reports of rectal bleeding. per patient last blood work with PCP was in March and was told "everything is fine".   - iron study c/f AoCD, folate low at 2, c/w folic acid 1mg qd  vit D level ok 36.9  - SPEP showing increased polyclonal gamma fraction consistent with chronic inflammatory conditions.   - no sign of hemolysis , haptoglobin 139 wnl, retic count 3.9  - CT showing Mild rectosigmoid wall thickening/hyperemia with presacral fluid, possible proctitis, but patient has no rectal pain. c/f malignancy iso AoCD and severe anemia. - GI consult emailed. f/u rec.   -trend CBC daily, maintain Hgb >7.0, received 1U prbc on 7/22 with appropriate response.  - GI plan for inpatient C-scope due to concern of poor outpatient follow up.   - C-scope performed on 7/26 with normal colon, diverticulosis. > bowel regimen and high fiber diet.
- likely anemia of chronic disease in s/o infection   - iron study c/f AoCD, folate low at 2, c/w folic acid 1mg qd, vit D level ok 36.9  - SPEP showing increased polyclonal gamma fraction consistent with chronic inflammatory conditions.   - no sign of hemolysis   - CT showing Mild rectosigmoid wall thickening/hyperemia with presacral fluid, possible proctitis, but patient has no rectal pain. c/f malignancy iso AoCD and severe anemia. - GI consult emailed. f/u rec.   -trend CBC daily, maintain Hgb >7.0, received 1U prbc on 7/22 with appropriate response.  - GI consulted, colonoscopy performed on 7/26 with normal colon, diverticulosis. > bowel regimen and high fiber diet.
No prior CBC available. No reports of rectal bleeding. per patient last blood work with PCP was in March and was told "everything is fine".   - iron study c/f AoCD, folate low at 2, c/w folic acid 1mg qd  vit D level ok 36.9  - SPEP showing increased polyclonal gamma fraction consistent with chronic inflammatory conditions.   - no sign of hemolysis , haptoglobin 139 wnl, retic count 3.9  - CT showing Mild rectosigmoid wall thickening/hyperemia with presacral fluid, possible proctitis, but patient has no rectal pain. c/f malignancy iso AoCD and severe anemia. - GI consult emailed. f/u rec.   -trend CBC daily, maintain Hgb >7.0, received 1U prbc on 7/22 with appropriate response.  - GI plan for inpatient C-scope due to concern of poor outpatient follow up.   - C-scope performed on 7/26 with normal colon, diverticulosis. > bowel regimen and high fiber diet.
No prior CBC available. No reports of rectal bleeding. per patient last blood work with PCP was in March and was told "everything is fine".   - iron study c/f AoCD, folate low at 2, c/w folic acid 1mg qd  vit D level ok 36.9  - SPEP showing increased polyclonal gamma fraction consistent with chronic inflammatory conditions.   - no sign of hemolysis , haptoglobin 139 wnl, retic count 3.9  - CT showing Mild rectosigmoid wall thickening/hyperemia with presacral fluid, possible proctitis, but patient has no rectal pain. c/f malignancy iso AoCD and severe anemia. - GI consult emailed. f/u rec.   -trend CBC daily, maintain Hgb >7.0, received 1U prbc on 7/22 with appropriate response.  - GI plan for inpatient C-scope due to concern of poor outpatient follow up.   - C-scope performed on 7/26 with normal colon, diverticulosis. > bowel regimen and high fiber diet.
- likely anemia of chronic disease in s/o infection   - iron study c/f AoCD, folate low at 2, c/w folic acid 1mg qd, vit D level ok 36.9  - SPEP showing increased polyclonal gamma fraction consistent with chronic inflammatory conditions.   - no sign of hemolysis   - CT showing Mild rectosigmoid wall thickening/hyperemia with presacral fluid, possible proctitis, but patient has no rectal pain. c/f malignancy iso AoCD and severe anemia. - GI consult emailed. f/u rec.   -trend CBC daily, maintain Hgb >7.0, received 1U prbc on 7/22 with appropriate response.  - GI consulted, colonoscopy performed on 7/26 with normal colon, diverticulosis. > bowel regimen and high fiber diet.
Moderate hyponatremia on labs; he is grossly euvolemic. Received IVNS in ED.  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
- likely anemia of chronic disease in s/o infection   - iron study c/f AoCD, folate low at 2, c/w folic acid 1mg qd, vit D level ok 36.9  - SPEP showing increased polyclonal gamma fraction consistent with chronic inflammatory conditions.   - no sign of hemolysis   - CT showing Mild rectosigmoid wall thickening/hyperemia with presacral fluid, possible proctitis, but patient has no rectal pain. c/f malignancy iso AoCD and severe anemia. - GI consult emailed. f/u rec.   -trend CBC daily, maintain Hgb >7.0, received 1U prbc on 7/22 with appropriate response.  - GI consulted, colonoscopy performed on 7/26 with normal colon, diverticulosis. > bowel regimen and high fiber diet.
No prior CBC available. No reports of rectal bleeding. per patient last blood work with PCP was in March and was told "everything is fine".   - iron study c/f AoCD, folate low at 2, start folic acid 1mg qd  vit D level ok 36.9  - SPEP showing increased polyclonal gamma fraction consistent with chronic inflammatory conditions.   - no sign of hemolysis , haptoglobin 139 wnl, retic count 3.9  - CT showing Mild rectosigmoid wall thickening/hyperemia with presacral fluid, possible proctitis, but patient has no rectal pain. c/f malignancy iso AoCD and severe anemia. - GI consult emailed. f/u rec.   -trend CBC daily, maintain Hgb >7.0, received 1U prbc on 7/22 with appropriate response.  - GI plan for inpatient C-scope due to concern of poor outpatient follow up.   - C-scope performed on 7/26 with normal colon, diverticulosis. > bowel regimen and high fiber diet.
Nutrition consultation ordered for possible malnutrition
Severe malnutrition, seen by nutrition  regular diet + supplement
No prior CBC available. No reports of rectal bleeding. per patient last blood work with PCP was in March and was told "everything is fine".   - iron study c/f AoCD, folate low at 2, start folic acid 1mg qd  vit D level ok 36.9  - SPEP showing increased polyclonal gamma fraction consistent with chronic inflammatory conditions.   - no sign of hemolysis , haptoglobin 139 wnl, retic count 3.9  - CT showing Mild rectosigmoid wall thickening/hyperemia with presacral fluid, possible proctitis, but patient has no rectal pain. c/f malignancy iso AoCD and severe anemia. - GI consult emailed. f/u rec.   -trend CBC daily, maintain Hgb >7.0, received 1U prbc on 7/22 with appropriate response.  - GI plan for inpatient C-scope due to concern of poor outpatient follow up.   - C-scope performed on 7/26 with normal colon, diverticulosis. > bowel regimen and high fiber diet.
Moderate hyponatremia on labs; he is grossly euvolemic. Received IVNS in ED.  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

## 2024-08-09 NOTE — PROGRESS NOTE ADULT - PROBLEM SELECTOR PLAN 6
Moderate hyponatremia on labs; he is grossly euvolemic. Received IVNS in ED.  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   - trend BMP.
Not taking antihypertensives; BP currently at goal  Will continue to trend BP w/routine VS
CXR Linear opacities in right lower lung field, likely atelectasis. No focal   consolidation.  -incentive spirometry as tolerated
CXR Linear opacities in right lower lung field, likely atelectasis. No focal   consolidation.  -incentive spirometry as tolerated
Moderate hyponatremia on labs; he is grossly euvolemic. Received IVNS in ED.  - trend  - fluid restriction, s/p salt tabs , urine Na 27, urine osm 503 c/w SIADH  - s/p short course of salt tab and improved.   - trend BMP.
CXR Linear opacities in right lower lung field, likely atelectasis. No focal   consolidation.  -incentive spirometry as tolerated
CXR Linear opacities in right lower lung field, likely atelectasis. No focal   consolidation.  -incentive spirometry as tolerated
Moderate hyponatremia on labs; he is grossly euvolemic. Received IVNS in ED.  - improved to 135  - fluid restriction, salt tab 2g tid , urine Na 27, urine osm 503 c/w SIADH  - s/p short course of salt tab and improved.   - trend BMP.
Not taking antihypertensives; BP currently at goal  Will continue to trend BP w/routine VS
Moderate hyponatremia on labs; he is grossly euvolemic. Received IVNS in ED.  - trend  - fluid restriction, s/p salt tabs , urine Na 27, urine osm 503 c/w SIADH  - s/p short course of salt tab and improved.   - trend BMP.
Moderate hyponatremia on labs; he is grossly euvolemic. Received IVNS in ED.  - improved to 133  - fluid restriction, s/p salt tabs , urine Na 27, urine osm 503 c/w SIADH  - s/p short course of salt tab and improved.   - trend BMP.
Not taking antihypertensives; BP currently at goal  Will continue to trend BP w/routine VS
Moderate hyponatremia on labs; he is grossly euvolemic. Received IVNS in ED.  - trend  - fluid restriction, s/p salt tabs , urine Na 27, urine osm 503 c/w SIADH  - s/p short course of salt tab and improved.   - trend BMP.
Moderate hyponatremia on labs; he is grossly euvolemic. Received IVNS in ED.  - trend  - fluid restriction, s/p salt tabs , urine Na 27, urine osm 503 c/w SIADH  - s/p short course of salt tab and improved.   - trend BMP.
Moderate hyponatremia on labs; he is grossly euvolemic. Received IVNS in ED.  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   - trend BMP.
Moderate hyponatremia on labs; he is grossly euvolemic. Received IVNS in ED.  - improved to 135  - fluid restriction, salt tab 2g tid , urine Na 27, urine osm 503 c/w SIADH  - s/p short course of salt tab and improved.   - trend BMP.
Moderate hyponatremia on labs; he is grossly euvolemic. Received IVNS in ED.  - trend  - fluid restriction, s/p salt tabs , urine Na 27, urine osm 503 c/w SIADH  - Na 131

## 2024-08-09 NOTE — PROGRESS NOTE ADULT - PROBLEM SELECTOR PLAN 4
- trend CrP, will get CT cervical/thoracic/lumbar spine as pt c/o back pain for months, worsening in the past month.   - management of OM as above.   - c/w Tramadol/Percocet for moderate/severe pain.   - lidocaine patch  - PT rec outpatient PT.
- trend CrP, will get CT cervical/thoracic/lumbar spine as pt c/o back pain for months, worsening in the past month.   - management of OM as above.   - pain control as above  - lidocaine patch  - acute rehab
- trend CrP, will get CT cervical/thoracic/lumbar spine as pt c/o back pain for months, worsening in the past month.   - management of OM as above.   - c/w Tramadol/Percocet for moderate/severe pain.   - lidocaine patch  - PT rec outpatient PT.
- trend CrP, will get CT cervical/thoracic/lumbar spine as pt c/o back pain for months, worsening in the past month.   - management of OM as above.   - c/w Tramadol/Percocet for moderate/severe pain.   - lidocaine patch  - PT rec outpatient PT.
CXR Linear opacities in right lower lung field, likely atelectasis. No focal   consolidation.  -incentive spirometry as tolerated
No prior CBC available. No reports of rectal bleeding. per patient last blood work with PCP was in March and was told "everything is fine".   - iron study c/f AoCD, folate low at 2, start folic acid 1mg qd  vit D level ok 36.9  -sent spep/upep with immunofixation,  - no sign of hemolysis , haptoglobin 139 wnl, retic count 3.9  - CT showing Mild rectosigmoid wall thickening/hyperemia with presacral fluid, possible proctitis, but patient has no rectal pain. c/f malignancy iso AoCD and severe anemia. - GI consult emailed. f/u rec.   -trend CBC daily, maintain Hgb >7.0, received 1U prbc on 7/22 with appropriate response.
No prior CBC available. No reports of rectal bleeding. per patient last blood work with PCP was in March and was told "everything is fine".   - iron study c/f AoCD, folate low at 2, start folic acid 1mg qd  vit D level ok 36.9  -sent spep/upep with immunofixation,  - no sign of hemolysis , haptoglobin 139 wnl, retic count 3.9  - CT showing Mild rectosigmoid wall thickening/hyperemia with presacral fluid, possible proctitis, but patient has no rectal pain. c/f malignancy iso AoCD and severe anemia. - GI consult emailed. f/u rec.   -trend CBC daily, maintain Hgb >7.0, received 1U prbc on 7/22 with appropriate response.  - GI plan for inpatient C-scope due to concern of poor outpatient follow up.   - C-scope today by GI.
- trend CrP, will get CT cervical/thoracic/lumbar spine as pt c/o back pain for months, worsening in the past month.   - management of OM as above.   - morphine prn pain  - lidocaine patch  - acute rehab
- trend CrP, will get CT cervical/thoracic/lumbar spine as pt c/o back pain for months, worsening in the past month.   - management of OM as above.   - morphine prn pain  - lidocaine patch  - acute rehab
-Will c/w CTX IV   - UCx growing E coli sensitive to CTX and augmentin.   - BCx 7/19 coag neg staph 1/2 bottles likely contaminant from procurement , repeat BCx from 7/21 NGTD.   - s/p bactroban x5 days for MSSA pcr +  - c/w CTX started on 7/19, IR bx no growth, plan for Ceftriaxone 2g qd through 9/13 per ID, will need PICC line prior to DC
- UCx growing E coli sensitive to CTX and augmentin.   - BCx 7/19 coag neg staph 1/2 bottles likely contaminant from procurement , repeat BCx from 7/21 NGTD.   - s/p bactroban x5 days for MSSA pcr +  - c/w CTX started on 7/19, IR bx no growth, plan for Ceftriaxone 2g qd through 9/13 per ID
- trend CrP, will get CT cervical/thoracic/lumbar spine as pt c/o back pain for months, worsening in the past month.   - management of OM as above.   - pain control as above  - lidocaine patch  - acute rehab
No prior CBC available. No reports of rectal bleeding. per patient last blood work with PCP was in March and was told "everything is fine".   - iron study c/f AoCD, folate low at 2, start folic acid 1mg qd  vit D level ok 36.9  -sent spep/upep with immunofixation,  - no sign of hemolysis , haptoglobin 139 wnl, retic count 3.9  - CT showing Mild rectosigmoid wall thickening/hyperemia with presacral fluid, possible proctitis, but patient has no rectal pain. c/f malignancy iso AoCD and severe anemia. - GI consult emailed. f/u rec.   -trend CBC daily, maintain Hgb >7.0, received 1U prbc on 7/22 with appropriate response.  - GI plan for inpatient C-scope due to concern of poor outpatient follow up.   - NPO after MN, bowel prep per GI team.
CXR Linear opacities in right lower lung field, likely atelectasis. No focal   consolidation.  -incentive spirometry as tolerated
- trend CrP, will get CT cervical/thoracic/lumbar spine as pt c/o back pain for months, worsening in the past month.   - management of OM as above.   - pain control as above  - lidocaine patch  - acute rehab
- UCx growing E coli sensitive to CTX and augmentin.   - BCx 7/19 coag neg staph 1/2 bottles likely contaminant from procurement , repeat BCx from 7/21 NGTD.   - s/p bactroban x5 days for MSSA pcr +  - c/w CTX started on 7/19, IR bx no growth, plan for Ceftriaxone 2g qd through 9/13 per ID
No prior CBC available. No reports of rectal bleeding. per patient last blood work with PCP was in March and was told "everything is fine".   - iron study c/f AoCD, folate low at 2, start folic acid 1mg qd  vit D level ok 36.9  - SPEP showing increased polyclonal gamma fraction consistent with chronic inflammatory conditions.   - no sign of hemolysis , haptoglobin 139 wnl, retic count 3.9  - CT showing Mild rectosigmoid wall thickening/hyperemia with presacral fluid, possible proctitis, but patient has no rectal pain. c/f malignancy iso AoCD and severe anemia. - GI consult emailed. f/u rec.   -trend CBC daily, maintain Hgb >7.0, received 1U prbc on 7/22 with appropriate response.  - GI plan for inpatient C-scope due to concern of poor outpatient follow up.   - C-scope performed on 7/26 with normal colon, diverticulosis. > bowel regimen and high fiber diet.
- UCx growing E coli sensitive to CTX and augmentin.   - BCx 7/19 coag neg staph 1/2 bottles likely contaminant from procurement , repeat BCx from 7/21 NGTD.   - s/p bactroban x5 days for MSSA pcr +  - c/w CTX started on 7/19, IR bx no growth, plan for Ceftriaxone 2g qd through 9/13 per ID
CXR Linear opacities in right lower lung field, likely atelectasis. No focal   consolidation.  -incentive spirometry as tolerated
- trend CrP, will get CT cervical/thoracic/lumbar spine as pt c/o back pain for months, worsening in the past month.   - management of OM as above.   - c/w Tramadol/Percocet for moderate/severe pain.   - lidocaine patch  - PT rec outpatient PT. PT reeval
- trend CrP, will get CT cervical/thoracic/lumbar spine as pt c/o back pain for months, worsening in the past month.   - management of OM as above.   - c/w Tramadol/Percocet for moderate/severe pain.   - lidocaine patch  - PT rec outpatient PT.

## 2024-08-09 NOTE — PROGRESS NOTE ADULT - PROBLEM SELECTOR PLAN 1
- patient had continue back pain and worsen over past 1 month. initially attributed to pyelo.   - CT Tspine showed T11-12 erosive changes.   - MR T spine 7/25 Discitis/osteomyelitis is suspected at the T11-12 level, T11-12: Disc bulge and bilateral hypertrophic facet change. This in addition to the epidural enhancement causes moderate narrowing spinal canal. Moderate to severe narrowing of both neural foramen  - Elevated inflammatory markers ESR 84, .3  - patient has been on CTX 2g qd since 7/19  - TTE neg for vegetation   - Bcx NGTD.   - s/p IR aspiration of T11/T12 disc space 7/29; IR culture neg, cytopathology inflammatory cells, no malignancy  - IR culture no growth on abx, continue Ceftriaxone 2g q24h through 9/13  - c/w pain control. oxy 5mg q4h prn mod, 7.5mg q4h prn severe pain  -  8/7 increased gabapentin 600mg TID  for neuropathic pain, flexeril 5mg tid for muscle spasms and lidocaine patch; ATC tylenol x 2 days (8/5- 8/7)  - s/p PICC line placement   - case d/w BARBARA Willams. given new weakness CT T/L spine done; expedite MRI; discussed with son Emery over telephone concern for abscess  - neuro check q 4   - CRP elevated rising 135

## 2024-08-09 NOTE — PROGRESS NOTE ADULT - PROBLEM SELECTOR PROBLEM 1
Dyspnea
Osteomyelitis of spine
Pyelonephritis
Osteomyelitis of spine
Osteomyelitis of spine
Dyspnea
Osteomyelitis of spine
Osteomyelitis of spine
Dyspnea
Osteomyelitis of spine
Pyelonephritis
Osteomyelitis of spine
Pyelonephritis
Osteomyelitis of spine
Dyspnea
Osteomyelitis of spine
Osteomyelitis of spine

## 2024-08-09 NOTE — PROGRESS NOTE ADULT - PROBLEM SELECTOR PROBLEM 5
Hypoalbuminemia
Anemia
Hyponatremia
Anemia
Hypoalbuminemia
Anemia
Hypoalbuminemia
Hyponatremia
Anemia
Hyponatremia
Anemia
Hyponatremia
Anemia

## 2024-08-09 NOTE — PROGRESS NOTE ADULT - REASON FOR ADMISSION
back pain, dysuria

## 2024-08-09 NOTE — PROGRESS NOTE ADULT - SUBJECTIVE AND OBJECTIVE BOX
Dr. Annamarie Scherer  Pager [phone]    PROGRESS NOTE:     Patient is a 84y old  Male who presents with a chief complaint of Urinary tract infection     (20 Jul 2024 13:56)      SUBJECTIVE / OVERNIGHT EVENTS: afebrile, reports urinary symptoms improved  ADDITIONAL REVIEW OF SYSTEMS: still with some dysuria    MEDICATIONS  (STANDING):  cefTRIAXone   IVPB 1000 milliGRAM(s) IV Intermittent every 24 hours  folic acid 1 milliGRAM(s) Oral daily  sodium chloride 2 Gram(s) Oral every 8 hours    MEDICATIONS  (PRN):  acetaminophen     Tablet .. 650 milliGRAM(s) Oral every 6 hours PRN Temp greater or equal to 38C (100.4F), Mild Pain (1 - 3), Moderate Pain (4 - 6)  melatonin 3 milliGRAM(s) Oral at bedtime PRN Insomnia      CAPILLARY BLOOD GLUCOSE        I&O's Summary    19 Jul 2024 07:01  -  20 Jul 2024 07:00  --------------------------------------------------------  IN: 0 mL / OUT: 450 mL / NET: -450 mL        PHYSICAL EXAM:  Vital Signs Last 24 Hrs  T(C): 36.3 (20 Jul 2024 09:40), Max: 36.4 (19 Jul 2024 17:23)  T(F): 97.3 (20 Jul 2024 09:40), Max: 97.5 (19 Jul 2024 17:23)  HR: 78 (20 Jul 2024 09:40) (71 - 78)  BP: 110/53 (20 Jul 2024 09:40) (110/53 - 147/54)  BP(mean): --  RR: 18 (20 Jul 2024 09:40) (17 - 18)  SpO2: 99% (20 Jul 2024 09:40) (96% - 100%)    Parameters below as of 20 Jul 2024 09:40  Patient On (Oxygen Delivery Method): room air      CONSTITUTIONAL: nad  RESPIRATORY: Normal respiratory effort; lungs are clear to auscultation bilaterally  CARDIOVASCULAR: Regular rate and rhythm, normal S1 and S2, no murmur/rub/gallop; No lower extremity edema; Peripheral pulses are 2+ bilaterally  ABDOMEN: Nontender to palpation, suprapubic ttp, soft   MUSCULOSKELETAL: no clubbing or cyanosis of digits; no joint swelling or tenderness to palpation  PSYCH: A+O to person, place, and time; affect appropriate    LABS:                        8.0    10.07 )-----------( 258      ( 20 Jul 2024 07:59 )             24.2     07-20    127<L>  |  97<L>  |  20  ----------------------------<  100<H>  3.5   |  22  |  0.84    Ca    8.1<L>      20 Jul 2024 05:55  Phos  2.6     07-20  Mg     1.70     07-20    TPro  6.2  /  Alb  2.4<L>  /  TBili  1.2  /  DBili  x   /  AST  30  /  ALT  21  /  AlkPhos  83  07-19          Urinalysis Basic - ( 20 Jul 2024 05:55 )    Color: x / Appearance: x / SG: x / pH: x  Gluc: 100 mg/dL / Ketone: x  / Bili: x / Urobili: x   Blood: x / Protein: x / Nitrite: x   Leuk Esterase: x / RBC: x / WBC x   Sq Epi: x / Non Sq Epi: x / Bacteria: x        Culture - Blood (collected 19 Jul 2024 05:33)  Source: .Blood Blood-Peripheral  Gram Stain (20 Jul 2024 05:52):    Growth in aerobic bottle: Gram Positive Cocci in Clusters  Preliminary Report (20 Jul 2024 05:52):    Growth in aerobic bottle: Gram Positive Cocci in Clusters    Direct identification is available within approximately 3-5    hours either by Blood Panel Multiplexed PCR or Direct    MALDI-TOF. Details: [email or web address]  Organism: Blood Culture PCR (20 Jul 2024 06:49)  Organism: Blood Culture PCR (20 Jul 2024 06:49)    Culture - Blood (collected 19 Jul 2024 05:23)  Source: .Blood Blood-Peripheral  Preliminary Report (20 Jul 2024 10:01):    No growth at 24 hours    Urinalysis with Rflx Culture (collected 19 Jul 2024 00:45)        RADIOLOGY & ADDITIONAL TESTS:  Results Reviewed:   Imaging Personally Reviewed:  < from: Xray Chest 1 View- PORTABLE-Urgent (Xray Chest 1 View- PORTABLE-Urgent .) (07.18.24 @ 23:37) >  Linear opacities in right lower lung field, likely atelectasis. No focal   consolidation.        Electrocardiogram Personally Reviewed:    COORDINATION OF CARE:  Care Discussed with Consultants/Other Providers [Y/N]:  Prior or Outpatient Records Reviewed [Y/N]:  
Patient is a 84y old  Male who presents with a chief complaint of back pain, dysuria (06 Aug 2024 17:20)    Interval history: states he wants to go to rehab. Denies new complaint      REVIEW OF SYSTEMS  pain  weakness    PAST MEDICAL & SURGICAL HISTORY  HTN (hypertension)    HLD (hyperlipidemia)    Former smoker    Gout    History of BPH         CURRENT FUNCTIONAL STATUS  deferred PT on 8/6 due to pain  states he is agreeable to participate, would like to go to rehab      RECENT LABS/IMAGING  CBC Full  -  ( 07 Aug 2024 07:08 )  WBC Count : 5.74 K/uL  RBC Count : 2.97 M/uL  Hemoglobin : 8.9 g/dL  Hematocrit : 27.2 %  Platelet Count - Automated : 353 K/uL  Mean Cell Volume : 91.6 fL  Mean Cell Hemoglobin : 30.0 pg  Mean Cell Hemoglobin Concentration : 32.7 gm/dL  Auto Neutrophil # : x  Auto Lymphocyte # : x  Auto Monocyte # : x  Auto Eosinophil # : x  Auto Basophil # : x  Auto Neutrophil % : x  Auto Lymphocyte % : x  Auto Monocyte % : x  Auto Eosinophil % : x  Auto Basophil % : x    08-07    132<L>  |  97<L>  |  23  ----------------------------<  106<H>  4.1   |  23  |  0.78    Ca    8.8      07 Aug 2024 07:08  Phos  3.8     08-07  Mg     2.20     08-07      Urinalysis Basic - ( 07 Aug 2024 07:08 )    Color: x / Appearance: x / SG: x / pH: x  Gluc: 106 mg/dL / Ketone: x  / Bili: x / Urobili: x   Blood: x / Protein: x / Nitrite: x   Leuk Esterase: x / RBC: x / WBC x   Sq Epi: x / Non Sq Epi: x / Bacteria: x        VITALS  T(C): 36.4 (08-07-24 @ 05:23), Max: 36.8 (08-06-24 @ 21:20)  HR: 85 (08-07-24 @ 05:23) (70 - 85)  BP: 109/67 (08-07-24 @ 05:23) (109/67 - 133/73)  RR: 18 (08-07-24 @ 05:23) (18 - 18)  SpO2: 100% (08-07-24 @ 05:23) (96% - 100%)  Wt(kg): --    ALLERGIES  No Known Allergies      MEDICATIONS   acetaminophen     Tablet .. 650 milliGRAM(s) Oral every 6 hours  albuterol/ipratropium for Nebulization 3 milliLiter(s) Nebulizer every 6 hours  cefTRIAXone   IVPB 2000 milliGRAM(s) IV Intermittent every 24 hours  cyclobenzaprine 5 milliGRAM(s) Oral three times a day  enoxaparin Injectable 40 milliGRAM(s) SubCutaneous every 24 hours  folic acid 1 milliGRAM(s) Oral daily  gabapentin 300 milliGRAM(s) Oral three times a day  lactobacillus acidophilus 1 Tablet(s) Oral daily  lidocaine   4% Patch 1 Patch Transdermal daily  melatonin 3 milliGRAM(s) Oral at bedtime PRN  ondansetron Injectable 4 milliGRAM(s) IV Push every 8 hours PRN  oxyCODONE    IR 7.5 milliGRAM(s) Oral every 4 hours PRN  oxyCODONE    IR 5 milliGRAM(s) Oral every 4 hours PRN  polyethylene glycol 3350 17 Gram(s) Oral two times a day  senna 2 Tablet(s) Oral at bedtime      ----------------------------------------------------------------------------------------   PHYSICAL EXAM  Constitutional - NAD, Comfortable  HEENT - NCAT, EOMI   Chest -no respiratory distress  Cardiovascular - RRR, S1S2   Abdomen -  Soft, NTND  Extremities - No C/C/E, No calf tenderness   Neurologic Exam -                    Cognitive - Awake, Alert, AAO to self, place, date, year, situation     Communication - Fluent, No dysarthria     Cranial Nerves - CN 2-12 intact     Motor -                      LEFT    UE - ShAB 5/5, EF 5/5, EE 5/5, WE 5/5,  5/5                    RIGHT UE - ShAB 5/5, EF 5/5, EE 5/5, WE 5/5,  5/5                    LEFT    LE - HF 4/5, KE 5/5, DF 5/5, PF 5/5                    RIGHT LE - HF 4/5, KE 5/5, DF 5/5, PF 5/5        Sensory - Intact to LT      Balance - WNL Static  Psychiatric - Mood stable, Affect WNL  ----------------------------------------------------------------------------------------  ASSESSMENT/PLAN  84 year old male presented with back pain, found on imaging to have enhancement T11 and T 12 vertebral bodies and posterior elements, possible  osteomyelitis, also paraspinal area enhancement concerning for phlegmon.  on ceftriaxone  SLP  Pain -gabapentin, oxy ir prn with bowel regimen, lidocaine patch, tylenol  continue bedside PT and OT  DVT PPX - lovenox  Rehab -recommend acute inpatient rehab when medically cleared. Patient can tolerate 3 hours per day of therapy with medical supervision  
Follow Up:      Inverval History/ROS:Patient is a 84y old  Male who presents with a chief complaint of back pain, dysuria (30 Jul 2024 13:50)    C/o back pain  No fever      Allergies    No Known Allergies    Intolerances        ANTIMICROBIALS:  cefTRIAXone   IVPB 2000 every 24 hours  cefTRIAXone   IVPB        OTHER MEDS:  acetaminophen     Tablet .. 650 milliGRAM(s) Oral every 6 hours PRN  albuterol/ipratropium for Nebulization 3 milliLiter(s) Nebulizer every 6 hours  famotidine Injectable 20 milliGRAM(s) IV Push once PRN  folic acid 1 milliGRAM(s) Oral daily  lactobacillus acidophilus 1 Tablet(s) Oral daily  lidocaine   4% Patch 1 Patch Transdermal daily  melatonin 3 milliGRAM(s) Oral at bedtime PRN  morphine  - Injectable 2 milliGRAM(s) IV Push every 6 hours PRN  ondansetron Injectable 4 milliGRAM(s) IV Push every 8 hours PRN  polyethylene glycol 3350 17 Gram(s) Oral two times a day  senna 2 Tablet(s) Oral at bedtime      Vital Signs Last 24 Hrs  T(C): 36.3 (30 Jul 2024 11:01), Max: 36.8 (29 Jul 2024 20:10)  T(F): 97.3 (30 Jul 2024 11:01), Max: 98.2 (29 Jul 2024 20:10)  HR: 68 (30 Jul 2024 11:01) (64 - 94)  BP: 143/63 (30 Jul 2024 11:01) (132/67 - 148/58)  BP(mean): 81 (29 Jul 2024 17:00) (81 - 81)  RR: 18 (30 Jul 2024 11:01) (15 - 18)  SpO2: 96% (30 Jul 2024 11:01) (96% - 97%)    Parameters below as of 30 Jul 2024 11:01  Patient On (Oxygen Delivery Method): room air        PHYSICAL EXAM:  General: [ x] non-toxic  HEAD/EYES: [ ] PERRL [x ] white sclera [ ] icterus  ENT:  [ ] normal [ x] supple [ ] thrush [ ] pharyngeal exudate  Cardiovascular:   [ ] murmur x[ ] normal [ ] PPM/AICD  Respiratory:  [x ] clear to ausculation bilaterally  GI:  [ x] soft, non-tender, normal bowel sounds  :  [ ] almodovar [ ] no CVA tenderness   Musculoskeletal:  [ ] no synovitis  Neurologic:  [ ] non-focal exam   Skin:  [x ] no rash  Lymph: [x ] no lymphadenopathy  Psychiatric:  [ ] appropriate affect [ ] alert & oriented  Lines:  [ x] no phlebitis [ ] central line                                8.6    5.50  )-----------( 361      ( 30 Jul 2024 06:21 )             25.4       07-30    132<L>  |  98  |  19  ----------------------------<  96  3.9   |  25  |  0.80    Ca    8.5      30 Jul 2024 06:21  Phos  3.4     07-30  Mg     2.00     07-30        Urinalysis Basic - ( 30 Jul 2024 06:21 )    Color: x / Appearance: x / SG: x / pH: x  Gluc: 96 mg/dL / Ketone: x  / Bili: x / Urobili: x   Blood: x / Protein: x / Nitrite: x   Leuk Esterase: x / RBC: x / WBC x   Sq Epi: x / Non Sq Epi: x / Bacteria: x        MICROBIOLOGY:Culture Results:   Testing in progress (07-29-24 @ 15:15)      RADIOLOGY:    
Follow Up:      Inverval History/ROS:Patient is a 84y old  Male who presents with a chief complaint of back pain, dysuria (31 Jul 2024 13:28)    No fever  No events  C/o back pain    Allergies    No Known Allergies    Intolerances        ANTIMICROBIALS:      OTHER MEDS:  acetaminophen     Tablet .. 650 milliGRAM(s) Oral every 6 hours PRN  albuterol/ipratropium for Nebulization 3 milliLiter(s) Nebulizer every 6 hours  enoxaparin Injectable 40 milliGRAM(s) SubCutaneous every 24 hours  famotidine Injectable 20 milliGRAM(s) IV Push once PRN  folic acid 1 milliGRAM(s) Oral daily  lactobacillus acidophilus 1 Tablet(s) Oral daily  lidocaine   4% Patch 1 Patch Transdermal daily  melatonin 3 milliGRAM(s) Oral at bedtime PRN  morphine  - Injectable 2 milliGRAM(s) IV Push every 4 hours PRN  ondansetron Injectable 4 milliGRAM(s) IV Push every 8 hours PRN  polyethylene glycol 3350 17 Gram(s) Oral two times a day  senna 2 Tablet(s) Oral at bedtime      Vital Signs Last 24 Hrs  T(C): 36.9 (31 Jul 2024 12:03), Max: 36.9 (31 Jul 2024 12:03)  T(F): 98.5 (31 Jul 2024 12:03), Max: 98.5 (31 Jul 2024 12:03)  HR: 76 (31 Jul 2024 12:03) (65 - 76)  BP: 132/60 (31 Jul 2024 12:03) (132/60 - 136/67)  BP(mean): --  RR: 18 (31 Jul 2024 12:03) (18 - 18)  SpO2: 99% (31 Jul 2024 12:03) (95% - 99%)    Parameters below as of 31 Jul 2024 12:03  Patient On (Oxygen Delivery Method): room air        PHYSICAL EXAM:  General: [ ] non-toxic  HEAD/EYES: [ ] PERRL [x ] white sclera [ ] icterus  ENT:  [ ] normal [x ] supple [ ] thrush [ ] pharyngeal exudate  Cardiovascular:   [ ] murmur [x ] normal [ ] PPM/AICD  Respiratory:  [ ] clear to ausculation bilaterally  GI:  [x ] soft, non-tender, normal bowel sounds  :  [ ] almodovar [ ] no CVA tenderness   Musculoskeletal:  [ ] no synovitis  Neurologic:  [ ] non-focal exam   Skin:  [ x] no rash  Lymph: [ ] no lymphadenopathy  Psychiatric:  [ ] appropriate affect [ ] alert & oriented  Lines:  [x ] no phlebitis [ ] central line                                8.3    5.97  )-----------( 332      ( 31 Jul 2024 11:03 )             24.6       07-31    133<L>  |  96<L>  |  16  ----------------------------<  99  3.6   |  23  |  0.89    Ca    8.6      31 Jul 2024 06:06  Phos  3.2     07-31  Mg     2.00     07-31        Urinalysis Basic - ( 31 Jul 2024 06:06 )    Color: x / Appearance: x / SG: x / pH: x  Gluc: 99 mg/dL / Ketone: x  / Bili: x / Urobili: x   Blood: x / Protein: x / Nitrite: x   Leuk Esterase: x / RBC: x / WBC x   Sq Epi: x / Non Sq Epi: x / Bacteria: x        MICROBIOLOGY:Culture Results:   No growth to date. (07-29-24 @ 15:15)  Culture Results:   Testing in progress (07-29-24 @ 15:15)      RADIOLOGY:    
Patient is a 84y old  Male who presents with a chief complaint of back pain, dysuria (02 Aug 2024 12:37)      Interval history: patient states he worked with bedside PT today, note pending. feels he is improving.      REVIEW OF SYSTEMS   weakness    PAST MEDICAL & SURGICAL HISTORY  HTN (hypertension)    HLD (hyperlipidemia)    Former smoker    Gout    History of BPH         CURRENT FUNCTIONAL STATUS  8/1   Bed Mobility  Bed Mobility Training Rehab Potential: fair, will monitor progress closely  Bed Mobility Training Symptoms Noted During/After Treatment: none  Bed Mobility Training Rolling/Turning: minimum assist (75% patient effort);  1 person assist  Bed Mobility Training Scooting: minimum assist (75% patient effort);  1 person assist  Bed Mobility Training Bridging: moderate assist (50% patient effort);  1 person assist  Bed Mobility Training Sit-to-Supine: moderate assist (50% patient effort);  1 person assist;  minimum assist (75% patient effort)  Bed Mobility Training Supine-to-Sit: moderate assist (50% patient effort);  1 person assist;  minimum assist (75% patient effort)  Bed Mobility Training Limitations: decreased ability to use legs for bridging/pushing;  decreased ability to use arms for pushing/pulling;  decreased strength    Sit-Stand Transfer Training  Sit-to-Stand Transfer Training Rehab Potential: fair, will monitor progress closely  Sit-to-Stand Transfer Training Symptoms Noted During/After Treatment: none  Transfer Training Sit-to-Stand Transfer: minimum assist (75% patient effort);  1 person assist;  weight-bearing as tolerated   rolling walker  Transfer Training Stand-to-Sit Transfer: minimum assist (75% patient effort);  1 person assist;  weight-bearing as tolerated   rolling walker  Sit-to-Stand Transfer Training Transfer Safety Analysis: decreased step length;  knee buckling;  decreased strength;  rolling walker    Gait Training  Gait Training Rehab Potential: poor;  unable to perform         RECENT LABS/IMAGING  CBC Full  -  ( 02 Aug 2024 05:50 )  WBC Count : 6.58 K/uL  RBC Count : 3.08 M/uL  Hemoglobin : 9.4 g/dL  Hematocrit : 28.1 %  Platelet Count - Automated : 396 K/uL  Mean Cell Volume : 91.2 fL  Mean Cell Hemoglobin : 30.5 pg  Mean Cell Hemoglobin Concentration : 33.5 gm/dL  Auto Neutrophil # : 3.72 K/uL  Auto Lymphocyte # : 1.82 K/uL  Auto Monocyte # : 0.85 K/uL  Auto Eosinophil # : 0.15 K/uL  Auto Basophil # : 0.03 K/uL  Auto Neutrophil % : 56.4 %  Auto Lymphocyte % : 27.7 %  Auto Monocyte % : 12.9 %  Auto Eosinophil % : 2.3 %  Auto Basophil % : 0.5 %    08-02    134<L>  |  98  |  20  ----------------------------<  116<H>  3.8   |  26  |  0.72    Ca    8.7      02 Aug 2024 05:50  Phos  2.9     08-02  Mg     1.90     08-02      Urinalysis Basic - ( 02 Aug 2024 05:50 )    Color: x / Appearance: x / SG: x / pH: x  Gluc: 116 mg/dL / Ketone: x  / Bili: x / Urobili: x   Blood: x / Protein: x / Nitrite: x   Leuk Esterase: x / RBC: x / WBC x   Sq Epi: x / Non Sq Epi: x / Bacteria: x        VITALS  T(C): 36.7 (08-02-24 @ 11:40), Max: 36.9 (08-01-24 @ 20:30)  HR: 80 (08-02-24 @ 11:40) (76 - 80)  BP: 142/68 (08-02-24 @ 11:40) (125/68 - 142/68)  RR: 18 (08-02-24 @ 11:40) (18 - 18)  SpO2: 96% (08-02-24 @ 11:40) (96% - 99%)  Wt(kg): --    ALLERGIES  No Known Allergies      MEDICATIONS   acetaminophen     Tablet .. 650 milliGRAM(s) Oral every 6 hours PRN  albuterol/ipratropium for Nebulization 3 milliLiter(s) Nebulizer every 6 hours  cefTRIAXone   IVPB 2000 milliGRAM(s) IV Intermittent every 24 hours  enoxaparin Injectable 40 milliGRAM(s) SubCutaneous every 24 hours  folic acid 1 milliGRAM(s) Oral daily  lactobacillus acidophilus 1 Tablet(s) Oral daily  lidocaine   4% Patch 1 Patch Transdermal daily  melatonin 3 milliGRAM(s) Oral at bedtime PRN  ondansetron Injectable 4 milliGRAM(s) IV Push every 8 hours PRN  oxyCODONE    IR 5 milliGRAM(s) Oral every 4 hours PRN  polyethylene glycol 3350 17 Gram(s) Oral two times a day  senna 2 Tablet(s) Oral at bedtime      ----------------------------------------------------------------------------------------  PHYSICAL EXAM  Constitutional - NAD, Comfortable  HEENT - NCAT, EOMI   Chest -no respiratory distress  Cardiovascular - RRR, S1S2   Abdomen - BS+, Soft, NTND  Extremities - No C/C/E, No calf tenderness   Neurologic Exam -                    Cognitive - Awake, Alert, AAO to self, place, date, year, situation     Communication - Fluent, No dysarthria     Cranial Nerves - CN 2-12 intact     Motor -                      LEFT    UE - ShAB 5/5, EF 5/5, EE 5/5, WE 5/5,  5/5                    RIGHT UE - ShAB 5/5, EF 5/5, EE 5/5, WE 5/5,  5/5                    LEFT    LE - HF 4/5, KE 5/5, DF 5/5, PF 5/5                    RIGHT LE - HF 4/5, KE 5/5, DF 5/5, PF 5/5        Sensory - Intact to LT      Balance - WNL Static  Psychiatric - Mood stable, Affect WNL  ----------------------------------------------------------------------------------------  ASSESSMENT/PLAN  84 year old male presented with back pain, found on imaging to have enhancement T11 and T 12 vertebral bodies and posterior elements, possible  osteomyelitis, also paraspinal area enhancement concerning for phlegmon.  on ceftriaxone  SLP- evaluated during admission, reported difficulty swallowing at times  Pain -oxy ir prn with bowel regimen, lidocaine patch, tylenol  continue bedside PT  please request OT evaluation  DVT PPX - lovenox  Rehab -  goal is for acute inpatient rehab when medically cleared, if still requiring assistance for mobility and tolerating therapy.   please request OT eval  
Patient is a 84y old  Male who presents with a chief complaint of back pain, dysuria (07 Aug 2024 13:21)      HPI:  HPI:    84M PMH essential HTN, BPH, presents with a week of painful urination and back pain. Mr Barcenas was diagnosed with a UTI at the beginning of this month but has not been able to take the antibiotics he was prescribed because the pill was too large to swallow. Over the last week, he has continued to have burning on urination and increased urinary frequency. He also has diffuse back pain, which does not radiate, and is 8/10 in severity at its worse, achy in character, improved after receiving abx.    PAST MEDICAL & SURGICAL HISTORY:  Essential HTN  BPH    Review of Systems:   CONSTITUTIONAL: No fever, weight loss, or fatigue  EYES: No eye pain, visual disturbances, or discharge  ENMT:  No difficulty hearing, tinnitus, vertigo; No sinus or throat pain  NECK: No pain or stiffness  BREASTS: No pain, masses, or nipple discharge  RESPIRATORY: No cough, wheezing, chills or hemoptysis; No shortness of breath  CARDIOVASCULAR: No chest pain, palpitations, dizziness, or leg swelling  GASTROINTESTINAL: No abdominal or epigastric pain. No nausea, vomiting, or hematemesis; No diarrhea or constipation. No melena or hematochezia.  GENITOURINARY: +dysuria, +frequency, no hematuria, or incontinence  NEUROLOGICAL: No headaches, memory loss, loss of strength, numbness, or tremors  SKIN: No itching, burning, rashes, or lesions   LYMPH NODES: No enlarged glands  ENDOCRINE: No heat or cold intolerance; No hair loss  MUSCULOSKELETAL: No joint pain or swelling; No muscle, or extremity pain, +back pain  PSYCHIATRIC: No depression, anxiety, mood swings, or difficulty sleeping  HEME/LYMPH: No easy bruising, or bleeding gums  ALLERGY AND IMMUNOLOGIC: No hives or eczema    Allergies    No Known Allergies    Intolerances        Social History:   Lives with wife and adult sons. Retired - used to work in [employer] business. Drinks a glass of wine on occasion. No tobacco or drug use.    FAMILY HISTORY:  No pertinent medical history in first degree relatives.    MEDICATIONS  (STANDING):    MEDICATIONS  (PRN):  acetaminophen     Tablet .. 650 milliGRAM(s) Oral every 6 hours PRN Temp greater or equal to 38C (100.4F), Mild Pain (1 - 3)  melatonin 3 milliGRAM(s) Oral at bedtime PRN Insomnia      T(C): 36.8 (24 @ 09:35), Max: 37.1 (24 @ 03:53)  HR: 91 (24 @ 09:35) (88 - 108)  BP: 101/58 (24 @ 09:35) (101/58 - 150/94)  RR: 16 (24 @ 09:35) (16 - 18)  SpO2: 98% (24 @ 09:35) (98% - 100%)    CAPILLARY BLOOD GLUCOSE        I&O's Summary    2024 07:01  -  2024 11:28  --------------------------------------------------------  IN: 0 mL / OUT: 100 mL / NET: -100 mL        PHYSICAL EXAM:  GENERAL: NAD, lying in bed  HEAD:  Atraumatic, Normocephalic  EYES: EOMI, PERRLA, conjunctiva and sclera clear  NECK: Supple, No elevated JVD  CHEST/LUNG: Clear to auscultation bilaterally; No wheeze  HEART: Regular rate and rhythm; No murmurs, rubs, or gallops  ABDOMEN: Soft, Nontender, Nondistended; Bowel sounds present  EXTREMITIES:  2+ Peripheral Pulses, No clubbing, cyanosis, or edema  PSYCH: AAOx3  NEUROLOGY: CN II-XII grossly intact, moving all extremities  SKIN: No rashes or lesions    LABS:                        7.4    8.79  )-----------( 253      ( 2024 00:45 )             21.6         128<L>  |  95<L>  |  24<H>  ----------------------------<  106<H>  4.3   |  20<L>  |  0.89    Ca    8.2<L>      2024 00:45    TPro  6.2  /  Alb  2.4<L>  /  TBili  1.2  /  DBili  x   /  AST  30  /  ALT  21  /  AlkPhos  83            Urinalysis Basic - ( 2024 00:45 )    Color: Yellow / Appearance: Cloudy / S.018 / pH: x  Gluc: 106 mg/dL / Ketone: Trace mg/dL  / Bili: Negative / Urobili: 1.0 mg/dL   Blood: x / Protein: 30 mg/dL / Nitrite: Positive   Leuk Esterase: Large / RBC: 2 /HPF /  /HPF   Sq Epi: x / Non Sq Epi: 0 /HPF / Bacteria: Too Numerous to count /HPF        Urinalysis with Rflx Culture (collected 24 @ 00:45)        RADIOLOGY & ADDITIONAL TESTS:    ECG Personally Reviewed - CXR - likely atelectasis     Imaging Personally Reviewed:    Consultant(s) Notes Reviewed:      Care Discussed with Consultants/Other Providers:   (2024 11:28)      REVIEW OF SYSTEMS  Constitutional - No fever, No weight loss, No fatigue  HEENT - No eye pain, No visual disturbances, No difficulty hearing, No tinnitus, No vertigo, No neck pain  Respiratory - No cough, No wheezing, No shortness of breath  Cardiovascular - No chest pain, No palpitations  Gastrointestinal - No abdominal pain, No nausea, No vomiting, No diarrhea, No constipation  Genitourinary - No dysuria, No frequency, No hematuria, No incontinence  Neurological - No headaches, No memory loss, No loss of strength, No numbness, No tremors  Skin - No itching, No rashes, No lesions   Endocrine - No temperature intolerance  Musculoskeletal - No joint pain, No joint swelling, No muscle pain  Psychiatric - No depression, No anxiety    PAST MEDICAL & SURGICAL HISTORY  HTN (hypertension)    HLD (hyperlipidemia)    Former smoker    Gout    History of BPH       CURRENT FUNCTIONAL STATUS       Bed Mobility  Bed Mobility Training Rehab Potential: good, to achieve stated therapy goals  Bed Mobility Training Sit-to-Supine: moderate assist (50% patient effort);  nonverbal cues (demo/gestures);  verbal cues;  set-up required;  hand over hand;  2 person assist  Bed Mobility Training Supine-to-Sit: moderate assist (50% patient effort);  1 person assist;  nonverbal cues (demo/gestures);  verbal cues;  set-up required;  hand over hand;  bed rails  Bed Mobility Training Limitations: impaired ability to control trunk for mobility;  decreased ability to use legs for bridging/pushing;  decreased strength;  impaired balance;  impaired postural control;  decreased flexibility    Sit-Stand Transfer Training  Sit-to-Stand Transfer Training Rehab Potential: good, to achieve stated therapy goals  Sit-to-Stand Transfer Training Treatment not Performed: Attempted 3 times but unable due to LE weakness with rolling walker and 2 people assisting patient     Therapeutic Exercise  Therapeutic Exercise Rehab Effort: good  Therapeutic Exercise Detail: Ther ex of LE included ankle pumps, knee extension, hip flexion. Pt instructed to perform as able throughout day. Pt verbalized understanding of therapeutic exercises.        RECENT LABS/IMAGING  CBC Full  -  ( 07 Aug 2024 07:08 )  WBC Count : 5.74 K/uL  RBC Count : 2.97 M/uL  Hemoglobin : 8.9 g/dL  Hematocrit : 27.2 %  Platelet Count - Automated : 353 K/uL  Mean Cell Volume : 91.6 fL  Mean Cell Hemoglobin : 30.0 pg  Mean Cell Hemoglobin Concentration : 32.7 gm/dL  Auto Neutrophil # : x  Auto Lymphocyte # : x  Auto Monocyte # : x  Auto Eosinophil # : x  Auto Basophil # : x  Auto Neutrophil % : x  Auto Lymphocyte % : x  Auto Monocyte % : x  Auto Eosinophil % : x  Auto Basophil % : x        132<L>  |  97<L>  |  23  ----------------------------<  106<H>  4.1   |  23  |  0.78    Ca    8.8      07 Aug 2024 07:08  Phos  3.8       Mg     2.20           Urinalysis Basic - ( 07 Aug 2024 07:08 )    Color: x / Appearance: x / SG: x / pH: x  Gluc: 106 mg/dL / Ketone: x  / Bili: x / Urobili: x   Blood: x / Protein: x / Nitrite: x   Leuk Esterase: x / RBC: x / WBC x   Sq Epi: x / Non Sq Epi: x / Bacteria: x        VITALS  T(C): 36.4 (24 @ 05:40), Max: 36.7 (24 @ 21:40)  HR: 93 (24 @ 05:40) (81 - 93)  BP: 108/57 (24 @ 05:40) (108/57 - 133/69)  RR: 18 (24 @ 05:40) (18 - 18)  SpO2: 98% (24 @ 05:40) (97% - 99%)  Wt(kg): --    ALLERGIES  No Known Allergies      MEDICATIONS   albuterol/ipratropium for Nebulization 3 milliLiter(s) Nebulizer every 6 hours  cefTRIAXone   IVPB 2000 milliGRAM(s) IV Intermittent every 24 hours  cyclobenzaprine 5 milliGRAM(s) Oral three times a day  enoxaparin Injectable 40 milliGRAM(s) SubCutaneous every 24 hours  folic acid 1 milliGRAM(s) Oral daily  gabapentin 400 milliGRAM(s) Oral three times a day  lactobacillus acidophilus 1 Tablet(s) Oral daily  lidocaine   4% Patch 1 Patch Transdermal daily  melatonin 3 milliGRAM(s) Oral at bedtime PRN  ondansetron Injectable 4 milliGRAM(s) IV Push every 8 hours PRN  oxyCODONE    IR 7.5 milliGRAM(s) Oral every 4 hours PRN  oxyCODONE    IR 5 milliGRAM(s) Oral every 4 hours PRN  polyethylene glycol 3350 17 Gram(s) Oral two times a day  senna 2 Tablet(s) Oral at bedtime      ----------------------------------------------------------------------------------------    PHYSICAL EXAM  Constitutional - NAD, Comfortable  HEENT - NCAT, EOMI   Chest -no respiratory distress  Cardiovascular - RRR, S1S2   Abdomen -  Soft, NTND  Extremities - No C/C/E, No calf tenderness   Neurologic Exam -                    Cognitive - Awake, Alert, AAO to self, place, date, year, situation     Communication - Fluent, No dysarthria     Cranial Nerves - CN 2-12 intact     Motor -                      LEFT    UE - 5/5                    RIGHT UE -  5/5                    LEFT    LE -  4/5                     RIGHT LE -  4/5      Sensory - Intact to LT      Balance - WNL Static  Psychiatric - Mood stable, Affect WNL  ----------------------------------------------------------------------------------------  ASSESSMENT/PLAN  84 year old male presented with back pain, found on imaging to have enhancement T11 and T 12 vertebral bodies and posterior elements, possible  osteomyelitis, also paraspinal area enhancement concerning for phlegmon.  on ceftriaxone IV  SLP  Pain -gabapentin, oxy ir prn with bowel regimen, lidocaine patch, tylenol  continue bedside PT and OT  DVT PPX - lovenox  Rehab -recommend subacute rehab when medically cleared.    patient was able to transfer last week, more limited by pain, tolerating bed mobility only  discussed with son Emery. 
Patient is a 84y old  Male who presents with a chief complaint of back pain, dysuria (24 Jul 2024 13:32)      Interval Events:   No acute events overnight.  Patient denies any acute symptoms at this time. Patient reported multiple BMs today, liquid BMs now w/ bowel regimen.     ROS:   A 12-point ROS was performed and negative except as noted in HPI.    Hospital Medications:  acetaminophen     Tablet .. 650 milliGRAM(s) Oral every 6 hours PRN  albuterol/ipratropium for Nebulization 3 milliLiter(s) Nebulizer every 6 hours  cefTRIAXone   IVPB      folic acid 1 milliGRAM(s) Oral daily  lidocaine   4% Patch 1 Patch Transdermal daily  melatonin 3 milliGRAM(s) Oral at bedtime PRN  oxycodone    5 mG/acetaminophen 325 mG 1 Tablet(s) Oral every 6 hours PRN  polyethylene glycol 3350 17 Gram(s) Oral daily  polyethylene glycol/electrolyte Solution. 4000 milliLiter(s) Oral once  senna 2 Tablet(s) Oral at bedtime  traMADol 75 milliGRAM(s) Oral every 6 hours PRN      PHYSICAL EXAM:   Vital Signs:  Vital Signs Last 24 Hrs  T(C): 36.7 (24 Jul 2024 11:47), Max: 36.8 (23 Jul 2024 18:30)  T(F): 98 (24 Jul 2024 11:47), Max: 98.3 (23 Jul 2024 21:47)  HR: 79 (24 Jul 2024 14:27) (79 - 94)  BP: 125/59 (24 Jul 2024 11:47) (108/61 - 125/59)  BP(mean): --  RR: 18 (24 Jul 2024 11:47) (16 - 18)  SpO2: 98% (24 Jul 2024 14:27) (97% - 100%)    Parameters below as of 24 Jul 2024 14:27  Patient On (Oxygen Delivery Method): room air      Daily     Daily     GENERAL: no acute distress  NEURO: alert  HEENT: anicteric sclera, no conjunctival pallor appreciated  CHEST: no respiratory distress, no accessory muscle use  CARDIAC: regular rate  ABDOMEN: soft, nondistended, nontender, no rebound or guarding  EXTREMITIES: warm, well perfused, no edema  SKIN: no lesions noted    LABS: reviewed                        7.7    5.07  )-----------( 260      ( 24 Jul 2024 07:13 )             22.7     07-24    132<L>  |  99  |  16  ----------------------------<  108<H>  3.6   |  24  |  0.96    Ca    8.2<L>      24 Jul 2024 07:13  Phos  3.6     07-24  Mg     1.90     07-24          Interval Diagnostic Studies: see sunrise for full report  
ANESTHESIA POSTOP CHECK    84y Male POSTOP DAY 1     No COMPLAINTS    NO APPARENT ANESTHESIA COMPLICATIONS      
Dr. Annamarie Scherer  Pager [phone]    PROGRESS NOTE:     Patient is a 84y old  Male who presents with a chief complaint of back pain, dysuria (28 Jul 2024 11:50)      SUBJECTIVE / OVERNIGHT EVENTS: denies chest pain or sob, c/o constipation, also c/o back pain  ADDITIONAL REVIEW OF SYSTEMS: afebrile     MEDICATIONS  (STANDING):  albuterol/ipratropium for Nebulization 3 milliLiter(s) Nebulizer every 6 hours  cefTRIAXone   IVPB 2000 milliGRAM(s) IV Intermittent every 24 hours  cefTRIAXone   IVPB      folic acid 1 milliGRAM(s) Oral daily  lactobacillus acidophilus 1 Tablet(s) Oral daily  lidocaine   4% Patch 1 Patch Transdermal daily  polyethylene glycol 3350 17 Gram(s) Oral two times a day  senna 2 Tablet(s) Oral at bedtime    MEDICATIONS  (PRN):  acetaminophen     Tablet .. 650 milliGRAM(s) Oral every 6 hours PRN Temp greater or equal to 38C (100.4F), Mild Pain (1 - 3), Moderate Pain (4 - 6)  famotidine Injectable 20 milliGRAM(s) IV Push once PRN abdominal pain  melatonin 3 milliGRAM(s) Oral at bedtime PRN Insomnia  ondansetron Injectable 4 milliGRAM(s) IV Push every 8 hours PRN Nausea and/or Vomiting  oxycodone    5 mG/acetaminophen 325 mG 1 Tablet(s) Oral every 6 hours PRN Severe Pain (7 - 10)      CAPILLARY BLOOD GLUCOSE        I&O's Summary    28 Jul 2024 07:01  -  29 Jul 2024 07:00  --------------------------------------------------------  IN: 0 mL / OUT: 700 mL / NET: -700 mL    29 Jul 2024 07:01  -  29 Jul 2024 15:38  --------------------------------------------------------  IN: 0 mL / OUT: 300 mL / NET: -300 mL        PHYSICAL EXAM:  Vital Signs Last 24 Hrs  T(C): 36.5 (29 Jul 2024 08:00), Max: 36.5 (29 Jul 2024 08:00)  T(F): 97.7 (29 Jul 2024 08:00), Max: 97.7 (29 Jul 2024 08:00)  HR: 83 (29 Jul 2024 09:42) (69 - 85)  BP: 124/59 (29 Jul 2024 08:00) (124/59 - 124/59)  BP(mean): --  RR: 17 (29 Jul 2024 08:00) (17 - 17)  SpO2: 98% (29 Jul 2024 09:42) (97% - 98%)    Parameters below as of 29 Jul 2024 09:42  Patient On (Oxygen Delivery Method): room air      CONSTITUTIONAL: nad  RESPIRATORY: Normal respiratory effort; lungs are clear to auscultation bilaterally  CARDIOVASCULAR: Regular rate and rhythm, normal S1 and S2, no murmur/rub/gallop; No lower extremity edema; Peripheral pulses are 2+ bilaterally  ABDOMEN: Nontender to palpation, suprapubic ttp, soft   MUSCULOSKELETAL: thoracic spine ttp,  no clubbing or cyanosis of digits;   PSYCH: A+O to person, place, and time; affect appropriate    LABS:                        7.7    6.16  )-----------( 293      ( 29 Jul 2024 07:13 )             23.4     07-29    133<L>  |  98  |  14  ----------------------------<  103<H>  3.6   |  24  |  0.90    Ca    8.6      29 Jul 2024 07:13  Phos  4.1     07-29  Mg     1.90     07-29      PT/INR - ( 29 Jul 2024 07:13 )   PT: 12.7 sec;   INR: 1.13 ratio         PTT - ( 29 Jul 2024 07:13 )  PTT:35.1 sec      Urinalysis Basic - ( 29 Jul 2024 07:13 )    Color: x / Appearance: x / SG: x / pH: x  Gluc: 103 mg/dL / Ketone: x  / Bili: x / Urobili: x   Blood: x / Protein: x / Nitrite: x   Leuk Esterase: x / RBC: x / WBC x   Sq Epi: x / Non Sq Epi: x / Bacteria: x    RADIOLOGY & ADDITIONAL TESTS:  Results Reviewed:   Imaging Personally Reviewed:  < from: MR Thoracic Spine w/wo IV Cont (07.25.24 @ 21:31) >  Impression: Discitis/osteomyelitis is suspected at the T11-12 level as   described above.      Electrocardiogram Personally Reviewed:    COORDINATION OF CARE:  Care Discussed with Consultants/Other Providers [Y/N]:  Prior or Outpatient Records Reviewed [Y/N]:  
Follow Up:      Inverval History/ROS:Patient is a 84y old  Male who presents with a chief complaint of back pain, dysuria (29 Jul 2024 15:38)    S/p Ir guided biopsy  No fever    Allergies    No Known Allergies    Intolerances        ANTIMICROBIALS:  cefTRIAXone   IVPB 2000 every 24 hours  cefTRIAXone   IVPB        OTHER MEDS:  acetaminophen     Tablet .. 650 milliGRAM(s) Oral every 6 hours PRN  albuterol/ipratropium for Nebulization 3 milliLiter(s) Nebulizer every 6 hours  famotidine Injectable 20 milliGRAM(s) IV Push once PRN  folic acid 1 milliGRAM(s) Oral daily  lactobacillus acidophilus 1 Tablet(s) Oral daily  lidocaine   4% Patch 1 Patch Transdermal daily  melatonin 3 milliGRAM(s) Oral at bedtime PRN  ondansetron Injectable 4 milliGRAM(s) IV Push every 8 hours PRN  oxycodone    5 mG/acetaminophen 325 mG 1 Tablet(s) Oral every 6 hours PRN  polyethylene glycol 3350 17 Gram(s) Oral two times a day  senna 2 Tablet(s) Oral at bedtime      Vital Signs Last 24 Hrs  T(C): 36.5 (29 Jul 2024 15:35), Max: 36.5 (29 Jul 2024 08:00)  T(F): 97.7 (29 Jul 2024 15:35), Max: 97.7 (29 Jul 2024 08:00)  HR: 64 (29 Jul 2024 17:00) (61 - 85)  BP: 148/58 (29 Jul 2024 17:00) (124/59 - 163/57)  BP(mean): 81 (29 Jul 2024 17:00) (79 - 86)  RR: 15 (29 Jul 2024 17:00) (14 - 17)  SpO2: 97% (29 Jul 2024 17:00) (93% - 98%)    Parameters below as of 29 Jul 2024 17:00  Patient On (Oxygen Delivery Method): room air        PHYSICAL EXAM:  General: [x ] non-toxic  HEAD/EYES: [ ] PERRL [x ] white sclera [ ] icterus  ENT:  [ ] normal [x ] supple [ ] thrush [ ] pharyngeal exudate  Cardiovascular:   [ ] murmur [x ] normal [ ] PPM/AICD  Respiratory:  [x ] clear to ausculation bilaterally  GI:  [ x] soft, non-tender, normal bowel sounds  :  [ ] almodovar [ ] no CVA tenderness   Musculoskeletal:  [ ] no synovitis  Neurologic:  [ ] non-focal exam   Skin:  [ x] no rash  Lymph: [x ] no lymphadenopathy  Psychiatric:  [ ] appropriate affect [ ] alert & oriented  Lines:  [x ] no phlebitis [ ] central line                                7.7    6.16  )-----------( 293      ( 29 Jul 2024 07:13 )             23.4       07-29    133<L>  |  98  |  14  ----------------------------<  103<H>  3.6   |  24  |  0.90    Ca    8.6      29 Jul 2024 07:13  Phos  4.1     07-29  Mg     1.90     07-29        Urinalysis Basic - ( 29 Jul 2024 07:13 )    Color: x / Appearance: x / SG: x / pH: x  Gluc: 103 mg/dL / Ketone: x  / Bili: x / Urobili: x   Blood: x / Protein: x / Nitrite: x   Leuk Esterase: x / RBC: x / WBC x   Sq Epi: x / Non Sq Epi: x / Bacteria: x        MICROBIOLOGY:    RADIOLOGY:    
Patient is a 84y old  Male who presents with a chief complaint of back pain, dysuria (04 Aug 2024 09:49)      HPI:  HPI:    84M PMH essential HTN, BPH, presents with a week of painful urination and back pain. Mr Barcenas was diagnosed with a UTI at the beginning of this month but has not been able to take the antibiotics he was prescribed because the pill was too large to swallow. Over the last week, he has continued to have burning on urination and increased urinary frequency. He also has diffuse back pain, which does not radiate, and is 8/10 in severity at its worse, achy in character, improved after receiving abx.    PAST MEDICAL & SURGICAL HISTORY:  Essential HTN  BPH    Review of Systems:   CONSTITUTIONAL: No fever, weight loss, or fatigue  EYES: No eye pain, visual disturbances, or discharge  ENMT:  No difficulty hearing, tinnitus, vertigo; No sinus or throat pain  NECK: No pain or stiffness  BREASTS: No pain, masses, or nipple discharge  RESPIRATORY: No cough, wheezing, chills or hemoptysis; No shortness of breath  CARDIOVASCULAR: No chest pain, palpitations, dizziness, or leg swelling  GASTROINTESTINAL: No abdominal or epigastric pain. No nausea, vomiting, or hematemesis; No diarrhea or constipation. No melena or hematochezia.  GENITOURINARY: +dysuria, +frequency, no hematuria, or incontinence  NEUROLOGICAL: No headaches, memory loss, loss of strength, numbness, or tremors  SKIN: No itching, burning, rashes, or lesions   LYMPH NODES: No enlarged glands  ENDOCRINE: No heat or cold intolerance; No hair loss  MUSCULOSKELETAL: No joint pain or swelling; No muscle, or extremity pain, +back pain  PSYCHIATRIC: No depression, anxiety, mood swings, or difficulty sleeping  HEME/LYMPH: No easy bruising, or bleeding gums  ALLERGY AND IMMUNOLOGIC: No hives or eczema    Allergies    No Known Allergies    Intolerances        Social History:   Lives with wife and adult sons. Retired - used to work in [employer]. Drinks a glass of wine on occasion. No tobacco or drug use.    FAMILY HISTORY:  No pertinent medical history in first degree relatives.    MEDICATIONS  (STANDING):    MEDICATIONS  (PRN):  acetaminophen     Tablet .. 650 milliGRAM(s) Oral every 6 hours PRN Temp greater or equal to 38C (100.4F), Mild Pain (1 - 3)  melatonin 3 milliGRAM(s) Oral at bedtime PRN Insomnia      T(C): 36.8 (24 @ 09:35), Max: 37.1 (24 @ 03:53)  HR: 91 (24 @ 09:35) (88 - 108)  BP: 101/58 (24 @ 09:35) (101/58 - 150/94)  RR: 16 (24 @ 09:35) (16 - 18)  SpO2: 98% (24 @ 09:35) (98% - 100%)    CAPILLARY BLOOD GLUCOSE        I&O's Summary    2024 07:01  -  2024 11:28  --------------------------------------------------------  IN: 0 mL / OUT: 100 mL / NET: -100 mL        PHYSICAL EXAM:  GENERAL: NAD, lying in bed  HEAD:  Atraumatic, Normocephalic  EYES: EOMI, PERRLA, conjunctiva and sclera clear  NECK: Supple, No elevated JVD  CHEST/LUNG: Clear to auscultation bilaterally; No wheeze  HEART: Regular rate and rhythm; No murmurs, rubs, or gallops  ABDOMEN: Soft, Nontender, Nondistended; Bowel sounds present  EXTREMITIES:  2+ Peripheral Pulses, No clubbing, cyanosis, or edema  PSYCH: AAOx3  NEUROLOGY: CN II-XII grossly intact, moving all extremities  SKIN: No rashes or lesions    LABS:                        7.4    8.79  )-----------( 253      ( 2024 00:45 )             21.6         128<L>  |  95<L>  |  24<H>  ----------------------------<  106<H>  4.3   |  20<L>  |  0.89    Ca    8.2<L>      2024 00:45    TPro  6.2  /  Alb  2.4<L>  /  TBili  1.2  /  DBili  x   /  AST  30  /  ALT  21  /  AlkPhos  83            Urinalysis Basic - ( 2024 00:45 )    Color: Yellow / Appearance: Cloudy / S.018 / pH: x  Gluc: 106 mg/dL / Ketone: Trace mg/dL  / Bili: Negative / Urobili: 1.0 mg/dL   Blood: x / Protein: 30 mg/dL / Nitrite: Positive   Leuk Esterase: Large / RBC: 2 /HPF /  /HPF   Sq Epi: x / Non Sq Epi: 0 /HPF / Bacteria: Too Numerous to count /HPF        Urinalysis with Rflx Culture (collected 24 @ 00:45)        RADIOLOGY & ADDITIONAL TESTS:    ECG Personally Reviewed - CXR - likely atelectasis     Imaging Personally Reviewed:    Consultant(s) Notes Reviewed:      Care Discussed with Consultants/Other Providers:   (2024 11:28)      REVIEW OF SYSTEMS  pain  weakness    PAST MEDICAL & SURGICAL HISTORY  HTN (hypertension)    HLD (hyperlipidemia)    Former smoker    Gout    History of BPH         CURRENT FUNCTIONAL STATUS    8/3  Bed Mobility  Bed Mobility Training Rehab Potential: good, to achieve stated therapy goals  Bed Mobility Training Symptoms Noted During/After Treatment: none  Bed Mobility Training Sit-to-Supine: minimum assist (75% patient effort);  1 person assist;  nonverbal cues (demo/gestures);  verbal cues  Bed Mobility Training Supine-to-Sit: minimum assist (75% patient effort);  1 person assist;  nonverbal cues (demo/gestures);  verbal cues  Bed Mobility Training Limitations: impaired ability to control trunk for mobility;  decreased ability to use legs for bridging/pushing;  decreased strength;  impaired balance;  pain    Sit-Stand Transfer Training  Sit-to-Stand Transfer Training Rehab Potential: good, to achieve stated therapy goals  Sit-to-Stand Transfer Training Symptoms Noted During/After Treatment: none  Transfer Training Sit-to-Stand Transfer: minimum assist (75% patient effort);  2 person assist;  nonverbal cues (demo/gestures);  verbal cues;  weight-bearing as tolerated   rolling walker  Transfer Training Stand-to-Sit Transfer: minimum assist (75% patient effort);  2 person assist;  nonverbal cues (demo/gestures);  verbal cues;  weight-bearing as tolerated   rolling walker  Sit-to-Stand Transfer Training Transfer Safety Analysis: decreased weight-shifting ability;  decreased sequencing ability;  decreased balance;  impaired balance;  decreased strength;  pain;  rolling walker    Gait Training  Gait Training Treatment not Performed: deferred secondary to pain and impaired standing balance       RECENT LABS/IMAGING  CBC Full  -  ( 05 Aug 2024 07:05 )  WBC Count : 6.43 K/uL  RBC Count : 3.05 M/uL  Hemoglobin : 9.2 g/dL  Hematocrit : 27.4 %  Platelet Count - Automated : 404 K/uL  Mean Cell Volume : 89.8 fL  Mean Cell Hemoglobin : 30.2 pg  Mean Cell Hemoglobin Concentration : 33.6 gm/dL  Auto Neutrophil # : x  Auto Lymphocyte # : x  Auto Monocyte # : x  Auto Eosinophil # : x  Auto Basophil # : x  Auto Neutrophil % : x  Auto Lymphocyte % : x  Auto Monocyte % : x  Auto Eosinophil % : x  Auto Basophil % : x        133<L>  |  98  |  21  ----------------------------<  131<H>  4.1   |  24  |  0.82    Ca    9.0      05 Aug 2024 07:05  Phos  3.9       Mg     2.00     08-      Urinalysis Basic - ( 05 Aug 2024 07:05 )    Color: x / Appearance: x / SG: x / pH: x  Gluc: 131 mg/dL / Ketone: x  / Bili: x / Urobili: x   Blood: x / Protein: x / Nitrite: x   Leuk Esterase: x / RBC: x / WBC x   Sq Epi: x / Non Sq Epi: x / Bacteria: x        VITALS  T(C): 36.6 (24 @ 05:06), Max: 37.1 (24 @ 21:20)  HR: 86 (24 @ 05:06) (86 - 90)  BP: 146/67 (24 @ 05:06) (135/85 - 146/67)  RR: 18 (24 @ 05:06) (18 - 18)  SpO2: 97% (24 @ 05:06) (97% - 97%)  Wt(kg): --    ALLERGIES  No Known Allergies      MEDICATIONS   acetaminophen     Tablet .. 650 milliGRAM(s) Oral every 6 hours PRN  albuterol/ipratropium for Nebulization 3 milliLiter(s) Nebulizer every 6 hours  cefTRIAXone   IVPB 2000 milliGRAM(s) IV Intermittent every 24 hours  cyclobenzaprine 5 milliGRAM(s) Oral three times a day  enoxaparin Injectable 40 milliGRAM(s) SubCutaneous every 24 hours  folic acid 1 milliGRAM(s) Oral daily  gabapentin 300 milliGRAM(s) Oral two times a day  lactobacillus acidophilus 1 Tablet(s) Oral daily  lidocaine   4% Patch 1 Patch Transdermal daily  melatonin 3 milliGRAM(s) Oral at bedtime PRN  ondansetron Injectable 4 milliGRAM(s) IV Push every 8 hours PRN  oxyCODONE    IR 5 milliGRAM(s) Oral every 4 hours PRN  oxyCODONE    IR 7.5 milliGRAM(s) Oral every 4 hours PRN  polyethylene glycol 3350 17 Gram(s) Oral two times a day  senna 2 Tablet(s) Oral at bedtime      ----------------------------------------------------------------------------------------    PHYSICAL EXAM  Constitutional - NAD, Comfortable  HEENT - NCAT, EOMI   Chest -no respiratory distress  Cardiovascular - RRR, S1S2   Abdomen - BS+, Soft, NTND  Extremities - No C/C/E, No calf tenderness   Neurologic Exam -                    Cognitive - Awake, Alert, AAO to self, place, date, year, situation     Communication - Fluent, No dysarthria     Cranial Nerves - CN 2-12 intact     Motor -                      LEFT    UE - ShAB 5/5, EF 5/5, EE 5/5, WE 5/5,  5/5                    RIGHT UE - ShAB 5/5, EF 5/5, EE 5/5, WE 5/5,  5/5                    LEFT    LE - HF 4/5, KE 5/5, DF 5/5, PF 5/5                    RIGHT LE - HF 4/5, KE 5/5, DF 5/5, PF 5/5        Sensory - Intact to LT      Balance - WNL Static  Psychiatric - Mood stable, Affect WNL  ----------------------------------------------------------------------------------------  ASSESSMENT/PLAN  84 year old male presented with back pain, found on imaging to have enhancement T11 and T 12 vertebral bodies and posterior elements, possible  osteomyelitis, also paraspinal area enhancement concerning for phlegmon.  on ceftriaxone  SLP  Pain -gabapentin, oxy ir prn with bowel regimen, lidocaine patch, tylenol  continue bedside PT and OT  DVT PPX - lovenox  Rehab -  goal is for acute inpatient rehab when medically cleared. currently limited by pain, will continue to monitor for improvement  reports 8/10 pain today  
[facility] Division of Hospital Medicine  Jose Dubon MD  In House Pager [phone]    Patient is a 84y old  Male who presents with a chief complaint of back pain, dysuria (26 Jul 2024 10:56)    OVERNIGHT EVENTS: no acute events.   SUBJECTIVE: no new subjective symptoms. feels well overall, still have pain in his back. going for C-scope today.   ROS: Denied Fever, Chill, CP, SOB, Abd pain, N/V/D, LE swelling or pain.     MEDICATIONS  (STANDING):  albuterol/ipratropium for Nebulization 3 milliLiter(s) Nebulizer every 6 hours  cefTRIAXone   IVPB 2000 milliGRAM(s) IV Intermittent every 24 hours  cefTRIAXone   IVPB      folic acid 1 milliGRAM(s) Oral daily  lactobacillus acidophilus 1 Tablet(s) Oral daily  lidocaine   4% Patch 1 Patch Transdermal daily  pantoprazole  Injectable 40 milliGRAM(s) IV Push at bedtime  polyethylene glycol 3350 17 Gram(s) Oral daily  senna 2 Tablet(s) Oral at bedtime    MEDICATIONS  (PRN):  acetaminophen     Tablet .. 650 milliGRAM(s) Oral every 6 hours PRN Temp greater or equal to 38C (100.4F), Mild Pain (1 - 3), Moderate Pain (4 - 6)  famotidine Injectable 20 milliGRAM(s) IV Push once PRN abdominal pain  melatonin 3 milliGRAM(s) Oral at bedtime PRN Insomnia  ondansetron Injectable 4 milliGRAM(s) IV Push every 8 hours PRN Nausea and/or Vomiting  oxycodone    5 mG/acetaminophen 325 mG 1 Tablet(s) Oral every 6 hours PRN Severe Pain (7 - 10)    CAPILLARY BLOOD GLUCOSE        I&O's Summary    26 Jul 2024 07:01  -  26 Jul 2024 13:24  --------------------------------------------------------  IN: 300 mL / OUT: 0 mL / NET: 300 mL        Vital Signs Last 24 Hrs  T(C): 36.1 (26 Jul 2024 12:03), Max: 36.7 (25 Jul 2024 22:15)  T(F): 97 (26 Jul 2024 12:03), Max: 98 (25 Jul 2024 22:15)  HR: 72 (26 Jul 2024 12:33) (64 - 93)  BP: 143/59 (26 Jul 2024 12:33) (111/58 - 143/59)  BP(mean): --  RR: 17 (26 Jul 2024 12:33) (13 - 18)  SpO2: 99% (26 Jul 2024 12:33) (97% - 100%)    Parameters below as of 26 Jul 2024 12:33  Patient On (Oxygen Delivery Method): room air        LABS:                        8.7    5.28  )-----------( 287      ( 26 Jul 2024 04:19 )             26.6     07-26    136  |  98  |  14  ----------------------------<  130<H>  4.0   |  25  |  1.00    Ca    8.6      26 Jul 2024 04:19  Phos  3.3     07-26  Mg     2.00     07-26    TPro  6.8  /  Alb  2.8<L>  /  TBili  0.6  /  DBili  0.2  /  AST  33  /  ALT  30  /  AlkPhos  98  07-26    PT/INR - ( 26 Jul 2024 04:19 )   PT: 11.9 sec;   INR: 1.06 ratio         PTT - ( 26 Jul 2024 04:19 )  PTT:32.3 sec      Urinalysis Basic - ( 26 Jul 2024 04:19 )    Color: x / Appearance: x / SG: x / pH: x  Gluc: 130 mg/dL / Ketone: x  / Bili: x / Urobili: x   Blood: x / Protein: x / Nitrite: x   Leuk Esterase: x / RBC: x / WBC x   Sq Epi: x / Non Sq Epi: x / Bacteria: x        RADIOLOGY & ADDITIONAL TESTS:  Results Reviewed: Y  Imaging Personally Reviewed: Y  Electrocardiogram Personally Reviewed: Y    COORDINATION OF CARE:  Care Discussed with Consultants/Other Providers [Y/N]: Y  Prior or Outpatient Records Reviewed [Y/N]: Y  
PAST 24HR EVENTS: Patient complaining of back pain without radiation into his extremities and LE weakness.     s/p IR aspiration on 7/29. IR culture neg, cytopath inflammatory cells. Per ID continue ceftriaxone through 9/13. PICC was placed. Neurosurgery called again due to LE weakness. Of note pt fell on 7/31 and refused imaging at that time.       Vital Signs Last 24 Hrs  T(C): 37.6 (08 Aug 2024 20:46), Max: 37.6 (08 Aug 2024 20:46)  T(F): 99.6 (08 Aug 2024 20:46), Max: 99.6 (08 Aug 2024 20:46)  HR: 117 (08 Aug 2024 20:46) (84 - 117)  BP: 127/69 (08 Aug 2024 20:46) (108/57 - 128/73)  BP(mean): --  RR: 17 (08 Aug 2024 20:46) (17 - 18)  SpO2: 94% (08 Aug 2024 20:46) (94% - 98%)    Parameters below as of 08 Aug 2024 20:46  Patient On (Oxygen Delivery Method): room air        MEDS:   albuterol/ipratropium for Nebulization 3 milliLiter(s) Nebulizer every 6 hours  cefTRIAXone   IVPB 2000 milliGRAM(s) IV Intermittent every 24 hours  cyclobenzaprine 5 milliGRAM(s) Oral three times a day  enoxaparin Injectable 40 milliGRAM(s) SubCutaneous every 24 hours  folic acid 1 milliGRAM(s) Oral daily  gabapentin 400 milliGRAM(s) Oral three times a day  lactobacillus acidophilus 1 Tablet(s) Oral daily  lidocaine   4% Patch 1 Patch Transdermal daily  melatonin 3 milliGRAM(s) Oral at bedtime PRN  ondansetron Injectable 4 milliGRAM(s) IV Push every 8 hours PRN  oxyCODONE    IR 5 milliGRAM(s) Oral every 4 hours PRN  oxyCODONE    IR 7.5 milliGRAM(s) Oral every 4 hours PRN  polyethylene glycol 3350 17 Gram(s) Oral two times a day  senna 2 Tablet(s) Oral at bedtime      LABS:                        8.9    5.74  )-----------( 353      ( 07 Aug 2024 07:08 )             27.2     08-07    132<L>  |  97<L>  |  23  ----------------------------<  106<H>  4.1   |  23  |  0.78    Ca    8.8      07 Aug 2024 07:08  Phos  3.8     08-07  Mg     2.20     08-07        PHYSICAL EXAM:  AOx3, Following Commands, Face symmetrical  EOMI, PERRL, CN 2-12 intact     RUE MOTOR:   Delt 5/5  Bicep 5/5  Tricep 5/5      HG 5/5      LUE MOTOR:   Delt 5/5  Bicep 5/5   Tricep 5/5     HG 5/5     RLE MOTOR:   HF 4/5            KE 5/5         DF 5/5         PF 5/5    EHL 5/5    LLE MOTOR:   HF 45             KE 4-/5          DF 4-/5          PF 45       EHL 45      SENSATION: intact to light touch     REFLEXES:  No clonus  No Hoffmans  
PAST 24HR EVENTS: Patient complaining of back pain without radiation into his extremities and LE weakness.     s/p IR aspiration on 7/29. IR culture neg, cytopath inflammatory cells. Per ID continue ceftriaxone through 9/13. PICC was placed. Neurosurgery called again due to LE weakness. Of note pt fell on 7/31 and refused imaging at that time.       Vital Signs Last 24 Hrs  T(C): 37.6 (08 Aug 2024 20:46), Max: 37.6 (08 Aug 2024 20:46)  T(F): 99.6 (08 Aug 2024 20:46), Max: 99.6 (08 Aug 2024 20:46)  HR: 117 (08 Aug 2024 20:46) (84 - 117)  BP: 127/69 (08 Aug 2024 20:46) (108/57 - 128/73)  BP(mean): --  RR: 17 (08 Aug 2024 20:46) (17 - 18)  SpO2: 94% (08 Aug 2024 20:46) (94% - 98%)    Parameters below as of 08 Aug 2024 20:46  Patient On (Oxygen Delivery Method): room air        MEDS:   albuterol/ipratropium for Nebulization 3 milliLiter(s) Nebulizer every 6 hours  cefTRIAXone   IVPB 2000 milliGRAM(s) IV Intermittent every 24 hours  cyclobenzaprine 5 milliGRAM(s) Oral three times a day  enoxaparin Injectable 40 milliGRAM(s) SubCutaneous every 24 hours  folic acid 1 milliGRAM(s) Oral daily  gabapentin 400 milliGRAM(s) Oral three times a day  lactobacillus acidophilus 1 Tablet(s) Oral daily  lidocaine   4% Patch 1 Patch Transdermal daily  melatonin 3 milliGRAM(s) Oral at bedtime PRN  ondansetron Injectable 4 milliGRAM(s) IV Push every 8 hours PRN  oxyCODONE    IR 5 milliGRAM(s) Oral every 4 hours PRN  oxyCODONE    IR 7.5 milliGRAM(s) Oral every 4 hours PRN  polyethylene glycol 3350 17 Gram(s) Oral two times a day  senna 2 Tablet(s) Oral at bedtime      LABS:                        8.9    5.74  )-----------( 353      ( 07 Aug 2024 07:08 )             27.2     08-07    132<L>  |  97<L>  |  23  ----------------------------<  106<H>  4.1   |  23  |  0.78    Ca    8.8      07 Aug 2024 07:08  Phos  3.8     08-07  Mg     2.20     08-07        PHYSICAL EXAM:  AOx3, Following Commands, Face symmetrical  EOMI, PERRL, CN 2-12 intact     RUE MOTOR:   Delt 5/5  Bicep 5/5  Tricep 5/5      HG 5/5      LUE MOTOR:   Delt 5/5  Bicep 5/5   Tricep 5/5     HG 5/5     RLE MOTOR:   HF 4/5            KE 5/5         DF 5/5         PF 5/5    EHL 5/5    LLE MOTOR:   HF 2/5             KE 4-/5          DF 4-/5          PF 45       EHL 45      SENSATION: intact to light touch     REFLEXES:  No clonus  No Hoffmans      CT T/L spine reviewed, degenerative changes T11/12 grossly unchanged
[facility] [facility] Division of Hospital Medicine  Jose Dubon MD  In House Pager [phone]    Patient is a 84y old  Male who presents with a chief complaint of back pain, dysuria (23 Jul 2024 14:41)    OVERNIGHT EVENTS: no acute events.   SUBJECTIVE: no new subjective symptoms. still have back pain. 7/10 after Tramadol. breathing has improved today.   ROS: Denied Fever, Chill, CP, SOB, Abd pain, N/V/D, LE swelling or pain.     MEDICATIONS  (STANDING):  albuterol/ipratropium for Nebulization 3 milliLiter(s) Nebulizer every 6 hours  cefTRIAXone   IVPB      folic acid 1 milliGRAM(s) Oral daily  lidocaine   4% Patch 1 Patch Transdermal daily  polyethylene glycol 3350 17 Gram(s) Oral daily  polyethylene glycol/electrolyte Solution. 4000 milliLiter(s) Oral once  senna 2 Tablet(s) Oral at bedtime    MEDICATIONS  (PRN):  acetaminophen     Tablet .. 650 milliGRAM(s) Oral every 6 hours PRN Temp greater or equal to 38C (100.4F), Mild Pain (1 - 3), Moderate Pain (4 - 6)  melatonin 3 milliGRAM(s) Oral at bedtime PRN Insomnia  oxycodone    5 mG/acetaminophen 325 mG 1 Tablet(s) Oral every 6 hours PRN Severe Pain (7 - 10)  traMADol 75 milliGRAM(s) Oral every 6 hours PRN Moderate Pain (4 - 6)    CAPILLARY BLOOD GLUCOSE        I&O's Summary    23 Jul 2024 07:01  -  24 Jul 2024 07:00  --------------------------------------------------------  IN: 240 mL / OUT: 1750 mL / NET: -1510 mL        Vital Signs Last 24 Hrs  T(C): 36.7 (24 Jul 2024 11:47), Max: 36.8 (23 Jul 2024 18:30)  T(F): 98 (24 Jul 2024 11:47), Max: 98.3 (23 Jul 2024 21:47)  HR: 79 (24 Jul 2024 11:47) (79 - 94)  BP: 125/59 (24 Jul 2024 11:47) (108/61 - 133/67)  BP(mean): --  RR: 18 (24 Jul 2024 11:47) (16 - 18)  SpO2: 98% (24 Jul 2024 11:47) (97% - 100%)    Parameters below as of 24 Jul 2024 11:47  Patient On (Oxygen Delivery Method): room air        LABS:                        7.7    5.07  )-----------( 260      ( 24 Jul 2024 07:13 )             22.7     07-24    132<L>  |  99  |  16  ----------------------------<  108<H>  3.6   |  24  |  0.96    Ca    8.2<L>      24 Jul 2024 07:13  Phos  3.6     07-24  Mg     1.90     07-24            Urinalysis Basic - ( 24 Jul 2024 07:13 )    Color: x / Appearance: x / SG: x / pH: x  Gluc: 108 mg/dL / Ketone: x  / Bili: x / Urobili: x   Blood: x / Protein: x / Nitrite: x   Leuk Esterase: x / RBC: x / WBC x   Sq Epi: x / Non Sq Epi: x / Bacteria: x        RADIOLOGY & ADDITIONAL TESTS:  Results Reviewed: Y  Imaging Personally Reviewed: Y  Electrocardiogram Personally Reviewed: Y    COORDINATION OF CARE:  Care Discussed with Consultants/Other Providers [Y/N]: Y  Prior or Outpatient Records Reviewed [Y/N]: Y  
Dr. Annamarie Scherer  Pager [phone]    PROGRESS NOTE:     Patient is a 84y old  Male who presents with a chief complaint of back pain, dysuria (01 Aug 2024 12:55)      SUBJECTIVE / OVERNIGHT EVENTS: denies chest pain/sob, back pain relatively controlled  ADDITIONAL REVIEW OF SYSTEMS: afebrile     MEDICATIONS  (STANDING):  albuterol/ipratropium for Nebulization 3 milliLiter(s) Nebulizer every 6 hours  cefTRIAXone   IVPB 2000 milliGRAM(s) IV Intermittent every 24 hours  enoxaparin Injectable 40 milliGRAM(s) SubCutaneous every 24 hours  folic acid 1 milliGRAM(s) Oral daily  lactobacillus acidophilus 1 Tablet(s) Oral daily  lidocaine   4% Patch 1 Patch Transdermal daily  polyethylene glycol 3350 17 Gram(s) Oral two times a day  senna 2 Tablet(s) Oral at bedtime    MEDICATIONS  (PRN):  acetaminophen     Tablet .. 650 milliGRAM(s) Oral every 6 hours PRN Temp greater or equal to 38C (100.4F), Mild Pain (1 - 3), Moderate Pain (4 - 6)  famotidine Injectable 20 milliGRAM(s) IV Push once PRN abdominal pain  melatonin 3 milliGRAM(s) Oral at bedtime PRN Insomnia  morphine  - Injectable 2 milliGRAM(s) IV Push every 4 hours PRN Severe Pain (7 - 10)  ondansetron Injectable 4 milliGRAM(s) IV Push every 8 hours PRN Nausea and/or Vomiting      CAPILLARY BLOOD GLUCOSE        I&O's Summary    01 Aug 2024 07:01  -  02 Aug 2024 07:00  --------------------------------------------------------  IN: 840 mL / OUT: 650 mL / NET: 190 mL    02 Aug 2024 07:01  -  02 Aug 2024 12:37  --------------------------------------------------------  IN: 0 mL / OUT: 300 mL / NET: -300 mL        PHYSICAL EXAM:  Vital Signs Last 24 Hrs  T(C): 36.7 (02 Aug 2024 11:40), Max: 36.9 (01 Aug 2024 20:30)  T(F): 98 (02 Aug 2024 11:40), Max: 98.4 (01 Aug 2024 20:30)  HR: 80 (02 Aug 2024 11:40) (76 - 80)  BP: 142/68 (02 Aug 2024 11:40) (125/68 - 142/68)  BP(mean): --  RR: 18 (02 Aug 2024 11:40) (18 - 18)  SpO2: 96% (02 Aug 2024 11:40) (96% - 99%)    Parameters below as of 02 Aug 2024 11:40  Patient On (Oxygen Delivery Method): room air        CONSTITUTIONAL: nad  RESPIRATORY: Normal respiratory effort; lungs are clear to auscultation bilaterally  CARDIOVASCULAR: Regular rate and rhythm, normal S1 and S2, no murmur/rub/gallop; No lower extremity edema; Peripheral pulses are 2+ bilaterally  ABDOMEN: Nontender to palpation, suprapubic ttp, soft   MUSCULOSKELETAL: thoracic spine ttp,  no clubbing or cyanosis of digits;   PSYCH: A+O to person, place, and time; affect appropriate    LABS:                        9.4    6.58  )-----------( 396      ( 02 Aug 2024 05:50 )             28.1     08-02    134<L>  |  98  |  20  ----------------------------<  116<H>  3.8   |  26  |  0.72    Ca    8.7      02 Aug 2024 05:50  Phos  2.9     08-02  Mg     1.90     08-02            Urinalysis Basic - ( 02 Aug 2024 05:50 )    Color: x / Appearance: x / SG: x / pH: x  Gluc: 116 mg/dL / Ketone: x  / Bili: x / Urobili: x   Blood: x / Protein: x / Nitrite: x   Leuk Esterase: x / RBC: x / WBC x   Sq Epi: x / Non Sq Epi: x / Bacteria: x          RADIOLOGY & ADDITIONAL TESTS:  Results Reviewed:   Imaging Personally Reviewed:  Electrocardiogram Personally Reviewed:    COORDINATION OF CARE:  Care Discussed with Consultants/Other Providers [Y/N]:  Prior or Outpatient Records Reviewed [Y/N]:  
Dr. Annamarie Scherer  Pager [phone]    PROGRESS NOTE:     Patient is a 84y old  Male who presents with a chief complaint of back pain, dysuria (02 Aug 2024 16:22)      SUBJECTIVE / OVERNIGHT EVENTS: denies chest pain or sob , still with significant paraspinal back pain, unable to ambulate d/t pain  ADDITIONAL REVIEW OF SYSTEMS: afebrile      MEDICATIONS  (STANDING):  albuterol/ipratropium for Nebulization 3 milliLiter(s) Nebulizer every 6 hours  cefTRIAXone   IVPB 2000 milliGRAM(s) IV Intermittent every 24 hours  cyclobenzaprine 5 milliGRAM(s) Oral three times a day  enoxaparin Injectable 40 milliGRAM(s) SubCutaneous every 24 hours  folic acid 1 milliGRAM(s) Oral daily  gabapentin 300 milliGRAM(s) Oral two times a day  lactobacillus acidophilus 1 Tablet(s) Oral daily  lidocaine   4% Patch 1 Patch Transdermal daily  polyethylene glycol 3350 17 Gram(s) Oral two times a day  senna 2 Tablet(s) Oral at bedtime    MEDICATIONS  (PRN):  acetaminophen     Tablet .. 650 milliGRAM(s) Oral every 6 hours PRN Temp greater or equal to 38C (100.4F), Mild Pain (1 - 3), Moderate Pain (4 - 6)  melatonin 3 milliGRAM(s) Oral at bedtime PRN Insomnia  ondansetron Injectable 4 milliGRAM(s) IV Push every 8 hours PRN Nausea and/or Vomiting  oxyCODONE    IR 5 milliGRAM(s) Oral every 4 hours PRN moderate to severe pain (4-10)      CAPILLARY BLOOD GLUCOSE        I&O's Summary    02 Aug 2024 07:01  -  03 Aug 2024 07:00  --------------------------------------------------------  IN: 200 mL / OUT: 700 mL / NET: -500 mL        PHYSICAL EXAM:  Vital Signs Last 24 Hrs  T(C): 36.6 (03 Aug 2024 11:52), Max: 36.7 (02 Aug 2024 20:24)  T(F): 97.8 (03 Aug 2024 11:52), Max: 98.1 (02 Aug 2024 20:24)  HR: 72 (03 Aug 2024 11:52) (72 - 88)  BP: 125/55 (03 Aug 2024 11:52) (125/55 - 134/75)  BP(mean): --  RR: 17 (03 Aug 2024 11:52) (17 - 18)  SpO2: 95% (03 Aug 2024 11:52) (95% - 98%)    Parameters below as of 03 Aug 2024 11:52  Patient On (Oxygen Delivery Method): room air      CONSTITUTIONAL: nad  RESPIRATORY: Normal respiratory effort; lungs are clear to auscultation bilaterally  CARDIOVASCULAR: Regular rate and rhythm, normal S1 and S2, no murmur/rub/gallop; No lower extremity edema; Peripheral pulses are 2+ bilaterally  ABDOMEN: soft, Nontender to palpation, nondistended, +bs  MUSCULOSKELETAL: paraspinal tenderness to palpation clubbing or cyanosis of digits;   PSYCH: A+O to person, place, and time; affect appropriate    LABS:                        8.8    6.13  )-----------( 367      ( 03 Aug 2024 04:01 )             26.4     08-03    130<L>  |  97<L>  |  20  ----------------------------<  112<H>  4.3   |  25  |  0.76    Ca    8.5      03 Aug 2024 04:01  Phos  3.3     08-03  Mg     2.00     08-03      Urinalysis Basic - ( 03 Aug 2024 04:01 )    Color: x / Appearance: x / SG: x / pH: x  Gluc: 112 mg/dL / Ketone: x  / Bili: x / Urobili: x   Blood: x / Protein: x / Nitrite: x   Leuk Esterase: x / RBC: x / WBC x   Sq Epi: x / Non Sq Epi: x / Bacteria: x      RADIOLOGY & ADDITIONAL TESTS:  Results Reviewed:   Imaging Personally Reviewed:    Electrocardiogram Personally Reviewed:    COORDINATION OF CARE:  Care Discussed with Consultants/Other Providers [Y/N]:  Prior or Outpatient Records Reviewed [Y/N]:  
[facility] Division of Hospital Medicine  Yonny Patterson MD  Pager (M-F, 8V-2V): [phone]  Other Times:  [phone]    Patient is a 84y old  Male who presents with a chief complaint of back pain, dysuria (09 Aug 2024 10:14)      SUBJECTIVE / OVERNIGHT EVENTS: Pt seen by NS and he is agreeable to stay for further studies explained risk of worsening weakness; seen earlier on in the day at 10 AM     MEDICATIONS  (STANDING):  albuterol/ipratropium for Nebulization 3 milliLiter(s) Nebulizer every 6 hours  cefTRIAXone   IVPB 2000 milliGRAM(s) IV Intermittent every 24 hours  cyclobenzaprine 5 milliGRAM(s) Oral three times a day  enoxaparin Injectable 40 milliGRAM(s) SubCutaneous every 24 hours  folic acid 1 milliGRAM(s) Oral daily  gabapentin 400 milliGRAM(s) Oral three times a day  lactobacillus acidophilus 1 Tablet(s) Oral daily  lidocaine   4% Patch 1 Patch Transdermal daily  polyethylene glycol 3350 17 Gram(s) Oral two times a day  senna 2 Tablet(s) Oral at bedtime    MEDICATIONS  (PRN):  melatonin 3 milliGRAM(s) Oral at bedtime PRN Insomnia  ondansetron Injectable 4 milliGRAM(s) IV Push every 8 hours PRN Nausea and/or Vomiting  oxyCODONE    IR 7.5 milliGRAM(s) Oral every 4 hours PRN Severe Pain (7 - 10)  oxyCODONE    IR 5 milliGRAM(s) Oral every 4 hours PRN Moderate Pain (4 - 6)      CAPILLARY BLOOD GLUCOSE        I&O's Summary      PHYSICAL EXAM:  Vital Signs Last 24 Hrs  T(C): 36.7 (09 Aug 2024 12:49), Max: 37.6 (08 Aug 2024 20:46)  T(F): 98 (09 Aug 2024 12:49), Max: 99.6 (08 Aug 2024 20:46)  HR: 103 (09 Aug 2024 12:49) (90 - 117)  BP: 128/71 (09 Aug 2024 12:49) (127/69 - 129/64)  BP(mean): --  RR: 18 (09 Aug 2024 12:49) (17 - 18)  SpO2: 93% (09 Aug 2024 12:49) (93% - 97%)    Parameters below as of 09 Aug 2024 12:49  Patient On (Oxygen Delivery Method): room air    CONSTITUTIONAL: NAD   RESPIRATORY: Normal respiratory effort; lungs are clear to auscultation bilaterally  CARDIOVASCULAR: Regular rate and rhythm, normal S1 and S2, no murmur/rub/gallop; No lower extremity edema; Peripheral pulses are 2+ bilaterally  ABDOMEN: soft, Nontender to palpation, nondistended, +bs  MUSCULOSKELETAL: able to flex at knee; LE distal strength 5/5 b/l ; weakness LT hip; sensation intact; UE PICC    PSYCH: A+O to person, place, and time; affect appropriate      LABS:                        9.3    7.95  )-----------( 333      ( 09 Aug 2024 03:25 )             27.9     08-09    131<L>  |  96<L>  |  23  ----------------------------<  131<H>  4.4   |  23  |  0.80    Ca    8.8      09 Aug 2024 03:25  Phos  3.7     08-09  Mg     2.00     08-09            Urinalysis Basic - ( 09 Aug 2024 03:25 )    Color: x / Appearance: x / SG: x / pH: x  Gluc: 131 mg/dL / Ketone: x  / Bili: x / Urobili: x   Blood: x / Protein: x / Nitrite: x   Leuk Esterase: x / RBC: x / WBC x   Sq Epi: x / Non Sq Epi: x / Bacteria: x          RADIOLOGY & ADDITIONAL TESTS:  Results Reviewed:   Imaging Personally Reviewed:  Electrocardiogram Personally Reviewed:    COORDINATION OF CARE:  Care Discussed with Consultants/Other Providers [Y/N]:  Prior or Outpatient Records Reviewed [Y/N]:  
[facility] Division of Hospital Medicine  Jose Dubon MD  In House Pager [phone]    Patient is a 84y old  Male who presents with a chief complaint of back pain, dysuria (27 Jul 2024 08:04)    OVERNIGHT EVENTS: no acute events.   SUBJECTIVE: no new subjective symptoms.   ROS: Denied Fever, Chill, CP, SOB, Abd pain, N/V/D, LE swelling or pain.     MEDICATIONS  (STANDING):  albuterol/ipratropium for Nebulization 3 milliLiter(s) Nebulizer every 6 hours  cefTRIAXone   IVPB 2000 milliGRAM(s) IV Intermittent every 24 hours  cefTRIAXone   IVPB      folic acid 1 milliGRAM(s) Oral daily  lactobacillus acidophilus 1 Tablet(s) Oral daily  lidocaine   4% Patch 1 Patch Transdermal daily  polyethylene glycol 3350 17 Gram(s) Oral daily  senna 2 Tablet(s) Oral at bedtime    MEDICATIONS  (PRN):  acetaminophen     Tablet .. 650 milliGRAM(s) Oral every 6 hours PRN Temp greater or equal to 38C (100.4F), Mild Pain (1 - 3), Moderate Pain (4 - 6)  famotidine Injectable 20 milliGRAM(s) IV Push once PRN abdominal pain  melatonin 3 milliGRAM(s) Oral at bedtime PRN Insomnia  ondansetron Injectable 4 milliGRAM(s) IV Push every 8 hours PRN Nausea and/or Vomiting  oxycodone    5 mG/acetaminophen 325 mG 1 Tablet(s) Oral every 6 hours PRN Severe Pain (7 - 10)    CAPILLARY BLOOD GLUCOSE        I&O's Summary    27 Jul 2024 07:01  -  28 Jul 2024 07:00  --------------------------------------------------------  IN: 1550 mL / OUT: 960 mL / NET: 590 mL        Vital Signs Last 24 Hrs  T(C): 36.2 (28 Jul 2024 04:15), Max: 36.7 (27 Jul 2024 12:17)  T(F): 97.2 (28 Jul 2024 04:15), Max: 98 (27 Jul 2024 12:17)  HR: 90 (28 Jul 2024 08:30) (90 - 96)  BP: 148/60 (28 Jul 2024 04:15) (121/62 - 150/63)  BP(mean): --  RR: 16 (28 Jul 2024 04:15) (16 - 18)  SpO2: 98% (28 Jul 2024 08:30) (96% - 100%)    Parameters below as of 28 Jul 2024 08:30  Patient On (Oxygen Delivery Method): room air        LABS:                        7.7    6.96  )-----------( 261      ( 28 Jul 2024 06:40 )             22.6     07-28    131<L>  |  98  |  13  ----------------------------<  107<H>  3.8   |  24  |  0.81    Ca    8.3<L>      28 Jul 2024 06:40  Phos  3.0     07-28  Mg     1.70     07-28            Urinalysis Basic - ( 28 Jul 2024 06:40 )    Color: x / Appearance: x / SG: x / pH: x  Gluc: 107 mg/dL / Ketone: x  / Bili: x / Urobili: x   Blood: x / Protein: x / Nitrite: x   Leuk Esterase: x / RBC: x / WBC x   Sq Epi: x / Non Sq Epi: x / Bacteria: x        RADIOLOGY & ADDITIONAL TESTS:  Results Reviewed: Y  Imaging Personally Reviewed: Y  Electrocardiogram Personally Reviewed: Y    COORDINATION OF CARE:  Care Discussed with Consultants/Other Providers [Y/N]: Y  Prior or Outpatient Records Reviewed [Y/N]: MK  
[facility] Division of Hospital Medicine  Jose Dubon MD  In House Pager [phone]    Patient is a 84y old  Male who presents with a chief complaint of back pain, dysuria (24 Jul 2024 14:40)    OVERNIGHT EVENTS: no acute events.   SUBJECTIVE: no new subjective symptoms. reports having watery BM now. had echo that was grossly nl. patient still have back pain, some improvement with lidocaine patch.   ROS: Denied Fever, Chill, CP, SOB, Abd pain, N/V/D, LE swelling or pain.     MEDICATIONS  (STANDING):  albuterol/ipratropium for Nebulization 3 milliLiter(s) Nebulizer every 6 hours  cefTRIAXone   IVPB 2000 milliGRAM(s) IV Intermittent every 24 hours  cefTRIAXone   IVPB      folic acid 1 milliGRAM(s) Oral daily  lactobacillus acidophilus 1 Tablet(s) Oral daily  lidocaine   4% Patch 1 Patch Transdermal daily  polyethylene glycol 3350 17 Gram(s) Oral daily  senna 2 Tablet(s) Oral at bedtime    MEDICATIONS  (PRN):  acetaminophen     Tablet .. 650 milliGRAM(s) Oral every 6 hours PRN Temp greater or equal to 38C (100.4F), Mild Pain (1 - 3), Moderate Pain (4 - 6)  melatonin 3 milliGRAM(s) Oral at bedtime PRN Insomnia  oxycodone    5 mG/acetaminophen 325 mG 1 Tablet(s) Oral every 6 hours PRN Severe Pain (7 - 10)    CAPILLARY BLOOD GLUCOSE        I&O's Summary    24 Jul 2024 07:01  -  25 Jul 2024 07:00  --------------------------------------------------------  IN: 0 mL / OUT: 1100 mL / NET: -1100 mL        Vital Signs Last 24 Hrs  T(C): 36.3 (25 Jul 2024 12:04), Max: 37.2 (24 Jul 2024 21:00)  T(F): 97.3 (25 Jul 2024 12:04), Max: 99 (24 Jul 2024 21:00)  HR: 74 (25 Jul 2024 12:04) (74 - 86)  BP: 119/59 (25 Jul 2024 12:04) (119/59 - 126/58)  BP(mean): --  RR: 18 (25 Jul 2024 12:04) (18 - 18)  SpO2: 97% (25 Jul 2024 12:04) (97% - 99%)    Parameters below as of 25 Jul 2024 12:04  Patient On (Oxygen Delivery Method): room air        LABS:                        8.0    6.00  )-----------( 261      ( 25 Jul 2024 03:25 )             23.4     07-25    132<L>  |  96<L>  |  14  ----------------------------<  123<H>  4.1   |  26  |  0.94    Ca    8.3<L>      25 Jul 2024 03:25  Phos  3.7     07-25  Mg     1.80     07-25    TPro  6.1  /  Alb  2.6<L>  /  TBili  0.6  /  DBili  0.2  /  AST  48<H>  /  ALT  34  /  AlkPhos  95  07-25    PT/INR - ( 25 Jul 2024 03:25 )   PT: 12.4 sec;   INR: 1.10 ratio         PTT - ( 25 Jul 2024 03:25 )  PTT:32.9 sec      Urinalysis Basic - ( 25 Jul 2024 03:25 )    Color: x / Appearance: x / SG: x / pH: x  Gluc: 123 mg/dL / Ketone: x  / Bili: x / Urobili: x   Blood: x / Protein: x / Nitrite: x   Leuk Esterase: x / RBC: x / WBC x   Sq Epi: x / Non Sq Epi: x / Bacteria: x        RADIOLOGY & ADDITIONAL TESTS:  Results Reviewed: Y  Imaging Personally Reviewed: Y  Electrocardiogram Personally Reviewed: Y    COORDINATION OF CARE:  Care Discussed with Consultants/Other Providers [Y/N]: Y  Prior or Outpatient Records Reviewed [Y/N]: Y  
Dr. Annamarie Scherer  Pager [phone]    PROGRESS NOTE:     Patient is a 84y old  Male who presents with a chief complaint of back pain, dysuria (30 Jul 2024 16:55)      SUBJECTIVE / OVERNIGHT EVENTS: denies chest pain or sob , back pain relatively controlled with iv morphine, but has some breakthrough pain  ADDITIONAL REVIEW OF SYSTEMS: afebrile     MEDICATIONS  (STANDING):  albuterol/ipratropium for Nebulization 3 milliLiter(s) Nebulizer every 6 hours  enoxaparin Injectable 40 milliGRAM(s) SubCutaneous every 24 hours  folic acid 1 milliGRAM(s) Oral daily  lactobacillus acidophilus 1 Tablet(s) Oral daily  lidocaine   4% Patch 1 Patch Transdermal daily  polyethylene glycol 3350 17 Gram(s) Oral two times a day  senna 2 Tablet(s) Oral at bedtime    MEDICATIONS  (PRN):  acetaminophen     Tablet .. 650 milliGRAM(s) Oral every 6 hours PRN Temp greater or equal to 38C (100.4F), Mild Pain (1 - 3), Moderate Pain (4 - 6)  famotidine Injectable 20 milliGRAM(s) IV Push once PRN abdominal pain  melatonin 3 milliGRAM(s) Oral at bedtime PRN Insomnia  morphine  - Injectable 2 milliGRAM(s) IV Push every 4 hours PRN Severe Pain (7 - 10)  ondansetron Injectable 4 milliGRAM(s) IV Push every 8 hours PRN Nausea and/or Vomiting      CAPILLARY BLOOD GLUCOSE        I&O's Summary    30 Jul 2024 07:01  -  31 Jul 2024 07:00  --------------------------------------------------------  IN: 600 mL / OUT: 750 mL / NET: -150 mL    31 Jul 2024 07:01  -  31 Jul 2024 13:28  --------------------------------------------------------  IN: 300 mL / OUT: 400 mL / NET: -100 mL        PHYSICAL EXAM:  Vital Signs Last 24 Hrs  T(C): 36.9 (31 Jul 2024 12:03), Max: 36.9 (31 Jul 2024 12:03)  T(F): 98.5 (31 Jul 2024 12:03), Max: 98.5 (31 Jul 2024 12:03)  HR: 76 (31 Jul 2024 12:03) (65 - 76)  BP: 132/60 (31 Jul 2024 12:03) (132/60 - 136/67)  BP(mean): --  RR: 18 (31 Jul 2024 12:03) (18 - 18)  SpO2: 99% (31 Jul 2024 12:03) (95% - 99%)    Parameters below as of 31 Jul 2024 12:03  Patient On (Oxygen Delivery Method): room air      CONSTITUTIONAL: nad  RESPIRATORY: Normal respiratory effort; lungs are clear to auscultation bilaterally  CARDIOVASCULAR: Regular rate and rhythm, normal S1 and S2, no murmur/rub/gallop; No lower extremity edema; Peripheral pulses are 2+ bilaterally  ABDOMEN: Nontender to palpation, suprapubic ttp, soft   MUSCULOSKELETAL: thoracic spine ttp,  no clubbing or cyanosis of digits;   PSYCH: A+O to person, place, and time; affect appropriate  LABS:                        8.3    5.97  )-----------( 332      ( 31 Jul 2024 11:03 )             24.6     07-31    133<L>  |  96<L>  |  16  ----------------------------<  99  3.6   |  23  |  0.89    Ca    8.6      31 Jul 2024 06:06  Phos  3.2     07-31  Mg     2.00     07-31            Urinalysis Basic - ( 31 Jul 2024 06:06 )    Color: x / Appearance: x / SG: x / pH: x  Gluc: 99 mg/dL / Ketone: x  / Bili: x / Urobili: x   Blood: x / Protein: x / Nitrite: x   Leuk Esterase: x / RBC: x / WBC x   Sq Epi: x / Non Sq Epi: x / Bacteria: x        Culture - Fungal, Tissue (collected 29 Jul 2024 15:15)  Source: .Tissue t ii / t12 ascites biopsy  Preliminary Report (30 Jul 2024 06:51):    Testing in progress    Culture - Acid Fast - Tissue w/Smear (collected 29 Jul 2024 15:15)  Source: .Tissue t ii / t12 ascites biopsy    Culture - Tissue with Gram Stain (collected 29 Jul 2024 15:15)  Source: .Tissue t ii / t12 ascites biopsy  Gram Stain (30 Jul 2024 04:13):    No polymorphonuclear cells seen per low power field    No organisms seen per oil power field  Preliminary Report (30 Jul 2024 20:39):    No growth to date.        RADIOLOGY & ADDITIONAL TESTS:  Results Reviewed:   Imaging Personally Reviewed:  Electrocardiogram Personally Reviewed:    COORDINATION OF CARE:  Care Discussed with Consultants/Other Providers [Y/N]:  Prior or Outpatient Records Reviewed [Y/N]:  
[facility] Division of Hospital Medicine  Yonny Patterson MD  Pager (F-F, 1K-8W): [phone]  Other Times:  [phone]    Patient is a 84y old  Male who presents with a chief complaint of back pain, dysuria (07 Aug 2024 10:49)      SUBJECTIVE / OVERNIGHT EVENTS: seen with ACP at bedside notes pain b/l back with radiation down legs.       MEDICATIONS  (STANDING):  albuterol/ipratropium for Nebulization 3 milliLiter(s) Nebulizer every 6 hours  cefTRIAXone   IVPB 2000 milliGRAM(s) IV Intermittent every 24 hours  cyclobenzaprine 5 milliGRAM(s) Oral three times a day  enoxaparin Injectable 40 milliGRAM(s) SubCutaneous every 24 hours  folic acid 1 milliGRAM(s) Oral daily  gabapentin 300 milliGRAM(s) Oral three times a day  lactobacillus acidophilus 1 Tablet(s) Oral daily  lidocaine   4% Patch 1 Patch Transdermal daily  polyethylene glycol 3350 17 Gram(s) Oral two times a day  senna 2 Tablet(s) Oral at bedtime    MEDICATIONS  (PRN):  melatonin 3 milliGRAM(s) Oral at bedtime PRN Insomnia  ondansetron Injectable 4 milliGRAM(s) IV Push every 8 hours PRN Nausea and/or Vomiting  oxyCODONE    IR 5 milliGRAM(s) Oral every 4 hours PRN Moderate Pain (4 - 6)  oxyCODONE    IR 7.5 milliGRAM(s) Oral every 4 hours PRN Severe Pain (7 - 10)      CAPILLARY BLOOD GLUCOSE        I&O's Summary    06 Aug 2024 07:01  -  07 Aug 2024 07:00  --------------------------------------------------------  IN: 600 mL / OUT: 720 mL / NET: -120 mL    07 Aug 2024 07:01  -  07 Aug 2024 13:21  --------------------------------------------------------  IN: 0 mL / OUT: 200 mL / NET: -200 mL        PHYSICAL EXAM:  Vital Signs Last 24 Hrs  T(C): 36.6 (07 Aug 2024 11:25), Max: 36.8 (06 Aug 2024 21:20)  T(F): 97.9 (07 Aug 2024 11:25), Max: 98.3 (06 Aug 2024 21:20)  HR: 81 (07 Aug 2024 11:25) (80 - 85)  BP: 133/69 (07 Aug 2024 11:25) (109/67 - 133/73)  BP(mean): --  RR: 18 (07 Aug 2024 11:25) (18 - 18)  SpO2: 99% (07 Aug 2024 11:25) (98% - 100%)    Parameters below as of 07 Aug 2024 11:25  Patient On (Oxygen Delivery Method): room air    CONSTITUTIONAL: NAD   RESPIRATORY: Normal respiratory effort; lungs are clear to auscultation bilaterally  CARDIOVASCULAR: Regular rate and rhythm, normal S1 and S2, no murmur/rub/gallop; No lower extremity edema; Peripheral pulses are 2+ bilaterally  ABDOMEN: soft, Nontender to palpation, nondistended, +bs  MUSCULOSKELETAL: paraspinal tenderness to palpation clubbing or cyanosis of digits; UE PICC    PSYCH: A+O to person, place, and time; affect appropriate      LABS:                        8.9    5.74  )-----------( 353      ( 07 Aug 2024 07:08 )             27.2     08-07    132<L>  |  97<L>  |  23  ----------------------------<  106<H>  4.1   |  23  |  0.78    Ca    8.8      07 Aug 2024 07:08  Phos  3.8     08-07  Mg     2.20     08-07            Urinalysis Basic - ( 07 Aug 2024 07:08 )    Color: x / Appearance: x / SG: x / pH: x  Gluc: 106 mg/dL / Ketone: x  / Bili: x / Urobili: x   Blood: x / Protein: x / Nitrite: x   Leuk Esterase: x / RBC: x / WBC x   Sq Epi: x / Non Sq Epi: x / Bacteria: x          RADIOLOGY & ADDITIONAL TESTS:  Results Reviewed:   Imaging Personally Reviewed:  Electrocardiogram Personally Reviewed:    COORDINATION OF CARE:  Care Discussed with Consultants/Other Providers [Y/N]:  Prior or Outpatient Records Reviewed [Y/N]:  
Dr. Annamarie Scherer  Pager [phone]    PROGRESS NOTE:     Patient is a 84y old  Male who presents with a chief complaint of back pain, dysuria (20 Jul 2024 14:48)      SUBJECTIVE / OVERNIGHT EVENTS: c/o back pain for months   ADDITIONAL REVIEW OF SYSTEMS: afebrile     MEDICATIONS  (STANDING):  cefTRIAXone   IVPB 1000 milliGRAM(s) IV Intermittent every 24 hours  folic acid 1 milliGRAM(s) Oral daily  mupirocin 2% Ointment 1 Application(s) Topical two times a day  sodium chloride 2 Gram(s) Oral every 8 hours    MEDICATIONS  (PRN):  acetaminophen     Tablet .. 650 milliGRAM(s) Oral every 6 hours PRN Temp greater or equal to 38C (100.4F), Mild Pain (1 - 3), Moderate Pain (4 - 6)  melatonin 3 milliGRAM(s) Oral at bedtime PRN Insomnia      CAPILLARY BLOOD GLUCOSE        I&O's Summary    20 Jul 2024 07:01  -  21 Jul 2024 07:00  --------------------------------------------------------  IN: 0 mL / OUT: 200 mL / NET: -200 mL        PHYSICAL EXAM:  Vital Signs Last 24 Hrs  T(C): 36.6 (21 Jul 2024 13:30), Max: 36.6 (21 Jul 2024 13:30)  T(F): 97.9 (21 Jul 2024 13:30), Max: 97.9 (21 Jul 2024 13:30)  HR: 70 (21 Jul 2024 13:30) (70 - 79)  BP: 123/54 (21 Jul 2024 13:30) (113/53 - 123/54)  BP(mean): --  RR: 18 (21 Jul 2024 13:30) (18 - 19)  SpO2: 98% (21 Jul 2024 13:30) (97% - 99%)    Parameters below as of 21 Jul 2024 13:30  Patient On (Oxygen Delivery Method): room air      CONSTITUTIONAL: nad  RESPIRATORY: Normal respiratory effort; lungs are clear to auscultation bilaterally  CARDIOVASCULAR: Regular rate and rhythm, normal S1 and S2, no murmur/rub/gallop; No lower extremity edema; Peripheral pulses are 2+ bilaterally  ABDOMEN: Nontender to palpation, suprapubic ttp, soft   MUSCULOSKELETAL: no clubbing or cyanosis of digits; no joint swelling or tenderness to palpation  PSYCH: A+O to person, place, and time; affect appropriate    LABS:                        7.6    8.28  )-----------( 262      ( 21 Jul 2024 09:30 )             23.0     07-21    130<L>  |  101  |  18  ----------------------------<  92  3.9   |  20<L>  |  0.83    Ca    8.0<L>      21 Jul 2024 07:30  Phos  2.6     07-20  Mg     1.70     07-20            Urinalysis Basic - ( 21 Jul 2024 07:30 )    Color: x / Appearance: x / SG: x / pH: x  Gluc: 92 mg/dL / Ketone: x  / Bili: x / Urobili: x   Blood: x / Protein: x / Nitrite: x   Leuk Esterase: x / RBC: x / WBC x   Sq Epi: x / Non Sq Epi: x / Bacteria: x        Culture - Blood (collected 19 Jul 2024 05:33)  Source: .Blood Blood-Peripheral  Gram Stain (20 Jul 2024 05:52):    Growth in aerobic bottle: Gram Positive Cocci in Clusters  Final Report (20 Jul 2024 22:06):    Growth in aerobic bottle: Staphylococcus hominis    Isolation of Coagulase negative Staphylococcus from single blood culture    sets may represent    contamination. Contact the Microbiology Department at [phone] if    susceptibility testing is    clinically indicated.    Direct identification is available within approximately 3-5    hours either by Blood Panel Multiplexed PCR or Direct    MALDI-TOF. Details: [email or web address]  Organism: Blood Culture PCR (20 Jul 2024 22:06)  Organism: Blood Culture PCR (20 Jul 2024 22:06)    Culture - Blood (collected 19 Jul 2024 05:23)  Source: .Blood Blood-Peripheral  Preliminary Report (21 Jul 2024 10:01):    No growth at 48 Hours    Urinalysis with Rflx Culture (collected 19 Jul 2024 00:45)    Culture - Urine (collected 19 Jul 2024 00:45)  Source: Clean Catch  Preliminary Report (21 Jul 2024 07:37):    >100,000 CFU/ml Escherichia coli    <10,000 CFU/ml Normal Urogenital loren present        RADIOLOGY & ADDITIONAL TESTS:  Results Reviewed:   Imaging Personally Reviewed:  Electrocardiogram Personally Reviewed:    COORDINATION OF CARE:  Care Discussed with Consultants/Other Providers [Y/N]:  Prior or Outpatient Records Reviewed [Y/N]:  
Dr. Annamarie Scherer  Pager [phone]    PROGRESS NOTE:     Patient is a 84y old  Male who presents with a chief complaint of back pain, dysuria (01 Aug 2024 11:14)      SUBJECTIVE / OVERNIGHT EVENTS: back pain relatively controlled  ADDITIONAL REVIEW OF SYSTEMS: afebrile     MEDICATIONS  (STANDING):  albuterol/ipratropium for Nebulization 3 milliLiter(s) Nebulizer every 6 hours  cefTRIAXone   IVPB 2000 milliGRAM(s) IV Intermittent every 24 hours  enoxaparin Injectable 40 milliGRAM(s) SubCutaneous every 24 hours  folic acid 1 milliGRAM(s) Oral daily  lactobacillus acidophilus 1 Tablet(s) Oral daily  lidocaine   4% Patch 1 Patch Transdermal daily  polyethylene glycol 3350 17 Gram(s) Oral two times a day  senna 2 Tablet(s) Oral at bedtime    MEDICATIONS  (PRN):  acetaminophen     Tablet .. 650 milliGRAM(s) Oral every 6 hours PRN Temp greater or equal to 38C (100.4F), Mild Pain (1 - 3), Moderate Pain (4 - 6)  famotidine Injectable 20 milliGRAM(s) IV Push once PRN abdominal pain  melatonin 3 milliGRAM(s) Oral at bedtime PRN Insomnia  morphine  - Injectable 2 milliGRAM(s) IV Push every 4 hours PRN Severe Pain (7 - 10)  ondansetron Injectable 4 milliGRAM(s) IV Push every 8 hours PRN Nausea and/or Vomiting      CAPILLARY BLOOD GLUCOSE        I&O's Summary    31 Jul 2024 07:01  -  01 Aug 2024 07:00  --------------------------------------------------------  IN: 500 mL / OUT: 1200 mL / NET: -700 mL    01 Aug 2024 07:01  -  01 Aug 2024 12:55  --------------------------------------------------------  IN: 540 mL / OUT: 450 mL / NET: 90 mL        PHYSICAL EXAM:  Vital Signs Last 24 Hrs  T(C): 36.3 (01 Aug 2024 11:19), Max: 36.7 (01 Aug 2024 05:50)  T(F): 97.4 (01 Aug 2024 11:19), Max: 98.1 (01 Aug 2024 05:50)  HR: 91 (01 Aug 2024 11:19) (75 - 91)  BP: 135/74 (01 Aug 2024 11:19) (135/74 - 146/74)  BP(mean): --  RR: 18 (01 Aug 2024 11:19) (18 - 18)  SpO2: 97% (01 Aug 2024 11:19) (97% - 97%)    Parameters below as of 01 Aug 2024 11:19  Patient On (Oxygen Delivery Method): room air      CONSTITUTIONAL: nad  RESPIRATORY: Normal respiratory effort; lungs are clear to auscultation bilaterally  CARDIOVASCULAR: Regular rate and rhythm, normal S1 and S2, no murmur/rub/gallop; No lower extremity edema; Peripheral pulses are 2+ bilaterally  ABDOMEN: Nontender to palpation, suprapubic ttp, soft   MUSCULOSKELETAL: thoracic spine ttp,  no clubbing or cyanosis of digits;   PSYCH: A+O to person, place, and time; affect appropriate  LABS:                        8.3    6.09  )-----------( 324      ( 01 Aug 2024 06:14 )             24.5     08-01    132<L>  |  97<L>  |  16  ----------------------------<  97  3.7   |  23  |  0.81    Ca    8.3<L>      01 Aug 2024 06:14  Phos  3.3     08-01  Mg     1.90     08-01            Urinalysis Basic - ( 01 Aug 2024 06:14 )    Color: x / Appearance: x / SG: x / pH: x  Gluc: 97 mg/dL / Ketone: x  / Bili: x / Urobili: x   Blood: x / Protein: x / Nitrite: x   Leuk Esterase: x / RBC: x / WBC x   Sq Epi: x / Non Sq Epi: x / Bacteria: x        Culture - Fungal, Tissue (collected 29 Jul 2024 15:15)  Source: .Tissue t ii / t12 ascites biopsy  Preliminary Report (31 Jul 2024 23:04):    Culture is being performed. Fungal cultures are held for 4 weeks.    Culture - Acid Fast - Tissue w/Smear (collected 29 Jul 2024 15:15)  Source: .Tissue t ii / t12 ascites biopsy  Preliminary Report (31 Jul 2024 23:10):    Culture is being performed.    Culture - Tissue with Gram Stain (collected 29 Jul 2024 15:15)  Source: .Tissue t ii / t12 ascites biopsy  Gram Stain (30 Jul 2024 04:13):    No polymorphonuclear cells seen per low power field    No organisms seen per oil power field  Preliminary Report (30 Jul 2024 20:39):    No growth to date.        RADIOLOGY & ADDITIONAL TESTS:  Results Reviewed:   Imaging Personally Reviewed:  Electrocardiogram Personally Reviewed:    COORDINATION OF CARE:  Care Discussed with Consultants/Other Providers [Y/N]:  Prior or Outpatient Records Reviewed [Y/N]:  
[facility] Division of Hospital Medicine  Jose Dubon MD  In House Pager [phone]    Patient is a 84y old  Male who presents with a chief complaint of back pain, dysuria (23 Jul 2024 09:06)    OVERNIGHT EVENTS: no acute events.   SUBJECTIVE: no new subjective symptoms. still have pain, has only asked for pain medication once. instructed patient to notify nurse of pain and request treatments. patient appears to have difficulty completing sentences today.   ROS: Denied Fever, Chill, CP, Abd pain, N/V/D, LE swelling or pain.     MEDICATIONS  (STANDING):  albuterol/ipratropium for Nebulization 3 milliLiter(s) Nebulizer every 6 hours  cefTRIAXone   IVPB 1000 milliGRAM(s) IV Intermittent every 24 hours  folic acid 1 milliGRAM(s) Oral daily    MEDICATIONS  (PRN):  acetaminophen     Tablet .. 650 milliGRAM(s) Oral every 6 hours PRN Temp greater or equal to 38C (100.4F), Mild Pain (1 - 3), Moderate Pain (4 - 6)  melatonin 3 milliGRAM(s) Oral at bedtime PRN Insomnia  traMADol 75 milliGRAM(s) Oral every 6 hours PRN Severe Pain (7 - 10)  traMADol 50 milliGRAM(s) Oral every 6 hours PRN Moderate Pain (4 - 6)    CAPILLARY BLOOD GLUCOSE        I&O's Summary    22 Jul 2024 07:01  -  23 Jul 2024 07:00  --------------------------------------------------------  IN: 0 mL / OUT: 800 mL / NET: -800 mL    23 Jul 2024 07:01  -  23 Jul 2024 14:42  --------------------------------------------------------  IN: 150 mL / OUT: 450 mL / NET: -300 mL        Vital Signs Last 24 Hrs  T(C): 36.3 (23 Jul 2024 09:27), Max: 36.7 (22 Jul 2024 22:09)  T(F): 97.3 (23 Jul 2024 09:27), Max: 98 (22 Jul 2024 22:09)  HR: 90 (23 Jul 2024 09:27) (75 - 90)  BP: 115/59 (23 Jul 2024 09:27) (115/59 - 149/63)  BP(mean): --  RR: 18 (23 Jul 2024 09:27) (16 - 18)  SpO2: 98% (23 Jul 2024 09:27) (97% - 99%)    Parameters below as of 23 Jul 2024 09:27  Patient On (Oxygen Delivery Method): room air        LABS:                        7.7    5.67  )-----------( 237      ( 23 Jul 2024 07:07 )             21.8     07-23    131<L>  |  101  |  16  ----------------------------<  99  3.9   |  22  |  0.90    Ca    7.9<L>      23 Jul 2024 07:07  Phos  2.5     07-23  Mg     1.60     07-23            Urinalysis Basic - ( 23 Jul 2024 07:07 )    Color: x / Appearance: x / SG: x / pH: x  Gluc: 99 mg/dL / Ketone: x  / Bili: x / Urobili: x   Blood: x / Protein: x / Nitrite: x   Leuk Esterase: x / RBC: x / WBC x   Sq Epi: x / Non Sq Epi: x / Bacteria: x        Culture - Blood (collected 21 Jul 2024 05:15)  Source: .Blood Blood-Peripheral  Preliminary Report (23 Jul 2024 11:01):    No growth at 48 Hours    Culture - Blood (collected 21 Jul 2024 05:00)  Source: .Blood Blood-Peripheral  Preliminary Report (23 Jul 2024 11:01):    No growth at 48 Hours    Culture - Blood (collected 20 Jul 2024 22:50)  Source: .Blood Blood-Peripheral  Preliminary Report (23 Jul 2024 06:01):    No growth at 48 Hours    Culture - Blood (collected 20 Jul 2024 22:35)  Source: .Blood Blood-Peripheral  Preliminary Report (23 Jul 2024 06:01):    No growth at 48 Hours      RADIOLOGY & ADDITIONAL TESTS:  Results Reviewed: Y  Imaging Personally Reviewed: Y  Electrocardiogram Personally Reviewed: Y    COORDINATION OF CARE:  Care Discussed with Consultants/Other Providers [Y/N]: Y  Prior or Outpatient Records Reviewed [Y/N]: Y  
Dr. Annamarie Scherer  Pager [phone]    PROGRESS NOTE:     Patient is a 84y old  Male who presents with a chief complaint of back pain, dysuria (03 Aug 2024 13:45)      SUBJECTIVE / OVERNIGHT EVENTS: denies chest pain or sob   ADDITIONAL REVIEW OF SYSTEMS: afebrile     MEDICATIONS  (STANDING):  albuterol/ipratropium for Nebulization 3 milliLiter(s) Nebulizer every 6 hours  cefTRIAXone   IVPB 2000 milliGRAM(s) IV Intermittent every 24 hours  cyclobenzaprine 5 milliGRAM(s) Oral three times a day  enoxaparin Injectable 40 milliGRAM(s) SubCutaneous every 24 hours  folic acid 1 milliGRAM(s) Oral daily  gabapentin 300 milliGRAM(s) Oral two times a day  lactobacillus acidophilus 1 Tablet(s) Oral daily  lidocaine   4% Patch 1 Patch Transdermal daily  polyethylene glycol 3350 17 Gram(s) Oral two times a day  senna 2 Tablet(s) Oral at bedtime    MEDICATIONS  (PRN):  acetaminophen     Tablet .. 650 milliGRAM(s) Oral every 6 hours PRN Temp greater or equal to 38C (100.4F), Mild Pain (1 - 3), Moderate Pain (4 - 6)  melatonin 3 milliGRAM(s) Oral at bedtime PRN Insomnia  ondansetron Injectable 4 milliGRAM(s) IV Push every 8 hours PRN Nausea and/or Vomiting  oxyCODONE    IR 5 milliGRAM(s) Oral every 4 hours PRN Moderate Pain (4 - 6)  oxyCODONE    IR 7.5 milliGRAM(s) Oral every 4 hours PRN Severe Pain (7 - 10)      CAPILLARY BLOOD GLUCOSE      POCT Blood Glucose.: 110 mg/dL (04 Aug 2024 09:01)    I&O's Summary    03 Aug 2024 07:01  -  04 Aug 2024 07:00  --------------------------------------------------------  IN: 0 mL / OUT: 300 mL / NET: -300 mL        PHYSICAL EXAM:  Vital Signs Last 24 Hrs  T(C): 36.4 (04 Aug 2024 05:25), Max: 36.7 (03 Aug 2024 21:00)  T(F): 97.6 (04 Aug 2024 05:25), Max: 98.1 (03 Aug 2024 21:00)  HR: 78 (04 Aug 2024 05:25) (72 - 82)  BP: 124/65 (04 Aug 2024 05:25) (124/65 - 125/67)  BP(mean): --  RR: 18 (04 Aug 2024 05:25) (17 - 18)  SpO2: 95% (04 Aug 2024 05:25) (95% - 97%)    Parameters below as of 04 Aug 2024 05:25  Patient On (Oxygen Delivery Method): room air      CONSTITUTIONAL: nad, loss of LE muscles  RESPIRATORY: Normal respiratory effort; lungs are clear to auscultation bilaterally  CARDIOVASCULAR: Regular rate and rhythm, normal S1 and S2, no murmur/rub/gallop; No lower extremity edema; Peripheral pulses are 2+ bilaterally  ABDOMEN: soft, Nontender to palpation, nondistended, +bs  MUSCULOSKELETAL: paraspinal tenderness to palpation clubbing or cyanosis of digits;   PSYCH: A+O to person, place, and time; affect appropriate    LABS:                        8.8    6.13  )-----------( 367      ( 03 Aug 2024 04:01 )             26.4     08-03    130<L>  |  97<L>  |  20  ----------------------------<  112<H>  4.3   |  25  |  0.76    Ca    8.5      03 Aug 2024 04:01  Phos  3.3     08-03  Mg     2.00     08-03            Urinalysis Basic - ( 03 Aug 2024 04:01 )    Color: x / Appearance: x / SG: x / pH: x  Gluc: 112 mg/dL / Ketone: x  / Bili: x / Urobili: x   Blood: x / Protein: x / Nitrite: x   Leuk Esterase: x / RBC: x / WBC x   Sq Epi: x / Non Sq Epi: x / Bacteria: x          RADIOLOGY & ADDITIONAL TESTS:  Results Reviewed:   Imaging Personally Reviewed:  Electrocardiogram Personally Reviewed:    COORDINATION OF CARE:  Care Discussed with Consultants/Other Providers [Y/N]:  Prior or Outpatient Records Reviewed [Y/N]:  
[facility] Division of Hospital Medicine  Jose Dubon MD  In House Pager [phone]    Patient is a 84y old  Male who presents with a chief complaint of back pain, dysuria (21 Jul 2024 16:35)    OVERNIGHT EVENTS: no acute events.   SUBJECTIVE: no new subjective symptoms. endorsing back pain, stiffness. also endorsed feeling SOB when working with PT.   ROS: Denied Fever, Chill, CP, SOB, Abd pain, N/V/D, LE swelling or pain.     MEDICATIONS  (STANDING):  cefTRIAXone   IVPB 1000 milliGRAM(s) IV Intermittent every 24 hours  folic acid 1 milliGRAM(s) Oral daily  sodium chloride 2 Gram(s) Oral every 8 hours    MEDICATIONS  (PRN):  acetaminophen     Tablet .. 650 milliGRAM(s) Oral every 6 hours PRN Temp greater or equal to 38C (100.4F), Mild Pain (1 - 3), Moderate Pain (4 - 6)  melatonin 3 milliGRAM(s) Oral at bedtime PRN Insomnia  traMADol 75 milliGRAM(s) Oral every 6 hours PRN Severe Pain (7 - 10)  traMADol 50 milliGRAM(s) Oral every 6 hours PRN Moderate Pain (4 - 6)    CAPILLARY BLOOD GLUCOSE        I&O's Summary    21 Jul 2024 07:01  -  22 Jul 2024 07:00  --------------------------------------------------------  IN: 0 mL / OUT: 1325 mL / NET: -1325 mL    22 Jul 2024 07:01  -  22 Jul 2024 15:14  --------------------------------------------------------  IN: 0 mL / OUT: 400 mL / NET: -400 mL        Vital Signs Last 24 Hrs  T(C): 37 (22 Jul 2024 09:53), Max: 37 (22 Jul 2024 09:53)  T(F): 98.6 (22 Jul 2024 09:53), Max: 98.6 (22 Jul 2024 09:53)  HR: 82 (22 Jul 2024 09:53) (81 - 87)  BP: 127/57 (22 Jul 2024 09:53) (122/52 - 135/58)  BP(mean): --  RR: 18 (22 Jul 2024 09:53) (17 - 18)  SpO2: 99% (22 Jul 2024 09:53) (94% - 99%)    Parameters below as of 22 Jul 2024 09:53  Patient On (Oxygen Delivery Method): room air        LABS:                        7.1    5.96  )-----------( 251      ( 22 Jul 2024 11:42 )             21.1     07-22    135  |  103  |  14  ----------------------------<  108<H>  3.3<L>   |  21<L>  |  0.85    Ca    7.9<L>      22 Jul 2024 06:06            Urinalysis Basic - ( 22 Jul 2024 06:06 )    Color: x / Appearance: x / SG: x / pH: x  Gluc: 108 mg/dL / Ketone: x  / Bili: x / Urobili: x   Blood: x / Protein: x / Nitrite: x   Leuk Esterase: x / RBC: x / WBC x   Sq Epi: x / Non Sq Epi: x / Bacteria: x        Culture - Blood (collected 21 Jul 2024 05:15)  Source: .Blood Blood-Peripheral  Preliminary Report (22 Jul 2024 11:12):    No growth at 24 hours    Culture - Blood (collected 21 Jul 2024 05:00)  Source: .Blood Blood-Peripheral  Preliminary Report (22 Jul 2024 11:12):    No growth at 24 hours    Culture - Blood (collected 20 Jul 2024 22:50)  Source: .Blood Blood-Peripheral  Preliminary Report (22 Jul 2024 06:01):    No growth at 24 hours    Culture - Blood (collected 20 Jul 2024 22:35)  Source: .Blood Blood-Peripheral  Preliminary Report (22 Jul 2024 06:01):    No growth at 24 hours      RADIOLOGY & ADDITIONAL TESTS:  Results Reviewed: Y  Imaging Personally Reviewed: Y  Electrocardiogram Personally Reviewed: Y    COORDINATION OF CARE:  Care Discussed with Consultants/Other Providers [Y/N]: Y  Prior or Outpatient Records Reviewed [Y/N]: Y  
[facility] Division of Hospital Medicine  Yonny Patterson MD  Pager (M-F, 2W-3O): [phone]  Other Times:  [phone]    Patient is a 84y old  Male who presents with a chief complaint of back pain, dysuria (08 Aug 2024 10:48)      SUBJECTIVE / OVERNIGHT EVENTS: seen this AM states he feels weak generalized; pain in back relieved with pain meds encouraged to get out of bed. refused lidocaine patch     MEDICATIONS  (STANDING):  albuterol/ipratropium for Nebulization 3 milliLiter(s) Nebulizer every 6 hours  cefTRIAXone   IVPB 2000 milliGRAM(s) IV Intermittent every 24 hours  cyclobenzaprine 5 milliGRAM(s) Oral three times a day  enoxaparin Injectable 40 milliGRAM(s) SubCutaneous every 24 hours  folic acid 1 milliGRAM(s) Oral daily  gabapentin 400 milliGRAM(s) Oral three times a day  lactobacillus acidophilus 1 Tablet(s) Oral daily  lidocaine   4% Patch 1 Patch Transdermal daily  polyethylene glycol 3350 17 Gram(s) Oral two times a day  senna 2 Tablet(s) Oral at bedtime    MEDICATIONS  (PRN):  melatonin 3 milliGRAM(s) Oral at bedtime PRN Insomnia  ondansetron Injectable 4 milliGRAM(s) IV Push every 8 hours PRN Nausea and/or Vomiting  oxyCODONE    IR 5 milliGRAM(s) Oral every 4 hours PRN Moderate Pain (4 - 6)  oxyCODONE    IR 7.5 milliGRAM(s) Oral every 4 hours PRN Severe Pain (7 - 10)      CAPILLARY BLOOD GLUCOSE      POCT Blood Glucose.: 135 mg/dL (07 Aug 2024 17:16)    I&O's Summary    07 Aug 2024 07:01  -  08 Aug 2024 07:00  --------------------------------------------------------  IN: 0 mL / OUT: 890 mL / NET: -890 mL        PHYSICAL EXAM:  Vital Signs Last 24 Hrs  T(C): 36.3 (08 Aug 2024 11:30), Max: 36.7 (07 Aug 2024 21:40)  T(F): 97.3 (08 Aug 2024 11:30), Max: 98 (07 Aug 2024 21:40)  HR: 84 (08 Aug 2024 11:30) (84 - 93)  BP: 126/57 (08 Aug 2024 11:30) (108/57 - 128/73)  BP(mean): --  RR: 18 (08 Aug 2024 11:30) (18 - 18)  SpO2: 98% (08 Aug 2024 11:30) (97% - 98%)    Parameters below as of 08 Aug 2024 11:30  Patient On (Oxygen Delivery Method): room air    CONSTITUTIONAL: NAD   RESPIRATORY: Normal respiratory effort; lungs are clear to auscultation bilaterally  CARDIOVASCULAR: Regular rate and rhythm, normal S1 and S2, no murmur/rub/gallop; No lower extremity edema; Peripheral pulses are 2+ bilaterally  ABDOMEN: soft, Nontender to palpation, nondistended, +bs  MUSCULOSKELETAL: able to flex at knee; LE distal strength 5/5 b/l ; weakness LT hip; sensation intact; UE PICC    PSYCH: A+O to person, place, and time; affect appropriate    LABS:                        8.9    5.74  )-----------( 353      ( 07 Aug 2024 07:08 )             27.2     08-07    132<L>  |  97<L>  |  23  ----------------------------<  106<H>  4.1   |  23  |  0.78    Ca    8.8      07 Aug 2024 07:08  Phos  3.8     08-07  Mg     2.20     08-07            Urinalysis Basic - ( 07 Aug 2024 07:08 )    Color: x / Appearance: x / SG: x / pH: x  Gluc: 106 mg/dL / Ketone: x  / Bili: x / Urobili: x   Blood: x / Protein: x / Nitrite: x   Leuk Esterase: x / RBC: x / WBC x   Sq Epi: x / Non Sq Epi: x / Bacteria: x          RADIOLOGY & ADDITIONAL TESTS:  Results Reviewed:   Imaging Personally Reviewed:  Electrocardiogram Personally Reviewed:    COORDINATION OF CARE:  Care Discussed with Consultants/Other Providers [Y/N]:  Prior or Outpatient Records Reviewed [Y/N]:  
[facility] Division of Hospital Medicine  Yonny Patterson MD  Pager (T-F, 5D-0F): [phone]  Other Times:  [phone]    Patient is a 84y old  Male who presents with a chief complaint of back pain, dysuria (05 Aug 2024 11:53)      SUBJECTIVE / OVERNIGHT EVENTS: notes b/l hip pain chronic attributes it to being in bed.       MEDICATIONS  (STANDING):  albuterol/ipratropium for Nebulization 3 milliLiter(s) Nebulizer every 6 hours  cefTRIAXone   IVPB 2000 milliGRAM(s) IV Intermittent every 24 hours  cyclobenzaprine 5 milliGRAM(s) Oral three times a day  enoxaparin Injectable 40 milliGRAM(s) SubCutaneous every 24 hours  folic acid 1 milliGRAM(s) Oral daily  gabapentin 300 milliGRAM(s) Oral two times a day  lactobacillus acidophilus 1 Tablet(s) Oral daily  lidocaine   4% Patch 1 Patch Transdermal daily  polyethylene glycol 3350 17 Gram(s) Oral two times a day  senna 2 Tablet(s) Oral at bedtime    MEDICATIONS  (PRN):  acetaminophen     Tablet .. 650 milliGRAM(s) Oral every 6 hours PRN Temp greater or equal to 38C (100.4F), Mild Pain (1 - 3), Moderate Pain (4 - 6)  melatonin 3 milliGRAM(s) Oral at bedtime PRN Insomnia  ondansetron Injectable 4 milliGRAM(s) IV Push every 8 hours PRN Nausea and/or Vomiting  oxyCODONE    IR 5 milliGRAM(s) Oral every 4 hours PRN Moderate Pain (4 - 6)  oxyCODONE    IR 7.5 milliGRAM(s) Oral every 4 hours PRN Severe Pain (7 - 10)      CAPILLARY BLOOD GLUCOSE        I&O's Summary    04 Aug 2024 07:01  -  05 Aug 2024 07:00  --------------------------------------------------------  IN: 770 mL / OUT: 1770 mL / NET: -1000 mL    05 Aug 2024 07:01  -  05 Aug 2024 14:44  --------------------------------------------------------  IN: 500 mL / OUT: 0 mL / NET: 500 mL        PHYSICAL EXAM:  Vital Signs Last 24 Hrs  T(C): 37.1 (05 Aug 2024 12:13), Max: 37.1 (04 Aug 2024 21:20)  T(F): 98.8 (05 Aug 2024 12:13), Max: 98.8 (05 Aug 2024 12:13)  HR: 78 (05 Aug 2024 12:13) (78 - 90)  BP: 150/61 (05 Aug 2024 12:13) (135/85 - 150/61)  BP(mean): --  RR: 18 (05 Aug 2024 12:13) (18 - 18)  SpO2: 96% (05 Aug 2024 12:13) (96% - 97%)    Parameters below as of 05 Aug 2024 12:13  Patient On (Oxygen Delivery Method): room air    CONSTITUTIONAL: NAD   RESPIRATORY: Normal respiratory effort; lungs are clear to auscultation bilaterally  CARDIOVASCULAR: Regular rate and rhythm, normal S1 and S2, no murmur/rub/gallop; No lower extremity edema; Peripheral pulses are 2+ bilaterally  ABDOMEN: soft, Nontender to palpation, nondistended, +bs  MUSCULOSKELETAL: paraspinal tenderness to palpation clubbing or cyanosis of digits;   PSYCH: A+O to person, place, and time; affect appropriate      LABS:                        9.2    6.43  )-----------( 404      ( 05 Aug 2024 07:05 )             27.4     08-05    133<L>  |  98  |  21  ----------------------------<  131<H>  4.1   |  24  |  0.82    Ca    9.0      05 Aug 2024 07:05  Phos  3.9     08-05  Mg     2.00     08-05            Urinalysis Basic - ( 05 Aug 2024 07:05 )    Color: x / Appearance: x / SG: x / pH: x  Gluc: 131 mg/dL / Ketone: x  / Bili: x / Urobili: x   Blood: x / Protein: x / Nitrite: x   Leuk Esterase: x / RBC: x / WBC x   Sq Epi: x / Non Sq Epi: x / Bacteria: x          RADIOLOGY & ADDITIONAL TESTS:  Results Reviewed:   Imaging Personally Reviewed:  Electrocardiogram Personally Reviewed:    COORDINATION OF CARE:  Care Discussed with Consultants/Other Providers [Y/N]:  Prior or Outpatient Records Reviewed [Y/N]:  
[facility] Division of Hospital Medicine  Yonny Patterson MD  Pager (T-F, 2L-4Y): [phone]  Other Times:  [phone]    Patient is a 84y old  Male who presents with a chief complaint of back pain, dysuria (05 Aug 2024 14:43)      SUBJECTIVE / OVERNIGHT EVENTS: s/p PICC line placement; notes improved       MEDICATIONS  (STANDING):  acetaminophen     Tablet .. 650 milliGRAM(s) Oral every 6 hours  albuterol/ipratropium for Nebulization 3 milliLiter(s) Nebulizer every 6 hours  cefTRIAXone   IVPB 2000 milliGRAM(s) IV Intermittent every 24 hours  cyclobenzaprine 5 milliGRAM(s) Oral three times a day  enoxaparin Injectable 40 milliGRAM(s) SubCutaneous every 24 hours  folic acid 1 milliGRAM(s) Oral daily  gabapentin 300 milliGRAM(s) Oral three times a day  lactobacillus acidophilus 1 Tablet(s) Oral daily  lidocaine   4% Patch 1 Patch Transdermal daily  polyethylene glycol 3350 17 Gram(s) Oral two times a day  senna 2 Tablet(s) Oral at bedtime    MEDICATIONS  (PRN):  melatonin 3 milliGRAM(s) Oral at bedtime PRN Insomnia  ondansetron Injectable 4 milliGRAM(s) IV Push every 8 hours PRN Nausea and/or Vomiting  oxyCODONE    IR 5 milliGRAM(s) Oral every 4 hours PRN Moderate Pain (4 - 6)  oxyCODONE    IR 7.5 milliGRAM(s) Oral every 4 hours PRN Severe Pain (7 - 10)      CAPILLARY BLOOD GLUCOSE        I&O's Summary    05 Aug 2024 07:01  -  06 Aug 2024 07:00  --------------------------------------------------------  IN: 750 mL / OUT: 870 mL / NET: -120 mL    06 Aug 2024 07:01  -  06 Aug 2024 17:20  --------------------------------------------------------  IN: 400 mL / OUT: 200 mL / NET: 200 mL        PHYSICAL EXAM:  Vital Signs Last 24 Hrs  T(C): 36.5 (06 Aug 2024 11:29), Max: 36.9 (05 Aug 2024 21:18)  T(F): 97.7 (06 Aug 2024 11:29), Max: 98.5 (05 Aug 2024 21:18)  HR: 70 (06 Aug 2024 11:29) (70 - 88)  BP: 119/56 (06 Aug 2024 11:29) (119/56 - 142/60)  BP(mean): --  RR: 18 (06 Aug 2024 11:29) (18 - 18)  SpO2: 96% (06 Aug 2024 11:29) (96% - 98%)    Parameters below as of 06 Aug 2024 05:20  Patient On (Oxygen Delivery Method): room air    CONSTITUTIONAL: nad, loss of LE muscles  RESPIRATORY: Normal respiratory effort; lungs are clear to auscultation bilaterally  CARDIOVASCULAR: Regular rate and rhythm, normal S1 and S2, no murmur/rub/gallop; No lower extremity edema; Peripheral pulses are 2+ bilaterally  ABDOMEN: soft, Nontender to palpation, nondistended, +bs  MUSCULOSKELETAL: paraspinal tenderness to palpation clubbing or cyanosis of digits; UE PICC    PSYCH: A+O to person, place, and time; affect appropriate      LABS:                        9.2    5.84  )-----------( 367      ( 06 Aug 2024 06:41 )             28.5     08-06    132<L>  |  98  |  21  ----------------------------<  108<H>  4.4   |  23  |  0.72    Ca    8.9      06 Aug 2024 06:41  Phos  3.9     08-06  Mg     2.00     08-06            Urinalysis Basic - ( 06 Aug 2024 06:41 )    Color: x / Appearance: x / SG: x / pH: x  Gluc: 108 mg/dL / Ketone: x  / Bili: x / Urobili: x   Blood: x / Protein: x / Nitrite: x   Leuk Esterase: x / RBC: x / WBC x   Sq Epi: x / Non Sq Epi: x / Bacteria: x          RADIOLOGY & ADDITIONAL TESTS:  Results Reviewed:   Imaging Personally Reviewed:  Electrocardiogram Personally Reviewed:    COORDINATION OF CARE:  Care Discussed with Consultants/Other Providers [Y/N]:  Prior or Outpatient Records Reviewed [Y/N]:  
Dr. Annamarie Scherer  Pager [phone]    PROGRESS NOTE:     Patient is a 84y old  Male who presents with a chief complaint of back pain, dysuria (30 Jul 2024 09:40)      SUBJECTIVE / OVERNIGHT EVENTS: denies chest pain or sob   ADDITIONAL REVIEW OF SYSTEMS: afebrile , c/o significant back pain     MEDICATIONS  (STANDING):  albuterol/ipratropium for Nebulization 3 milliLiter(s) Nebulizer every 6 hours  cefTRIAXone   IVPB 2000 milliGRAM(s) IV Intermittent every 24 hours  cefTRIAXone   IVPB      folic acid 1 milliGRAM(s) Oral daily  lactobacillus acidophilus 1 Tablet(s) Oral daily  lidocaine   4% Patch 1 Patch Transdermal daily  polyethylene glycol 3350 17 Gram(s) Oral two times a day  senna 2 Tablet(s) Oral at bedtime    MEDICATIONS  (PRN):  acetaminophen     Tablet .. 650 milliGRAM(s) Oral every 6 hours PRN Temp greater or equal to 38C (100.4F), Mild Pain (1 - 3), Moderate Pain (4 - 6)  famotidine Injectable 20 milliGRAM(s) IV Push once PRN abdominal pain  melatonin 3 milliGRAM(s) Oral at bedtime PRN Insomnia  morphine  - Injectable 2 milliGRAM(s) IV Push every 6 hours PRN Severe Pain (7 - 10)  ondansetron Injectable 4 milliGRAM(s) IV Push every 8 hours PRN Nausea and/or Vomiting      CAPILLARY BLOOD GLUCOSE        I&O's Summary    29 Jul 2024 07:01  -  30 Jul 2024 07:00  --------------------------------------------------------  IN: 0 mL / OUT: 300 mL / NET: -300 mL    30 Jul 2024 07:01  -  30 Jul 2024 13:51  --------------------------------------------------------  IN: 400 mL / OUT: 550 mL / NET: -150 mL        PHYSICAL EXAM:  Vital Signs Last 24 Hrs  T(C): 36.3 (30 Jul 2024 11:01), Max: 36.8 (29 Jul 2024 20:10)  T(F): 97.3 (30 Jul 2024 11:01), Max: 98.2 (29 Jul 2024 20:10)  HR: 68 (30 Jul 2024 11:01) (61 - 94)  BP: 143/63 (30 Jul 2024 11:01) (132/67 - 163/57)  BP(mean): 81 (29 Jul 2024 17:00) (79 - 86)  RR: 18 (30 Jul 2024 11:01) (14 - 18)  SpO2: 96% (30 Jul 2024 11:01) (93% - 97%)    Parameters below as of 30 Jul 2024 11:01  Patient On (Oxygen Delivery Method): room air      CONSTITUTIONAL: nad  RESPIRATORY: Normal respiratory effort; lungs are clear to auscultation bilaterally  CARDIOVASCULAR: Regular rate and rhythm, normal S1 and S2, no murmur/rub/gallop; No lower extremity edema; Peripheral pulses are 2+ bilaterally  ABDOMEN: Nontender to palpation, suprapubic ttp, soft   MUSCULOSKELETAL: thoracic spine ttp,  no clubbing or cyanosis of digits;   PSYCH: A+O to person, place, and time; affect appropriate      LABS:                        8.6    5.50  )-----------( 361      ( 30 Jul 2024 06:21 )             25.4     07-30    132<L>  |  98  |  19  ----------------------------<  96  3.9   |  25  |  0.80    Ca    8.5      30 Jul 2024 06:21  Phos  3.4     07-30  Mg     2.00     07-30      PT/INR - ( 29 Jul 2024 07:13 )   PT: 12.7 sec;   INR: 1.13 ratio         PTT - ( 29 Jul 2024 07:13 )  PTT:35.1 sec      Urinalysis Basic - ( 30 Jul 2024 06:21 )    Color: x / Appearance: x / SG: x / pH: x  Gluc: 96 mg/dL / Ketone: x  / Bili: x / Urobili: x   Blood: x / Protein: x / Nitrite: x   Leuk Esterase: x / RBC: x / WBC x   Sq Epi: x / Non Sq Epi: x / Bacteria: x        Culture - Fungal, Tissue (collected 29 Jul 2024 15:15)  Source: .Tissue t ii / t12 ascites biopsy  Preliminary Report (30 Jul 2024 06:51):    Testing in progress    Culture - Acid Fast - Tissue w/Smear (collected 29 Jul 2024 15:15)  Source: .Tissue t ii / t12 ascites biopsy    Culture - Tissue with Gram Stain (collected 29 Jul 2024 15:15)  Source: .Tissue t ii / t12 ascites biopsy  Gram Stain (30 Jul 2024 04:13):    No polymorphonuclear cells seen per low power field    No organisms seen per oil power field        RADIOLOGY & ADDITIONAL TESTS:  Results Reviewed:   Imaging Personally Reviewed:    Electrocardiogram Personally Reviewed:    COORDINATION OF CARE:  Care Discussed with Consultants/Other Providers [Y/N]:  Prior or Outpatient Records Reviewed [Y/N]:  
[facility] Division of Hospital Medicine  Jose Dubon MD  In House Pager [phone]    Patient is a 84y old  Male who presents with a chief complaint of back pain, dysuria (26 Jul 2024 14:43)    OVERNIGHT EVENTS: no acute events.   SUBJECTIVE: no new subjective symptoms. s/p C-scope w/o complications. still complains of back pain.   ROS: Denied Fever, Chill, CP, SOB, Abd pain, N/V/D, LE swelling or pain.     MEDICATIONS  (STANDING):  albuterol/ipratropium for Nebulization 3 milliLiter(s) Nebulizer every 6 hours  cefTRIAXone   IVPB 2000 milliGRAM(s) IV Intermittent every 24 hours  cefTRIAXone   IVPB      folic acid 1 milliGRAM(s) Oral daily  lactobacillus acidophilus 1 Tablet(s) Oral daily  lidocaine   4% Patch 1 Patch Transdermal daily  polyethylene glycol 3350 17 Gram(s) Oral daily  senna 2 Tablet(s) Oral at bedtime    MEDICATIONS  (PRN):  acetaminophen     Tablet .. 650 milliGRAM(s) Oral every 6 hours PRN Temp greater or equal to 38C (100.4F), Mild Pain (1 - 3), Moderate Pain (4 - 6)  famotidine Injectable 20 milliGRAM(s) IV Push once PRN abdominal pain  melatonin 3 milliGRAM(s) Oral at bedtime PRN Insomnia  ondansetron Injectable 4 milliGRAM(s) IV Push every 8 hours PRN Nausea and/or Vomiting  oxycodone    5 mG/acetaminophen 325 mG 1 Tablet(s) Oral every 6 hours PRN Severe Pain (7 - 10)    CAPILLARY BLOOD GLUCOSE        I&O's Summary    26 Jul 2024 07:01  -  27 Jul 2024 07:00  --------------------------------------------------------  IN: 1300 mL / OUT: 550 mL / NET: 750 mL        Vital Signs Last 24 Hrs  T(C): 36.6 (27 Jul 2024 04:40), Max: 36.8 (26 Jul 2024 19:40)  T(F): 97.8 (27 Jul 2024 04:40), Max: 98.2 (26 Jul 2024 19:40)  HR: 95 (27 Jul 2024 04:40) (64 - 107)  BP: 135/64 (27 Jul 2024 04:40) (130/56 - 153/71)  BP(mean): --  RR: 16 (27 Jul 2024 04:40) (13 - 17)  SpO2: 97% (27 Jul 2024 04:40) (96% - 100%)    Parameters below as of 27 Jul 2024 04:40  Patient On (Oxygen Delivery Method): room air        LABS:                        8.1    5.65  )-----------( 241      ( 27 Jul 2024 07:25 )             24.5     07-26    136  |  98  |  14  ----------------------------<  130<H>  4.0   |  25  |  1.00    Ca    8.6      26 Jul 2024 04:19  Phos  3.3     07-26  Mg     2.00     07-26    TPro  6.8  /  Alb  2.8<L>  /  TBili  0.6  /  DBili  0.2  /  AST  33  /  ALT  30  /  AlkPhos  98  07-26    PT/INR - ( 26 Jul 2024 04:19 )   PT: 11.9 sec;   INR: 1.06 ratio         PTT - ( 26 Jul 2024 04:19 )  PTT:32.3 sec      Urinalysis Basic - ( 26 Jul 2024 04:19 )    Color: x / Appearance: x / SG: x / pH: x  Gluc: 130 mg/dL / Ketone: x  / Bili: x / Urobili: x   Blood: x / Protein: x / Nitrite: x   Leuk Esterase: x / RBC: x / WBC x   Sq Epi: x / Non Sq Epi: x / Bacteria: x        RADIOLOGY & ADDITIONAL TESTS:  Results Reviewed: Y  Imaging Personally Reviewed: Y  Electrocardiogram Personally Reviewed: Y    COORDINATION OF CARE:  Care Discussed with Consultants/Other Providers [Y/N]: Y  Prior or Outpatient Records Reviewed [Y/N]: MK

## 2024-08-09 NOTE — PROGRESS NOTE ADULT - PROBLEM SELECTOR PLAN 10
Not on medication for this; no clinical signs of obstruction (voiding w/o difficulty)
Low risk for VTE per IMPROVE score
Low risk for VTE per IMPROVE score
Not on medication for this; no clinical signs of obstruction (voiding w/o difficulty)
Low risk for VTE per IMPROVE score
Not on medication for this; no clinical signs of obstruction (voiding w/o difficulty)
Not on medication for this; no clinical signs of obstruction (voiding w/o difficulty)
Low risk for VTE per IMPROVE score

## 2024-08-09 NOTE — PROGRESS NOTE ADULT - PROBLEM SELECTOR PLAN 9
Not taking antihypertensives; BP currently at goal  Will continue to trend BP w/routine VS
Not on medication for this; no clinical signs of obstruction (voiding w/o difficulty)
Not taking antihypertensives; BP currently at goal  Will continue to trend BP w/routine VS
Not on medication for this; no clinical signs of obstruction (voiding w/o difficulty)
Not taking antihypertensives; BP currently at goal  Will continue to trend BP w/routine VS
Not on medication for this; no clinical signs of obstruction (voiding w/o difficulty)
Not on medication for this; no clinical signs of obstruction (voiding w/o difficulty)
Not taking antihypertensives; BP currently at goal  Will continue to trend BP w/routine VS

## 2024-08-09 NOTE — PROGRESS NOTE ADULT - NUTRITIONAL ASSESSMENT
This patient has been assessed with a concern for Malnutrition and has been determined to have a diagnosis/diagnoses of Severe protein-calorie malnutrition.    This patient is being managed with:   Diet Soft and Bite Sized-  1000mL Fluid Restriction ([id number])  Supplement Feeding Modality:  Oral  Ensure Clear Cans or Servings Per Day:  1       Frequency:  Three Times a day  Entered: Jul 21 2024  7:13AM  
This patient has been assessed with a concern for Malnutrition and has been determined to have a diagnosis/diagnoses of Severe protein-calorie malnutrition.    This patient is being managed with:   Diet Soft and Bite Sized-  High Fiber (HIFIBER)  Supplement Feeding Modality:  Oral  Ensure Plus High Protein Cans or Servings Per Day:  3       Frequency:  Daily  Entered: Jul 26 2024  1:26PM  
This patient has been assessed with a concern for Malnutrition and has been determined to have a diagnosis/diagnoses of Severe protein-calorie malnutrition.    This patient is being managed with:   Diet Soft and Bite Sized-  High Fiber (HIFIBER)  Supplement Feeding Modality:  Oral  Ensure Plus High Protein Cans or Servings Per Day:  3       Frequency:  Daily  Entered: Jul 26 2024  1:26PM  
This patient has been assessed with a concern for Malnutrition and has been determined to have a diagnosis/diagnoses of Severe protein-calorie malnutrition.    This patient is being managed with:   Diet Soft and Bite Sized-  High Fiber (HIFIBER)  Supplement Feeding Modality:  Oral  Ensure Plus High Protein Cans or Servings Per Day:  3       Frequency:  Daily  Entered: Jul 26 2024  1:26PM    Diet NPO-  NPO for Procedure/Test     NPO Start Date: 28-Jul-2024   NPO Start Time: 23:59  Except Medications  Entered: Jul 26 2024  1:06PM  
This patient has been assessed with a concern for Malnutrition and has been determined to have a diagnosis/diagnoses of Severe protein-calorie malnutrition.    This patient is being managed with:   Diet DASH/TLC-  Sodium & Cholesterol Restricted  Entered: Jul 19 2024  8:59AM  
This patient has been assessed with a concern for Malnutrition and has been determined to have a diagnosis/diagnoses of Severe protein-calorie malnutrition.    This patient is being managed with:   Diet Soft and Bite Sized-  High Fiber (HIFIBER)  Supplement Feeding Modality:  Oral  Ensure Plus High Protein Cans or Servings Per Day:  3       Frequency:  Daily  Entered: Jul 26 2024  1:26PM    Diet NPO-  NPO for Procedure/Test     NPO Start Date: 28-Jul-2024   NPO Start Time: 23:59  Except Medications  Entered: Jul 26 2024  1:06PM  
This patient has been assessed with a concern for Malnutrition and has been determined to have a diagnosis/diagnoses of Severe protein-calorie malnutrition.    This patient is being managed with:   Diet Soft and Bite Sized-  High Fiber (HIFIBER)  Supplement Feeding Modality:  Oral  Ensure Plus High Protein Cans or Servings Per Day:  3       Frequency:  Daily  Entered: Jul 26 2024  1:26PM  
This patient has been assessed with a concern for Malnutrition and has been determined to have a diagnosis/diagnoses of Severe protein-calorie malnutrition.    This patient is being managed with:   Diet Soft and Bite Sized-  1000mL Fluid Restriction ([id number])  Supplement Feeding Modality:  Oral  Ensure Clear Cans or Servings Per Day:  1       Frequency:  Three Times a day  Entered: Jul 21 2024  7:13AM  
This patient has been assessed with a concern for Malnutrition and has been determined to have a diagnosis/diagnoses of Severe protein-calorie malnutrition.    This patient is being managed with:   Diet NPO-  NPO for Procedure/Test     NPO Start Date: 28-Jul-2024   NPO Start Time: 23:59  Except Medications  Entered: Jul 26 2024  1:06PM    Diet Clear Liquid-  1000mL Fluid Restriction ([id number])  Supplement Feeding Modality:  Oral  Ensure Clear Cans or Servings Per Day:  3       Frequency:  Daily  Entered: Jul 25 2024  2:05PM  
This patient has been assessed with a concern for Malnutrition and has been determined to have a diagnosis/diagnoses of Severe protein-calorie malnutrition.    This patient is being managed with:   Diet Soft and Bite Sized-  High Fiber (HIFIBER)  Supplement Feeding Modality:  Oral  Ensure Plus High Protein Cans or Servings Per Day:  3       Frequency:  Daily  Entered: Jul 26 2024  1:26PM  
This patient has been assessed with a concern for Malnutrition and has been determined to have a diagnosis/diagnoses of Severe protein-calorie malnutrition.    This patient is being managed with:   Diet Soft and Bite Sized-  High Fiber (HIFIBER)  Supplement Feeding Modality:  Oral  Ensure Plus High Protein Cans or Servings Per Day:  3       Frequency:  Daily  Entered: Jul 26 2024  1:26PM  
This patient has been assessed with a concern for Malnutrition and has been determined to have a diagnosis/diagnoses of Severe protein-calorie malnutrition.    This patient is being managed with:   Diet NPO-  NPO for Procedure/Test     NPO Start Date: 25-Jul-2024   NPO Start Time: 00:00  Except Medications  Entered: Jul 25 2024 10:31AM  
This patient has been assessed with a concern for Malnutrition and has been determined to have a diagnosis/diagnoses of Severe protein-calorie malnutrition.    This patient is being managed with:   Diet Soft and Bite Sized-  High Fiber (HIFIBER)  Supplement Feeding Modality:  Oral  Ensure Plus High Protein Cans or Servings Per Day:  3       Frequency:  Daily  Entered: Jul 26 2024  1:26PM    Diet NPO-  NPO for Procedure/Test     NPO Start Date: 28-Jul-2024   NPO Start Time: 23:59  Except Medications  Entered: Jul 26 2024  1:06PM  
This patient has been assessed with a concern for Malnutrition and has been determined to have a diagnosis/diagnoses of Severe protein-calorie malnutrition.    This patient is being managed with:   Diet Soft and Bite Sized-  1000mL Fluid Restriction ([id number])  Supplement Feeding Modality:  Oral  Ensure Clear Cans or Servings Per Day:  1       Frequency:  Three Times a day  Entered: Jul 21 2024  7:13AM  
This patient has been assessed with a concern for Malnutrition and has been determined to have a diagnosis/diagnoses of Severe protein-calorie malnutrition.    This patient is being managed with:   Diet NPO after Midnight-     NPO Start Date: 24-Jul-2024   NPO Start Time: 23:59  Except Medications  Entered: Jul 24 2024  1:28PM    Diet Clear Liquid-  1000mL Fluid Restriction ([id number])  Supplement Feeding Modality:  Oral  Ensure Clear Cans or Servings Per Day:  3       Frequency:  Daily  Entered: Jul 24 2024  8:55AM  
This patient has been assessed with a concern for Malnutrition and has been determined to have a diagnosis/diagnoses of Severe protein-calorie malnutrition.    This patient is being managed with:   Diet Soft and Bite Sized-  High Fiber (HIFIBER)  Supplement Feeding Modality:  Oral  Ensure Plus High Protein Cans or Servings Per Day:  3       Frequency:  Daily  Entered: Jul 26 2024  1:26PM  

## 2024-08-09 NOTE — PROGRESS NOTE ADULT - PROBLEM SELECTOR PROBLEM 6
Atelectasis
Hyponatremia
Essential hypertension
Essential hypertension
Hyponatremia
Hyponatremia
Essential hypertension
Hyponatremia
Atelectasis
Hyponatremia
Hyponatremia
Atelectasis
Atelectasis
Hyponatremia

## 2024-08-09 NOTE — PROGRESS NOTE ADULT - PROBLEM SELECTOR PROBLEM 2
Low back pain
Anemia
Dyspnea
Low back pain
Anemia
Dyspnea
Low back pain
Pyelonephritis
Anemia
Dyspnea
Pyelonephritis
Dyspnea
Dyspnea
Pyelonephritis
Dyspnea
Pyelonephritis
Low back pain
Dyspnea

## 2024-08-09 NOTE — PROGRESS NOTE ADULT - PROBLEM SELECTOR PROBLEM 7
Atelectasis
BPH without urinary obstruction
Atelectasis
Hypoalbuminemia
Atelectasis
Hypoalbuminemia
Atelectasis
BPH without urinary obstruction
BPH without urinary obstruction
Hypoalbuminemia
Atelectasis
Hypoalbuminemia

## 2024-08-09 NOTE — CHART NOTE - NSCHARTNOTESELECT_GEN_ALL_CORE
Event Note
Follow-up/Nutrition Services
IR note/Event Note
Nutrition Services
Event Note
Follow-up/Nutrition Services

## 2024-08-09 NOTE — PROGRESS NOTE ADULT - PROBLEM SELECTOR PROBLEM 4
Pyelonephritis
Atelectasis
Low back pain
Anemia
Pyelonephritis
Atelectasis
Atelectasis
Low back pain
Pyelonephritis
Low back pain
Anemia
Pyelonephritis
Low back pain
Low back pain
Anemia
Anemia

## 2024-08-09 NOTE — PROGRESS NOTE ADULT - PROBLEM SELECTOR PLAN 1
- CT T and L spine reviewed- no acute changes  - ESR/CRP elevated  - Given worsening weakness, need to rule out epidural plegmon or spinal compression  - Need urgent MRI T Spine w/wo contrast  - Discussed need for MRI with patient this AM and discussed that if compressive collection present then may need urgent surgery for washout/decompression. Patient is adamantly against surgery and agrees comprehension of risk and would like to c/w antibiotics and go to rehab. When asked if team could speak with family, he stated he will be the one making this decision  - D/w primary team   - Will continue to see patient     [id number]    Case discussed with attending neurosurgeon Dr. Bergeron

## 2024-08-09 NOTE — PROGRESS NOTE ADULT - PROBLEM SELECTOR PLAN 3
clinical signs of dyspnea, CT showed small pleural effusion.   CTPA neg for PE, showed loculated effusion on the R side. small left pl eff.   proBNP elevated, denied h/o heart disease. no murmurs.   respiratory improved with lasix 20 ivp x2; echo obtained after lasix showed normal LV function EF 67%, no LVH, no valvular dz.   - possibly diastolic HF iso fluid resuscitation and blood transfusion.  - no further lasix.  - monitor clinically.
-Will c/w CTX IV   - UCx growing E coli sensitive to CTX and augmentin.   - BCx 7/19 coag neg staph 1/2 bottles likely contaminant from procurement , repeat BCx from 7/21 NGTD.   - s/p bactroban x5 days for MSSA pcr +  - c/w CTX started on 7/19, IR bx no growth, plan for Ceftriaxone 2g qd through 9/13 per ID, will need PICC line prior to DC
Moderate hyponatremia on labs; he is grossly euvolemic. Received IVNS in ED.  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
- trend CrP, will get CT cervical/thoracic/lumbar spine as pt c/o back pain for months  - CT spine showed T11-T12 erosive changes are identified involving the superior endplate of T12 and to a lesser extent the inferior endplate of T11. Paravertebral soft tissue thickening is noted.   - MR T spine with contrast to assess possible infectious etiology.   - c/w Tramadol for pain.   - lidocaine patch  - PT rec outpatient PT.
-Will c/w CTX IV   - UCx growing E coli sensitive to CTX and augmentin.   - BCx 7/19 coag neg staph 1/2 bottles likely contaminant from procurement , repeat BCx from 7/21 NGTD.   - bactroban ointment to nostrils x5 days for MSSA pcr +  - c/w CTX started on 7/19, IR bx no growth, plan for Ceftriaxone 2g qd through 9/13 per ID, will need PICC line prior to DC
-Will c/w CTX IV   - UCx growing E coli sensitive to CTX and augmentin.   - BCx 7/19 coag neg staph 1/2 bottles likely contaminant from procurement , repeat BCx from 7/21 NGTD.   - bactroban ointment to nostrils x5 days for MSSA pcr +  - c/w CTX started on 7/19, per ID if IR cx negative, will need PICC line and plan for 6-8 wk course of abx
-Will c/w CTX IV   - UCx growing E coli sensitive to CTX and augmentin.   - BCx 7/19 coag neg staph 1/2 bottles likely contaminant from procurement , repeat BCx from 7/21 NGTD.   - bactroban ointment to nostrils x5 days for MSSA pcr +  - c/w CTX started on 7/19, per ID if IR cx negative, will need PICC line and plan for 6-8 wk course of abx
-Will c/w CTX IV   - UCx growing E coli sensitive to CTX and augmentin.   - BCx 7/19 coag neg staph 1/2 bottles likely contaminant from procurement , repeat BCx from 7/21 NGTD.   - bactroban ointment to nostrils x5 days for MSSA pcr +  - c/w CTX started on 7/19.
Moderate hyponatremia on labs; he is grossly euvolemic. Received IVNS in ED.  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
Moderate hyponatremia on labs; he is grossly euvolemic. Received IVNS in ED.  -Will repeat BMP in AM and encourage PO hydration to thirst  - fluid restriction, salt tab 2g tid , check urine lytes/osm, trend Na, fluid restriction
clinical signs of dyspnea, CT showed small pleural effusion.   CTPA neg for PE, showed loculated effusion on the R side. small left pl eff.   proBNP elevated, denied h/o heart disease. no murmurs.   respiratory improved with lasix 20 ivp x2; echo obtained after lasix showed normal LV function EF 67%, no LVH, no valvular dz.   - possibly diastolic HF iso fluid resuscitation and blood transfusion.  - no further lasix.  - monitor clinically.  - resolved
- trend CrP, will get CT cervical/thoracic/lumbar spine as pt c/o back pain for months  - CT spine showed T11-T12 erosive changes are identified involving the superior endplate of T12 and to a lesser extent the inferior endplate of T11. Paravertebral soft tissue thickening is noted.   - MR T spine with contrast to assess possible infectious etiology.   - increase CTX to 2g for OM coverage until MRI results.   - MRSA pcr neg.   - c/w Tramadol/Percocet for moderate/severe pain.   - lidocaine patch  - PT rec outpatient PT.
- UCx growing E coli sensitive to CTX and augmentin.   - BCx 7/19 coag neg staph 1/2 bottles likely contaminant from procurement , repeat BCx from 7/21 NGTD.   - s/p bactroban x5 days for MSSA pcr +  - c/w CTX started on 7/19, IR bx no growth, plan for Ceftriaxone 2g qd through 9/13 per ID
-Will c/w CTX IV   - UCx growing E coli sensitive to CTX and augmentin.   - BCx 7/19 coag neg staph 1/2 bottles likely contaminant from procurement , repeat BCx from 7/21 NGTD.   - bactroban ointment to nostrils x5 days for MSSA pcr +  - c/w CTX started on 7/19, per ID if IR cx negative, will need PICC line and plan for 6-8 wk course of abx
- trend CrP, will get CT cervical/thoracic/lumbar spine as pt c/o back pain for months  - CT spine showed T11-T12 erosive changes are identified involving the superior endplate of T12 and to a lesser extent the inferior endplate of T11. Paravertebral soft tissue thickening is noted.   - MRSA pcr neg.   - c/w Tramadol/Percocet for moderate/severe pain.   - lidocaine patch  - PT rec outpatient PT.  - MR T spine showed concern for discitis vs OM of T11/T12 spine  - c/w CTX 2g qd  - ID consult for abx recs.   - IR consult for biopsy/sampling/culture - scheduled for 7/29  - Neurosurgery consult - no plan for surgery given negative neurologic symptoms.
clinical signs of dyspnea, CT showed small pleural effusion.   CTPA neg for PE, showed loculated effusion on the R side. small left pl eff.   proBNP elevated, denied h/o heart disease. no murmurs.   respiratory improved with lasix 20 ivp x2; echo obtained after lasix showed normal LV function EF 67%, no LVH, no valvular dz.   - possibly diastolic HF iso fluid resuscitation and blood transfusion.  - no further lasix.  - monitor clinically.  - resolved
-Will c/w CTX IV   - UCx growing E coli sensitive to CTX and augmentin.   - BCx 7/19 coag neg staph 1/2 bottles likely contaminant from procurement , repeat BCx from 7/21 NGTD.   - bactroban ointment to nostrils x5 days for MSSA pcr +  - c/w CTX started on 7/19.
-Will c/w CTX IV   - UCx growing E coli sensitive to CTX and augmentin.   - BCx 7/19 coag neg staph 1/2 bottles likely contaminant from procurement , repeat BCx from 7/21 NGTD.   - s/p bactroban x5 days for MSSA pcr +  - c/w CTX started on 7/19, IR bx no growth, plan for Ceftriaxone 2g qd through 9/13 per ID, will need PICC line prior to DC
clinical signs of dyspnea, CT showed small pleural effusion.   CTPA neg for PE, showed loculated effusion on the R side. small left pl eff.   proBNP elevated, denied h/o heart disease. no murmurs.   respiratory improved with lasix 20 ivp x2; echo obtained after lasix showed normal LV function EF 67%, no LVH, no valvular dz.   - possibly diastolic HF iso fluid resuscitation and blood transfusion.  - no further lasix.  - monitor clinically.  - resolved
-Will c/w CTX IV   - UCx growing E coli sensitive to CTX and augmentin.   - BCx 7/19 coag neg staph 1/2 bottles likely contaminant from procurement , repeat BCx from 7/21 NGTD.   - bactroban ointment to nostrils x5 days for MSSA pcr +  - c/w CTX started on 7/19.
- trend CrP, will get CT cervical/thoracic/lumbar spine as pt c/o back pain for months  - CT spine showed T11-T12 erosive changes are identified involving the superior endplate of T12 and to a lesser extent the inferior endplate of T11. Paravertebral soft tissue thickening is noted.   - MR T spine with contrast to assess possible infectious etiology.   - increase CTX to 2g for OM coverage until MRI results.   - c/w Tramadol/Percocet for moderate/severe pain.   - lidocaine patch  - PT rec outpatient PT.

## 2024-08-09 NOTE — PROGRESS NOTE ADULT - PROBLEM SELECTOR PROBLEM 9
Essential hypertension
BPH without urinary obstruction
Essential hypertension
Essential hypertension
BPH without urinary obstruction
Essential hypertension

## 2024-08-09 NOTE — PROGRESS NOTE ADULT - PROBLEM SELECTOR PLAN 8
Not taking antihypertensives; BP currently at goal  Will continue to trend BP w/routine VS
Not taking antihypertensives; BP currently at goal  Will continue to trend BP w/routine VS
Severe malnutrition, seen by nutrition  regular diet + supplement
Low risk for VTE per IMPROVE score
Low risk for VTE per IMPROVE score
Severe malnutrition, seen by nutrition  regular diet + supplement
Low risk for VTE per IMPROVE score
Severe malnutrition, seen by nutrition  regular diet + supplement
Not taking antihypertensives; BP currently at goal  Will continue to trend BP w/routine VS
Not taking antihypertensives; BP currently at goal  Will continue to trend BP w/routine VS
Severe malnutrition, seen by nutrition  regular diet + supplement
Severe malnutrition, seen by nutrition  regular diet + supplement

## 2024-08-09 NOTE — PROGRESS NOTE ADULT - PROBLEM SELECTOR PROBLEM 8
Hypoalbuminemia
Essential hypertension
Hypoalbuminemia
Prophylactic measure
Prophylactic measure
Hypoalbuminemia
Prophylactic measure
Hypoalbuminemia
Essential hypertension
Essential hypertension
Hypoalbuminemia
Essential hypertension

## 2024-08-09 NOTE — PROGRESS NOTE ADULT - PROBLEM SELECTOR PLAN 7
CXR Linear opacities in right lower lung field, likely atelectasis. No focal   consolidation.  -incentive spirometry as tolerated
Not on medication for this; no clinical signs of obstruction (voiding w/o difficulty)
Severe malnutrition, seen by nutrition  regular diet + supplement
Severe malnutrition, seen by nutrition  regular diet + supplement
CXR Linear opacities in right lower lung field, likely atelectasis. No focal   consolidation.  -incentive spirometry as tolerated
Not on medication for this; no clinical signs of obstruction (voiding w/o difficulty)
CXR Linear opacities in right lower lung field, likely atelectasis. No focal   consolidation.  -incentive spirometry as tolerated
CXR Linear opacities in right lower lung field, likely atelectasis. No focal   consolidation.  -incentive spirometry as tolerated
Not on medication for this; no clinical signs of obstruction (voiding w/o difficulty)
CXR Linear opacities in right lower lung field, likely atelectasis. No focal   consolidation.  -incentive spirometry as tolerated
Severe malnutrition, seen by nutrition  regular diet + supplement
CXR Linear opacities in right lower lung field, likely atelectasis. No focal   consolidation.  -incentive spirometry as tolerated
Severe malnutrition, seen by nutrition  regular diet + supplement
CXR Linear opacities in right lower lung field, likely atelectasis. No focal   consolidation.  -incentive spirometry as tolerated

## 2024-08-09 NOTE — PROGRESS NOTE ADULT - PROBLEM SELECTOR PROBLEM 10
BPH without urinary obstruction
Prophylactic measure
Prophylactic measure
BPH without urinary obstruction
Prophylactic measure
Prophylactic measure
BPH without urinary obstruction

## 2024-08-09 NOTE — PROGRESS NOTE ADULT - ASSESSMENT
84M with essential HTN and BPH admitted 7/19/2024 c/o painful urination and back pain.   Patient reportedly had UTI PTA but unable to tolerate antibiotics.  No fever or leukocytosis on presentation.  Urinalysis with pyuria.  UC with E coli.  Patient has been on IV ceftriaxone since 7/19.  Continued back pain.  CT Thoracic Spine noted T11-T12 erosive changes involving the superior endplate of T12 and to a lesser extent the inferior endplate of T11; paravertebral soft tissue thickening is noted (infection versus erosive progressive degenerative change at this level).  This was followed up with MRI that showed subtle area of increased T2 prolongation involving the T11/12 disc space with widening the disc spaces identified as well; in addition there is evidence of abnormal T2 prolongation with some enhancement seen involving the T11 and T12 vertebral bodies and posterior elements; possibility of underlying discitis and osteomyelitis; also evidence of prominent soft tissue identified in the paraspinal region with associated enhancement. There is also enhancement seen involving the epidural region is well. These findings are concerning for underlying areas of phlegmon in the setting of infection.  .  ID asked to help management    UTI / epididymidis  - On ceftriaxone    Vertebral OM  - S/p IR guided biopsy  - Done while on abx  - If no organism isolated, can consider 6-8 weeks ceftriaxone  - obtain ESR    
85 yo M PMH essential HTN, BPH, presented on 7/18 with painful urination and back pain x 1wk. He is admitted for management of likely pyelonephritis. Found to be anemic as well. MRI T-spine done for back pain showed Discitis/osteomyelitis at the T11-12 level. CRP elevated to 90-100s and WBC normal. Initial blood culture positive but subsequent blood cultures have been negative.     Now s/p IR aspiration on 7/29. IR culture neg, cytopath inflammatory cells. Per ID continue ceftriaxone through 9/13. PICC was placed. Neurosurgery called again due to LE weakness. Of note pt fell on 7/31 and refused imaging at that time.       
84M PMH essential HTN, BPH, presents with a week of painful urination and back pain. He is admitted for management of likely pyelonephritis. incidentally found to have severe anemia with AoCD. CT showing concerning rectalsigmoid lesion. GI eval started. now c/b dyspnea. CXR c/f fluid overload with b/l pleural effusion. proBNP elevated. clinically not grossly fluid overload. +pleural effusion, echo was normal. 
84M PMH essential HTN, BPH, presents with a week of painful urination and back pain. He is admitted for management of likely pyelonephritis. incidentally found to have severe anemia with AoCD. CT showing concerning rectalsigmoid lesion. GI eval started. now c/b dyspnea. CXR c/f fluid overload with b/l pleural effusion. proBNP elevated. clinically not grossly fluid overload. +pleural effusion, echo was normal. 
84M with essential HTN and BPH admitted 7/19/2024 c/o painful urination and back pain.   Patient reportedly had UTI PTA but unable to tolerate antibiotics.  No fever or leukocytosis on presentation.  Urinalysis with pyuria.  UC with E coli.  Patient has been on IV ceftriaxone since 7/19.  Continued back pain.  CT Thoracic Spine noted T11-T12 erosive changes involving the superior endplate of T12 and to a lesser extent the inferior endplate of T11; paravertebral soft tissue thickening is noted (infection versus erosive progressive degenerative change at this level).  This was followed up with MRI that showed subtle area of increased T2 prolongation involving the T11/12 disc space with widening the disc spaces identified as well; in addition there is evidence of abnormal T2 prolongation with some enhancement seen involving the T11 and T12 vertebral bodies and posterior elements; possibility of underlying discitis and osteomyelitis; also evidence of prominent soft tissue identified in the paraspinal region with associated enhancement. There is also enhancement seen involving the epidural region is well. These findings are concerning for underlying areas of phlegmon in the setting of infection.  .  ID asked to help management    UTI / epididymidis  - On ceftriaxone    Vertebral OM  - S/p IR guided biopsy  - Done while on abx    - If no organism isolated, can consider 6-8 weeks ceftriaxone    - IF biopsy culture is without growth at 48-72 hours, I would place a picc line and plan a 6-8 week course of ceftriaxone    -Monitor pain. If his pain is not improving, spine surgery follow up        
84M with essential HTN and BPH admitted 7/19/2024 c/o painful urination and back pain.   Patient reportedly had UTI PTA but unable to tolerate antibiotics.  No fever or leukocytosis on presentation.  Urinalysis with pyuria.  UC with E coli.  Patient has been on IV ceftriaxone since 7/19.  Continued back pain.  CT Thoracic Spine noted T11-T12 erosive changes involving the superior endplate of T12 and to a lesser extent the inferior endplate of T11; paravertebral soft tissue thickening is noted (infection versus erosive progressive degenerative change at this level).  This was followed up with MRI that showed subtle area of increased T2 prolongation involving the T11/12 disc space with widening the disc spaces identified as well; in addition there is evidence of abnormal T2 prolongation with some enhancement seen involving the T11 and T12 vertebral bodies and posterior elements; possibility of underlying discitis and osteomyelitis; also evidence of prominent soft tissue identified in the paraspinal region with associated enhancement. There is also enhancement seen involving the epidural region is well. These findings are concerning for underlying areas of phlegmon in the setting of infection.  .  ID asked to help management    UTI / epididymidis  - On ceftriaxone    Vertebral OM  - S/p IR guided biopsy- Culture without grwoth but done on antibiotics    - Ceftriaxone 2 gram iv q 24 through 9/13  Weekly CBC and CMP  Please email weekly labs to [email or web address]  Attention to DR Elizabeth Garcia    -Monitor pain. If his pain is not improving, spine surgery follow up    Follow up with Dr Elizabeth Garcia  [address]  [place] [address] [phone]    No ID contraindication to picc placement    Will sign off  Call with questions          
85 yo M PMH essential HTN, BPH, presented on 7/18 with painful urination and back pain x 1wk. He is admitted for management of likely pyelonephritis. Found to be anemic as well. MRI T-spine done for back pain showed Discitis/osteomyelitis at the T11-12 level. CRP elevated to 90-100s and WBC normal. Initial blood culture positive but subsequent blood cultures have been negative.     Now s/p IR aspiration on 7/29. IR culture neg, cytopath inflammatory cells. Per ID continue ceftriaxone through 9/13. PICC was placed. Neurosurgery called again due to LE weakness. Of note pt fell on 7/31 and refused imaging at that time.       
LENORA BARCENAS is a 84year old Male with history of HTN, HLD BPH presenting to the hospital w/ sepsis in the setting of pyelonephritis, w/ GI consulted in the setting of new severe normocytic anemia and rectosigmoid thickening on CT.     #Severe Normocytic Anemia  #Rectosigmoid Thickening on CT   #Folate deficiency   #?Intermittent hematochezia  Anemia likely multifactorial in the setting of JANICE (w/ possible GI compoment), folate deficiency, and possible anemia of chronic disease w/ inflammation iso pyelonephritis. No prior hx of anemia, baseline hgb is 13.1, however currently Hgb 6.9-7, required 1 units pRBC 7/22, but w/ appropriate correction 6.9 --> 7.7.  Rectal exam w/o evidence of bleeding. CT A/P w/ mild rectosgimoid wall thickening/hyperemia w/ pre-sacral fluid, questionable proctitis. While no gross bleeding apparent, concern for occult GIB w/ new severe anemia and possible intermittent hematochezia. Differential for includes stercoral proctitis (given hx of intermittent constipation) vs internal hemorrhoids vs colonic malignancy, less likely but possible diverticulosis vs angioectastia, Will need to r/o malabsorptive disease, ? celiac given folate deficiency.   7/23 - Extensive discussion with patient's son Raleigh Barcenas [phone] who patient requested I discuss his care with. Discussed that given no overt bleeding that colonoscopy could be performed as an in patient vs outpatient, however both sons live out of state and the patient has been regularly cancelling his appointments and not following up with his doctors. As such, they are favoring that he could obtain the colonoscopy as an in patient, even w/ patient's baseline frailty and current recovery from pyelonephritis.    Recommendations:  -  F/u TTG IgA   - Please replete with folic acid 1mg QD  - Please start patient on Miralax QD and senna  - Current plan for colonoscopy 7/25. Please make patient NPO at midnight, obtain CBC, CMP, coags at 4 AM. If patient has not completed prep after midnight, it is okay to continue to give patient prep until he finishes it. Golytely ordered, to be given this evening.     All recommendations are preliminary until attending attestation present.    Discussed with Dr. Mcgee      Note incomplete until finalized by attending signature/attestation.    Jony Mitchell  GI/Hepatology Fellow, PGY-4    MONDAY-FRIDAY 8AM-5PM:  Please message via [employer] or email [email or web address]     On Weekends/Holidays (All day) and Weekdays after 5 PM to 8 AM  For nonurgent consults please email:  Please email [email or web address]  For urgent consults:  Please contact on call GI team. See Amion schedule ([facility]), [place] paging system ([facility]), or call hospital  ([facility])    
84M PMH essential HTN, BPH, presents with a week of painful urination and back pain. He is admitted for management of likely pyelonephritis. incidentally found to have severe anemia with AoCD. CT showing concerning rectalsigmoid lesion. GI eval started. now c/b dyspnea. CXR c/f fluid overload with b/l pleural effusion. proBNP elevated. clinically not grossly fluid overload. +pleural effusion, echo was normal. MR spine high susp for T11-12 OM. 
84M PMH essential HTN, BPH, presents with a week of painful urination and back pain. He is admitted for management of likely pyelonephritis. incidentally found to have severe anemia with AoCD. CT showing concerning rectalsigmoid lesion. GI eval started. now c/b dyspnea. CXR c/f fluid overload with b/l pleural effusion. proBNP elevated. ?CHF, clinically not grossly fluid overload. 
84M PMH essential HTN, BPH, presents with a week of painful urination and back pain. He is admitted for management of likely pyelonephritis. incidentally found to have severe anemia with AoCD. CT showing concerning rectalsigmoid lesion. GI eval started. now c/b dyspnea. CXR c/f fluid overload with b/l pleural effusion. proBNP elevated. clinically not grossly fluid overload. +pleural effusion, echo was normal. MR spine high susp for T11-12 OM. 
84M PMH essential HTN, BPH, presents with a week of painful urination and back pain. He is admitted for management of likely pyelonephritis. incidentally found to have severe anemia with AoCD. CT showing concerning rectalsigmoid lesion. GI eval started. now c/b dyspnea. CXR c/f fluid overload with b/l pleural effusion. proBNP elevated. clinically not grossly fluid overload. +pleural effusion, echo was normal. MR spine high susp for T11-12 OM. 
84M PMH essential HTN, BPH, presents with a week of painful urination and back pain. He is admitted for management of likely pyelonephritis.
84M PMH essential HTN, BPH, presents with a week of painful urination and back pain. He is admitted for management of likely pyelonephritis.
84M PMH essential HTN, BPH, presents with a week of painful urination and back pain. He is admitted for management of likely pyelonephritis. incidentally found to have severe anemia with AoCD. CT showing concerning rectalsigmoid lesion. GI eval started. now c/b dyspnea. CXR c/f fluid overload with b/l pleural effusion. proBNP elevated. clinically not grossly fluid overload. +pleural effusion, echo was normal. MR spine high susp for T11-12 OM. 
84M PMH essential HTN, BPH, presents with a week of painful urination and back pain. He is admitted for management of likely pyelonephritis. incidentally found to have severe anemia with AoCD. CT showing concerning rectalsigmoid lesions. 
84M PMH essential HTN, BPH, presents with a week of painful urination and back pain. He is admitted for management of likely pyelonephritis. incidentally found to have severe anemia with AoCD. CT showing concerning rectalsigmoid lesion. GI eval started. now c/b dyspnea. CXR c/f fluid overload with b/l pleural effusion. proBNP elevated. ?CHF vs PE. 
84M PMH essential HTN, BPH, presents with a week of painful urination and back pain. He is admitted for management of likely pyelonephritis. incidentally found to have severe anemia with AoCD. CT showing concerning rectalsigmoid lesion. GI eval started. now c/b dyspnea. CXR c/f fluid overload with b/l pleural effusion. proBNP elevated. clinically not grossly fluid overload. +pleural effusion, echo was normal. MR spine high susp for T11-12 OM. 

## 2024-08-09 NOTE — PROGRESS NOTE ADULT - TIME BILLING
- Ordering, reviewing, and interpreting labs, testing, and imaging.  - Independently obtaining a review of systems and performing a physical exam  - Reviewing consultant documentation/recommendations in addition to discussing plan of care with consultants.  - Counselling and educating patient and family regarding interpretation of aforementioned items and plan of care.
- Ordering, reviewing, and interpreting labs, testing, and imaging.  - Independently obtaining a review of systems and performing a physical exam  - Reviewing prior hospitalization and where necessary, outpatient records.  - Reviewing consultant recommendations/communicating with consultants  - Counselling and educating patient and family regarding interpretation of aforementioned items and plan of care.
- Ordering, reviewing, and interpreting labs, testing, and imaging.  - Independently obtaining a review of systems and performing a physical exam  - Reviewing consultant documentation/recommendations in addition to discussing plan of care with consultants.  - Counselling and educating patient and family regarding interpretation of aforementioned items and plan of care.
- Ordering, reviewing, and interpreting labs, testing, and imaging.  - Independently obtaining a review of systems and performing a physical exam  - Reviewing consultant documentation/recommendations in addition to discussing plan of care with consultants.  - Counselling and educating patient and family regarding interpretation of aforementioned items and plan of care.
- Ordering, reviewing, and interpreting labs, testing, and imaging.  - Independently obtaining a review of systems and performing a physical exam  - Reviewing prior hospitalization and where necessary, outpatient records.  - Reviewing consultant recommendations/communicating with consultants  - Counselling and educating patient and family regarding interpretation of aforementioned items and plan of care.
Reviewed lab data, radiology results, consultants' recommendations, documentation in [place], discussed with family, ACP, interdisciplinary staff and/or intervention were performed.
- Ordering, reviewing, and interpreting labs, testing, and imaging.  - Independently obtaining a review of systems and performing a physical exam  - Reviewing prior hospitalization and where necessary, outpatient records.  - Reviewing consultant recommendations/communicating with consultants  - Counselling and educating patient and family regarding interpretation of aforementioned items and plan of care.
- Ordering, reviewing, and interpreting labs, testing, and imaging.  - Independently obtaining a review of systems and performing a physical exam  - Reviewing prior hospitalization and where necessary, outpatient records.  - Reviewing consultant recommendations/communicating with consultants  - Counselling and educating patient and family regarding interpretation of aforementioned items and plan of care.

## 2024-08-10 ENCOUNTER — APPOINTMENT (OUTPATIENT)
Dept: NEUROSURGERY | Facility: HOSPITAL | Age: 84
End: 2024-08-10

## 2024-08-12 ENCOUNTER — APPOINTMENT (OUTPATIENT)
Dept: UROLOGY | Facility: CLINIC | Age: 84
End: 2024-08-12

## 2024-08-16 ENCOUNTER — NON-APPOINTMENT (OUTPATIENT)
Age: 84
End: 2024-08-16

## 2024-08-16 ENCOUNTER — TRANSCRIPTION ENCOUNTER (OUTPATIENT)
Age: 84
End: 2024-08-16

## 2024-08-16 ENCOUNTER — INPATIENT (INPATIENT)
Facility: HOSPITAL | Age: 84
LOS: 14 days | Discharge: NURSING, REHAB OR LONG-TERM CARE | DRG: 96 | End: 2024-08-31
Attending: PHYSICAL MEDICINE & REHABILITATION | Admitting: PHYSICAL MEDICINE & REHABILITATION
Payer: MEDICARE

## 2024-08-16 VITALS
SYSTOLIC BLOOD PRESSURE: 160 MMHG | DIASTOLIC BLOOD PRESSURE: 73 MMHG | TEMPERATURE: 98 F | HEART RATE: 88 BPM | OXYGEN SATURATION: 97 % | RESPIRATION RATE: 16 BRPM

## 2024-08-16 DIAGNOSIS — R21 RASH AND OTHER NONSPECIFIC SKIN ERUPTION: ICD-10-CM

## 2024-08-16 DIAGNOSIS — R15.9 FULL INCONTINENCE OF FECES: ICD-10-CM

## 2024-08-16 DIAGNOSIS — E87.1 HYPO-OSMOLALITY AND HYPONATREMIA: ICD-10-CM

## 2024-08-16 DIAGNOSIS — G06.1 INTRASPINAL ABSCESS AND GRANULOMA: ICD-10-CM

## 2024-08-16 DIAGNOSIS — Z51.89 ENCOUNTER FOR OTHER SPECIFIED AFTERCARE: ICD-10-CM

## 2024-08-16 DIAGNOSIS — Z98.1 ARTHRODESIS STATUS: ICD-10-CM

## 2024-08-16 DIAGNOSIS — N40.0 BENIGN PROSTATIC HYPERPLASIA WITHOUT LOWER URINARY TRACT SYMPTOMS: ICD-10-CM

## 2024-08-16 DIAGNOSIS — Z79.2 LONG TERM (CURRENT) USE OF ANTIBIOTICS: ICD-10-CM

## 2024-08-16 DIAGNOSIS — I10 ESSENTIAL (PRIMARY) HYPERTENSION: ICD-10-CM

## 2024-08-16 DIAGNOSIS — E87.6 HYPOKALEMIA: ICD-10-CM

## 2024-08-16 DIAGNOSIS — I82.451 ACUTE EMBOLISM AND THROMBOSIS OF RIGHT PERONEAL VEIN: ICD-10-CM

## 2024-08-16 DIAGNOSIS — Z47.89 ENCOUNTER FOR OTHER ORTHOPEDIC AFTERCARE: ICD-10-CM

## 2024-08-16 DIAGNOSIS — L29.8 OTHER PRURITUS: ICD-10-CM

## 2024-08-16 DIAGNOSIS — E43 UNSPECIFIED SEVERE PROTEIN-CALORIE MALNUTRITION: ICD-10-CM

## 2024-08-16 DIAGNOSIS — Z87.891 PERSONAL HISTORY OF NICOTINE DEPENDENCE: ICD-10-CM

## 2024-08-16 DIAGNOSIS — M46.34: ICD-10-CM

## 2024-08-16 DIAGNOSIS — M46.24 OSTEOMYELITIS OF VERTEBRA, THORACIC REGION: ICD-10-CM

## 2024-08-16 DIAGNOSIS — M10.9 GOUT, UNSPECIFIED: ICD-10-CM

## 2024-08-16 DIAGNOSIS — G62.9 POLYNEUROPATHY, UNSPECIFIED: ICD-10-CM

## 2024-08-16 DIAGNOSIS — M86.9 OSTEOMYELITIS, UNSPECIFIED: ICD-10-CM

## 2024-08-16 DIAGNOSIS — G82.20 PARAPLEGIA, UNSPECIFIED: ICD-10-CM

## 2024-08-16 DIAGNOSIS — R32 UNSPECIFIED URINARY INCONTINENCE: ICD-10-CM

## 2024-08-16 RX ORDER — LIDOCAINE HYDROCHLORIDE 20 MG/ML
1 JELLY TOPICAL EVERY 24 HOURS
Refills: 0 | Status: DISCONTINUED | OUTPATIENT
Start: 2024-08-16 | End: 2024-08-31

## 2024-08-16 RX ORDER — OXYCODONE HYDROCHLORIDE 30 MG/1
10 TABLET ORAL EVERY 4 HOURS
Refills: 0 | Status: DISCONTINUED | OUTPATIENT
Start: 2024-08-16 | End: 2024-08-19

## 2024-08-16 RX ORDER — POLYETHYLENE GLYCOL 3350 17 G/17G
17 POWDER, FOR SOLUTION ORAL DAILY
Refills: 0 | Status: DISCONTINUED | OUTPATIENT
Start: 2024-08-16 | End: 2024-08-29

## 2024-08-16 RX ORDER — GABAPENTIN 400 MG/1
600 CAPSULE ORAL THREE TIMES A DAY
Refills: 0 | Status: DISCONTINUED | OUTPATIENT
Start: 2024-08-16 | End: 2024-08-27

## 2024-08-16 RX ORDER — ONDANSETRON HCL/PF 4 MG/2 ML
4 VIAL (ML) INJECTION
Refills: 0 | Status: DISCONTINUED | OUTPATIENT
Start: 2024-08-16 | End: 2024-08-31

## 2024-08-16 RX ORDER — ALLOPURINOL 100 MG/1
1 TABLET ORAL
Refills: 0 | DISCHARGE

## 2024-08-16 RX ORDER — SENNA 187 MG
2 TABLET ORAL AT BEDTIME
Refills: 0 | Status: DISCONTINUED | OUTPATIENT
Start: 2024-08-16 | End: 2024-08-31

## 2024-08-16 RX ORDER — OXYCODONE HYDROCHLORIDE 30 MG/1
5 TABLET ORAL EVERY 4 HOURS
Refills: 0 | Status: DISCONTINUED | OUTPATIENT
Start: 2024-08-16 | End: 2024-08-19

## 2024-08-16 RX ORDER — CEFTRIAXONE 500 MG/1
2000 INJECTION, POWDER, FOR SOLUTION INTRAMUSCULAR; INTRAVENOUS EVERY 24 HOURS
Refills: 0 | Status: DISCONTINUED | OUTPATIENT
Start: 2024-08-17 | End: 2024-08-23

## 2024-08-16 RX ORDER — CYCLOBENZAPRINE HYDROCHLORIDE 15 MG/1
5 CAPSULE, EXTENDED RELEASE ORAL THREE TIMES A DAY
Refills: 0 | Status: DISCONTINUED | OUTPATIENT
Start: 2024-08-16 | End: 2024-08-31

## 2024-08-16 RX ORDER — FINASTERIDE 1 MG/1
5 TABLET, FILM COATED ORAL DAILY
Refills: 0 | Status: DISCONTINUED | OUTPATIENT
Start: 2024-08-17 | End: 2024-08-31

## 2024-08-16 RX ORDER — VANCOMYCIN HCL IN 5 % DEXTROSE 1.5G/250ML
1000 PLASTIC BAG, INJECTION (ML) INTRAVENOUS
Refills: 0 | Status: DISCONTINUED | OUTPATIENT
Start: 2024-08-16 | End: 2024-08-17

## 2024-08-16 RX ORDER — ENOXAPARIN SODIUM 100 MG/ML
40 INJECTION SUBCUTANEOUS
Refills: 0 | Status: DISCONTINUED | OUTPATIENT
Start: 2024-08-17 | End: 2024-08-24

## 2024-08-16 RX ORDER — ACETAMINOPHEN 500 MG/5ML
650 LIQUID (ML) ORAL EVERY 6 HOURS
Refills: 0 | Status: DISCONTINUED | OUTPATIENT
Start: 2024-08-16 | End: 2024-08-31

## 2024-08-16 RX ADMIN — GABAPENTIN 600 MILLIGRAM(S): 400 CAPSULE ORAL at 23:59

## 2024-08-17 LAB
ALBUMIN SERPL ELPH-MCNC: 1.7 G/DL — LOW (ref 3.3–5)
ALP SERPL-CCNC: 113 U/L — SIGNIFICANT CHANGE UP (ref 40–120)
ALT FLD-CCNC: 56 U/L — HIGH (ref 10–45)
ANION GAP SERPL CALC-SCNC: 9 MMOL/L — SIGNIFICANT CHANGE UP (ref 5–17)
AST SERPL-CCNC: 33 U/L — SIGNIFICANT CHANGE UP (ref 10–40)
BILIRUB SERPL-MCNC: 0.6 MG/DL — SIGNIFICANT CHANGE UP (ref 0.2–1.2)
BUN SERPL-MCNC: 10 MG/DL — SIGNIFICANT CHANGE UP (ref 7–23)
CALCIUM SERPL-MCNC: 7.8 MG/DL — LOW (ref 8.4–10.5)
CHLORIDE SERPL-SCNC: 101 MMOL/L — SIGNIFICANT CHANGE UP (ref 96–108)
CO2 SERPL-SCNC: 23 MMOL/L — SIGNIFICANT CHANGE UP (ref 22–31)
CREAT SERPL-MCNC: 0.84 MG/DL — SIGNIFICANT CHANGE UP (ref 0.5–1.3)
EGFR: 87 ML/MIN/1.73M2 — SIGNIFICANT CHANGE UP
EGFR: 87 ML/MIN/1.73M2 — SIGNIFICANT CHANGE UP
GLUCOSE SERPL-MCNC: 103 MG/DL — HIGH (ref 70–99)
HCT VFR BLD CALC: 24.7 % — LOW (ref 39–50)
HGB BLD-MCNC: 8.1 G/DL — LOW (ref 13–17)
MCHC RBC-ENTMCNC: 30.6 PG — SIGNIFICANT CHANGE UP (ref 27–34)
MCHC RBC-ENTMCNC: 32.8 GM/DL — SIGNIFICANT CHANGE UP (ref 32–36)
MCV RBC AUTO: 93.2 FL — SIGNIFICANT CHANGE UP (ref 80–100)
NRBC # BLD: 0 /100 WBCS — SIGNIFICANT CHANGE UP (ref 0–0)
NRBC BLD-RTO: 0 /100 WBCS — SIGNIFICANT CHANGE UP (ref 0–0)
PLATELET # BLD AUTO: 258 K/UL — SIGNIFICANT CHANGE UP (ref 150–400)
POTASSIUM SERPL-MCNC: 3.2 MMOL/L — LOW (ref 3.5–5.3)
POTASSIUM SERPL-SCNC: 3.2 MMOL/L — LOW (ref 3.5–5.3)
PROT SERPL-MCNC: 6 G/DL — SIGNIFICANT CHANGE UP (ref 6–8.3)
RBC # BLD: 2.65 M/UL — LOW (ref 4.2–5.8)
RBC # FLD: 14.7 % — HIGH (ref 10.3–14.5)
SARS-COV-2 RNA SPEC QL NAA+PROBE: SIGNIFICANT CHANGE UP
SODIUM SERPL-SCNC: 133 MMOL/L — LOW (ref 135–145)
VANCOMYCIN TROUGH SERPL-MCNC: 25.4 UG/ML — CRITICAL HIGH (ref 10–20)
WBC # BLD: 7.34 K/UL — SIGNIFICANT CHANGE UP (ref 3.8–10.5)
WBC # FLD AUTO: 7.34 K/UL — SIGNIFICANT CHANGE UP (ref 3.8–10.5)

## 2024-08-17 PROCEDURE — 99223 1ST HOSP IP/OBS HIGH 75: CPT

## 2024-08-17 PROCEDURE — 99222 1ST HOSP IP/OBS MODERATE 55: CPT

## 2024-08-17 RX ORDER — VANCOMYCIN HCL IN 5 % DEXTROSE 1.5G/250ML
1000 PLASTIC BAG, INJECTION (ML) INTRAVENOUS
Refills: 0 | Status: DISCONTINUED | OUTPATIENT
Start: 2024-08-17 | End: 2024-08-17

## 2024-08-17 RX ORDER — B1/B2/B3/B5/B6/B12/VIT C/FOLIC 500-0.5 MG
1 TABLET ORAL DAILY
Refills: 0 | Status: DISCONTINUED | OUTPATIENT
Start: 2024-08-17 | End: 2024-08-31

## 2024-08-17 RX ORDER — VANCOMYCIN HCL IN 5 % DEXTROSE 1.5G/250ML
750 PLASTIC BAG, INJECTION (ML) INTRAVENOUS
Refills: 0 | Status: DISCONTINUED | OUTPATIENT
Start: 2024-08-17 | End: 2024-08-31

## 2024-08-17 RX ADMIN — Medication 40 MILLIEQUIVALENT(S): at 13:19

## 2024-08-17 RX ADMIN — ENOXAPARIN SODIUM 40 MILLIGRAM(S): 100 INJECTION SUBCUTANEOUS at 17:02

## 2024-08-17 RX ADMIN — Medication 100 MILLIGRAM(S): at 11:50

## 2024-08-17 RX ADMIN — Medication 40 MILLIEQUIVALENT(S): at 17:01

## 2024-08-17 RX ADMIN — Medication 250 MILLIGRAM(S): at 07:35

## 2024-08-17 RX ADMIN — CEFTRIAXONE 100 MILLIGRAM(S): 500 INJECTION, POWDER, FOR SOLUTION INTRAMUSCULAR; INTRAVENOUS at 06:21

## 2024-08-17 RX ADMIN — GABAPENTIN 600 MILLIGRAM(S): 400 CAPSULE ORAL at 21:19

## 2024-08-17 RX ADMIN — FINASTERIDE 5 MILLIGRAM(S): 1 TABLET, FILM COATED ORAL at 11:51

## 2024-08-17 RX ADMIN — LIDOCAINE HYDROCHLORIDE 1 PATCH: 20 JELLY TOPICAL at 06:22

## 2024-08-17 RX ADMIN — Medication 2 GRAM(S): at 17:02

## 2024-08-17 RX ADMIN — POLYETHYLENE GLYCOL 3350 17 GRAM(S): 17 POWDER, FOR SOLUTION ORAL at 11:50

## 2024-08-17 RX ADMIN — GABAPENTIN 600 MILLIGRAM(S): 400 CAPSULE ORAL at 06:24

## 2024-08-17 RX ADMIN — Medication 2 GRAM(S): at 06:21

## 2024-08-17 RX ADMIN — GABAPENTIN 600 MILLIGRAM(S): 400 CAPSULE ORAL at 13:19

## 2024-08-17 RX ADMIN — Medication 1 APPLICATION(S): at 06:32

## 2024-08-17 RX ADMIN — Medication 250 MILLIGRAM(S): at 17:00

## 2024-08-18 LAB
ANION GAP SERPL CALC-SCNC: 7 MMOL/L — SIGNIFICANT CHANGE UP (ref 5–17)
BUN SERPL-MCNC: 12 MG/DL — SIGNIFICANT CHANGE UP (ref 7–23)
CALCIUM SERPL-MCNC: 8.3 MG/DL — LOW (ref 8.4–10.5)
CHLORIDE SERPL-SCNC: 100 MMOL/L — SIGNIFICANT CHANGE UP (ref 96–108)
CO2 SERPL-SCNC: 27 MMOL/L — SIGNIFICANT CHANGE UP (ref 22–31)
CREAT SERPL-MCNC: 0.99 MG/DL — SIGNIFICANT CHANGE UP (ref 0.5–1.3)
EGFR: 76 ML/MIN/1.73M2 — SIGNIFICANT CHANGE UP
EGFR: 76 ML/MIN/1.73M2 — SIGNIFICANT CHANGE UP
GLUCOSE SERPL-MCNC: 135 MG/DL — HIGH (ref 70–99)
MAGNESIUM SERPL-MCNC: 1.8 MG/DL — SIGNIFICANT CHANGE UP (ref 1.6–2.6)
POTASSIUM SERPL-MCNC: 3.3 MMOL/L — LOW (ref 3.5–5.3)
POTASSIUM SERPL-SCNC: 3.3 MMOL/L — LOW (ref 3.5–5.3)
SODIUM SERPL-SCNC: 134 MMOL/L — LOW (ref 135–145)

## 2024-08-18 PROCEDURE — 99232 SBSQ HOSP IP/OBS MODERATE 35: CPT

## 2024-08-18 RX ADMIN — Medication 500 MILLIGRAM(S): at 11:58

## 2024-08-18 RX ADMIN — LIDOCAINE HYDROCHLORIDE 1 PATCH: 20 JELLY TOPICAL at 18:00

## 2024-08-18 RX ADMIN — Medication 2 GRAM(S): at 17:56

## 2024-08-18 RX ADMIN — Medication 40 MILLIEQUIVALENT(S): at 22:09

## 2024-08-18 RX ADMIN — Medication 100 MILLIGRAM(S): at 11:58

## 2024-08-18 RX ADMIN — GABAPENTIN 600 MILLIGRAM(S): 400 CAPSULE ORAL at 05:59

## 2024-08-18 RX ADMIN — LIDOCAINE HYDROCHLORIDE 1 PATCH: 20 JELLY TOPICAL at 05:59

## 2024-08-18 RX ADMIN — GABAPENTIN 600 MILLIGRAM(S): 400 CAPSULE ORAL at 15:40

## 2024-08-18 RX ADMIN — Medication 40 MILLIEQUIVALENT(S): at 15:58

## 2024-08-18 RX ADMIN — GABAPENTIN 600 MILLIGRAM(S): 400 CAPSULE ORAL at 22:08

## 2024-08-18 RX ADMIN — Medication 250 MILLIGRAM(S): at 17:56

## 2024-08-18 RX ADMIN — Medication 1 TABLET(S): at 11:58

## 2024-08-18 RX ADMIN — LIDOCAINE HYDROCHLORIDE 1 PATCH: 20 JELLY TOPICAL at 07:46

## 2024-08-18 RX ADMIN — ENOXAPARIN SODIUM 40 MILLIGRAM(S): 100 INJECTION SUBCUTANEOUS at 17:57

## 2024-08-18 RX ADMIN — FINASTERIDE 5 MILLIGRAM(S): 1 TABLET, FILM COATED ORAL at 11:59

## 2024-08-18 RX ADMIN — CEFTRIAXONE 100 MILLIGRAM(S): 500 INJECTION, POWDER, FOR SOLUTION INTRAMUSCULAR; INTRAVENOUS at 06:06

## 2024-08-18 RX ADMIN — CYCLOBENZAPRINE HYDROCHLORIDE 5 MILLIGRAM(S): 15 CAPSULE, EXTENDED RELEASE ORAL at 11:58

## 2024-08-18 RX ADMIN — Medication 250 MILLIGRAM(S): at 06:43

## 2024-08-18 RX ADMIN — Medication 1 APPLICATION(S): at 06:08

## 2024-08-19 ENCOUNTER — NON-APPOINTMENT (OUTPATIENT)
Age: 84
End: 2024-08-19

## 2024-08-19 DIAGNOSIS — Z86.718 PERSONAL HISTORY OF OTHER VENOUS THROMBOSIS AND EMBOLISM: ICD-10-CM

## 2024-08-19 LAB
ALBUMIN SERPL ELPH-MCNC: 2 G/DL — LOW (ref 3.3–5)
ALP SERPL-CCNC: 126 U/L — HIGH (ref 40–120)
ALT FLD-CCNC: 61 U/L — HIGH (ref 10–45)
ANION GAP SERPL CALC-SCNC: 8 MMOL/L — SIGNIFICANT CHANGE UP (ref 5–17)
AST SERPL-CCNC: 32 U/L — SIGNIFICANT CHANGE UP (ref 10–40)
BILIRUB SERPL-MCNC: 0.5 MG/DL — SIGNIFICANT CHANGE UP (ref 0.2–1.2)
BUN SERPL-MCNC: 15 MG/DL — SIGNIFICANT CHANGE UP (ref 7–23)
CALCIUM SERPL-MCNC: 8.8 MG/DL — SIGNIFICANT CHANGE UP (ref 8.4–10.5)
CHLORIDE SERPL-SCNC: 99 MMOL/L — SIGNIFICANT CHANGE UP (ref 96–108)
CO2 SERPL-SCNC: 25 MMOL/L — SIGNIFICANT CHANGE UP (ref 22–31)
CREAT SERPL-MCNC: 1.02 MG/DL — SIGNIFICANT CHANGE UP (ref 0.5–1.3)
CRP SERPL-MCNC: 83 MG/L — HIGH
EGFR: 73 ML/MIN/1.73M2 — SIGNIFICANT CHANGE UP
EGFR: 73 ML/MIN/1.73M2 — SIGNIFICANT CHANGE UP
ERYTHROCYTE [SEDIMENTATION RATE] IN BLOOD: 99 MM/HR — HIGH (ref 0–20)
GLUCOSE SERPL-MCNC: 107 MG/DL — HIGH (ref 70–99)
HCT VFR BLD CALC: 25.4 % — LOW (ref 39–50)
HGB BLD-MCNC: 8.4 G/DL — LOW (ref 13–17)
MCHC RBC-ENTMCNC: 30.5 PG — SIGNIFICANT CHANGE UP (ref 27–34)
MCHC RBC-ENTMCNC: 33.1 GM/DL — SIGNIFICANT CHANGE UP (ref 32–36)
MCV RBC AUTO: 92.4 FL — SIGNIFICANT CHANGE UP (ref 80–100)
NRBC # BLD: 0 /100 WBCS — SIGNIFICANT CHANGE UP (ref 0–0)
NRBC BLD-RTO: 0 /100 WBCS — SIGNIFICANT CHANGE UP (ref 0–0)
PLATELET # BLD AUTO: 337 K/UL — SIGNIFICANT CHANGE UP (ref 150–400)
POTASSIUM SERPL-MCNC: 3.9 MMOL/L — SIGNIFICANT CHANGE UP (ref 3.5–5.3)
POTASSIUM SERPL-SCNC: 3.9 MMOL/L — SIGNIFICANT CHANGE UP (ref 3.5–5.3)
PROT SERPL-MCNC: 6.8 G/DL — SIGNIFICANT CHANGE UP (ref 6–8.3)
RBC # BLD: 2.75 M/UL — LOW (ref 4.2–5.8)
RBC # FLD: 14.8 % — HIGH (ref 10.3–14.5)
SODIUM SERPL-SCNC: 132 MMOL/L — LOW (ref 135–145)
VANCOMYCIN TROUGH SERPL-MCNC: 16.6 UG/ML — SIGNIFICANT CHANGE UP (ref 10–20)
WBC # BLD: 9.81 K/UL — SIGNIFICANT CHANGE UP (ref 3.8–10.5)
WBC # FLD AUTO: 9.81 K/UL — SIGNIFICANT CHANGE UP (ref 3.8–10.5)

## 2024-08-19 PROCEDURE — 99232 SBSQ HOSP IP/OBS MODERATE 35: CPT

## 2024-08-19 RX ORDER — OXYCODONE HYDROCHLORIDE 30 MG/1
2.5 TABLET ORAL EVERY 6 HOURS
Refills: 0 | Status: DISCONTINUED | OUTPATIENT
Start: 2024-08-19 | End: 2024-08-21

## 2024-08-19 RX ADMIN — GABAPENTIN 600 MILLIGRAM(S): 400 CAPSULE ORAL at 15:31

## 2024-08-19 RX ADMIN — Medication 250 MILLIGRAM(S): at 08:06

## 2024-08-19 RX ADMIN — Medication 1 APPLICATION(S): at 08:10

## 2024-08-19 RX ADMIN — GABAPENTIN 600 MILLIGRAM(S): 400 CAPSULE ORAL at 21:22

## 2024-08-19 RX ADMIN — Medication 250 MILLIGRAM(S): at 18:20

## 2024-08-19 RX ADMIN — Medication 650 MILLIGRAM(S): at 06:34

## 2024-08-19 RX ADMIN — Medication 100 MILLIGRAM(S): at 12:56

## 2024-08-19 RX ADMIN — Medication 1 TABLET(S): at 12:56

## 2024-08-19 RX ADMIN — FINASTERIDE 5 MILLIGRAM(S): 1 TABLET, FILM COATED ORAL at 12:56

## 2024-08-19 RX ADMIN — Medication 2 GRAM(S): at 18:19

## 2024-08-19 RX ADMIN — ENOXAPARIN SODIUM 40 MILLIGRAM(S): 100 INJECTION SUBCUTANEOUS at 18:18

## 2024-08-19 RX ADMIN — LIDOCAINE HYDROCHLORIDE 1 PATCH: 20 JELLY TOPICAL at 06:35

## 2024-08-19 RX ADMIN — Medication 2 TABLET(S): at 21:22

## 2024-08-19 RX ADMIN — GABAPENTIN 600 MILLIGRAM(S): 400 CAPSULE ORAL at 06:34

## 2024-08-19 RX ADMIN — Medication 500 MILLIGRAM(S): at 12:56

## 2024-08-19 RX ADMIN — CEFTRIAXONE 100 MILLIGRAM(S): 500 INJECTION, POWDER, FOR SOLUTION INTRAMUSCULAR; INTRAVENOUS at 06:34

## 2024-08-20 ENCOUNTER — NON-APPOINTMENT (OUTPATIENT)
Age: 84
End: 2024-08-20

## 2024-08-20 LAB — CRP SERPL-MCNC: 131 MG/L — HIGH

## 2024-08-20 PROCEDURE — 99232 SBSQ HOSP IP/OBS MODERATE 35: CPT

## 2024-08-20 RX ORDER — BENZONATATE 100 MG
100 CAPSULE ORAL THREE TIMES A DAY
Refills: 0 | Status: DISCONTINUED | OUTPATIENT
Start: 2024-08-20 | End: 2024-08-31

## 2024-08-20 RX ADMIN — Medication 650 MILLIGRAM(S): at 18:12

## 2024-08-20 RX ADMIN — Medication 2 GRAM(S): at 05:09

## 2024-08-20 RX ADMIN — Medication 2 GRAM(S): at 18:12

## 2024-08-20 RX ADMIN — ENOXAPARIN SODIUM 40 MILLIGRAM(S): 100 INJECTION SUBCUTANEOUS at 18:13

## 2024-08-20 RX ADMIN — LIDOCAINE HYDROCHLORIDE 1 PATCH: 20 JELLY TOPICAL at 18:23

## 2024-08-20 RX ADMIN — LIDOCAINE HYDROCHLORIDE 1 PATCH: 20 JELLY TOPICAL at 07:17

## 2024-08-20 RX ADMIN — GABAPENTIN 600 MILLIGRAM(S): 400 CAPSULE ORAL at 13:11

## 2024-08-20 RX ADMIN — Medication 250 MILLIGRAM(S): at 05:07

## 2024-08-20 RX ADMIN — Medication 250 MILLIGRAM(S): at 18:13

## 2024-08-20 RX ADMIN — FINASTERIDE 5 MILLIGRAM(S): 1 TABLET, FILM COATED ORAL at 13:12

## 2024-08-20 RX ADMIN — Medication 500 MILLIGRAM(S): at 13:12

## 2024-08-20 RX ADMIN — Medication 650 MILLIGRAM(S): at 19:10

## 2024-08-20 RX ADMIN — CEFTRIAXONE 100 MILLIGRAM(S): 500 INJECTION, POWDER, FOR SOLUTION INTRAMUSCULAR; INTRAVENOUS at 06:20

## 2024-08-20 RX ADMIN — Medication 1 TABLET(S): at 13:11

## 2024-08-20 RX ADMIN — LIDOCAINE HYDROCHLORIDE 1 PATCH: 20 JELLY TOPICAL at 06:21

## 2024-08-20 RX ADMIN — GABAPENTIN 600 MILLIGRAM(S): 400 CAPSULE ORAL at 21:45

## 2024-08-20 RX ADMIN — Medication 100 MILLIGRAM(S): at 13:12

## 2024-08-20 RX ADMIN — GABAPENTIN 600 MILLIGRAM(S): 400 CAPSULE ORAL at 05:05

## 2024-08-20 RX ADMIN — CYCLOBENZAPRINE HYDROCHLORIDE 5 MILLIGRAM(S): 15 CAPSULE, EXTENDED RELEASE ORAL at 21:45

## 2024-08-20 RX ADMIN — Medication 1 APPLICATION(S): at 05:05

## 2024-08-21 LAB
CRP SERPL-MCNC: 133 MG/L — HIGH
VANCOMYCIN TROUGH SERPL-MCNC: 17.2 UG/ML — SIGNIFICANT CHANGE UP (ref 10–20)

## 2024-08-21 PROCEDURE — 99232 SBSQ HOSP IP/OBS MODERATE 35: CPT

## 2024-08-21 RX ORDER — OXYCODONE HYDROCHLORIDE 30 MG/1
2.5 TABLET ORAL EVERY 4 HOURS
Refills: 0 | Status: DISCONTINUED | OUTPATIENT
Start: 2024-08-21 | End: 2024-08-22

## 2024-08-21 RX ADMIN — Medication 2 GRAM(S): at 17:23

## 2024-08-21 RX ADMIN — LIDOCAINE HYDROCHLORIDE 1 PATCH: 20 JELLY TOPICAL at 06:39

## 2024-08-21 RX ADMIN — LIDOCAINE HYDROCHLORIDE 1 PATCH: 20 JELLY TOPICAL at 08:53

## 2024-08-21 RX ADMIN — Medication 1 APPLICATION(S): at 05:13

## 2024-08-21 RX ADMIN — ENOXAPARIN SODIUM 40 MILLIGRAM(S): 100 INJECTION SUBCUTANEOUS at 17:19

## 2024-08-21 RX ADMIN — Medication 250 MILLIGRAM(S): at 05:13

## 2024-08-21 RX ADMIN — GABAPENTIN 600 MILLIGRAM(S): 400 CAPSULE ORAL at 13:42

## 2024-08-21 RX ADMIN — Medication 1 TABLET(S): at 12:13

## 2024-08-21 RX ADMIN — Medication 100 MILLIGRAM(S): at 05:12

## 2024-08-21 RX ADMIN — LIDOCAINE HYDROCHLORIDE 1 PATCH: 20 JELLY TOPICAL at 19:12

## 2024-08-21 RX ADMIN — GABAPENTIN 600 MILLIGRAM(S): 400 CAPSULE ORAL at 21:53

## 2024-08-21 RX ADMIN — Medication 500 MILLIGRAM(S): at 12:12

## 2024-08-21 RX ADMIN — Medication 2 GRAM(S): at 05:12

## 2024-08-21 RX ADMIN — CEFTRIAXONE 100 MILLIGRAM(S): 500 INJECTION, POWDER, FOR SOLUTION INTRAMUSCULAR; INTRAVENOUS at 06:37

## 2024-08-21 RX ADMIN — Medication 250 MILLIGRAM(S): at 17:17

## 2024-08-21 RX ADMIN — Medication 2 TABLET(S): at 21:53

## 2024-08-21 RX ADMIN — GABAPENTIN 600 MILLIGRAM(S): 400 CAPSULE ORAL at 05:11

## 2024-08-21 RX ADMIN — Medication 100 MILLIGRAM(S): at 12:12

## 2024-08-21 RX ADMIN — FINASTERIDE 5 MILLIGRAM(S): 1 TABLET, FILM COATED ORAL at 12:13

## 2024-08-22 LAB
ALBUMIN SERPL ELPH-MCNC: 2.1 G/DL — LOW (ref 3.3–5)
ALP SERPL-CCNC: 137 U/L — HIGH (ref 40–120)
ALT FLD-CCNC: 59 U/L — HIGH (ref 10–45)
ANION GAP SERPL CALC-SCNC: 10 MMOL/L — SIGNIFICANT CHANGE UP (ref 5–17)
AST SERPL-CCNC: 39 U/L — SIGNIFICANT CHANGE UP (ref 10–40)
BILIRUB SERPL-MCNC: 0.3 MG/DL — SIGNIFICANT CHANGE UP (ref 0.2–1.2)
BUN SERPL-MCNC: 17 MG/DL — SIGNIFICANT CHANGE UP (ref 7–23)
CALCIUM SERPL-MCNC: 8.7 MG/DL — SIGNIFICANT CHANGE UP (ref 8.4–10.5)
CHLORIDE SERPL-SCNC: 97 MMOL/L — SIGNIFICANT CHANGE UP (ref 96–108)
CO2 SERPL-SCNC: 24 MMOL/L — SIGNIFICANT CHANGE UP (ref 22–31)
CREAT SERPL-MCNC: 0.94 MG/DL — SIGNIFICANT CHANGE UP (ref 0.5–1.3)
CRP SERPL-MCNC: 120 MG/L — HIGH
EGFR: 81 ML/MIN/1.73M2 — SIGNIFICANT CHANGE UP
EGFR: 81 ML/MIN/1.73M2 — SIGNIFICANT CHANGE UP
GLUCOSE SERPL-MCNC: 115 MG/DL — HIGH (ref 70–99)
HCT VFR BLD CALC: 28.2 % — LOW (ref 39–50)
HGB BLD-MCNC: 9.3 G/DL — LOW (ref 13–17)
MCHC RBC-ENTMCNC: 30.5 PG — SIGNIFICANT CHANGE UP (ref 27–34)
MCHC RBC-ENTMCNC: 33 GM/DL — SIGNIFICANT CHANGE UP (ref 32–36)
MCV RBC AUTO: 92.5 FL — SIGNIFICANT CHANGE UP (ref 80–100)
NRBC # BLD: 0 /100 WBCS — SIGNIFICANT CHANGE UP (ref 0–0)
NRBC BLD-RTO: 0 /100 WBCS — SIGNIFICANT CHANGE UP (ref 0–0)
PLATELET # BLD AUTO: 314 K/UL — SIGNIFICANT CHANGE UP (ref 150–400)
POTASSIUM SERPL-MCNC: 3.6 MMOL/L — SIGNIFICANT CHANGE UP (ref 3.5–5.3)
POTASSIUM SERPL-SCNC: 3.6 MMOL/L — SIGNIFICANT CHANGE UP (ref 3.5–5.3)
PROT SERPL-MCNC: 7.3 G/DL — SIGNIFICANT CHANGE UP (ref 6–8.3)
RBC # BLD: 3.05 M/UL — LOW (ref 4.2–5.8)
RBC # FLD: 14.9 % — HIGH (ref 10.3–14.5)
SODIUM SERPL-SCNC: 131 MMOL/L — LOW (ref 135–145)
WBC # BLD: 9.86 K/UL — SIGNIFICANT CHANGE UP (ref 3.8–10.5)
WBC # FLD AUTO: 9.86 K/UL — SIGNIFICANT CHANGE UP (ref 3.8–10.5)

## 2024-08-22 PROCEDURE — 99232 SBSQ HOSP IP/OBS MODERATE 35: CPT

## 2024-08-22 RX ORDER — OXYCODONE HYDROCHLORIDE 30 MG/1
5 TABLET ORAL EVERY 6 HOURS
Refills: 0 | Status: DISCONTINUED | OUTPATIENT
Start: 2024-08-22 | End: 2024-08-27

## 2024-08-22 RX ADMIN — FINASTERIDE 5 MILLIGRAM(S): 1 TABLET, FILM COATED ORAL at 12:27

## 2024-08-22 RX ADMIN — ENOXAPARIN SODIUM 40 MILLIGRAM(S): 100 INJECTION SUBCUTANEOUS at 18:40

## 2024-08-22 RX ADMIN — Medication 1 TABLET(S): at 12:27

## 2024-08-22 RX ADMIN — Medication 250 MILLIGRAM(S): at 18:40

## 2024-08-22 RX ADMIN — Medication 100 MILLIGRAM(S): at 12:28

## 2024-08-22 RX ADMIN — GABAPENTIN 600 MILLIGRAM(S): 400 CAPSULE ORAL at 06:40

## 2024-08-22 RX ADMIN — Medication 1 APPLICATION(S): at 06:45

## 2024-08-22 RX ADMIN — OXYCODONE HYDROCHLORIDE 2.5 MILLIGRAM(S): 30 TABLET ORAL at 09:12

## 2024-08-22 RX ADMIN — Medication 2 GRAM(S): at 18:41

## 2024-08-22 RX ADMIN — Medication 250 MILLIGRAM(S): at 06:39

## 2024-08-22 RX ADMIN — Medication 500 MILLIGRAM(S): at 12:27

## 2024-08-22 RX ADMIN — GABAPENTIN 600 MILLIGRAM(S): 400 CAPSULE ORAL at 22:05

## 2024-08-22 RX ADMIN — CEFTRIAXONE 100 MILLIGRAM(S): 500 INJECTION, POWDER, FOR SOLUTION INTRAMUSCULAR; INTRAVENOUS at 05:31

## 2024-08-22 RX ADMIN — GABAPENTIN 600 MILLIGRAM(S): 400 CAPSULE ORAL at 15:49

## 2024-08-22 RX ADMIN — Medication 2 GRAM(S): at 06:40

## 2024-08-22 RX ADMIN — OXYCODONE HYDROCHLORIDE 2.5 MILLIGRAM(S): 30 TABLET ORAL at 08:43

## 2024-08-22 RX ADMIN — Medication 2 TABLET(S): at 22:05

## 2024-08-23 LAB
ALBUMIN SERPL ELPH-MCNC: 1.9 G/DL — LOW (ref 3.3–5)
ALP SERPL-CCNC: 126 U/L — HIGH (ref 40–120)
ALT FLD-CCNC: 59 U/L — HIGH (ref 10–45)
ANION GAP SERPL CALC-SCNC: 10 MMOL/L — SIGNIFICANT CHANGE UP (ref 5–17)
AST SERPL-CCNC: 43 U/L — HIGH (ref 10–40)
BILIRUB SERPL-MCNC: 0.3 MG/DL — SIGNIFICANT CHANGE UP (ref 0.2–1.2)
BUN SERPL-MCNC: 16 MG/DL — SIGNIFICANT CHANGE UP (ref 7–23)
CALCIUM SERPL-MCNC: 8.8 MG/DL — SIGNIFICANT CHANGE UP (ref 8.4–10.5)
CHLORIDE SERPL-SCNC: 98 MMOL/L — SIGNIFICANT CHANGE UP (ref 96–108)
CO2 SERPL-SCNC: 26 MMOL/L — SIGNIFICANT CHANGE UP (ref 22–31)
CREAT SERPL-MCNC: 0.97 MG/DL — SIGNIFICANT CHANGE UP (ref 0.5–1.3)
CRP SERPL-MCNC: 121 MG/L — HIGH
EGFR: 78 ML/MIN/1.73M2 — SIGNIFICANT CHANGE UP
EGFR: 78 ML/MIN/1.73M2 — SIGNIFICANT CHANGE UP
GLUCOSE SERPL-MCNC: 151 MG/DL — HIGH (ref 70–99)
POTASSIUM SERPL-MCNC: 3.3 MMOL/L — LOW (ref 3.5–5.3)
POTASSIUM SERPL-SCNC: 3.3 MMOL/L — LOW (ref 3.5–5.3)
PROT SERPL-MCNC: 6.7 G/DL — SIGNIFICANT CHANGE UP (ref 6–8.3)
SODIUM SERPL-SCNC: 134 MMOL/L — LOW (ref 135–145)
VANCOMYCIN TROUGH SERPL-MCNC: 17.4 UG/ML — SIGNIFICANT CHANGE UP (ref 10–20)

## 2024-08-23 PROCEDURE — 99232 SBSQ HOSP IP/OBS MODERATE 35: CPT

## 2024-08-23 RX ORDER — CEFTRIAXONE 500 MG/1
2000 INJECTION, POWDER, FOR SOLUTION INTRAMUSCULAR; INTRAVENOUS EVERY 24 HOURS
Refills: 0 | Status: DISCONTINUED | OUTPATIENT
Start: 2024-08-24 | End: 2024-08-31

## 2024-08-23 RX ADMIN — GABAPENTIN 600 MILLIGRAM(S): 400 CAPSULE ORAL at 22:50

## 2024-08-23 RX ADMIN — Medication 2 GRAM(S): at 19:25

## 2024-08-23 RX ADMIN — CEFTRIAXONE 100 MILLIGRAM(S): 500 INJECTION, POWDER, FOR SOLUTION INTRAMUSCULAR; INTRAVENOUS at 06:22

## 2024-08-23 RX ADMIN — Medication 1 TABLET(S): at 11:42

## 2024-08-23 RX ADMIN — Medication 500 MILLIGRAM(S): at 11:42

## 2024-08-23 RX ADMIN — Medication 2 TABLET(S): at 22:50

## 2024-08-23 RX ADMIN — Medication 1 APPLICATION(S): at 07:34

## 2024-08-23 RX ADMIN — GABAPENTIN 600 MILLIGRAM(S): 400 CAPSULE ORAL at 07:33

## 2024-08-23 RX ADMIN — FINASTERIDE 5 MILLIGRAM(S): 1 TABLET, FILM COATED ORAL at 11:42

## 2024-08-23 RX ADMIN — GABAPENTIN 600 MILLIGRAM(S): 400 CAPSULE ORAL at 15:30

## 2024-08-23 RX ADMIN — Medication 100 MILLIGRAM(S): at 11:42

## 2024-08-23 RX ADMIN — ENOXAPARIN SODIUM 40 MILLIGRAM(S): 100 INJECTION SUBCUTANEOUS at 19:25

## 2024-08-23 RX ADMIN — Medication 250 MILLIGRAM(S): at 07:34

## 2024-08-23 RX ADMIN — Medication 2 GRAM(S): at 07:34

## 2024-08-23 RX ADMIN — Medication 250 MILLIGRAM(S): at 18:30

## 2024-08-24 LAB — CRP SERPL-MCNC: 111 MG/L — HIGH

## 2024-08-24 PROCEDURE — 99232 SBSQ HOSP IP/OBS MODERATE 35: CPT

## 2024-08-24 RX ORDER — ENOXAPARIN SODIUM 100 MG/ML
70 INJECTION SUBCUTANEOUS EVERY 12 HOURS
Refills: 0 | Status: DISCONTINUED | OUTPATIENT
Start: 2024-08-24 | End: 2024-08-29

## 2024-08-24 RX ADMIN — ENOXAPARIN SODIUM 70 MILLIGRAM(S): 100 INJECTION SUBCUTANEOUS at 19:11

## 2024-08-24 RX ADMIN — Medication 250 MILLIGRAM(S): at 06:53

## 2024-08-24 RX ADMIN — Medication 250 MILLIGRAM(S): at 17:18

## 2024-08-24 RX ADMIN — Medication 2 GRAM(S): at 05:59

## 2024-08-24 RX ADMIN — Medication 1 TABLET(S): at 12:07

## 2024-08-24 RX ADMIN — GABAPENTIN 600 MILLIGRAM(S): 400 CAPSULE ORAL at 21:44

## 2024-08-24 RX ADMIN — Medication 1 APPLICATION(S): at 06:11

## 2024-08-24 RX ADMIN — POLYETHYLENE GLYCOL 3350 17 GRAM(S): 17 POWDER, FOR SOLUTION ORAL at 12:06

## 2024-08-24 RX ADMIN — Medication 100 MILLIGRAM(S): at 12:07

## 2024-08-24 RX ADMIN — Medication 2 GRAM(S): at 17:25

## 2024-08-24 RX ADMIN — FINASTERIDE 5 MILLIGRAM(S): 1 TABLET, FILM COATED ORAL at 12:07

## 2024-08-24 RX ADMIN — CEFTRIAXONE 100 MILLIGRAM(S): 500 INJECTION, POWDER, FOR SOLUTION INTRAMUSCULAR; INTRAVENOUS at 05:58

## 2024-08-24 RX ADMIN — GABAPENTIN 600 MILLIGRAM(S): 400 CAPSULE ORAL at 05:59

## 2024-08-24 RX ADMIN — GABAPENTIN 600 MILLIGRAM(S): 400 CAPSULE ORAL at 14:33

## 2024-08-24 RX ADMIN — Medication 2 TABLET(S): at 21:44

## 2024-08-24 RX ADMIN — Medication 500 MILLIGRAM(S): at 12:06

## 2024-08-25 LAB — CRP SERPL-MCNC: 109 MG/L — HIGH

## 2024-08-25 PROCEDURE — 99232 SBSQ HOSP IP/OBS MODERATE 35: CPT

## 2024-08-25 RX ADMIN — GABAPENTIN 600 MILLIGRAM(S): 400 CAPSULE ORAL at 06:54

## 2024-08-25 RX ADMIN — Medication 40 MILLIEQUIVALENT(S): at 17:11

## 2024-08-25 RX ADMIN — Medication 40 MILLIEQUIVALENT(S): at 21:15

## 2024-08-25 RX ADMIN — Medication 1 TABLET(S): at 11:42

## 2024-08-25 RX ADMIN — Medication 500 MILLIGRAM(S): at 11:42

## 2024-08-25 RX ADMIN — ENOXAPARIN SODIUM 70 MILLIGRAM(S): 100 INJECTION SUBCUTANEOUS at 06:53

## 2024-08-25 RX ADMIN — ENOXAPARIN SODIUM 70 MILLIGRAM(S): 100 INJECTION SUBCUTANEOUS at 17:12

## 2024-08-25 RX ADMIN — Medication 2 TABLET(S): at 21:15

## 2024-08-25 RX ADMIN — FINASTERIDE 5 MILLIGRAM(S): 1 TABLET, FILM COATED ORAL at 11:42

## 2024-08-25 RX ADMIN — Medication 2 GRAM(S): at 06:54

## 2024-08-25 RX ADMIN — CEFTRIAXONE 100 MILLIGRAM(S): 500 INJECTION, POWDER, FOR SOLUTION INTRAMUSCULAR; INTRAVENOUS at 06:53

## 2024-08-25 RX ADMIN — Medication 2 GRAM(S): at 17:13

## 2024-08-25 RX ADMIN — Medication 250 MILLIGRAM(S): at 07:54

## 2024-08-25 RX ADMIN — GABAPENTIN 600 MILLIGRAM(S): 400 CAPSULE ORAL at 21:15

## 2024-08-25 RX ADMIN — Medication 250 MILLIGRAM(S): at 17:13

## 2024-08-25 RX ADMIN — Medication 1 APPLICATION(S): at 07:02

## 2024-08-25 RX ADMIN — Medication 100 MILLIGRAM(S): at 11:42

## 2024-08-25 RX ADMIN — GABAPENTIN 600 MILLIGRAM(S): 400 CAPSULE ORAL at 17:10

## 2024-08-26 ENCOUNTER — RX RENEWAL (OUTPATIENT)
Age: 84
End: 2024-08-26

## 2024-08-26 LAB
ALBUMIN SERPL ELPH-MCNC: 2 G/DL — LOW (ref 3.3–5)
ALP SERPL-CCNC: 121 U/L — HIGH (ref 40–120)
ALT FLD-CCNC: 56 U/L — HIGH (ref 10–45)
ANION GAP SERPL CALC-SCNC: 10 MMOL/L — SIGNIFICANT CHANGE UP (ref 5–17)
AST SERPL-CCNC: 37 U/L — SIGNIFICANT CHANGE UP (ref 10–40)
BILIRUB SERPL-MCNC: 0.3 MG/DL — SIGNIFICANT CHANGE UP (ref 0.2–1.2)
BUN SERPL-MCNC: 17 MG/DL — SIGNIFICANT CHANGE UP (ref 7–23)
CALCIUM SERPL-MCNC: 8.5 MG/DL — SIGNIFICANT CHANGE UP (ref 8.4–10.5)
CHLORIDE SERPL-SCNC: 99 MMOL/L — SIGNIFICANT CHANGE UP (ref 96–108)
CO2 SERPL-SCNC: 24 MMOL/L — SIGNIFICANT CHANGE UP (ref 22–31)
CREAT SERPL-MCNC: 1.1 MG/DL — SIGNIFICANT CHANGE UP (ref 0.5–1.3)
CRP SERPL-MCNC: 105 MG/L — HIGH
EGFR: 67 ML/MIN/1.73M2 — SIGNIFICANT CHANGE UP
EGFR: 67 ML/MIN/1.73M2 — SIGNIFICANT CHANGE UP
ERYTHROCYTE [SEDIMENTATION RATE] IN BLOOD: 120 MM/HR — HIGH (ref 0–20)
GLUCOSE SERPL-MCNC: 148 MG/DL — HIGH (ref 70–99)
HCT VFR BLD CALC: 24.2 % — LOW (ref 39–50)
HGB BLD-MCNC: 8.1 G/DL — LOW (ref 13–17)
MCHC RBC-ENTMCNC: 30.6 PG — SIGNIFICANT CHANGE UP (ref 27–34)
MCHC RBC-ENTMCNC: 33.5 GM/DL — SIGNIFICANT CHANGE UP (ref 32–36)
MCV RBC AUTO: 91.3 FL — SIGNIFICANT CHANGE UP (ref 80–100)
NRBC # BLD: 0 /100 WBCS — SIGNIFICANT CHANGE UP (ref 0–0)
NRBC BLD-RTO: 0 /100 WBCS — SIGNIFICANT CHANGE UP (ref 0–0)
PLATELET # BLD AUTO: 337 K/UL — SIGNIFICANT CHANGE UP (ref 150–400)
POTASSIUM SERPL-MCNC: 4.7 MMOL/L — SIGNIFICANT CHANGE UP (ref 3.5–5.3)
POTASSIUM SERPL-SCNC: 4.7 MMOL/L — SIGNIFICANT CHANGE UP (ref 3.5–5.3)
PROT SERPL-MCNC: 6.9 G/DL — SIGNIFICANT CHANGE UP (ref 6–8.3)
RBC # BLD: 2.65 M/UL — LOW (ref 4.2–5.8)
RBC # FLD: 14.7 % — HIGH (ref 10.3–14.5)
SODIUM SERPL-SCNC: 133 MMOL/L — LOW (ref 135–145)
VANCOMYCIN TROUGH SERPL-MCNC: 15 UG/ML — SIGNIFICANT CHANGE UP (ref 10–20)
WBC # BLD: 7.66 K/UL — SIGNIFICANT CHANGE UP (ref 3.8–10.5)
WBC # FLD AUTO: 7.66 K/UL — SIGNIFICANT CHANGE UP (ref 3.8–10.5)

## 2024-08-26 PROCEDURE — 99232 SBSQ HOSP IP/OBS MODERATE 35: CPT

## 2024-08-26 RX ADMIN — GABAPENTIN 600 MILLIGRAM(S): 400 CAPSULE ORAL at 05:33

## 2024-08-26 RX ADMIN — LIDOCAINE HYDROCHLORIDE 1 PATCH: 20 JELLY TOPICAL at 05:35

## 2024-08-26 RX ADMIN — GABAPENTIN 600 MILLIGRAM(S): 400 CAPSULE ORAL at 21:33

## 2024-08-26 RX ADMIN — GABAPENTIN 600 MILLIGRAM(S): 400 CAPSULE ORAL at 13:16

## 2024-08-26 RX ADMIN — ENOXAPARIN SODIUM 70 MILLIGRAM(S): 100 INJECTION SUBCUTANEOUS at 18:09

## 2024-08-26 RX ADMIN — Medication 1 APPLICATION(S): at 05:35

## 2024-08-26 RX ADMIN — Medication 250 MILLIGRAM(S): at 18:11

## 2024-08-26 RX ADMIN — ENOXAPARIN SODIUM 70 MILLIGRAM(S): 100 INJECTION SUBCUTANEOUS at 05:34

## 2024-08-26 RX ADMIN — Medication 250 MILLIGRAM(S): at 05:34

## 2024-08-26 RX ADMIN — CYCLOBENZAPRINE HYDROCHLORIDE 5 MILLIGRAM(S): 15 CAPSULE, EXTENDED RELEASE ORAL at 12:44

## 2024-08-26 RX ADMIN — Medication 2 GRAM(S): at 18:11

## 2024-08-26 RX ADMIN — Medication 2 GRAM(S): at 05:33

## 2024-08-26 RX ADMIN — CEFTRIAXONE 100 MILLIGRAM(S): 500 INJECTION, POWDER, FOR SOLUTION INTRAMUSCULAR; INTRAVENOUS at 05:34

## 2024-08-26 RX ADMIN — Medication 100 MILLIGRAM(S): at 12:44

## 2024-08-26 RX ADMIN — FINASTERIDE 5 MILLIGRAM(S): 1 TABLET, FILM COATED ORAL at 12:44

## 2024-08-27 LAB — CRP SERPL-MCNC: 101 MG/L — HIGH

## 2024-08-27 PROCEDURE — 99232 SBSQ HOSP IP/OBS MODERATE 35: CPT

## 2024-08-27 PROCEDURE — 71045 X-RAY EXAM CHEST 1 VIEW: CPT | Mod: 26

## 2024-08-27 RX ORDER — GABAPENTIN 400 MG/1
600 CAPSULE ORAL THREE TIMES A DAY
Refills: 0 | Status: DISCONTINUED | OUTPATIENT
Start: 2024-08-27 | End: 2024-08-31

## 2024-08-27 RX ORDER — OXYCODONE HYDROCHLORIDE 30 MG/1
5 TABLET ORAL EVERY 6 HOURS
Refills: 0 | Status: DISCONTINUED | OUTPATIENT
Start: 2024-08-27 | End: 2024-08-31

## 2024-08-27 RX ORDER — DULOXETINE 20 MG/1
20 CAPSULE, DELAYED RELEASE ORAL DAILY
Refills: 0 | Status: DISCONTINUED | OUTPATIENT
Start: 2024-08-27 | End: 2024-08-31

## 2024-08-27 RX ORDER — GABAPENTIN 400 MG/1
800 CAPSULE ORAL THREE TIMES A DAY
Refills: 0 | Status: DISCONTINUED | OUTPATIENT
Start: 2024-08-27 | End: 2024-08-27

## 2024-08-27 RX ADMIN — LIDOCAINE HYDROCHLORIDE 1 PATCH: 20 JELLY TOPICAL at 06:58

## 2024-08-27 RX ADMIN — Medication 2 GRAM(S): at 17:26

## 2024-08-27 RX ADMIN — Medication 1 TABLET(S): at 12:28

## 2024-08-27 RX ADMIN — Medication 100 MILLIGRAM(S): at 12:29

## 2024-08-27 RX ADMIN — ENOXAPARIN SODIUM 70 MILLIGRAM(S): 100 INJECTION SUBCUTANEOUS at 17:26

## 2024-08-27 RX ADMIN — Medication 250 MILLIGRAM(S): at 17:26

## 2024-08-27 RX ADMIN — FINASTERIDE 5 MILLIGRAM(S): 1 TABLET, FILM COATED ORAL at 12:28

## 2024-08-27 RX ADMIN — Medication 1 APPLICATION(S): at 05:38

## 2024-08-27 RX ADMIN — GABAPENTIN 600 MILLIGRAM(S): 400 CAPSULE ORAL at 22:39

## 2024-08-27 RX ADMIN — CEFTRIAXONE 100 MILLIGRAM(S): 500 INJECTION, POWDER, FOR SOLUTION INTRAMUSCULAR; INTRAVENOUS at 06:57

## 2024-08-27 RX ADMIN — GABAPENTIN 600 MILLIGRAM(S): 400 CAPSULE ORAL at 05:37

## 2024-08-27 RX ADMIN — Medication 250 MILLIGRAM(S): at 05:38

## 2024-08-27 RX ADMIN — LIDOCAINE HYDROCHLORIDE 1 PATCH: 20 JELLY TOPICAL at 18:31

## 2024-08-27 RX ADMIN — Medication 2 GRAM(S): at 05:36

## 2024-08-27 RX ADMIN — ENOXAPARIN SODIUM 70 MILLIGRAM(S): 100 INJECTION SUBCUTANEOUS at 05:38

## 2024-08-27 RX ADMIN — GABAPENTIN 600 MILLIGRAM(S): 400 CAPSULE ORAL at 13:36

## 2024-08-27 RX ADMIN — Medication 500 MILLIGRAM(S): at 12:28

## 2024-08-27 RX ADMIN — LIDOCAINE HYDROCHLORIDE 1 PATCH: 20 JELLY TOPICAL at 05:35

## 2024-08-28 ENCOUNTER — TRANSCRIPTION ENCOUNTER (OUTPATIENT)
Age: 84
End: 2024-08-28

## 2024-08-28 LAB
CRP SERPL-MCNC: 99 MG/L — HIGH
VANCOMYCIN TROUGH SERPL-MCNC: 17 UG/ML — SIGNIFICANT CHANGE UP (ref 10–20)

## 2024-08-28 PROCEDURE — 99232 SBSQ HOSP IP/OBS MODERATE 35: CPT

## 2024-08-28 RX ORDER — DULOXETINE 20 MG/1
1 CAPSULE, DELAYED RELEASE ORAL
Qty: 0 | Refills: 0 | DISCHARGE
Start: 2024-08-28

## 2024-08-28 RX ORDER — SENNA 187 MG
2 TABLET ORAL
Qty: 0 | Refills: 0 | DISCHARGE
Start: 2024-08-28

## 2024-08-28 RX ORDER — ACETAMINOPHEN 500 MG/5ML
2 LIQUID (ML) ORAL
Qty: 0 | Refills: 0 | DISCHARGE
Start: 2024-08-28

## 2024-08-28 RX ORDER — OXYCODONE HYDROCHLORIDE 30 MG/1
1 TABLET ORAL
Qty: 0 | Refills: 0 | DISCHARGE
Start: 2024-08-28

## 2024-08-28 RX ORDER — B1/B2/B3/B5/B6/B12/VIT C/FOLIC 500-0.5 MG
1 TABLET ORAL
Qty: 0 | Refills: 0 | DISCHARGE
Start: 2024-08-28

## 2024-08-28 RX ORDER — BENZONATATE 100 MG
1 CAPSULE ORAL
Qty: 0 | Refills: 0 | DISCHARGE
Start: 2024-08-28

## 2024-08-28 RX ORDER — GABAPENTIN 400 MG/1
2 CAPSULE ORAL
Qty: 0 | Refills: 0 | DISCHARGE
Start: 2024-08-28

## 2024-08-28 RX ORDER — POLYETHYLENE GLYCOL 3350 17 G/17G
17 POWDER, FOR SOLUTION ORAL
Qty: 0 | Refills: 0 | DISCHARGE
Start: 2024-08-28

## 2024-08-28 RX ORDER — ONDANSETRON HCL/PF 4 MG/2 ML
1 VIAL (ML) INJECTION
Qty: 0 | Refills: 0 | DISCHARGE
Start: 2024-08-28

## 2024-08-28 RX ORDER — VANCOMYCIN HCL IN 5 % DEXTROSE 1.5G/250ML
750 PLASTIC BAG, INJECTION (ML) INTRAVENOUS
Qty: 0 | Refills: 0 | DISCHARGE
Start: 2024-08-28

## 2024-08-28 RX ORDER — FINASTERIDE 5 MG/1
1 TABLET, FILM COATED ORAL
Qty: 0 | Refills: 0 | DISCHARGE

## 2024-08-28 RX ORDER — LIDOCAINE HYDROCHLORIDE 20 MG/ML
1 JELLY TOPICAL
Qty: 0 | Refills: 0 | DISCHARGE
Start: 2024-08-28

## 2024-08-28 RX ORDER — FINASTERIDE 1 MG/1
1 TABLET, FILM COATED ORAL
Qty: 0 | Refills: 0 | DISCHARGE
Start: 2024-08-28

## 2024-08-28 RX ORDER — CYCLOBENZAPRINE HYDROCHLORIDE 15 MG/1
1 CAPSULE, EXTENDED RELEASE ORAL
Qty: 0 | Refills: 0 | DISCHARGE
Start: 2024-08-28

## 2024-08-28 RX ADMIN — Medication 250 MILLIGRAM(S): at 05:17

## 2024-08-28 RX ADMIN — Medication 1 APPLICATION(S): at 06:05

## 2024-08-28 RX ADMIN — CEFTRIAXONE 100 MILLIGRAM(S): 500 INJECTION, POWDER, FOR SOLUTION INTRAMUSCULAR; INTRAVENOUS at 05:17

## 2024-08-28 RX ADMIN — DULOXETINE 20 MILLIGRAM(S): 20 CAPSULE, DELAYED RELEASE ORAL at 12:33

## 2024-08-28 RX ADMIN — Medication 100 MILLIGRAM(S): at 12:33

## 2024-08-28 RX ADMIN — Medication 2 GRAM(S): at 05:18

## 2024-08-28 RX ADMIN — LIDOCAINE HYDROCHLORIDE 1 PATCH: 20 JELLY TOPICAL at 06:05

## 2024-08-28 RX ADMIN — ENOXAPARIN SODIUM 70 MILLIGRAM(S): 100 INJECTION SUBCUTANEOUS at 17:48

## 2024-08-28 RX ADMIN — GABAPENTIN 600 MILLIGRAM(S): 400 CAPSULE ORAL at 05:31

## 2024-08-28 RX ADMIN — GABAPENTIN 600 MILLIGRAM(S): 400 CAPSULE ORAL at 13:54

## 2024-08-28 RX ADMIN — Medication 2 GRAM(S): at 17:48

## 2024-08-28 RX ADMIN — Medication 250 MILLIGRAM(S): at 17:49

## 2024-08-28 RX ADMIN — ENOXAPARIN SODIUM 70 MILLIGRAM(S): 100 INJECTION SUBCUTANEOUS at 05:16

## 2024-08-28 RX ADMIN — Medication 1 TABLET(S): at 12:34

## 2024-08-28 RX ADMIN — Medication 500 MILLIGRAM(S): at 12:33

## 2024-08-28 RX ADMIN — GABAPENTIN 600 MILLIGRAM(S): 400 CAPSULE ORAL at 22:07

## 2024-08-28 RX ADMIN — Medication 2 TABLET(S): at 22:07

## 2024-08-28 RX ADMIN — FINASTERIDE 5 MILLIGRAM(S): 1 TABLET, FILM COATED ORAL at 12:33

## 2024-08-29 LAB
ALBUMIN SERPL ELPH-MCNC: 2.2 G/DL — LOW (ref 3.3–5)
ALP SERPL-CCNC: 117 U/L — SIGNIFICANT CHANGE UP (ref 40–120)
ALT FLD-CCNC: 47 U/L — HIGH (ref 10–45)
ANION GAP SERPL CALC-SCNC: 8 MMOL/L — SIGNIFICANT CHANGE UP (ref 5–17)
AST SERPL-CCNC: 30 U/L — SIGNIFICANT CHANGE UP (ref 10–40)
BILIRUB SERPL-MCNC: 0.3 MG/DL — SIGNIFICANT CHANGE UP (ref 0.2–1.2)
BUN SERPL-MCNC: 22 MG/DL — SIGNIFICANT CHANGE UP (ref 7–23)
CALCIUM SERPL-MCNC: 8.9 MG/DL — SIGNIFICANT CHANGE UP (ref 8.4–10.5)
CHLORIDE SERPL-SCNC: 99 MMOL/L — SIGNIFICANT CHANGE UP (ref 96–108)
CO2 SERPL-SCNC: 25 MMOL/L — SIGNIFICANT CHANGE UP (ref 22–31)
CREAT SERPL-MCNC: 0.88 MG/DL — SIGNIFICANT CHANGE UP (ref 0.5–1.3)
CRP SERPL-MCNC: 101 MG/L — HIGH
EGFR: 86 ML/MIN/1.73M2 — SIGNIFICANT CHANGE UP
EGFR: 86 ML/MIN/1.73M2 — SIGNIFICANT CHANGE UP
GLUCOSE SERPL-MCNC: 109 MG/DL — HIGH (ref 70–99)
HCT VFR BLD CALC: 24.8 % — LOW (ref 39–50)
HGB BLD-MCNC: 8.1 G/DL — LOW (ref 13–17)
MCHC RBC-ENTMCNC: 29.9 PG — SIGNIFICANT CHANGE UP (ref 27–34)
MCHC RBC-ENTMCNC: 32.7 GM/DL — SIGNIFICANT CHANGE UP (ref 32–36)
MCV RBC AUTO: 91.5 FL — SIGNIFICANT CHANGE UP (ref 80–100)
NRBC # BLD: 0 /100 WBCS — SIGNIFICANT CHANGE UP (ref 0–0)
NRBC BLD-RTO: 0 /100 WBCS — SIGNIFICANT CHANGE UP (ref 0–0)
PLATELET # BLD AUTO: 334 K/UL — SIGNIFICANT CHANGE UP (ref 150–400)
POTASSIUM SERPL-MCNC: 3.7 MMOL/L — SIGNIFICANT CHANGE UP (ref 3.5–5.3)
POTASSIUM SERPL-SCNC: 3.7 MMOL/L — SIGNIFICANT CHANGE UP (ref 3.5–5.3)
PROT SERPL-MCNC: 7.2 G/DL — SIGNIFICANT CHANGE UP (ref 6–8.3)
RBC # BLD: 2.71 M/UL — LOW (ref 4.2–5.8)
RBC # FLD: 14.6 % — HIGH (ref 10.3–14.5)
SODIUM SERPL-SCNC: 132 MMOL/L — LOW (ref 135–145)
WBC # BLD: 6.34 K/UL — SIGNIFICANT CHANGE UP (ref 3.8–10.5)
WBC # FLD AUTO: 6.34 K/UL — SIGNIFICANT CHANGE UP (ref 3.8–10.5)

## 2024-08-29 PROCEDURE — 99232 SBSQ HOSP IP/OBS MODERATE 35: CPT

## 2024-08-29 PROCEDURE — 93970 EXTREMITY STUDY: CPT | Mod: 26

## 2024-08-29 RX ORDER — ENOXAPARIN SODIUM 100 MG/ML
40 INJECTION SUBCUTANEOUS EVERY 12 HOURS
Refills: 0 | Status: DISCONTINUED | OUTPATIENT
Start: 2024-08-29 | End: 2024-08-29

## 2024-08-29 RX ORDER — ENOXAPARIN SODIUM 100 MG/ML
40 INJECTION SUBCUTANEOUS EVERY 24 HOURS
Refills: 0 | Status: DISCONTINUED | OUTPATIENT
Start: 2024-08-30 | End: 2024-08-31

## 2024-08-29 RX ORDER — POLYETHYLENE GLYCOL 3350 17 G/17G
17 POWDER, FOR SOLUTION ORAL
Refills: 0 | DISCHARGE
Start: 2024-08-29

## 2024-08-29 RX ORDER — POLYETHYLENE GLYCOL 3350 17 G/17G
17 POWDER, FOR SOLUTION ORAL
Refills: 0 | Status: DISCONTINUED | OUTPATIENT
Start: 2024-08-29 | End: 2024-08-31

## 2024-08-29 RX ADMIN — Medication 250 MILLIGRAM(S): at 06:38

## 2024-08-29 RX ADMIN — Medication 500 MILLIGRAM(S): at 11:29

## 2024-08-29 RX ADMIN — Medication 250 MILLIGRAM(S): at 17:24

## 2024-08-29 RX ADMIN — FINASTERIDE 5 MILLIGRAM(S): 1 TABLET, FILM COATED ORAL at 11:29

## 2024-08-29 RX ADMIN — OXYCODONE HYDROCHLORIDE 5 MILLIGRAM(S): 30 TABLET ORAL at 22:33

## 2024-08-29 RX ADMIN — Medication 1 TABLET(S): at 11:29

## 2024-08-29 RX ADMIN — Medication 2 GRAM(S): at 06:39

## 2024-08-29 RX ADMIN — Medication 100 MILLIGRAM(S): at 11:28

## 2024-08-29 RX ADMIN — Medication 1 APPLICATION(S): at 07:50

## 2024-08-29 RX ADMIN — GABAPENTIN 600 MILLIGRAM(S): 400 CAPSULE ORAL at 14:43

## 2024-08-29 RX ADMIN — GABAPENTIN 600 MILLIGRAM(S): 400 CAPSULE ORAL at 06:40

## 2024-08-29 RX ADMIN — ENOXAPARIN SODIUM 70 MILLIGRAM(S): 100 INJECTION SUBCUTANEOUS at 06:39

## 2024-08-29 RX ADMIN — OXYCODONE HYDROCHLORIDE 5 MILLIGRAM(S): 30 TABLET ORAL at 21:33

## 2024-08-29 RX ADMIN — Medication 2 TABLET(S): at 21:32

## 2024-08-29 RX ADMIN — GABAPENTIN 600 MILLIGRAM(S): 400 CAPSULE ORAL at 21:32

## 2024-08-29 RX ADMIN — Medication 2 GRAM(S): at 17:24

## 2024-08-29 RX ADMIN — CEFTRIAXONE 100 MILLIGRAM(S): 500 INJECTION, POWDER, FOR SOLUTION INTRAMUSCULAR; INTRAVENOUS at 06:48

## 2024-08-29 RX ADMIN — DULOXETINE 20 MILLIGRAM(S): 20 CAPSULE, DELAYED RELEASE ORAL at 11:29

## 2024-08-30 LAB
ALBUMIN SERPL ELPH-MCNC: 2 G/DL — LOW (ref 3.3–5)
ALP SERPL-CCNC: 120 U/L — SIGNIFICANT CHANGE UP (ref 40–120)
ALT FLD-CCNC: 40 U/L — SIGNIFICANT CHANGE UP (ref 10–45)
ANION GAP SERPL CALC-SCNC: 9 MMOL/L — SIGNIFICANT CHANGE UP (ref 5–17)
AST SERPL-CCNC: 28 U/L — SIGNIFICANT CHANGE UP (ref 10–40)
BASOPHILS # BLD AUTO: 0.03 K/UL — SIGNIFICANT CHANGE UP (ref 0–0.2)
BASOPHILS NFR BLD AUTO: 0.5 % — SIGNIFICANT CHANGE UP (ref 0–2)
BILIRUB SERPL-MCNC: 0.2 MG/DL — SIGNIFICANT CHANGE UP (ref 0.2–1.2)
BUN SERPL-MCNC: 22 MG/DL — SIGNIFICANT CHANGE UP (ref 7–23)
CALCIUM SERPL-MCNC: 8.8 MG/DL — SIGNIFICANT CHANGE UP (ref 8.4–10.5)
CHLORIDE SERPL-SCNC: 100 MMOL/L — SIGNIFICANT CHANGE UP (ref 96–108)
CO2 SERPL-SCNC: 25 MMOL/L — SIGNIFICANT CHANGE UP (ref 22–31)
CREAT SERPL-MCNC: 1.06 MG/DL — SIGNIFICANT CHANGE UP (ref 0.5–1.3)
CRP SERPL-MCNC: 72 MG/L — HIGH
EGFR: 70 ML/MIN/1.73M2 — SIGNIFICANT CHANGE UP
EGFR: 70 ML/MIN/1.73M2 — SIGNIFICANT CHANGE UP
EOSINOPHIL # BLD AUTO: 0.73 K/UL — HIGH (ref 0–0.5)
EOSINOPHIL NFR BLD AUTO: 11.3 % — HIGH (ref 0–6)
GLUCOSE SERPL-MCNC: 114 MG/DL — HIGH (ref 70–99)
HCT VFR BLD CALC: 23.6 % — LOW (ref 39–50)
HGB BLD-MCNC: 7.8 G/DL — LOW (ref 13–17)
IMM GRANULOCYTES NFR BLD AUTO: 0.5 % — SIGNIFICANT CHANGE UP (ref 0–0.9)
LYMPHOCYTES # BLD AUTO: 1.59 K/UL — SIGNIFICANT CHANGE UP (ref 1–3.3)
LYMPHOCYTES # BLD AUTO: 24.6 % — SIGNIFICANT CHANGE UP (ref 13–44)
MAGNESIUM SERPL-MCNC: 1.8 MG/DL — SIGNIFICANT CHANGE UP (ref 1.6–2.6)
MCHC RBC-ENTMCNC: 30.4 PG — SIGNIFICANT CHANGE UP (ref 27–34)
MCHC RBC-ENTMCNC: 33.1 GM/DL — SIGNIFICANT CHANGE UP (ref 32–36)
MCV RBC AUTO: 91.8 FL — SIGNIFICANT CHANGE UP (ref 80–100)
MONOCYTES # BLD AUTO: 0.68 K/UL — SIGNIFICANT CHANGE UP (ref 0–0.9)
MONOCYTES NFR BLD AUTO: 10.5 % — SIGNIFICANT CHANGE UP (ref 2–14)
NEUTROPHILS # BLD AUTO: 3.41 K/UL — SIGNIFICANT CHANGE UP (ref 1.8–7.4)
NEUTROPHILS NFR BLD AUTO: 52.6 % — SIGNIFICANT CHANGE UP (ref 43–77)
NRBC # BLD: 0 /100 WBCS — SIGNIFICANT CHANGE UP (ref 0–0)
NRBC BLD-RTO: 0 /100 WBCS — SIGNIFICANT CHANGE UP (ref 0–0)
PHOSPHATE SERPL-MCNC: 3.4 MG/DL — SIGNIFICANT CHANGE UP (ref 2.5–4.5)
PLATELET # BLD AUTO: 334 K/UL — SIGNIFICANT CHANGE UP (ref 150–400)
POTASSIUM SERPL-MCNC: 3.8 MMOL/L — SIGNIFICANT CHANGE UP (ref 3.5–5.3)
POTASSIUM SERPL-SCNC: 3.8 MMOL/L — SIGNIFICANT CHANGE UP (ref 3.5–5.3)
PROT SERPL-MCNC: 6.7 G/DL — SIGNIFICANT CHANGE UP (ref 6–8.3)
RBC # BLD: 2.57 M/UL — LOW (ref 4.2–5.8)
RBC # FLD: 14.6 % — HIGH (ref 10.3–14.5)
SODIUM SERPL-SCNC: 134 MMOL/L — LOW (ref 135–145)
VANCOMYCIN TROUGH SERPL-MCNC: 16.2 UG/ML — SIGNIFICANT CHANGE UP (ref 10–20)
WBC # BLD: 6.47 K/UL — SIGNIFICANT CHANGE UP (ref 3.8–10.5)
WBC # FLD AUTO: 6.47 K/UL — SIGNIFICANT CHANGE UP (ref 3.8–10.5)

## 2024-08-30 PROCEDURE — 71045 X-RAY EXAM CHEST 1 VIEW: CPT | Mod: 26

## 2024-08-30 PROCEDURE — 99233 SBSQ HOSP IP/OBS HIGH 50: CPT

## 2024-08-30 PROCEDURE — 99232 SBSQ HOSP IP/OBS MODERATE 35: CPT

## 2024-08-30 RX ORDER — DIPHENHYDRAMINE HCL 12.5MG/5ML
25 ELIXIR ORAL ONCE
Refills: 0 | Status: COMPLETED | OUTPATIENT
Start: 2024-08-30 | End: 2024-08-30

## 2024-08-30 RX ORDER — NYSTATIN 100000 [USP'U]/G
1 CREAM TOPICAL
Refills: 0 | Status: DISCONTINUED | OUTPATIENT
Start: 2024-08-30 | End: 2024-08-31

## 2024-08-30 RX ORDER — HYDROCORTISONE 10 MG/G
1 CREAM TOPICAL
Refills: 0 | Status: DISCONTINUED | OUTPATIENT
Start: 2024-08-30 | End: 2024-08-31

## 2024-08-30 RX ADMIN — ENOXAPARIN SODIUM 40 MILLIGRAM(S): 100 INJECTION SUBCUTANEOUS at 05:21

## 2024-08-30 RX ADMIN — DULOXETINE 20 MILLIGRAM(S): 20 CAPSULE, DELAYED RELEASE ORAL at 12:28

## 2024-08-30 RX ADMIN — Medication 250 MILLIGRAM(S): at 17:55

## 2024-08-30 RX ADMIN — Medication 1 APPLICATION(S): at 05:22

## 2024-08-30 RX ADMIN — Medication 100 MILLIGRAM(S): at 12:29

## 2024-08-30 RX ADMIN — GABAPENTIN 600 MILLIGRAM(S): 400 CAPSULE ORAL at 13:39

## 2024-08-30 RX ADMIN — LIDOCAINE HYDROCHLORIDE 1 PATCH: 20 JELLY TOPICAL at 05:33

## 2024-08-30 RX ADMIN — Medication 2 TABLET(S): at 21:53

## 2024-08-30 RX ADMIN — NYSTATIN 1 APPLICATION(S): 100000 CREAM TOPICAL at 18:30

## 2024-08-30 RX ADMIN — Medication 2 GRAM(S): at 18:30

## 2024-08-30 RX ADMIN — Medication 2 GRAM(S): at 05:22

## 2024-08-30 RX ADMIN — GABAPENTIN 600 MILLIGRAM(S): 400 CAPSULE ORAL at 05:22

## 2024-08-30 RX ADMIN — GABAPENTIN 600 MILLIGRAM(S): 400 CAPSULE ORAL at 21:53

## 2024-08-30 RX ADMIN — CEFTRIAXONE 100 MILLIGRAM(S): 500 INJECTION, POWDER, FOR SOLUTION INTRAMUSCULAR; INTRAVENOUS at 05:24

## 2024-08-30 RX ADMIN — FINASTERIDE 5 MILLIGRAM(S): 1 TABLET, FILM COATED ORAL at 12:28

## 2024-08-30 RX ADMIN — Medication 500 MILLIGRAM(S): at 12:29

## 2024-08-30 RX ADMIN — Medication 1 TABLET(S): at 12:28

## 2024-08-30 RX ADMIN — HYDROCORTISONE 1 APPLICATION(S): 10 CREAM TOPICAL at 06:20

## 2024-08-30 RX ADMIN — LIDOCAINE HYDROCHLORIDE 1 PATCH: 20 JELLY TOPICAL at 07:52

## 2024-08-30 RX ADMIN — HYDROCORTISONE 1 APPLICATION(S): 10 CREAM TOPICAL at 17:57

## 2024-08-30 RX ADMIN — POLYETHYLENE GLYCOL 3350 17 GRAM(S): 17 POWDER, FOR SOLUTION ORAL at 05:23

## 2024-08-30 RX ADMIN — Medication 25 MILLIGRAM(S): at 06:20

## 2024-08-31 VITALS
HEART RATE: 78 BPM | SYSTOLIC BLOOD PRESSURE: 124 MMHG | RESPIRATION RATE: 16 BRPM | OXYGEN SATURATION: 97 % | DIASTOLIC BLOOD PRESSURE: 56 MMHG

## 2024-08-31 LAB — CRP SERPL-MCNC: 55 MG/L — HIGH

## 2024-08-31 PROCEDURE — 36415 COLL VENOUS BLD VENIPUNCTURE: CPT

## 2024-08-31 PROCEDURE — 83735 ASSAY OF MAGNESIUM: CPT

## 2024-08-31 PROCEDURE — 97110 THERAPEUTIC EXERCISES: CPT | Mod: GO

## 2024-08-31 PROCEDURE — 85652 RBC SED RATE AUTOMATED: CPT

## 2024-08-31 PROCEDURE — 97530 THERAPEUTIC ACTIVITIES: CPT | Mod: GP

## 2024-08-31 PROCEDURE — 97112 NEUROMUSCULAR REEDUCATION: CPT | Mod: GP

## 2024-08-31 PROCEDURE — 97116 GAIT TRAINING THERAPY: CPT | Mod: GP

## 2024-08-31 PROCEDURE — 80053 COMPREHEN METABOLIC PANEL: CPT

## 2024-08-31 PROCEDURE — 86140 C-REACTIVE PROTEIN: CPT

## 2024-08-31 PROCEDURE — 97535 SELF CARE MNGMENT TRAINING: CPT | Mod: GO

## 2024-08-31 PROCEDURE — 71045 X-RAY EXAM CHEST 1 VIEW: CPT

## 2024-08-31 PROCEDURE — 84100 ASSAY OF PHOSPHORUS: CPT

## 2024-08-31 PROCEDURE — 97161 PT EVAL LOW COMPLEX 20 MIN: CPT | Mod: GP

## 2024-08-31 PROCEDURE — 85025 COMPLETE CBC W/AUTO DIFF WBC: CPT

## 2024-08-31 PROCEDURE — 87635 SARS-COV-2 COVID-19 AMP PRB: CPT

## 2024-08-31 PROCEDURE — 80202 ASSAY OF VANCOMYCIN: CPT

## 2024-08-31 PROCEDURE — 97165 OT EVAL LOW COMPLEX 30 MIN: CPT | Mod: GO

## 2024-08-31 PROCEDURE — 85027 COMPLETE CBC AUTOMATED: CPT

## 2024-08-31 PROCEDURE — 99238 HOSP IP/OBS DSCHRG MGMT 30/<: CPT

## 2024-08-31 PROCEDURE — 93970 EXTREMITY STUDY: CPT

## 2024-08-31 PROCEDURE — 80048 BASIC METABOLIC PNL TOTAL CA: CPT

## 2024-08-31 RX ORDER — NYSTATIN 100000 [USP'U]/G
1 CREAM TOPICAL
Qty: 0 | Refills: 0 | DISCHARGE
Start: 2024-08-31 | End: 2024-09-03

## 2024-08-31 RX ADMIN — GABAPENTIN 600 MILLIGRAM(S): 400 CAPSULE ORAL at 13:13

## 2024-08-31 RX ADMIN — Medication 650 MILLIGRAM(S): at 13:13

## 2024-08-31 RX ADMIN — ENOXAPARIN SODIUM 40 MILLIGRAM(S): 100 INJECTION SUBCUTANEOUS at 05:02

## 2024-08-31 RX ADMIN — Medication 100 MILLIGRAM(S): at 13:12

## 2024-08-31 RX ADMIN — Medication 500 MILLIGRAM(S): at 13:12

## 2024-08-31 RX ADMIN — DULOXETINE 20 MILLIGRAM(S): 20 CAPSULE, DELAYED RELEASE ORAL at 13:12

## 2024-08-31 RX ADMIN — HYDROCORTISONE 1 APPLICATION(S): 10 CREAM TOPICAL at 05:04

## 2024-08-31 RX ADMIN — GABAPENTIN 600 MILLIGRAM(S): 400 CAPSULE ORAL at 05:02

## 2024-08-31 RX ADMIN — CEFTRIAXONE 100 MILLIGRAM(S): 500 INJECTION, POWDER, FOR SOLUTION INTRAMUSCULAR; INTRAVENOUS at 05:03

## 2024-08-31 RX ADMIN — Medication 250 MILLIGRAM(S): at 05:46

## 2024-08-31 RX ADMIN — Medication 1 TABLET(S): at 13:12

## 2024-08-31 RX ADMIN — LIDOCAINE HYDROCHLORIDE 1 PATCH: 20 JELLY TOPICAL at 05:00

## 2024-08-31 RX ADMIN — Medication 1 APPLICATION(S): at 05:08

## 2024-08-31 RX ADMIN — FINASTERIDE 5 MILLIGRAM(S): 1 TABLET, FILM COATED ORAL at 13:12

## 2024-08-31 RX ADMIN — Medication 2 GRAM(S): at 05:02

## 2024-08-31 RX ADMIN — NYSTATIN 1 APPLICATION(S): 100000 CREAM TOPICAL at 05:02

## 2024-09-03 NOTE — H&P ADULT - PROBLEM SELECTOR PLAN 6
Low risk for VTE per IMPROVE score Not taking antihypertensives; BP currently at goal  Will continue to trend BP w/routine VS Refusal

## 2024-09-24 ENCOUNTER — APPOINTMENT (OUTPATIENT)
Dept: NEUROSURGERY | Facility: CLINIC | Age: 84
End: 2024-09-24
Payer: MEDICARE

## 2024-09-24 VITALS
HEIGHT: 65 IN | DIASTOLIC BLOOD PRESSURE: 73 MMHG | OXYGEN SATURATION: 99 % | HEART RATE: 83 BPM | SYSTOLIC BLOOD PRESSURE: 163 MMHG

## 2024-09-24 DIAGNOSIS — M46.24 OSTEOMYELITIS OF VERTEBRA, THORACIC REGION: ICD-10-CM

## 2024-09-24 PROCEDURE — 99024 POSTOP FOLLOW-UP VISIT: CPT

## 2024-09-24 RX ORDER — HEPARIN SOD,PORCINE/0.9 % NACL 5000/500ML
5000-0.9 INTRAVENOUS SOLUTION INTRAVENOUS
Refills: 0 | Status: ACTIVE | COMMUNITY

## 2024-09-24 RX ORDER — GABAPENTIN 300 MG/1
300 CAPSULE ORAL
Refills: 0 | Status: ACTIVE | COMMUNITY

## 2024-09-24 RX ORDER — MULTIVITAMIN
TABLET ORAL
Refills: 0 | Status: ACTIVE | COMMUNITY

## 2024-09-24 RX ORDER — ACETAMINOPHEN 325 MG/1
325 TABLET ORAL
Refills: 0 | Status: ACTIVE | COMMUNITY

## 2024-09-24 RX ORDER — NORMAL SALT TABLETS 1 G/G
1 TABLET ORAL
Refills: 0 | Status: ACTIVE | COMMUNITY

## 2024-09-24 RX ORDER — LIDOCAINE 40 MG/G
4 PATCH TOPICAL
Refills: 0 | Status: ACTIVE | COMMUNITY

## 2024-09-24 RX ORDER — SENNOSIDES 8.6 MG TABLETS 8.6 MG/1
8.6 TABLET ORAL
Refills: 0 | Status: ACTIVE | COMMUNITY

## 2024-09-24 RX ORDER — TRIAMCINOLONE ACETONIDE 1 MG/G
0.1 CREAM TOPICAL
Refills: 0 | Status: ACTIVE | COMMUNITY

## 2024-10-03 ENCOUNTER — APPOINTMENT (OUTPATIENT)
Dept: INFECTIOUS DISEASE | Facility: CLINIC | Age: 84
End: 2024-10-03

## 2024-10-03 VITALS
DIASTOLIC BLOOD PRESSURE: 76 MMHG | HEART RATE: 80 BPM | SYSTOLIC BLOOD PRESSURE: 138 MMHG | HEIGHT: 65 IN | OXYGEN SATURATION: 100 %

## 2024-10-03 DIAGNOSIS — M86.9 OSTEOMYELITIS, UNSPECIFIED: ICD-10-CM

## 2024-10-03 DIAGNOSIS — M46.40 DISCITIS, UNSPECIFIED, SITE UNSPECIFIED: ICD-10-CM

## 2024-10-03 DIAGNOSIS — G06.2 EXTRADURAL AND SUBDURAL ABSCESS, UNSPECIFIED: ICD-10-CM

## 2024-10-03 PROCEDURE — 99215 OFFICE O/P EST HI 40 MIN: CPT

## 2024-10-03 NOTE — ASSESSMENT
[FreeTextEntry1] : Patient: Mr. Drew Barcenas, an 84-year-old man, has undergone thoracic fusion to address osteomyelitis and osteodiskitis. He also experienced acute paraplegia due to high-grade spinal cord compression on 8/10/24.  Plan: - Schedule a follow-up with the infectious disease specialist for re-evaluation and continuation of antibiotic therapy. -Restart  antibiotic therapy  - Continue progressive physical therapy for strengthening of the lower extremities.  Follow-up appointment: in 6 weeks  Please refer to Dr. Bergeron's detailed notes for more information.  I, Dr. Kashmir Bergeron, assessed the patient alongside nurse practitioner Pablito Boswell and devised the plan of care. I discussed the patient's case with the nurse practitioner during the visit and concur with the assessment and plan unless otherwise specified.

## 2024-10-03 NOTE — REASON FOR VISIT
[de-identified] : S/P Emergent posterior T10-L2 exposure. Bilateral T11-12 laminectomies for evacuation of epidural abscess. Bilateral T12 transpedicular corpectomies for debridement of T12 vertebral body osteomyelitis and evacuation of T11-T12 paravertebral abscess. Intraoperative ultrasound to confirm circumferential cord decompression. Freehand placement of bilateral T10, T11, L1 and L2 pedicle screws. Intraoperative CT scan. T10, T11, L1 and L2 vertebroplasties via fenestrated screws. T10-L2 posterolateral arthrodesis instrumented fusion. Use of morselized autograft.  [de-identified] : 8/10/24 [de-identified] : Today he comes via wheelchair assist. He resides in rehab. His lumbar incision is gaining  light yellow drainage. Dressing in place. He recently stopped AB therapy on 9/22. His BLE is improving. His bowel and bladder is intact.

## 2024-10-03 NOTE — ASSESSMENT
[FreeTextEntry1] : 84 m with HTN, BPH, found to hve  T11-T12 discitis/osteomyelitis 7/2024 with no growth on bone biopsy but done on antibiotics, blood cx showed 1/4 staph hominis and pt had UTI/ empidymoorchitis at the time so was discharged with a 6 week course of ceftriaxone, but was admitted again on 7/31/2024 for new LLE weakness.  MRI showed worsening epidural disease/compression.  Increase inflammatory markers.  s/p OR 8/10/2024 for a posterior thoracic laminectomy and evacuation of epidural abscess/phlegmon as well as hardware placement on T10-L2 for stabilization.  OR cultures negative pt was discharged with 6 weeks of vanco and ceftraixone post OR but pt has non healing wound vs dehiscence and neurosurgery recommended to extend the course pt here for f/u, afebrile but with 2 openings and drainage    discitis/osteo and T11-T12 epidural abscess/phlegmon status post evacuation on 8/10/2024, negative OR cx non healing wound vs dehiscence concerning for hardware infection   * will need thoracic MRI with albert * c/w vanco and ceftriaxone for now * f/u with neurosurgery

## 2024-10-03 NOTE — REVIEW OF SYSTEMS
[Fever] : no fever [Chills] : no chills [Eye Pain] : no eye pain [Earache] : no earache [Loss Of Hearing] : no hearing loss [Chest Pain] : no chest pain [Palpitations] : no palpitations [Shortness Of Breath] : no shortness of breath [Wheezing] : no wheezing [Cough] : no cough [Abdominal Pain] : no abdominal pain [Vomiting] : no vomiting [Diarrhea] : no diarrhea [Confused] : no confusion [Suicidal] : not suicidal [Easy Bleeding] : no tendency for easy bleeding [Easy Bruising] : no tendency for easy bruising [Negative] : Eyes [FreeTextEntry9] : still has back pain [de-identified] : wound dehiscence on the back

## 2024-10-03 NOTE — PHYSICAL EXAM
[General Appearance - Alert] : alert [General Appearance - In No Acute Distress] : in no acute distress [Sclera] : the sclera and conjunctiva were normal [Outer Ear] : the ears and nose were normal in appearance [Neck Appearance] : the appearance of the neck was normal [Auscultation Breath Sounds / Voice Sounds] : lungs were clear to auscultation bilaterally [Heart Sounds] : normal S1 and S2 [Edema] : there was no peripheral edema [Abdomen Soft] : soft [Abdomen Tenderness] : non-tender [Costovertebral Angle Tenderness] : no CVA tenderness [No Palpable Adenopathy] : no palpable adenopathy [FreeTextEntry1] : spine incision with an opening in the mid thoracic spine, some drainage, a larger wound in lower thoracic spine and drainage  [] : no rash [Affect] : the affect was normal

## 2024-10-03 NOTE — REVIEW OF SYSTEMS
[Feeling Poorly] : feeling poorly [As Noted in HPI] : as noted in HPI [Leg Weakness] : leg weakness [Poor Coordination] : poor coordination [Abnormal Sensation] : an abnormal sensation [Difficulty Walking] : difficulty walking [Arthralgias] : arthralgias [Negative] : Heme/Lymph [FreeTextEntry9] : Wheelchair dependent

## 2024-10-03 NOTE — PHYSICAL EXAM
[General Appearance - Alert] : alert [General Appearance - In No Acute Distress] : in no acute distress [Oriented To Time, Place, And Person] : oriented to person, place, and time [de-identified] : Wheelchair dependent; BLE weakness improving [No Visual Abnormalities] : no visible abnormailities [] : no respiratory distress [Heart Rate And Rhythm] : heart rate was normal and rhythm regular [FreeTextEntry1] : Wheelchair dependent

## 2024-10-03 NOTE — REASON FOR VISIT
[de-identified] : S/P Emergent posterior T10-L2 exposure. Bilateral T11-12 laminectomies for evacuation of epidural abscess. Bilateral T12 transpedicular corpectomies for debridement of T12 vertebral body osteomyelitis and evacuation of T11-T12 paravertebral abscess. Intraoperative ultrasound to confirm circumferential cord decompression. Freehand placement of bilateral T10, T11, L1 and L2 pedicle screws. Intraoperative CT scan. T10, T11, L1 and L2 vertebroplasties via fenestrated screws. T10-L2 posterolateral arthrodesis instrumented fusion. Use of morselized autograft.  [de-identified] : 8/10/24 [de-identified] : Today he comes via wheelchair assist. He resides in rehab. His lumbar incision is gaining  light yellow drainage. Dressing in place. He recently stopped AB therapy on 9/22. His BLE is improving. His bowel and bladder is intact.

## 2024-10-03 NOTE — PHYSICAL EXAM
[General Appearance - Alert] : alert [General Appearance - In No Acute Distress] : in no acute distress [Oriented To Time, Place, And Person] : oriented to person, place, and time [de-identified] : Wheelchair dependent; BLE weakness improving [No Visual Abnormalities] : no visible abnormailities [] : no respiratory distress [Heart Rate And Rhythm] : heart rate was normal and rhythm regular [FreeTextEntry1] : Wheelchair dependent

## 2024-10-04 ENCOUNTER — NON-APPOINTMENT (OUTPATIENT)
Age: 84
End: 2024-10-04

## 2024-10-04 ENCOUNTER — OUTPATIENT (OUTPATIENT)
Dept: OUTPATIENT SERVICES | Facility: HOSPITAL | Age: 84
LOS: 1 days | End: 2024-10-04
Payer: MEDICARE

## 2024-10-04 ENCOUNTER — APPOINTMENT (OUTPATIENT)
Dept: CARDIOLOGY | Facility: CLINIC | Age: 84
End: 2024-10-04

## 2024-10-04 VITALS — HEART RATE: 80 BPM | OXYGEN SATURATION: 100 % | SYSTOLIC BLOOD PRESSURE: 137 MMHG | DIASTOLIC BLOOD PRESSURE: 67 MMHG

## 2024-10-04 DIAGNOSIS — Z86.718 PERSONAL HISTORY OF OTHER VENOUS THROMBOSIS AND EMBOLISM: ICD-10-CM

## 2024-10-04 DIAGNOSIS — Z87.891 PERSONAL HISTORY OF NICOTINE DEPENDENCE: ICD-10-CM

## 2024-10-04 DIAGNOSIS — E78.5 HYPERLIPIDEMIA, UNSPECIFIED: ICD-10-CM

## 2024-10-04 PROCEDURE — 93970 EXTREMITY STUDY: CPT

## 2024-10-04 PROCEDURE — 99214 OFFICE O/P EST MOD 30 MIN: CPT

## 2024-10-04 PROCEDURE — G2211 COMPLEX E/M VISIT ADD ON: CPT

## 2024-10-04 PROCEDURE — 93970 EXTREMITY STUDY: CPT | Mod: 26

## 2024-10-04 NOTE — ASSESSMENT
[FreeTextEntry1] : Assessment: 1. History R Peroneal DVT in August 2. Admission for osteomyelitis of spine & cord compression, s/p posterior thoracic laminectomy evacuation of epidural abscess/phlegmon T11-T12 and placement of hardware T10-L2 for stabilization on 8/10 3. HTN  Plan: 1. History R Peroneal DVT in August Surveillance LE venous duplex. Currently not on anticoagulation or pharmacological DVT PPX at rehab. Depending on venous duplex results, will decide if anticoagulation remains needed. 2. Dry Legs Moisturize daily. Daily feet exam. 3. Health Maintenance Healthy diet and exercise. 4. Continue excellent care with Dr. Bergeron. 5 Follow-up in 3 months. Call with any questions. Office will call with test results.

## 2024-10-04 NOTE — PHYSICAL EXAM
[General Appearance - Well Developed] : well developed [Normal Appearance] : normal appearance [Well Groomed] : well groomed [General Appearance - Well Nourished] : well nourished [No Deformities] : no deformities [General Appearance - In No Acute Distress] : no acute distress [Normal Conjunctiva] : the conjunctiva exhibited no abnormalities [Eyelids - No Xanthelasma] : the eyelids demonstrated no xanthelasmas [Normal Oral Mucosa] : normal oral mucosa [No Oral Pallor] : no oral pallor [No Oral Cyanosis] : no oral cyanosis [Normal Jugular Venous A Waves Present] : normal jugular venous A waves present [Normal Jugular Venous V Waves Present] : normal jugular venous V waves present [No Jugular Venous Whitt A Waves] : no jugular venous whitt A waves [Heart Rate And Rhythm] : heart rate and rhythm were normal [Heart Sounds] : normal S1 and S2 [Murmurs] : no murmurs present [Respiration, Rhythm And Depth] : normal respiratory rhythm and effort [Exaggerated Use Of Accessory Muscles For Inspiration] : no accessory muscle use [Auscultation Breath Sounds / Voice Sounds] : lungs were clear to auscultation bilaterally [Abdomen Soft] : soft [Abdomen Tenderness] : non-tender [Abdomen Mass (___ Cm)] : no abdominal mass palpated [FreeTextEntry1] : in wheelchair [] : no ischemic changes [No Skin Ulcers] : no skin ulcer [Affect] : the affect was normal 25-Jun-2022

## 2024-10-04 NOTE — REASON FOR VISIT
[Initial Evaluation] : an initial evaluation of [FreeTextEntry2] : vascular evaluation [FreeTextEntry1] : 10/4/2024  Recent hospital admission 8/10-8/16/2024  85 y/o M with PMHX HTN, BPH, with osteomyelitis of spine & cord compression, pyelonephritis s/p posterior thoracic laminectomy evacuation of epidural abscess/phlegmon T11-T12 and placement of hardware T10-L2 for stabilization on 8/10. LE venous duplex on 8/10 showed R peroneal DVT. CTA chest on 7/23 showed no PE. TTE on 8/11 showed normal RV size and function. Troponin negative. VSS on RA. Denies C/P or SOB. No LE pain or edema. Denies history of prior VTE. Vascular Cardiology consulted as inpatient.   LE venous duplex 8/15 reviewed and stable without prorogation. Recommend to continue Lovenox 40 mg daily while admitted and on discharge.   Labs LE venous duplex 8/2024: Persistent DVT involving the right peroneal vein.  Currently reports off DVT PPX at rehab.   Medications: Allopurinol Ceftriaxone IV via R PICC Iron Finasteride Gabapentin  Vancomycin IV

## 2024-10-05 ENCOUNTER — OUTPATIENT (OUTPATIENT)
Dept: OUTPATIENT SERVICES | Facility: HOSPITAL | Age: 84
LOS: 1 days | End: 2024-10-05

## 2024-10-05 DIAGNOSIS — G06.2 EXTRADURAL AND SUBDURAL ABSCESS, UNSPECIFIED: ICD-10-CM

## 2024-10-07 ENCOUNTER — NON-APPOINTMENT (OUTPATIENT)
Age: 84
End: 2024-10-07

## 2024-10-10 ENCOUNTER — NON-APPOINTMENT (OUTPATIENT)
Age: 84
End: 2024-10-10

## 2024-10-11 ENCOUNTER — APPOINTMENT (OUTPATIENT)
Dept: MRI IMAGING | Facility: CLINIC | Age: 84
End: 2024-10-11

## 2024-10-11 ENCOUNTER — TRANSCRIPTION ENCOUNTER (OUTPATIENT)
Age: 84
End: 2024-10-11

## 2024-10-11 ENCOUNTER — OUTPATIENT (OUTPATIENT)
Dept: OUTPATIENT SERVICES | Facility: HOSPITAL | Age: 84
LOS: 1 days | End: 2024-10-11
Payer: MEDICARE

## 2024-10-11 DIAGNOSIS — G06.2 EXTRADURAL AND SUBDURAL ABSCESS, UNSPECIFIED: ICD-10-CM

## 2024-10-11 PROCEDURE — A9585: CPT

## 2024-10-11 PROCEDURE — 72157 MRI CHEST SPINE W/O & W/DYE: CPT

## 2024-10-11 PROCEDURE — 72157 MRI CHEST SPINE W/O & W/DYE: CPT | Mod: 26

## 2024-10-15 ENCOUNTER — NON-APPOINTMENT (OUTPATIENT)
Age: 84
End: 2024-10-15

## 2024-10-17 ENCOUNTER — NON-APPOINTMENT (OUTPATIENT)
Age: 84
End: 2024-10-17

## 2024-10-18 ENCOUNTER — NON-APPOINTMENT (OUTPATIENT)
Age: 84
End: 2024-10-18

## 2024-10-22 ENCOUNTER — NON-APPOINTMENT (OUTPATIENT)
Age: 84
End: 2024-10-22

## 2024-10-23 ENCOUNTER — NON-APPOINTMENT (OUTPATIENT)
Age: 84
End: 2024-10-23

## 2024-10-24 ENCOUNTER — NON-APPOINTMENT (OUTPATIENT)
Age: 84
End: 2024-10-24

## 2024-10-25 DIAGNOSIS — T14.8XXA OTHER INJURY OF UNSPECIFIED BODY REGION, INITIAL ENCOUNTER: ICD-10-CM

## 2024-10-28 DIAGNOSIS — Z86.718 PERSONAL HISTORY OF OTHER VENOUS THROMBOSIS AND EMBOLISM: ICD-10-CM

## 2024-11-06 ENCOUNTER — APPOINTMENT (OUTPATIENT)
Dept: NEUROSURGERY | Facility: CLINIC | Age: 84
End: 2024-11-06
Payer: MEDICARE

## 2024-11-06 VITALS
BODY MASS INDEX: 29.49 KG/M2 | WEIGHT: 177 LBS | SYSTOLIC BLOOD PRESSURE: 164 MMHG | HEIGHT: 65 IN | RESPIRATION RATE: 16 BRPM | HEART RATE: 83 BPM | OXYGEN SATURATION: 98 % | DIASTOLIC BLOOD PRESSURE: 72 MMHG

## 2024-11-06 DIAGNOSIS — M46.24 OSTEOMYELITIS OF VERTEBRA, THORACIC REGION: ICD-10-CM

## 2024-11-06 PROCEDURE — 99212 OFFICE O/P EST SF 10 MIN: CPT | Mod: 24

## 2024-11-08 ENCOUNTER — NON-APPOINTMENT (OUTPATIENT)
Age: 84
End: 2024-11-08

## 2024-11-14 ENCOUNTER — NON-APPOINTMENT (OUTPATIENT)
Age: 84
End: 2024-11-14

## 2024-11-15 ENCOUNTER — APPOINTMENT (OUTPATIENT)
Dept: PLASTIC SURGERY | Facility: CLINIC | Age: 84
End: 2024-11-15
Payer: MEDICARE

## 2024-11-15 VITALS
OXYGEN SATURATION: 99 % | HEART RATE: 77 BPM | HEIGHT: 65 IN | TEMPERATURE: 97.7 F | SYSTOLIC BLOOD PRESSURE: 137 MMHG | WEIGHT: 177 LBS | BODY MASS INDEX: 29.49 KG/M2 | DIASTOLIC BLOOD PRESSURE: 77 MMHG

## 2024-11-15 PROCEDURE — 17250 CHEM CAUT OF GRANLTJ TISSUE: CPT

## 2024-11-15 PROCEDURE — 99204 OFFICE O/P NEW MOD 45 MIN: CPT | Mod: 25

## 2024-12-19 ENCOUNTER — INPATIENT (INPATIENT)
Facility: HOSPITAL | Age: 84
LOS: 11 days | Discharge: NURSING, REHAB OR LONG-TERM CARE | End: 2024-12-31
Attending: HOSPITALIST | Admitting: HOSPITALIST
Payer: MEDICARE

## 2024-12-19 VITALS
SYSTOLIC BLOOD PRESSURE: 143 MMHG | DIASTOLIC BLOOD PRESSURE: 62 MMHG | RESPIRATION RATE: 20 BRPM | HEART RATE: 83 BPM | OXYGEN SATURATION: 100 % | WEIGHT: 119.93 LBS | TEMPERATURE: 98 F

## 2024-12-19 DIAGNOSIS — Z29.9 ENCOUNTER FOR PROPHYLACTIC MEASURES, UNSPECIFIED: ICD-10-CM

## 2024-12-19 DIAGNOSIS — J18.9 PNEUMONIA, UNSPECIFIED ORGANISM: ICD-10-CM

## 2024-12-19 DIAGNOSIS — M10.9 GOUT, UNSPECIFIED: ICD-10-CM

## 2024-12-19 DIAGNOSIS — E87.1 HYPO-OSMOLALITY AND HYPONATREMIA: ICD-10-CM

## 2024-12-19 DIAGNOSIS — G93.41 METABOLIC ENCEPHALOPATHY: ICD-10-CM

## 2024-12-19 DIAGNOSIS — N40.0 BENIGN PROSTATIC HYPERPLASIA WITHOUT LOWER URINARY TRACT SYMPTOMS: ICD-10-CM

## 2024-12-19 DIAGNOSIS — A41.9 SEPSIS, UNSPECIFIED ORGANISM: ICD-10-CM

## 2024-12-19 DIAGNOSIS — Z98.890 OTHER SPECIFIED POSTPROCEDURAL STATES: Chronic | ICD-10-CM

## 2024-12-19 DIAGNOSIS — G62.9 POLYNEUROPATHY, UNSPECIFIED: ICD-10-CM

## 2024-12-19 DIAGNOSIS — R41.82 ALTERED MENTAL STATUS, UNSPECIFIED: ICD-10-CM

## 2024-12-19 LAB
ALBUMIN SERPL ELPH-MCNC: 2.6 G/DL — LOW (ref 3.3–5)
ALP SERPL-CCNC: 180 U/L — HIGH (ref 40–120)
ALT FLD-CCNC: 47 U/L — HIGH (ref 4–41)
ANION GAP SERPL CALC-SCNC: 11 MMOL/L — SIGNIFICANT CHANGE UP (ref 7–14)
AST SERPL-CCNC: 49 U/L — HIGH (ref 4–40)
BASOPHILS # BLD AUTO: 0.05 K/UL — SIGNIFICANT CHANGE UP (ref 0–0.2)
BASOPHILS NFR BLD AUTO: 0.3 % — SIGNIFICANT CHANGE UP (ref 0–2)
BILIRUB SERPL-MCNC: 0.6 MG/DL — SIGNIFICANT CHANGE UP (ref 0.2–1.2)
BUN SERPL-MCNC: 23 MG/DL — SIGNIFICANT CHANGE UP (ref 7–23)
CALCIUM SERPL-MCNC: 8.7 MG/DL — SIGNIFICANT CHANGE UP (ref 8.4–10.5)
CHLORIDE SERPL-SCNC: 99 MMOL/L — SIGNIFICANT CHANGE UP (ref 98–107)
CO2 SERPL-SCNC: 23 MMOL/L — SIGNIFICANT CHANGE UP (ref 22–31)
CREAT SERPL-MCNC: 0.89 MG/DL — SIGNIFICANT CHANGE UP (ref 0.5–1.3)
EGFR: 86 ML/MIN/1.73M2 — SIGNIFICANT CHANGE UP
EOSINOPHIL # BLD AUTO: 0.27 K/UL — SIGNIFICANT CHANGE UP (ref 0–0.5)
EOSINOPHIL NFR BLD AUTO: 1.6 % — SIGNIFICANT CHANGE UP (ref 0–6)
FLUAV AG NPH QL: SIGNIFICANT CHANGE UP
FLUBV AG NPH QL: SIGNIFICANT CHANGE UP
GAS PNL BLDV: SIGNIFICANT CHANGE UP
GLUCOSE SERPL-MCNC: 120 MG/DL — HIGH (ref 70–99)
HCT VFR BLD CALC: 29.7 % — LOW (ref 39–50)
HGB BLD-MCNC: 10.1 G/DL — LOW (ref 13–17)
IANC: 14.4 K/UL — HIGH (ref 1.8–7.4)
IMM GRANULOCYTES NFR BLD AUTO: 0.7 % — SIGNIFICANT CHANGE UP (ref 0–0.9)
LACTATE SERPL-SCNC: 1.2 MMOL/L — SIGNIFICANT CHANGE UP (ref 0.5–2)
LYMPHOCYTES # BLD AUTO: 0.76 K/UL — LOW (ref 1–3.3)
LYMPHOCYTES # BLD AUTO: 4.5 % — LOW (ref 13–44)
MCHC RBC-ENTMCNC: 29.4 PG — SIGNIFICANT CHANGE UP (ref 27–34)
MCHC RBC-ENTMCNC: 34 G/DL — SIGNIFICANT CHANGE UP (ref 32–36)
MCV RBC AUTO: 86.3 FL — SIGNIFICANT CHANGE UP (ref 80–100)
MONOCYTES # BLD AUTO: 1.26 K/UL — HIGH (ref 0–0.9)
MONOCYTES NFR BLD AUTO: 7.5 % — SIGNIFICANT CHANGE UP (ref 2–14)
NEUTROPHILS # BLD AUTO: 14.4 K/UL — HIGH (ref 1.8–7.4)
NEUTROPHILS NFR BLD AUTO: 85.4 % — HIGH (ref 43–77)
NRBC # BLD: 0 /100 WBCS — SIGNIFICANT CHANGE UP (ref 0–0)
NRBC # FLD: 0 K/UL — SIGNIFICANT CHANGE UP (ref 0–0)
NT-PROBNP SERPL-SCNC: 922 PG/ML — HIGH
PLATELET # BLD AUTO: 231 K/UL — SIGNIFICANT CHANGE UP (ref 150–400)
POTASSIUM SERPL-MCNC: 3.6 MMOL/L — SIGNIFICANT CHANGE UP (ref 3.5–5.3)
POTASSIUM SERPL-SCNC: 3.6 MMOL/L — SIGNIFICANT CHANGE UP (ref 3.5–5.3)
PROT SERPL-MCNC: 6.3 G/DL — SIGNIFICANT CHANGE UP (ref 6–8.3)
RBC # BLD: 3.44 M/UL — LOW (ref 4.2–5.8)
RBC # FLD: 13.9 % — SIGNIFICANT CHANGE UP (ref 10.3–14.5)
RSV RNA NPH QL NAA+NON-PROBE: SIGNIFICANT CHANGE UP
SARS-COV-2 RNA SPEC QL NAA+PROBE: SIGNIFICANT CHANGE UP
SODIUM SERPL-SCNC: 133 MMOL/L — LOW (ref 135–145)
TROPONIN T, HIGH SENSITIVITY RESULT: 22 NG/L — SIGNIFICANT CHANGE UP
WBC # BLD: 16.86 K/UL — HIGH (ref 3.8–10.5)
WBC # FLD AUTO: 16.86 K/UL — HIGH (ref 3.8–10.5)

## 2024-12-19 PROCEDURE — 99285 EMERGENCY DEPT VISIT HI MDM: CPT

## 2024-12-19 PROCEDURE — 99223 1ST HOSP IP/OBS HIGH 75: CPT | Mod: GC

## 2024-12-19 PROCEDURE — 71250 CT THORAX DX C-: CPT | Mod: 26,MC

## 2024-12-19 RX ORDER — FERROUS SULFATE 325(65) MG
1 TABLET ORAL
Refills: 0 | DISCHARGE

## 2024-12-19 RX ORDER — SODIUM CHLORIDE 9 MG/ML
2 INJECTION, SOLUTION INTRAMUSCULAR; INTRAVENOUS; SUBCUTANEOUS EVERY 12 HOURS
Refills: 0 | Status: DISCONTINUED | OUTPATIENT
Start: 2024-12-19 | End: 2024-12-31

## 2024-12-19 RX ORDER — POLYETHYLENE GLYCOL 3350 17 G/DOSE
17 POWDER (GRAM) ORAL DAILY
Refills: 0 | Status: DISCONTINUED | OUTPATIENT
Start: 2024-12-19 | End: 2024-12-31

## 2024-12-19 RX ORDER — ALLOPURINOL 100 MG/1
100 TABLET ORAL DAILY
Refills: 0 | Status: DISCONTINUED | OUTPATIENT
Start: 2024-12-19 | End: 2024-12-31

## 2024-12-19 RX ORDER — AZITHROMYCIN MONOHYDRATE 200 MG/5ML
500 POWDER, FOR SUSPENSION ORAL EVERY 24 HOURS
Refills: 0 | Status: DISCONTINUED | OUTPATIENT
Start: 2024-12-20 | End: 2024-12-20

## 2024-12-19 RX ORDER — SODIUM CHLORIDE 9 MG/ML
1000 INJECTION, SOLUTION INTRAVENOUS
Refills: 0 | Status: DISCONTINUED | OUTPATIENT
Start: 2024-12-19 | End: 2024-12-19

## 2024-12-19 RX ORDER — SODIUM CHLORIDE 9 MG/ML
500 INJECTION, SOLUTION INTRAVENOUS ONCE
Refills: 0 | Status: COMPLETED | OUTPATIENT
Start: 2024-12-19 | End: 2024-12-19

## 2024-12-19 RX ORDER — VANCOMYCIN HYDROCHLORIDE 5 G/100ML
1000 INJECTION, POWDER, LYOPHILIZED, FOR SOLUTION INTRAVENOUS ONCE
Refills: 0 | Status: COMPLETED | OUTPATIENT
Start: 2024-12-19 | End: 2024-12-19

## 2024-12-19 RX ORDER — FINASTERIDE 5 MG
5 TABLET ORAL DAILY
Refills: 0 | Status: DISCONTINUED | OUTPATIENT
Start: 2024-12-19 | End: 2024-12-31

## 2024-12-19 RX ORDER — AZITHROMYCIN MONOHYDRATE 200 MG/5ML
500 POWDER, FOR SUSPENSION ORAL ONCE
Refills: 0 | Status: COMPLETED | OUTPATIENT
Start: 2024-12-19 | End: 2024-12-19

## 2024-12-19 RX ORDER — LIDOCAINE 50 MG/G
1 OINTMENT TOPICAL
Refills: 0 | DISCHARGE

## 2024-12-19 RX ORDER — LIDOCAINE 50 MG/G
1 OINTMENT TOPICAL DAILY
Refills: 0 | Status: DISCONTINUED | OUTPATIENT
Start: 2024-12-19 | End: 2024-12-31

## 2024-12-19 RX ORDER — CEFTRIAXONE SODIUM 1 G/1
1000 INJECTION, POWDER, FOR SOLUTION INTRAMUSCULAR; INTRAVENOUS EVERY 24 HOURS
Refills: 0 | Status: DISCONTINUED | OUTPATIENT
Start: 2024-12-20 | End: 2024-12-20

## 2024-12-19 RX ORDER — DULOXETINE HYDROCHLORIDE 30 MG/1
20 CAPSULE, DELAYED RELEASE ORAL DAILY
Refills: 0 | Status: DISCONTINUED | OUTPATIENT
Start: 2024-12-19 | End: 2024-12-31

## 2024-12-19 RX ORDER — GABAPENTIN 300 MG/1
2 CAPSULE ORAL
Refills: 0 | DISCHARGE

## 2024-12-19 RX ORDER — ENOXAPARIN SODIUM 60 MG/.6ML
40 INJECTION INTRAVENOUS; SUBCUTANEOUS EVERY 24 HOURS
Refills: 0 | Status: DISCONTINUED | OUTPATIENT
Start: 2024-12-19 | End: 2024-12-31

## 2024-12-19 RX ORDER — INFLUENZA A VIRUS A/WISCONSIN/588/2019 (H1N1) RECOMBINANT HEMAGGLUTININ ANTIGEN, INFLUENZA A VIRUS A/DARWIN/6/2021 (H3N2) RECOMBINANT HEMAGGLUTININ ANTIGEN, INFLUENZA B VIRUS B/AUSTRIA/1359417/2021 RECOMBINANT HEMAGGLUTININ ANTIGEN, AND INFLUENZA B VIRUS B/PHUKET/3073/2013 RECOMBINANT HEMAGGLUTININ ANTIGEN 45; 45; 45; 45 UG/.5ML; UG/.5ML; UG/.5ML; UG/.5ML
0.5 INJECTION INTRAMUSCULAR ONCE
Refills: 0 | Status: COMPLETED | OUTPATIENT
Start: 2024-12-19 | End: 2024-12-19

## 2024-12-19 RX ORDER — SODIUM CHLORIDE 9 MG/ML
1000 INJECTION, SOLUTION INTRAMUSCULAR; INTRAVENOUS; SUBCUTANEOUS ONCE
Refills: 0 | Status: COMPLETED | OUTPATIENT
Start: 2024-12-19 | End: 2024-12-19

## 2024-12-19 RX ORDER — ALBUTEROL SULFATE 90 UG/1
2 INHALANT RESPIRATORY (INHALATION) EVERY 6 HOURS
Refills: 0 | Status: DISCONTINUED | OUTPATIENT
Start: 2024-12-19 | End: 2024-12-31

## 2024-12-19 RX ORDER — AZITHROMYCIN MONOHYDRATE 200 MG/5ML
POWDER, FOR SUSPENSION ORAL
Refills: 0 | Status: DISCONTINUED | OUTPATIENT
Start: 2024-12-19 | End: 2024-12-20

## 2024-12-19 RX ORDER — B COMPLEX, C NO.20/FOLIC ACID 1 MG
1 CAPSULE ORAL DAILY
Refills: 0 | Status: DISCONTINUED | OUTPATIENT
Start: 2024-12-19 | End: 2024-12-31

## 2024-12-19 RX ORDER — SENNOSIDES 8.6 MG/1
2 TABLET, FILM COATED ORAL AT BEDTIME
Refills: 0 | Status: DISCONTINUED | OUTPATIENT
Start: 2024-12-19 | End: 2024-12-31

## 2024-12-19 RX ORDER — CEFTRIAXONE SODIUM 1 G/1
1000 INJECTION, POWDER, FOR SOLUTION INTRAMUSCULAR; INTRAVENOUS ONCE
Refills: 0 | Status: COMPLETED | OUTPATIENT
Start: 2024-12-19 | End: 2024-12-19

## 2024-12-19 RX ORDER — ACETAMINOPHEN 80 MG/.8ML
1000 SOLUTION/ DROPS ORAL ONCE
Refills: 0 | Status: COMPLETED | OUTPATIENT
Start: 2024-12-19 | End: 2024-12-19

## 2024-12-19 RX ORDER — GABAPENTIN 300 MG/1
600 CAPSULE ORAL THREE TIMES A DAY
Refills: 0 | Status: DISCONTINUED | OUTPATIENT
Start: 2024-12-19 | End: 2024-12-31

## 2024-12-19 RX ADMIN — CEFTRIAXONE SODIUM 100 MILLIGRAM(S): 1 INJECTION, POWDER, FOR SOLUTION INTRAMUSCULAR; INTRAVENOUS at 10:47

## 2024-12-19 RX ADMIN — AZITHROMYCIN MONOHYDRATE 500 MILLIGRAM(S): 200 POWDER, FOR SUSPENSION ORAL at 17:28

## 2024-12-19 RX ADMIN — SODIUM CHLORIDE 1000 MILLILITER(S): 9 INJECTION, SOLUTION INTRAMUSCULAR; INTRAVENOUS; SUBCUTANEOUS at 11:06

## 2024-12-19 RX ADMIN — GABAPENTIN 600 MILLIGRAM(S): 300 CAPSULE ORAL at 21:37

## 2024-12-19 RX ADMIN — SENNOSIDES 2 TABLET(S): 8.6 TABLET, FILM COATED ORAL at 21:37

## 2024-12-19 RX ADMIN — ACETAMINOPHEN 400 MILLIGRAM(S): 80 SOLUTION/ DROPS ORAL at 16:12

## 2024-12-19 RX ADMIN — SODIUM CHLORIDE 1000 MILLILITER(S): 9 INJECTION, SOLUTION INTRAVENOUS at 20:29

## 2024-12-19 RX ADMIN — ENOXAPARIN SODIUM 40 MILLIGRAM(S): 60 INJECTION INTRAVENOUS; SUBCUTANEOUS at 21:36

## 2024-12-19 RX ADMIN — VANCOMYCIN HYDROCHLORIDE 250 MILLIGRAM(S): 5 INJECTION, POWDER, LYOPHILIZED, FOR SOLUTION INTRAVENOUS at 10:47

## 2024-12-19 RX ADMIN — SODIUM CHLORIDE 2 GRAM(S): 9 INJECTION, SOLUTION INTRAMUSCULAR; INTRAVENOUS; SUBCUTANEOUS at 18:57

## 2024-12-19 NOTE — H&P ADULT - PROBLEM SELECTOR PLAN 8
DVT Prophylaxis: Lovenox 40mg QD  DIET: Mechanical soft diet  DISPO: To be determined  DNR/DNI: Trial NIV

## 2024-12-19 NOTE — ED PROVIDER NOTE - PATIENT'S PREFERRED PRONOUN
Prior Authorization Retail Medication Request    Medication/Dose: galcanezumab-gnlm (EMGALITY) 120 MG/ML injection  ICD code (if different than what is on RX):    Previously Tried and Failed:    Rationale:      Insurance Name:    Insurance ID:        Pharmacy Information (if different than what is on RX)  Name:    Phone:       Him/He

## 2024-12-19 NOTE — ED ADULT NURSE NOTE - OBJECTIVE STATEMENT
This is a 83 y/o male alert and oriented x 3, with a past medical history of HTN, HLD, Gout, BPH and recent diagnosis of Pneumonia. Sent in [facility], due to weakness and sob. speaks in short sentences, mild sob noted. Noted to have small lesion on the right lower jaw, skin is dry and scaly, Decrease air entry to lower lobes bilaterally, coughing noted associated with chest pain, not in any distress.  Abdomen is soft and non tender on palpation. No edema noted. Moving all limbs no deficits noted. IV initiated on left AC g 20 blood work sent to lab waiting for results. Monitor applied, NSR noted. Bed in lowest position, side rails up, for safety call bell in reach.

## 2024-12-19 NOTE — ED ADULT NURSE NOTE - NS ED NURSE RECORD ANOTHER VITAL SIGN
Dolor serafin de espalda inferior   LO QUE NECESITA SABER:   El dolor serafin de la región lumbar de la espalda es ghazal molestia repentina en la parte inferior de olmstead espalda que dura hasta por 6 semanas  La molestia hace que sea dificil que usted tolere la [place]  INSTRUCCIONES SOBRE EL ESTRELLA HOSPITALARIA:   Regrese a la jessy de emergencias si:   · Usted tiene dolor intenso  · Usted repentinamente tiene rigidez o siente pesadez en ambos glúteos hacia abajo de ambas piernas  · Usted tiene entumecimiento o debilidad en ghazal pierna o dolor en ambas piernas  · Usted tiene entumecimiento en el área genital o en la región lumbar  · Usted no puede controlar olmstead orina ni indu deposiciones intestinales  Pregúntele a olmstead [facility] vitaminas y minerales son adecuados para usted  · Usted tiene fiebre  · Usted tiene un dolor por la noche o cuando descansa  · Olmstead dolor no mejora con el tratamiento  · Usted tiene dolor que empeora cuando tose o estornuda  · Usted siente un estallido o chasquido repentino en olmstead espalda  · Usted tiene preguntas o inquietudes acerca de olmstead condición o cuidado  Medicamentos:  Los siguientes medicamentos pueden  ser recetados por olmstead médico:  · El acetaminofén  max el dolor  Está disponible sin receta médica  Pregunte la cantidad y la frecuencia con que debe tomarlos  [address]  El acetaminofén puede causar daño en el hígado cuando no se danae de forma correcta  · AINEs (Analgésicos antiinflamatorios no esteroides)  ayudan a disminuir la inflamación y el dolor  Deepak medicamento esta disponible con o sin ghazal receta médica  Los AINEs pueden causar sangrado estomacal o problemas renales en ciertas personas  Si usted danae un medicamento anticoagulante, siempre pregúntele a olmstead médico si los MINESH son seguros para usted  Siempre sofia la etiqueta de deepak medicamento y [place] instrucciones  · Un medicamento con receta para el dolor  podrían ser [place]  Pregunte al médico cómo debe rob deepak medicamento de forma gordon  · Relajantes musculares  disminuyen el dolor y [employer] músculos de la parte inferior de la [place]  · [place] indu medicamentos dank se le haya indicado  Consulte con olmstead médico si usted deyanira que olmstead medicamento no le está ayudando o si presenta efectos secundarios  Infórmele si es alérgico a algún medicamento  Mantenga ghazal lista actualizada de los [place], las vitaminas y los productos herbales que danae  Incluya los siguientes datos de los medicamentos: cantidad, frecuencia y motivo de administración  Traiga con usted la lista o los envases de la píldoras a indu citas de seguimiento  Lleve la lista de los medicamentos con usted en reese de ghazal emergencia  Cuidados personales:   · Manténgase activo  lo más que pueda sin causar más dolor  El reposo en cama puede empeorar olmstead dolor de espalda  Comience con ejercicios ligeros dank caminar  Evite levantar objetos hasta que ya no tenga dolor  Solicite más información acerca de las actividades físicas o plan de ejercicios que son los adecuados para usted  · El hielo  ayuda a disminuir la inflamación, el dolor y los espasmos musculares  Ponga hielo lizzy en ghazal bolsa plástica  Cúbrala con ghazal toalla  Aplíquela en olmstead janneth lumbar por 20 a 30 minutos cada 2 horas  Joceline esto por 2 a 3 días después que el dolor empiece, o según lo indicado  · El calor  ayuda a disminuir dolor y espasmos musculares  Empiece a utilizar calor después de vivian terminado el tratamiento con el hielo  Utilice ghazal toalla pequeña empapada con [place], ghazal almohada térmica o tome un baño de [place]ler con agua tibia  Aplíquese calor en el área lesionada elvis 20 a 30 minutos cada 2 horas elvis la cantidad de [employer] indiquen  [place] entre el calor y el hielo  Prevenir el dolor serafin de la parte inferior de la espalda:   · Use la mecánica corporal adecuada        ¨ Flexione la cadera y las rodillas cuando Osorio Miramontes a levantar un objeto  No doble la cintura  Utilice los [employer] de las piernas mientras levanta gomez carga  No use gomez espalda  Mantenga el objeto cerca de gomez pecho mientras lo levanta  No se tuerza, ni levante cualquier cosa por encima de gomez cintura  ¨ Cambie gomez posición frecuentemente cuando pase mucho tiempo de pie  Descanse un pie sobre ghazal [place] o un reposapiés e intercambie con el otro pie frecuentemente  ¨ No permanezca sentado por lapsos de tiempo prolongados  Cuando sea necesario hacerlo, siéntese en ghazal silla de respaldo recto con los pies apoyados en el suelo  Nunca alcance, jale ni empuje mientras se encuentra sentando  · Joceline ejercicios que fortalezcan indu músculos de la espalda  Entre en calor antes de hacer ejercicio  Consulte con gomez médico sobre [employer] plan de ejercicios para usted  · Mantenga un peso saludable  Consulte con gomez médico cuánto debería pesar  Pida que le ayude a crear un plan para bajar de peso si usted tiene sobrepeso  Acuda a indu consultas de control con gomez médico según le indicaron  Regrese a ghazal zoraida de seguimiento si usted aun tiene [employer] de 1 a 3 semanas de [place]  Puede que usted necesite acudir con un ortopedista si gomez dolor de espalda dura más de 12 semanas  Anote indu preguntas para que se acuerde de hacerlas elvis indu visitas  © 2017 [address] Information is for End User's use only and may not be sold, redistributed or otherwise used for commercial purposes  All illustrations and images included in CareNotes® are the copyrighted property of A D A M , Inc  or aKl Strong  Esta información es sólo para uso en educación  Gomez intención no es darle un consejo médico sobre enfermedades o tratamientos  Colsulte con gomez Zelpha Roch farmacéutico antes de seguir cualquier régimen médico para saber si es seguro y efectivo para usted  Yes

## 2024-12-19 NOTE — H&P ADULT - PROBLEM SELECTOR PLAN 4
- Home medication: Finasteride 5mg QD  - C/w home medication - Mild transaminitis on admission  - Alk phos 180, AST 49, ALT 47  - CTM w/ morning labs  - If continues to uptrend, can get liver imaging

## 2024-12-19 NOTE — PATIENT PROFILE ADULT - NSPROEXTENSIONSOFSELF_GEN_A_NUR
CLINICAL NUTRITION SERVICES - PEDIATRIC ASSESSMENT NOTE    REASON FOR ASSESSMENT  Male-Yael San is a 0 day old male evaluated by the dietitian for NICU Admission/LOS and baby receiving nutrition support.     ANTHROPOMETRICS  Birth Wt: 830 gm, 4.64%tile & z score -1.68  Current Wt: 830 gm  Length: 32 cm, 0.26%tile & z score -2.8  Head Circumference: 26 cm, 16.84%tile & z score -0.96  Comments: Birth weight is c/w SGA status as plotted on Madeline Growth Chart based on PMA. Anticipate diuresis after birth with baby regaining birth weight by DOL 10-14.    NUTRITION HISTORY  NPO with initiation of Starter PN and SMOF Lipids shortly after birth. Unable to determine MOB's plan for feeding through chart review at this time.     Information obtained from: Chart  Factors affecting nutrition intake include: Prematurity (born at and currently 29 6/7 weeks PMA) and reliance on respiratory support (CPAP)    NUTRITION SUPPORT     Enteral Nutrition: NPO.       Parenteral Nutrition: Starter PN at 81 mL/kg/day with SMOF lipids at 5 mL/kg/day providing 54 total Kcals/kg/day (38 non-protein Kcals/kg), 4 gm/kg/day protein, 1 gm/kg/day fat; GIR of 5.6 mg/kg/min. PN is meeting 40-42% of assessed Kcal needs and 100% of assessed protein needs.    Intake/Tolerance: NPO with OG tube to gravity, no documented output or stools thus far.        PHYSICAL FINDINGS  Observed: Visual assessment not completed at this time.   Obtained from Chart/Interdisciplinary Team: Nutrition related physical findings noted in EMR include SGA, ELBW status and OG tube and UVC in place.     LABS: Reviewed and include glucose 36 mg/dL (low - monitor on Starter PN), hemoglobin 14.5 g/dL (appropriate)  MEDICATIONS: Reviewed     ASSESSED NUTRITION NEEDS:    -Energy: 90-95 nonprotein Kcals/kg/day from TPN while NPO/receiving <30 mL/kg/day feeds; ~115 total Kcals/kg/day from TPN + Feeds; 120-130 Kcals/kg/day from Feeds alone    -Protein: 4 gm/kg/day    -Fluid: Per  Medical Team; 80 mL/kg/day total fluid goal currently     -Micronutrients: 10-15 mcg/day of Vit D, 2-3 mg/kg/day elemental Zinc (at a minimum) & 6 mg/kg/day (total) of Iron - with feedings + acceptable (<350 ng/mL) Ferritin level      NUTRITION STATUS VALIDATION  Unable to assess at this time using established criteria as infant is <2 weeks of age.     NUTRITION DIAGNOSIS:    Predicted suboptimal energy intake related to lack of full nutrition support as evidenced by current Starter PN and SMOF Lipids meeting 40-42% of estimated energy needs.     INTERVENTIONS  Nutrition Prescription    Meet 100% assessed energy & protein needs via feedings with age-appropriate growth.     Nutrition Education:      No education needs identified at this time.     Implementation:    Parenteral Nutrition (Starter PN and SMOF Lipids initiated, see recommendations below)    Goals    1). Meet 100% assessed energy & protein needs via nutrition support.    2). After diuresis, regain birth weight by DOL 10-14 with goal wt gain of 19-22 g/kg/day. Linear growth of 1.3-1.4 cm/week.     3). With full feeds receive appropriate Vitamin D, Zinc, & Iron intakes.    FOLLOW UP/MONITORING    Macronutrient intakes, Micronutrient intakes, and Anthropometric measurements    RECOMMENDATIONS     1). When medically appropriate initiate and advance feedings of Human milk/Donor Human milk (with parental assent) per NICU Feeding Guidelines to goal of 160 mL/kg/day.       2). As medically-appropriate on DOL 1, recommend transition to full/custom PN with GIR of 6.5 mg/kg/min, 4 gm/kg/day protein and 2 gm/kg/day of SMOF Lipids.   - While baby is NPO/enteral feeds are limited, advance PN GIR by 1 mg/kg/min each day to goal of 12 mg/kg/min and advance SMOF Lipids by 1 gm/kg/day to goal of 3.5 gm/kg/day while maintaining AA at 4 gm/kg/day.  - If baby develops hyperglycemia requiring insulin, then decrease goal GIR to 6-8 mg/kg/min and goal SMOF Lipids to  2.5-3 gm/kg/day.    - Once hyperglycemia has resolved, begin to advance GIR by 0.5-1 mg/kg/min each day towards goal of 12 mg/kg/min & increase IV fat to 3.5 gm/kg/day (assuming appropriate TG level).      3). Once feeds are >30 mL/kg/day, begin to titrate PN macronutrients accordingly with each feeding increase.   - With increase in feedings to 100 mL/kg/day consider an increase to Human Milk + Similac HMF (4 Kcal/oz) = 24 sita/oz.   - Begin to run out PN once feeds are 100-110 mL/kg/day.      4). With achievement of full feeds initiate:  - 7.5 mcg/day of Vitamin D  - Liquid Protein to achieve 4 gm/kg/day (total) protein intake   - Once baby is 2 weeks old, then consider initiation of Zinc Sulfate at 8.8 mg/kg/day to provide 2 mg/kg/day of elemental Zinc.   *Please separate Zinc and Iron supplements to optimize absorption of both.       5). Given birth weight <1800 gm, baby would benefit from a Ferritin level at 2 weeks of age (2/24/23) to better assess Iron needs.   - Minimally baby would benefit from an additional 6 mg/kg/day of elemental Iron (with Darbepoetin) at 2 weeks of age & with full feedings.     Allyson Martinez RD, CSPCC, LD  Phone: [phone]  Pager: [phone]   none

## 2024-12-19 NOTE — ED ADULT NURSE NOTE - CHIEF COMPLAINT QUOTE
Patient brought to ER by EMS from [facility] for shortness of breath and weakness for a week. Diagnosed Positive for pneumonia.

## 2024-12-19 NOTE — ED PROVIDER NOTE - CLINICAL SUMMARY MEDICAL DECISION MAKING FREE TEXT BOX
84-year-old male coming from skilled nursing facility after chest x-ray revealed right upper lobe infiltrate and patchy left basilar infiltrate yesterday.  Patient was started on Keflex yesterday at nursing facility.  Patient is satting 100% on room air, hemodynamically stable, afebrile.  No increased work of breathing.  Endorsing productive cough.  No lower extremity edema.  No calf tenderness bilaterally.  Will begin patient on empiric ceftriaxone and vancomycin for suspected pneumonia.  Will obtain labs and RVP swab. 84-year-old male coming from skilled nursing facility after chest x-ray revealed right upper lobe infiltrate and patchy left basilar infiltrate yesterday.  Patient was started on Keflex yesterday at nursing facility.  Patient is satting 100% on room air, hemodynamically stable, afebrile.  No increased work of breathing.  Endorsing productive cough.  No lower extremity edema.  No calf tenderness bilaterally.  Low concern PE given no hypoxia, tachycardia or LE edema. Will obtain CT non con chest to evaluate for pneumonia. Will begin patient on empiric ceftriaxone and vancomycin for suspected pneumonia.  Will obtain labs and RVP swab.

## 2024-12-19 NOTE — PATIENT PROFILE ADULT - FALL HARM RISK - HARM RISK INTERVENTIONS
Assistance with ambulation/Assistance OOB with selected safe patient handling equipment/Communicate Risk of Fall with Harm to all staff/Discuss with provider need for PT consult/Monitor gait and stability/Reinforce activity limits and safety measures with patient and family/Tailored Fall Risk Interventions/Visual Cue: Yellow wristband and red socks/Bed in lowest position, wheels locked, appropriate side rails in place/Call bell, personal items and telephone in reach/Instruct patient to call for assistance before getting out of bed or chair/Non-slip footwear when patient is out of bed/[place] to call system/Physically safe environment - no spills, clutter or unnecessary equipment/Purposeful Proactive Rounding/Room/bathroom lighting operational, light cord in reach

## 2024-12-19 NOTE — H&P ADULT - NSHPLABSRESULTS_GEN_ALL_CORE
LABS:                        10.1   16.86 )-----------( 231      ( 19 Dec 2024 10:20 )             29.7     12-19    133[L]  |  99  |  23  ----------------------------<  120[H]  3.6   |  23  |  0.89    Ca    8.7      19 Dec 2024 10:20    TPro  6.3  /  Alb  2.6[L]  /  TBili  0.6  /  DBili  x   /  AST  49[H]  /  ALT  47[H]  /  AlkPhos  180[H]  12-19    ProBNP 922    VBG pH 7.32 O2 28 CO2 48 Bicarb 23    RVP neg LABS:                        10.1   16.86 )-----------( 231      ( 19 Dec 2024 10:20 )             29.7     12-19    133[L]  |  99  |  23  ----------------------------<  120[H]  3.6   |  23  |  0.89    Ca    8.7      19 Dec 2024 10:20    TPro  6.3  /  Alb  2.6[L]  /  TBili  0.6  /  DBili  x   /  AST  49[H]  /  ALT  47[H]  /  AlkPhos  180[H]  12-19    ProBNP 922    VBG pH 7.32 O2 28 CO2 48 Bicarb 23    RVP neg    < from: CT Chest No Cont (12.19.24 @ 11:28) >    IMPRESSION:  *  Upper lobe predominant peripherally oriented groundglass opacities   with denser consolidation opacities in the dependent portions of the   bilateral lower lobes. Findings are suggestive of multifocal pneumonia   with superimposed basilar atelectasis. COVID infection is within the   differential given the peripheral location of the groundglass opacities.  *  Trace right effusion.

## 2024-12-19 NOTE — H&P ADULT - HISTORY OF PRESENT ILLNESS
Drew Barcenas is a 84 y.o.  Drew Barcenas is a 84 y.o. man PMH HTN, gout, BPH, neuropathy, hyponatremia, hx DVT and recent hospitalization for spine osteomyelitis s/p epidural evacuation and hardware placement presenting from [facility] for SOB and lethargy. At the rehab center CXR was taken with concerns for PNA and Pt was started on Keflex. Because of worsening condition Pt was sent to the hospital. The son Emery (HCP) was called for a history.    In the ED:  VSS. NS 1L bolus given, CT showed multifocal pneumonia vancomycin and ceftriaxone.    Chart Review:  Admitted from Aug 16th to the 31st for back pain, Found to have discitis and osteo. Aspiration of the epidural abscess showed negative cultures. Surgery was done for epidural evacuation and hardware placement. Pt was on vanc and cefepime. C/b by DVT. Pt was sent to rehab and got 6 weeks of CTX through a PICC line.    Drew Barcenas is a 84 y.o. man PMH HTN, gout, BPH, neuropathy, hyponatremia, hx DVT and recent hospitalization for spine osteomyelitis s/p epidural evacuation and hardware placement presenting from [facility] for SOB and lethargy. At the rehab center CXR was taken with concerns for PNA and Pt was started on Keflex. Because of worsening condition Pt was sent to the hospital. The son Emery (HCP) was called for a history.    In the ED:  VSS. NS 1L bolus given, CT showed multifocal pneumonia vancomycin and ceftriaxone. Became febrile to 103F qualifying for sepsis.    Chart Review:  Admitted from Aug 16th to the 31st for back pain, Found to have discitis and osteo. Aspiration of the epidural abscess showed negative cultures. Surgery was done for epidural evacuation and hardware placement. Pt was on vanc and cefepime. C/b by DVT. Pt was sent to rehab and got 6 weeks of CTX through a PICC line.

## 2024-12-19 NOTE — H&P ADULT - PROBLEM SELECTOR PLAN 6
DVT Prophylaxis: Lovenox 40mg QD  DIET: Depending on bedside dysphagia test  DISPO: To be determined  FULL CODE DVT Prophylaxis: Lovenox 40mg QD  DIET: Depending on bedside dysphagia test  DISPO: To be determined  DNR/DNI: Trial NIV - Home medication: Finasteride 5mg QD  - C/w home medication

## 2024-12-19 NOTE — H&P ADULT - PROBLEM SELECTOR PLAN 3
- Home medications: Allopurinol 100mg QD  - C/w home med - Pt has hx of hyponatremia  - Na 133 on admission  - Home medication: salt tabs 2g BID  - C/w home med

## 2024-12-19 NOTE — H&P ADULT - CONVERSATION DETAILS
Discussed goals of care, including compressions and intubation, with next of kin (son Emery). Pt altered at time of evaluation and unable to make health care decisions. NOK demonstrated understanding and all questions were answered. NOK expresses Pt is to be DNR/DNI: Trial NIV. MOLST filled and placed in chart.

## 2024-12-19 NOTE — ED PROVIDER NOTE - PROGRESS NOTE DETAILS
Marlin Colvin DO (PGY-1): Patient did not intially meet sepsis criteria and had been on out patient abx, blood cultures not initially drawn after cbc and vbg resulted blood cultures drawn. Will be admitted for pneumonia

## 2024-12-19 NOTE — H&P ADULT - NSHPREVIEWOFSYSTEMS_GEN_ALL_CORE
ROS limited due to altered mental status  Pt would not answer questions or make sense per Sebastian   Pt stated "I'm not in any pain"

## 2024-12-19 NOTE — ED ADULT NURSE NOTE - NSFALLRISKINTERV_ED_ALL_ED

## 2024-12-19 NOTE — H&P ADULT - ASSESSMENT
Drew Barcenas is a 84 y.o. 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  Found to have multifocal PNA on CT chest imaging. Currently on azithromycin and CTX. Drew Barcenas is a 84 y.o. man PMH HTN, gout, BPH, neuropathy, hyponatremia, hx DVT and recent hospitalization for spine osteomyelitis s/p epidural evacuation and hardware placement presenting from [facility] for SOB and lethargy.  Found to have multifocal PNA on CT chest imaging. Currently on azithromycin and CTX. Febrile to 103.3 qualifying for sepsis. Most likely secondary to pneumonia.

## 2024-12-19 NOTE — H&P ADULT - ATTENDING COMMENTS
84 year old male with a history of T11-T12 osteomyelitis/discitis, epidural abscess s/p evacuation and placement of hardware at T10-L2 for stabilization, RLE DVT, depression, BPH, gout presenting from [facility] for SOB and cough. [employer]  #[id number] used for Mandarin translation. Patient reports weeks of cough, productive of whitish sputum. Recently has been feeling more sick and unwell. CXR at NH showed PNA and patient was started on Keflex, however his symptoms did not improve and he was sent to [facility] ER. Patient also endorses L hip pain that has worsened recently. Back pain is chronic and at baseline. Denies bowel or bladder incontinence.     Patient AAOx3 on interview, coughing occasionally. +TITI rhonchi.     #Sepsis  #Multifocal pneumonia  #History of T11-T12 OM/discitis  #Hyponatremia  #Depression  #Anemia  #BPH  #Elevated liver enzymes  #History of RLE DVT in 8/2024     -s/p IVF in ER, lactate normal  -BP soft- will give another 500cc bolus of LR  -c/w Ceftriaxone and azithromycin empirically  -check urine Legionella, sputum culture  -xray of L hip  -trend Na, can c/w home dose of salt tabs for now; check urine studies if not improving  -check iron studies, ferritin, Vitamin B12, folate  -trend liver enzymes- if they remain elevated, can check RUQ US  -c/w home meds for depression and BPH  -PT eval

## 2024-12-19 NOTE — ED ADULT TRIAGE NOTE - CHIEF COMPLAINT QUOTE
Patient brought to ER by EMS from [facility] for shortness of breath and weakness for a week. Diagnosed Positive for pneumonia.

## 2024-12-19 NOTE — ED PROVIDER NOTE - ATTENDING CONTRIBUTION TO CARE
Dr. Sharpe: 85 yo male with HTN, few months s/p treatment for thoracic spine osteo and epidural abscess, HLD, recent CXR showing pneumonia and on abx now, in ED with worsening cough productive of white sputum.  No N/V/D or abdominal pain.  On exam pt chronically-ill appearing but in NAD, heart RRR, lungs coarse breath sounds left base, abd NTND, extremities without swelling, strength equally decreased in all extremities and skin without rash.  Pt not on O2 regularly, and arrived with O2 in use, but O2 turned off and SpO2 100% on room air.    I, Kings Sharpe MD, personally made/approved the management plan for this patient and take responsibility for the patient's management.

## 2024-12-19 NOTE — H&P ADULT - PROBLEM SELECTOR PLAN 1
- CT imaging notable for multifocal PNA and bibasilar atelectasis   - C/w CTX and azithromycin  - F/u BCx   - F/u urine legionella  - F/u MRSA swab - Febrile to 103.3F and elevated white count  - CT imaging notable for multifocal PNA and bibasilar atelectasis   - C/w CTX and azithromycin  - F/u BCx   - F/u urine legionella  - F/u MRSA swab  - UA ordered

## 2024-12-19 NOTE — H&P ADULT - PROBLEM SELECTOR PLAN 5
- Home medication: Gabapentin 600mg TID, duloxetine 20mg QD  - C/w home medications - Home medications: Allopurinol 100mg QD  - C/w home med

## 2024-12-19 NOTE — H&P ADULT - NSHPSOCIALHISTORY_GEN_ALL_CORE
Currently resides at [facility] [phone]  Before, lived in a Co-Op in [facility]  Baseline AO x 3, mobility limited since recent after surgery  Hx of smoking, no drinking, or drugs Currently resides at [facility] [phone]  Before, lived in a Co-Op in [facility]  Baseline AO x 3, mobility limited since recent after surgery  Remote history of smoking, no drinking, or drugs

## 2024-12-19 NOTE — ED ADULT NURSE NOTE - PRO INTERPRETER NEED 2
Patient: Augustin Junior   : 1981 MRN: [id number]    SUBJECTIVE:  Chief Complaint   Patient presents with   • Follow-up     3 month follow up        A 42 year old male presents for a follow-up visit.      HISTORY OF PRESENT ILLNESS:  Historian: Self.    Hypertriglyceridemia: He has quit eating fried food and bread, Triglycerides are 227 mg/dl and cholesterol is 182 mg/dl according to recent labs. Tries to stay active and eat healthy. Has stopped consuming fried foods.    Elevated blood sugar: His glucose is 100 mg/dl.     Neck mass: He has a history of neck mass which reappeared again. Denies pain while swallowing. Was advised ultrasound in the past-did not get it done. Ultrasound of thyroid was normal.        PAST MEDICAL HISTORY:  Past Medical History:   Diagnosis Date   • Asthma    • Hypertension      MEDICATIONS:  Current Outpatient Medications   Medication Sig   • losartan (COZAAR) 100 MG tablet Take 1 tablet by mouth daily.   • metoPROLOL succinate (TOPROL-XL) 50 MG 24 hr tablet Take 1 tablet by mouth daily.     No current facility-administered medications for this visit.     ALLERGIES:  ALLERGIES:   Allergen Reactions   • Sulfa Antibiotics Other (See Comments)     Cerner Allergy Text Annotation: sulfa drugs     PAST SURGICAL HISTORY:  Past Surgical History:   Procedure Laterality Date   • Inguinal hernia repair       FAMILY HISTORY:  Family History   Problem Relation Age of Onset   • Diabetes Mother    • Stroke Mother    • Hypertension Father      SOCIAL HISTORY:  Social History     Tobacco Use   Smoking Status Former   • Types: Cigarettes   Smokeless Tobacco Never     Social History     Substance and Sexual Activity   Alcohol Use Yes    Comment: SOCIAL        REVIEW OF SYSTEMS:    Constitutional: Negative.    HENT: Negative.    Eyes: Negative.    Respiratory: Negative.    Cardiovascular: Negative.    Gastrointestinal: Negative.    Endocrine: Negative.    Genitourinary: Negative.    Musculoskeletal:  Negative.  Allergic/Immunologic: Negative.    Skin: Negative.  Neurological:Negative.  Hematological: Negative.    Psychiatric/Behavioral: Negative.      OBJECTIVE:  Vitals:    02/19/24 1651 02/19/24 1720   BP: (!) 145/96 137/85   BP Location: RUE - Right upper extremity    Patient Position: Sitting    Cuff Size: Large Adult    Pulse: 89    Temp: 98.2 °F (36.8 °C)    TempSrc: Temporal    SpO2: 99%    Weight: 83.2 kg (183 lb 6.8 oz)    Height: 5' 10\" (1.778 m)      Body mass index is 26.32 kg/m².      PHYSICAL EXAM:    Constitutional: In no acute distress. Well developed  Eyes: The conjunctiva exhibited no abnormalities. The sclera was normal  Skin: Skin moisture and turgor normal.  Psychiatric: Oriented to time, place, and person. The mood was normal. The  affect was normal. The memory was unimpaired.  Neck: Right lateral swelling of lymph-node.            ASSESSMENT AND PLAN:  This is a 42 year old male who presents with :  1. Hypertriglyceridemia:  Stay active.  Advised low-carb/sugar intake.  Increase protein intake.  Ordered Lipid Panel With Reflex; Future.    - Lipid Panel With Reflex; Future    2. Elevated blood sugar:  Stay active.  Advised diabetic diet-low carb/sugar intake.  Increase protein intake.  Ordered Comprehensive Metabolic Panel; Future.  Ordered Glycohemoglobin; Future.    - Comprehensive Metabolic Panel; Future.  - Glycohemoglobin; Future.    3. Neck mass:  Ordered US SOFT TISSUE NECK OR HEAD; Future.    - US SOFT TISSUE NECK OR HEAD; Future    Follow-up for sooner or as needed.    Orders Placed This Encounter   • US SOFT TISSUE NECK OR HEAD   • Comprehensive Metabolic Panel   • Lipid Panel With Reflex   • Glycohemoglobin         Refer to orders.  Medical compliance with plan discussed and risks of non-compliance reviewed.  Patient education completed on disease process, etiology & prognosis.  Proper usage and side effects of medications reviewed & discussed.  Patient understands and agrees with  the plan.  Return to clinic as clinically indicated as discussed with patient who verbalized understanding of the plan and is in agreement with the plan.    Return in about 3 months (around 5/19/2024), or if symptoms worsen or fail to improve.    IZayra, have created a visit summary document based on the audio recording between Dr. Tammy Moe MD and this patient for the physician to review, edit as needed, and authenticate.  Creation Date: 2/22/2024      English

## 2024-12-19 NOTE — H&P ADULT - NSHPPHYSICALEXAM_GEN_ALL_CORE
VITALS:   Vital Signs Last 24 Hrs  T(C): 39.6 (19 Dec 2024 15:58), Max: 39.6 (19 Dec 2024 15:58)  T(F): 103.3 (19 Dec 2024 15:58), Max: 103.3 (19 Dec 2024 15:58)  HR: 92 (19 Dec 2024 15:58) (76 - 92)  BP: 137/59 (19 Dec 2024 15:58) (124/62 - 148/67)  BP(mean): --  RR: 16 (19 Dec 2024 15:58) (16 - 23)  SpO2: 100% (19 Dec 2024 15:58) (100% - 100%)    Parameters below as of 19 Dec 2024 15:58  Patient On (Oxygen Delivery Method): nasal cannula  O2 Flow (L/min): 2    GENERAL: Lethargic  HEAD:  Atraumatic, normocephalic, warm to the touch  EYES: EOMI, PERRLA, conjunctiva and sclera clear  ENT: Moist mucous membranes  NECK: Supple, no JVD  HEART: S1, S2, regular rate and rhythm, no murmurs, rubs, or gallops  LUNGS: Unlabored respirations.  Clear to auscultation bilaterally, no crackles, wheezing, or rhonchi  ABDOMEN: Soft, nontender, nondistended, +BS  BACK: No back tenderness, back bandage on lower back  EXTREMITIES: 2+ peripheral pulses bilaterally. No clubbing, cyanosis, or edema  NERVOUS SYSTEM:  A&Ox1, follows commands, doesn't answer questions appropriately. No focal deficits   SKIN: No rashes or lesions

## 2024-12-19 NOTE — ED PROVIDER NOTE - OBJECTIVE STATEMENT
84-year-old male history of hypertension, thoracic spine osteomyelitis, epidural abscess s/p removal and placement of hardware on 8/10/24 presenting from skilled nursing facility after chest x-ray revealed right upper lobe infiltrate and patchy left basilar infiltrate concerning for pneumonia.  Patient is endorsing increased shortness of breath and substernal non radiating chest pain.  Patient is A&O x 2.  Per chart review patient was started on Keflex yesterday for pneumonia.  He denies any lower extremity pain or swelling.  Denies fever, urinary symptoms.

## 2024-12-19 NOTE — H&P ADULT - PROBLEM SELECTOR PLAN 2
- Per son, Pt is usually AO x 3  - Currently AO x 1  - Most likely secondary to acute infection  - CTM - Per son, Pt is usually AO x 3  - Currently AO x 1  - Most likely secondary to acute infection and sepsis  - CTM - Per son, Pt is usually AO x 3  - Currently AO x 1  - Has fluctuating mental status  - Most likely secondary to acute infection and sepsis  - CTM

## 2024-12-19 NOTE — ED PROVIDER NOTE - PHYSICAL EXAMINATION
GENERAL: Awake, NAD, no increased wob  HEENT: NC/AT, moist mucous membranes, L sided facial lesion  LUNGS:  Mild crackles L middle lobe, no wheezes   CARDIAC: RRR, no m/r/g  ABDOMEN: Soft, non tender, non distended, no rebound, no guarding  EXT: No edema, no calf tenderness  NEURO: A&Ox3. Moving all extremities.  SKIN: Warm and dry. No rash.  PSYCH: Normal affect.

## 2024-12-20 DIAGNOSIS — Z98.1 ARTHRODESIS STATUS: ICD-10-CM

## 2024-12-20 PROBLEM — E78.5 HYPERLIPIDEMIA, UNSPECIFIED: Chronic | Status: INACTIVE | Noted: 2022-04-26 | Resolved: 2024-12-19

## 2024-12-20 LAB
ADD ON TEST-SPECIMEN IN LAB: SIGNIFICANT CHANGE UP
ALBUMIN SERPL ELPH-MCNC: 2.7 G/DL — LOW (ref 3.3–5)
ALP SERPL-CCNC: 170 U/L — HIGH (ref 40–120)
ALT FLD-CCNC: 40 U/L — SIGNIFICANT CHANGE UP (ref 4–41)
ANION GAP SERPL CALC-SCNC: 10 MMOL/L — SIGNIFICANT CHANGE UP (ref 7–14)
APPEARANCE UR: ABNORMAL
AST SERPL-CCNC: 32 U/L — SIGNIFICANT CHANGE UP (ref 4–40)
BACTERIA # UR AUTO: NEGATIVE /HPF — SIGNIFICANT CHANGE UP
BASOPHILS # BLD AUTO: 0.06 K/UL — SIGNIFICANT CHANGE UP (ref 0–0.2)
BASOPHILS NFR BLD AUTO: 0.3 % — SIGNIFICANT CHANGE UP (ref 0–2)
BILIRUB SERPL-MCNC: 0.9 MG/DL — SIGNIFICANT CHANGE UP (ref 0.2–1.2)
BILIRUB UR-MCNC: NEGATIVE — SIGNIFICANT CHANGE UP
BUN SERPL-MCNC: 22 MG/DL — SIGNIFICANT CHANGE UP (ref 7–23)
CALCIUM SERPL-MCNC: 8.6 MG/DL — SIGNIFICANT CHANGE UP (ref 8.4–10.5)
CAST: 3 /LPF — SIGNIFICANT CHANGE UP (ref 0–4)
CHLORIDE SERPL-SCNC: 100 MMOL/L — SIGNIFICANT CHANGE UP (ref 98–107)
CO2 SERPL-SCNC: 22 MMOL/L — SIGNIFICANT CHANGE UP (ref 22–31)
COLOR SPEC: YELLOW — SIGNIFICANT CHANGE UP
CREAT SERPL-MCNC: 0.81 MG/DL — SIGNIFICANT CHANGE UP (ref 0.5–1.3)
DIFF PNL FLD: NEGATIVE — SIGNIFICANT CHANGE UP
EGFR: 88 ML/MIN/1.73M2 — SIGNIFICANT CHANGE UP
EOSINOPHIL # BLD AUTO: 0.66 K/UL — HIGH (ref 0–0.5)
EOSINOPHIL NFR BLD AUTO: 3.3 % — SIGNIFICANT CHANGE UP (ref 0–6)
GLUCOSE SERPL-MCNC: 117 MG/DL — HIGH (ref 70–99)
GLUCOSE UR QL: NEGATIVE MG/DL — SIGNIFICANT CHANGE UP
GRAM STN FLD: ABNORMAL
HCT VFR BLD CALC: 29.8 % — LOW (ref 39–50)
HGB BLD-MCNC: 10.3 G/DL — LOW (ref 13–17)
IANC: 17.69 K/UL — HIGH (ref 1.8–7.4)
IMM GRANULOCYTES NFR BLD AUTO: 1 % — HIGH (ref 0–0.9)
KETONES UR-MCNC: NEGATIVE MG/DL — SIGNIFICANT CHANGE UP
LACTATE SERPL-SCNC: 1.2 MMOL/L — SIGNIFICANT CHANGE UP (ref 0.5–2)
LEUKOCYTE ESTERASE UR-ACNC: NEGATIVE — SIGNIFICANT CHANGE UP
LYMPHOCYTES # BLD AUTO: 0.47 K/UL — LOW (ref 1–3.3)
LYMPHOCYTES # BLD AUTO: 2.4 % — LOW (ref 13–44)
MAGNESIUM SERPL-MCNC: 1.7 MG/DL — SIGNIFICANT CHANGE UP (ref 1.6–2.6)
MCHC RBC-ENTMCNC: 29 PG — SIGNIFICANT CHANGE UP (ref 27–34)
MCHC RBC-ENTMCNC: 34.6 G/DL — SIGNIFICANT CHANGE UP (ref 32–36)
MCV RBC AUTO: 83.9 FL — SIGNIFICANT CHANGE UP (ref 80–100)
METHOD TYPE: SIGNIFICANT CHANGE UP
MONOCYTES # BLD AUTO: 0.64 K/UL — SIGNIFICANT CHANGE UP (ref 0–0.9)
MONOCYTES NFR BLD AUTO: 3.2 % — SIGNIFICANT CHANGE UP (ref 2–14)
MRSA PCR RESULT.: DETECTED
MRSA SPEC QL CULT: SIGNIFICANT CHANGE UP
NEUTROPHILS # BLD AUTO: 17.69 K/UL — HIGH (ref 1.8–7.4)
NEUTROPHILS NFR BLD AUTO: 89.8 % — HIGH (ref 43–77)
NITRITE UR-MCNC: NEGATIVE — SIGNIFICANT CHANGE UP
NRBC # BLD: 0 /100 WBCS — SIGNIFICANT CHANGE UP (ref 0–0)
NRBC # FLD: 0 K/UL — SIGNIFICANT CHANGE UP (ref 0–0)
PH UR: 6 — SIGNIFICANT CHANGE UP (ref 5–8)
PHOSPHATE SERPL-MCNC: 2.6 MG/DL — SIGNIFICANT CHANGE UP (ref 2.5–4.5)
PLATELET # BLD AUTO: 252 K/UL — SIGNIFICANT CHANGE UP (ref 150–400)
POTASSIUM SERPL-MCNC: 3.5 MMOL/L — SIGNIFICANT CHANGE UP (ref 3.5–5.3)
POTASSIUM SERPL-SCNC: 3.5 MMOL/L — SIGNIFICANT CHANGE UP (ref 3.5–5.3)
PROT SERPL-MCNC: 6.3 G/DL — SIGNIFICANT CHANGE UP (ref 6–8.3)
PROT UR-MCNC: 30 MG/DL
RBC # BLD: 3.55 M/UL — LOW (ref 4.2–5.8)
RBC # FLD: 13.6 % — SIGNIFICANT CHANGE UP (ref 10.3–14.5)
RBC CASTS # UR COMP ASSIST: 1 /HPF — SIGNIFICANT CHANGE UP (ref 0–4)
S AUREUS DNA NOSE QL NAA+PROBE: DETECTED
S PNEUM AG UR QL: NEGATIVE — SIGNIFICANT CHANGE UP
SODIUM SERPL-SCNC: 132 MMOL/L — LOW (ref 135–145)
SP GR SPEC: 1.02 — SIGNIFICANT CHANGE UP (ref 1–1.03)
SPECIMEN SOURCE: SIGNIFICANT CHANGE UP
SPECIMEN SOURCE: SIGNIFICANT CHANGE UP
SQUAMOUS # UR AUTO: 1 /HPF — SIGNIFICANT CHANGE UP (ref 0–5)
UROBILINOGEN FLD QL: 1 MG/DL — SIGNIFICANT CHANGE UP (ref 0.2–1)
WBC # BLD: 19.71 K/UL — HIGH (ref 3.8–10.5)
WBC # FLD AUTO: 19.71 K/UL — HIGH (ref 3.8–10.5)
WBC UR QL: 0 /HPF — SIGNIFICANT CHANGE UP (ref 0–5)

## 2024-12-20 PROCEDURE — 99233 SBSQ HOSP IP/OBS HIGH 50: CPT | Mod: GC

## 2024-12-20 PROCEDURE — 99223 1ST HOSP IP/OBS HIGH 75: CPT

## 2024-12-20 PROCEDURE — 73502 X-RAY EXAM HIP UNI 2-3 VIEWS: CPT | Mod: 26,LT

## 2024-12-20 RX ORDER — VANCOMYCIN HYDROCHLORIDE 5 G/100ML
750 INJECTION, POWDER, LYOPHILIZED, FOR SOLUTION INTRAVENOUS EVERY 12 HOURS
Refills: 0 | Status: DISCONTINUED | OUTPATIENT
Start: 2024-12-20 | End: 2024-12-22

## 2024-12-20 RX ORDER — PIPERACILLIN AND TAZOBACTAM 3; .375 G/15ML; G/15ML
3.38 INJECTION, POWDER, LYOPHILIZED, FOR SOLUTION INTRAVENOUS ONCE
Refills: 0 | Status: COMPLETED | OUTPATIENT
Start: 2024-12-20 | End: 2024-12-20

## 2024-12-20 RX ORDER — PIPERACILLIN AND TAZOBACTAM 3; .375 G/15ML; G/15ML
3.38 INJECTION, POWDER, LYOPHILIZED, FOR SOLUTION INTRAVENOUS EVERY 8 HOURS
Refills: 0 | Status: DISCONTINUED | OUTPATIENT
Start: 2024-12-20 | End: 2024-12-23

## 2024-12-20 RX ORDER — SODIUM HYPOCHLORITE 0.057 %
1 SOLUTION, IRRIGATION IRRIGATION DAILY
Refills: 0 | Status: DISCONTINUED | OUTPATIENT
Start: 2024-12-20 | End: 2024-12-31

## 2024-12-20 RX ADMIN — Medication 17 GRAM(S): at 13:13

## 2024-12-20 RX ADMIN — GABAPENTIN 600 MILLIGRAM(S): 300 CAPSULE ORAL at 22:10

## 2024-12-20 RX ADMIN — PIPERACILLIN AND TAZOBACTAM 25 GRAM(S): 3; .375 INJECTION, POWDER, LYOPHILIZED, FOR SOLUTION INTRAVENOUS at 22:09

## 2024-12-20 RX ADMIN — Medication 5 MILLIGRAM(S): at 13:12

## 2024-12-20 RX ADMIN — PIPERACILLIN AND TAZOBACTAM 25 GRAM(S): 3; .375 INJECTION, POWDER, LYOPHILIZED, FOR SOLUTION INTRAVENOUS at 16:21

## 2024-12-20 RX ADMIN — PIPERACILLIN AND TAZOBACTAM 200 GRAM(S): 3; .375 INJECTION, POWDER, LYOPHILIZED, FOR SOLUTION INTRAVENOUS at 13:11

## 2024-12-20 RX ADMIN — ENOXAPARIN SODIUM 40 MILLIGRAM(S): 60 INJECTION INTRAVENOUS; SUBCUTANEOUS at 22:10

## 2024-12-20 RX ADMIN — SODIUM CHLORIDE 2 GRAM(S): 9 INJECTION, SOLUTION INTRAMUSCULAR; INTRAVENOUS; SUBCUTANEOUS at 18:09

## 2024-12-20 RX ADMIN — LIDOCAINE 1 PATCH: 50 OINTMENT TOPICAL at 19:00

## 2024-12-20 RX ADMIN — VANCOMYCIN HYDROCHLORIDE 250 MILLIGRAM(S): 5 INJECTION, POWDER, LYOPHILIZED, FOR SOLUTION INTRAVENOUS at 19:31

## 2024-12-20 RX ADMIN — ALLOPURINOL 100 MILLIGRAM(S): 100 TABLET ORAL at 13:12

## 2024-12-20 RX ADMIN — SENNOSIDES 2 TABLET(S): 8.6 TABLET, FILM COATED ORAL at 22:10

## 2024-12-20 RX ADMIN — LIDOCAINE 1 PATCH: 50 OINTMENT TOPICAL at 13:12

## 2024-12-20 RX ADMIN — Medication 1 TABLET(S): at 13:11

## 2024-12-20 RX ADMIN — GABAPENTIN 600 MILLIGRAM(S): 300 CAPSULE ORAL at 13:12

## 2024-12-20 RX ADMIN — DULOXETINE HYDROCHLORIDE 20 MILLIGRAM(S): 30 CAPSULE, DELAYED RELEASE ORAL at 13:12

## 2024-12-20 RX ADMIN — GABAPENTIN 600 MILLIGRAM(S): 300 CAPSULE ORAL at 05:52

## 2024-12-20 RX ADMIN — SODIUM CHLORIDE 2 GRAM(S): 9 INJECTION, SOLUTION INTRAMUSCULAR; INTRAVENOUS; SUBCUTANEOUS at 05:51

## 2024-12-20 NOTE — PHYSICAL THERAPY INITIAL EVALUATION ADULT - GENERAL OBSERVATIONS, REHAB EVAL
Pt encountered in semisupine position, no distress, AxOx3, with +IV. Pt agreeable to participate in PT evaluation. Vitals taken; /56mmHg, heart rate 70bpm.

## 2024-12-20 NOTE — PHYSICAL THERAPY INITIAL EVALUATION ADULT - PATIENT PROFILE REVIEW, REHAB EVAL
ACTIVITY ORDER: Ambulate as Tolerated; Spoke with RN Nadya Carey prior to PT evaluation-->Pt OK for PT consult/OOB activity./yes

## 2024-12-20 NOTE — CONSULT NOTE ADULT - ASSESSMENT
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  MRI showed worsening epidural disease/compression. Increase inflammatory markers. s/p OR 8/10/2024 for a posterior thoracic laminectomy and evacuation of epidural abscess/phlegmon as well as hardware placement on T10-L2 for stabilization. OR cultures negative, pt was discharged with 6 weeks of vanco and ceftriaxone post OR but had a non healing wound vs dehiscence and neurosurgery recommended to extend the course, MRI was repeated which showed resolved epidural abscess but  a new small 3.7 cm fluid collection within the right posterior paraspinal soft tissues communicating to the wound possibly  seroma although sequelae of superimposed infection cannot be entirely excluded. The antibiotic course was extended to 11/17 and pt has been in the rehab, now brought in from rehab as he had ?SOB and CXR showed pneumonia  here febrile to 103.3, tachypnea but no hypoxia WBC: 19  pt with a L low back pain and a sacral wound that as per the son developed in the rehab  exam with diffuse skin thickening with hyperpigmentation and scaling, patchy erythematous lesions on the face and sacral wound  chest CT:  Upper lobe predominant peripherally oriented groundglass opacities with denser consolidation opacities in the dependent portions of the bilateral lower lobes. Findings are suggestive of multifocal pneumonia     T11-T12 discitis/osteo with negative bone biopsy cx 7/2024, developed LE weakness on antibiotics with epidural abscess s/p T10-L2 hardware and evacuation of abscess 8/10 with negative OR cx and was on vanco, ceftriaxone developed non healing wound, MRI 10/2024 with resolved epidural abscess but a new soft tissue collection communicating to the wound which could be seroma or infection, no further surgical interventions done but antibiotics course was extended to 11/17 now with fever to 103.3, leukocytosis 19, tachypnea, sepsis, multifocal pneumonia on chest CT  but has L lower back pain with a sacral wound and diffuse skin thickening with hyperpigmentation and scaling, patchy erythematous lesions on the face     * follow the blood cx  * lumbosacral MRi with albert  * full panel RVP  * derm eval for the rash  * if urine legionella negative discontinue azithro  * c/w vanco and zosyn for now  * wound care eval  * monitor CBC/diff, CMP and vanco trough    The above assessment and plan was discussed with the primary team    Ilda Gilmore MD  contact on teams  After 5pm and on weekends call [phone]

## 2024-12-20 NOTE — PHYSICAL THERAPY INITIAL EVALUATION ADULT - ADDITIONAL COMMENTS
Pt is a poor historian. As per H and P report, pt is from Rehab. No updated care coordinator note at this times. As per last care coordinator note on 08/19/2024, pt was living in an apartment with his wife; elevator inside. Prior to rehab and hospital admission, pt was completely independent and used a single axis cane with ambulation. As per pt, he is non-ambulatory in rehab.     Pt left comfortable in bed, NAD, all lines intact, all precautions maintained, with call bell in reach, bed alarm on, and DEBRA Covington aware of PT evaluation and pt on bed pan.

## 2024-12-20 NOTE — PROGRESS NOTE ADULT - PROBLEM SELECTOR PLAN 3
- Pt has hx of hyponatremia  - Na 133 on admission  - Home medication: salt tabs 2g BID  - C/w home med - Mild transaminitis on admission  - Alk phos 180, AST 49, ALT 47  Plan  C/w trend

## 2024-12-20 NOTE — CONSULT NOTE ADULT - SUBJECTIVE AND OBJECTIVE BOX
HPI:  84 m with HTN, gout, BPH, neuropathy, hyponatremia, DVT, T11-T12 discitis/osteomyelitis 7/2024 with no growth on bone biopsy but done on antibiotics, blood cx showed 1/4 staph hominis and pt had UTI/ empidymoorchitis at the time so was discharged with a 6 week course of ceftriaxone, but was admitted again on 7/31/2024 for new LLE weakness.  MRI showed worsening epidural disease/compression. Increase inflammatory markers. s/p OR 8/10/2024 for a posterior thoracic laminectomy and evacuation of epidural abscess/phlegmon as well as hardware placement on T10-L2 for stabilization. OR cultures negative, pt was discharged with 6 weeks of vanco and ceftraixone post OR but had a non healing wound vs dehiscence and neurosurgery recommended to extend the course, MRI was repeated which showed resolved epidural abscess but  a new small 3.7 cm fluid collection within the right posterior paraspinal soft tissues communicating to the wound possibly  seroma although sequelae of superimposed infection cannot be entirely excluded. The antibiotic course was extended to 11/17 and pt has been in the rehab, now brought in from rehab as he had ?SOB and CXR showed pneumonia  here febrile to 103.3, tachypnea but no hypoxia WBC: 19  pt with a L low back pain and a sacral wound that as per the son developed in the rehab        PAST MEDICAL & SURGICAL HISTORY:  HTN (hypertension)      Former smoker      Gout      History of BPH      S/P spinal surgery          Allergies    No Known Allergies    Intolerances        ANTIMICROBIALS:  piperacillin/tazobactam IVPB.. 3.375 every 8 hours  vancomycin  IVPB 750 every 12 hours      OTHER MEDS:  albuterol    90 MICROgram(s) HFA Inhaler 2 Puff(s) Inhalation every 6 hours PRN  allopurinol 100 milliGRAM(s) Oral daily  DULoxetine 20 milliGRAM(s) Oral daily  enoxaparin Injectable 40 milliGRAM(s) SubCutaneous every 24 hours  finasteride 5 milliGRAM(s) Oral daily  gabapentin 600 milliGRAM(s) Oral three times a day  influenza  Vaccine (HIGH DOSE) 0.5 milliLiter(s) IntraMuscular once  lidocaine   4% Patch 1 Patch Transdermal daily  multivitamin 1 Tablet(s) Oral daily  polyethylene glycol 3350 17 Gram(s) Oral daily  senna 2 Tablet(s) Oral at bedtime  sodium chloride 2 Gram(s) Oral every 12 hours      SOCIAL HISTORY:  , used to live with his wife but has been in rehab after the spine surgery  former smoker no  alcohol or drug abuse  no recent travel    FAMILY HISTORY:  No recent febrile illness in family members        ROS:    All other systems negative     Constitutional: + fever  Eye: no eye pain, no redness, no vision changes  ENT:  no sore throat, no rhinorrhea  Cardiovascular:  no chest pain, no palpitation  Respiratory:  + SOB  GI:  no abd pain, no vomiting, no diarrhea  urinary: no dysuria, no hematuria, no flank pain  : no  scrotal pain  musculoskeletal:  L lower back pain  skin:  sacral wound, redness of face  neurology:  no headache, no seizure  psych: no anxiety, no depression     Physical Exam:    General:    NAD, non toxic  Head: atraumatic, normocephalic  Eyes: normal sclera and conjunctiva  ENT:   no oropharyngeal lesions, no LAD, neck supple  Cardio:    regular S1,S2  Respiratory:  comfortable on RA, b/l crackles  abd:   soft, BS +, not tender  :     no CVAT, no suprapubic tenderness, no almodovar  Musculoskeletal : no joint swelling, no edema  Skin:   patchy erythematous lesions on face, torso and back with skin thickening hyperpigmentation and scaling, sacral wound  vascular: no phlebitis  Neurologic:     no focal deficits  psych: normal affect      Drug Dosing Weight  Height (cm): 167.6 (19 Dec 2024 18:36)  Weight (kg): 58.6 (19 Dec 2024 18:36)  BMI (kg/m2): 20.9 (19 Dec 2024 18:36)  BSA (m2): 1.66 (19 Dec 2024 18:36)    Vital Signs Last 24 Hrs  T(F): 97.2 (12-20-24 @ 13:05), Max: 103.3 (12-19-24 @ 15:58)    Vital Signs Last 24 Hrs  HR: 68 (12-20-24 @ 13:05) (68 - 80)  BP: 140/63 (12-20-24 @ 13:05) (100/50 - 140/63)  RR: 18 (12-20-24 @ 13:05)  SpO2: 100% (12-20-24 @ 13:05) (97% - 100%)  Wt(kg): --                          10.3   19.71 )-----------( 252      ( 20 Dec 2024 06:46 )             29.8       12-20    132[L]  |  100  |  22  ----------------------------<  117[H]  3.5   |  22  |  0.81    Ca    8.6      20 Dec 2024 06:46  Phos  2.6     12-20  Mg     1.70     12-20    TPro  6.3  /  Alb  2.7[L]  /  TBili  0.9  /  DBili  x   /  AST  32  /  ALT  40  /  AlkPhos  170[H]  12-20      Urinalysis Basic - ( 20 Dec 2024 06:46 )    Color: x / Appearance: x / SG: x / pH: x  Gluc: 117 mg/dL / Ketone: x  / Bili: x / Urobili: x   Blood: x / Protein: x / Nitrite: x   Leuk Esterase: x / RBC: x / WBC x   Sq Epi: x / Non Sq Epi: x / Bacteria: x        MICROBIOLOGY:  v  .Blood BLOOD  12-19-24   Growth in aerobic bottle: Gram Positive Cocci in Clusters  Direct identification is available within approximately 3-5  hours either by Blood Panel Multiplexed PCR or Direct  MALDI-TOF. Details: [email or web address]  --    Growth in aerobic bottle: Gram Positive Cocci in Clusters                  RADIOLOGY:    Images independently visualized and reviewed personally,  findings as below    < from: Xray Hip 2-3 Views, Left (12.20.24 @ 09:35) >  IMPRESSION:  No fractures or dislocations.    Redemonstrated lumbar spine degenerative change. Preserved left hip joint   space and no gross radiographic evidence for AVN.    Small enthesophytes along peripheral left iliac crest and ischial   tuberosity margins.    Generalized osteopenia otherwise no discrete suspicious lytic or blastic   lesions.    Vascular calcifications.    < end of copied text >  < from: CT Chest No Cont (12.19.24 @ 11:28) >  IMPRESSION:  *  Upper lobe predominant peripherally oriented groundglass opacities   with denser consolidation opacities in the dependent portions of the   bilateral lower lobes. Findings are suggestive of multifocal pneumonia   with superimposed basilar atelectasis. COVID infection is within the   differential given the peripheral location of the groundglass opacities.  *  Trace right effusion.    < end of copied text >

## 2024-12-20 NOTE — CONSULT NOTE ADULT - ASSESSMENT
Assessment/Plan: Mr. Barcenas is a 83 yo man with PMH HTN, gout, BPH, neuropathy, hyponatremia, hx RLE DVT and recent hospitalization for spine osteomyelitis s/p epidural evacuation and hardware placement presenting from [facility] for SOB and lethargy.  Found to have multifocal PNA on CT chest imaging; flu/rsv/covid pannel negative. Febrile to 103.3 qualifying for sepsis most likely secondary to pneumonia. Initially on azithromycin and Ceftriaxone leukocytosis uptrending abx broadened to Zosyn and Vancomycin today.    Wound Consult requested to assist w/ management of right buttock abscess and sacrum unstageable pressure injury.     Right buttock abscess  -Necrotic wound base with pin-point separation from inferior wound edge. No tunneling or undermining  -Expressed moderate amount of nonodorous purulent drainage  -Wound culture obtained and sent.- primary team to follow up.  -No sharp debridement indicated, slough adherent, wound edges friable with cleansing, on lovenox; risk of bleeding outweighs benefit.  -Consider CT Abd/pelvis with contrast to assess extent of abscess  -Abx management per ID.  -Topical recommendation- cleanse with Dakins 1/4 strength. Apply Liquid barrier film to periwound skin. Pack 1cm depth with Aquacel ribbon to facilitate spontaneous drainage. Apply medihoney gel to remaining exposed slough, cover with silicone foam with border. Change daily and prn if soiled/compromised.  -Continue to offload pressure    Sacrum unstageable pressure injury  -Necrotic wound base, no bone palpable  -Unable to express purulent drainage  -No sharp debridement indicated, slough adherent  -Sacrum and right buttock wounds may ultimately connect; at this time there are no tunnels or undermining  -Topical recommendations: cleanse with Dakins 1/4 strength. Apply Liquid barrier film to periwound skin. cover with silicone foam with border. Change daily and prn if soiled/compromised.  -Continue to offload pressure; low airloss support surface, turn and position per protocol with use of fluidized positioning devices, continue incontinence care per protocol and use of single breathable incontinence pads, continue to offload heels with complete cair boots.  -Continue Nutritional management as per RD recommendations.    Face and posterior trunk with patchy-flaking erythematous rash, dry sanguineous adherent crust to right chin  -Consider dermatology consult r/o drug reaction/assist in management    -Consider Mupirocin 2% ointment to right chin scab BID for 7 days.   -Consider applying emollient ointment- Sween 24 to posterior trunk twice a day.    Findings and plan discussed with patient, family and primary team Dr. Snow at bedside. All questions and concerns addressed to meet patient and family's satisfaction. Will continue to follow periodically while inpatient, please reconsult earlier as needed.   Upon discharge may follow up at [facility]- [phone].    Remainder of care per primary team.  Thank you.    ELIAZAR Deleon, CWN (MS Teams)    If after 4PM or before 7:30AM on Mon-Friday or weekend/holiday please contact general surgery for urgent matters.   Team A- [id number]   Team B- [id number]  For non-urgent matters e-mail [email or web address]    I spent 75 minutes face to face with this patient of which more than 50% of the time was spent counseling & coordinating care of this pt

## 2024-12-20 NOTE — DIETITIAN INITIAL EVALUATION ADULT - PERTINENT LABORATORY DATA
12-20    132[L]  |  100  |  22  ----------------------------<  117[H]  3.5   |  22  |  0.81    Ca    8.6      20 Dec 2024 06:46  Phos  2.6     12-20  Mg     1.70     12-20    TPro  6.3  /  Alb  2.7[L]  /  TBili  0.9  /  DBili  x   /  AST  32  /  ALT  40  /  AlkPhos  170[H]  12-20  A1C with Estimated Average Glucose Result: 5.1 % (08-10-24 @ 01:18)  A1C with Estimated Average Glucose Result: 4.9 % (07-20-24 @ 05:55)

## 2024-12-20 NOTE — PROGRESS NOTE ADULT - PROBLEM SELECTOR PLAN 6
- Home medication: Finasteride 5mg QD  - C/w home medication - Home medication: Gabapentin 600mg TID, duloxetine 20mg QD  - C/w home medications

## 2024-12-20 NOTE — PROGRESS NOTE ADULT - PROBLEM SELECTOR PLAN 5
- Home medications: Allopurinol 100mg QD  - C/w home med - Home medication: Finasteride 5mg QD  - C/w home medication

## 2024-12-20 NOTE — PROGRESS NOTE ADULT - PROBLEM SELECTOR PLAN 4
- Mild transaminitis on admission  - Alk phos 180, AST 49, ALT 47  - CTM w/ morning labs  - If continues to uptrend, can get liver imaging - Home medications: Allopurinol 100mg QD  - C/w home med

## 2024-12-20 NOTE — DIETITIAN INITIAL EVALUATION ADULT - NSFNSPHYEXAMSKINFT_GEN_A_CORE
Pressure Injury 1: sacrum, Unstageable  Pressure Injury 2: Right:, buttock, Unstageable  Pressure Injury 3: none, none  Pressure Injury 4: none, none  Pressure Injury 5: none, none  Pressure Injury 6: none, none  Pressure Injury 7: none, none  Pressure Injury 8: none, none  Pressure Injury 9: none, none  Pressure Injury 10: none, none  Pressure Injury 11: none, none

## 2024-12-20 NOTE — PROGRESS NOTE ADULT - PROBLEM SELECTOR PLAN 2
- Per son, Pt is usually AO x 3  - Currently AO x 1  - Has fluctuating mental status  - Most likely secondary to acute infection and sepsis  - CTM - Pt has hx of hyponatremia  - Na 133 on admission  - Home medication: salt tabs 2g BID  - C/w home med

## 2024-12-20 NOTE — PROGRESS NOTE ADULT - PROBLEM SELECTOR PLAN 1
- Febrile to 103.3F and elevated white count  - CT imaging notable for multifocal PNA and bibasilar atelectasis   - C/w CTX and azithromycin  - F/u BCx   - F/u urine legionella  - F/u MRSA swab  - UA ordered #Febrile to 103.3F and elevated white count  #CT imaging notable for multifocal PNA and bibasilar atelectasis   # sacral abscess     Plant  - C/w CTX and azithromycin  - F/u BCx   - F/u urine legionella  - F/u MRSA swab  - ID consoluted given recent hx of osteomyelitis s/p drainage and hardware s/p cefepime and vanc

## 2024-12-20 NOTE — CONSULT NOTE ADULT - SUBJECTIVE AND OBJECTIVE BOX
NEUROSURGERY CONSULT     HPI: 84yoM PMH HTN/BPH, gout, peripheral neuropathy, hyponatremia, s/p T11-12 laminectomy and epidural abscess evacuation, T10-L2 fixation on 8/10/24 with Dr. Bergeron (currently DNR/DNI) came into ED yesterday from rehab with SOB/lethargy, found to have PNA, febrile to 103F meeting sepsis criteria. Neurosx consulted for concerns for hardware infectio i/s/o sepsis. Pt currently denies back pain, weakness, numbness, tingling. Pt's mental status has been waxing/waning likely d/t infection, seemed to be more oriented upon assessment today. Pt was made DNR/DNI via his son who is his HCP when he was less oriented, but patient reiterated today that he would not want surgery.       MEDS:  albuterol    90 MICROgram(s) HFA Inhaler 2 Puff(s) Inhalation every 6 hours PRN  allopurinol 100 milliGRAM(s) Oral daily  azithromycin  IVPB      azithromycin  IVPB 500 milliGRAM(s) IV Intermittent every 24 hours  DULoxetine 20 milliGRAM(s) Oral daily  enoxaparin Injectable 40 milliGRAM(s) SubCutaneous every 24 hours  finasteride 5 milliGRAM(s) Oral daily  gabapentin 600 milliGRAM(s) Oral three times a day  influenza  Vaccine (HIGH DOSE) 0.5 milliLiter(s) IntraMuscular once  lidocaine   4% Patch 1 Patch Transdermal daily  multivitamin 1 Tablet(s) Oral daily  piperacillin/tazobactam IVPB.- 3.375 Gram(s) IV Intermittent once  piperacillin/tazobactam IVPB.. 3.375 Gram(s) IV Intermittent every 8 hours  polyethylene glycol 3350 17 Gram(s) Oral daily  senna 2 Tablet(s) Oral at bedtime  sodium chloride 2 Gram(s) Oral every 12 hours  vancomycin  IVPB 750 milliGRAM(s) IV Intermittent every 12 hours      Vital Signs Last 24 Hrs  T(C): 36.2 (20 Dec 2024 13:05), Max: 39.6 (19 Dec 2024 15:58)  T(F): 97.2 (20 Dec 2024 13:05), Max: 103.3 (19 Dec 2024 15:58)  HR: 68 (20 Dec 2024 13:05) (68 - 92)  BP: 140/63 (20 Dec 2024 13:05) (100/50 - 140/63)  BP(mean): --  RR: 18 (20 Dec 2024 13:05) (16 - 18)  SpO2: 100% (20 Dec 2024 13:05) (97% - 100%)    Parameters below as of 20 Dec 2024 13:05  Patient On (Oxygen Delivery Method): room air        LABS:                        10.3   19.71 )-----------( 252      ( 20 Dec 2024 06:46 )             29.8     12-20    132[L]  |  100  |  22  ----------------------------<  117[H]  3.5   |  22  |  0.81    Ca    8.6      20 Dec 2024 06:46  Phos  2.6     12-20  Mg     1.70     12-20    TPro  6.3  /  Alb  2.7[L]  /  TBili  0.9  /  DBili  x   /  AST  32  /  ALT  40  /  AlkPhos  170[H]  12-20          PHYSICAL EXAM:  AOx2-3, Following Commands, Face symmetrical  Well healed back incision, no drainage from previous surgery site  No tenderness to palpation of spine throughout    RUE MOTOR:   Delt 5/5  Bicep 5/5  Tricep 5/5      HG 5/5      LUE MOTOR:   Delt 5/5  Bicep 5/5   Tricep 5/5     HG 5/5     RLE MOTOR:   HF 5/5            KF 5/5          KE 5/5         DF 5/5         PF 5/5    EHL 5/5    LLE MOTOR:   HF 5/5        KF 5/5          KE 5/5            DF 5/5            PF 5/5       EHL 5/5      SENSATION: intact to light touch     REFLEXES:  No clonus  No Hoffmans    DTRs: patellar 2+ b/l   NEUROSURGERY CONSULT     HPI: 84yoM Mandarin speaking #[id number] PMH HTN/BPH, gout, peripheral neuropathy, hyponatremia, s/p T11-12 laminectomy and epidural abscess evacuation, T10-L2 fixation on 8/10/24 with Dr. Bergeron (currently DNR/DNI) came into ED yesterday from rehab with SOB/lethargy, found to have PNA, febrile to 103F meeting sepsis criteria. Neurosx consulted for concerns for hardware infectio i/s/o sepsis. Pt currently denies back pain, weakness, numbness, tingling. Pt's mental status has been waxing/waning likely d/t infection, seemed to be more oriented upon assessment today. Pt was made DNR/DNI via his son who is his HCP when he was less oriented, but patient reiterated today that he would not want surgery.       MEDS:  albuterol    90 MICROgram(s) HFA Inhaler 2 Puff(s) Inhalation every 6 hours PRN  allopurinol 100 milliGRAM(s) Oral daily  azithromycin  IVPB      azithromycin  IVPB 500 milliGRAM(s) IV Intermittent every 24 hours  DULoxetine 20 milliGRAM(s) Oral daily  enoxaparin Injectable 40 milliGRAM(s) SubCutaneous every 24 hours  finasteride 5 milliGRAM(s) Oral daily  gabapentin 600 milliGRAM(s) Oral three times a day  influenza  Vaccine (HIGH DOSE) 0.5 milliLiter(s) IntraMuscular once  lidocaine   4% Patch 1 Patch Transdermal daily  multivitamin 1 Tablet(s) Oral daily  piperacillin/tazobactam IVPB.- 3.375 Gram(s) IV Intermittent once  piperacillin/tazobactam IVPB.. 3.375 Gram(s) IV Intermittent every 8 hours  polyethylene glycol 3350 17 Gram(s) Oral daily  senna 2 Tablet(s) Oral at bedtime  sodium chloride 2 Gram(s) Oral every 12 hours  vancomycin  IVPB 750 milliGRAM(s) IV Intermittent every 12 hours      Vital Signs Last 24 Hrs  T(C): 36.2 (20 Dec 2024 13:05), Max: 39.6 (19 Dec 2024 15:58)  T(F): 97.2 (20 Dec 2024 13:05), Max: 103.3 (19 Dec 2024 15:58)  HR: 68 (20 Dec 2024 13:05) (68 - 92)  BP: 140/63 (20 Dec 2024 13:05) (100/50 - 140/63)  BP(mean): --  RR: 18 (20 Dec 2024 13:05) (16 - 18)  SpO2: 100% (20 Dec 2024 13:05) (97% - 100%)    Parameters below as of 20 Dec 2024 13:05  Patient On (Oxygen Delivery Method): room air        LABS:                        10.3   19.71 )-----------( 252      ( 20 Dec 2024 06:46 )             29.8     12-20    132[L]  |  100  |  22  ----------------------------<  117[H]  3.5   |  22  |  0.81    Ca    8.6      20 Dec 2024 06:46  Phos  2.6     12-20  Mg     1.70     12-20    TPro  6.3  /  Alb  2.7[L]  /  TBili  0.9  /  DBili  x   /  AST  32  /  ALT  40  /  AlkPhos  170[H]  12-20          PHYSICAL EXAM:  AOx2-3, Following Commands, Face symmetrical  Well healed back incision, no drainage from previous surgery site  No tenderness to palpation of spine throughout    RUE MOTOR:   Delt 5/5  Bicep 5/5  Tricep 5/5      HG 5/5      LUE MOTOR:   Delt 5/5  Bicep 5/5   Tricep 5/5     HG 5/5     RLE MOTOR:   HF 5/5            KF 5/5          KE 5/5         DF 5/5         PF 5/5    EHL 5/5    LLE MOTOR:   HF 5/5        KF 5/5          KE 5/5            DF 5/5            PF 5/5       EHL 5/5      SENSATION: intact to light touch     REFLEXES:  No clonus  No Hoffmans    DTRs: patellar 2+ b/l

## 2024-12-20 NOTE — PROGRESS NOTE ADULT - PROBLEM SELECTOR PLAN 7
- Home medication: Gabapentin 600mg TID, duloxetine 20mg QD  - C/w home medications DVT Prophylaxis: Lovenox 40mg QD  DIET: Mechanical soft diet  DISPO: To be determined  DNR/DNI: Trial NIV

## 2024-12-20 NOTE — DIETITIAN INITIAL EVALUATION ADULT - PERTINENT MEDS FT
MEDICATIONS  (STANDING):  allopurinol 100 milliGRAM(s) Oral daily  azithromycin  IVPB      azithromycin  IVPB 500 milliGRAM(s) IV Intermittent every 24 hours  DULoxetine 20 milliGRAM(s) Oral daily  enoxaparin Injectable 40 milliGRAM(s) SubCutaneous every 24 hours  finasteride 5 milliGRAM(s) Oral daily  gabapentin 600 milliGRAM(s) Oral three times a day  influenza  Vaccine (HIGH DOSE) 0.5 milliLiter(s) IntraMuscular once  lidocaine   4% Patch 1 Patch Transdermal daily  multivitamin 1 Tablet(s) Oral daily  piperacillin/tazobactam IVPB.- 3.375 Gram(s) IV Intermittent once  piperacillin/tazobactam IVPB.. 3.375 Gram(s) IV Intermittent every 8 hours  polyethylene glycol 3350 17 Gram(s) Oral daily  senna 2 Tablet(s) Oral at bedtime  sodium chloride 2 Gram(s) Oral every 12 hours  vancomycin  IVPB 750 milliGRAM(s) IV Intermittent every 12 hours    MEDICATIONS  (PRN):  albuterol    90 MICROgram(s) HFA Inhaler 2 Puff(s) Inhalation every 6 hours PRN Shortness of Breath and/or Wheezing

## 2024-12-20 NOTE — CONSULT NOTE ADULT - PROBLEM SELECTOR RECOMMENDATION 9
- No acute neurosurgical intervention  - Antibiotics per primary/ID   - MRI Lspine currently ordered, imaging will not  from neurosx standpoint     [id number]    Case discussed with attending neurosurgeon Dr. Bergeron - No acute neurosurgical intervention  - Antibiotics per primary/ID   - MRI Lspine currently ordered    [id number]    Case discussed with attending neurosurgeon Dr. Bergeron

## 2024-12-20 NOTE — CONSULT NOTE ADULT - SUBJECTIVE AND OBJECTIVE BOX
Wound SURGERY CONSULT NOTE    HPI:  Drew Barcenas is a 84 y.o. 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 At the rehab center CXR was taken with concerns for PNA and pt was started on Keflex. Because of worsening condition pt was sent to the hospital. The son Emery (HCP) was called for a history.    In the ED:  VSS. NS 1L bolus given, CT showed multifocal pneumonia vancomycin and ceftriaxone. Became febrile to 103F qualifying for sepsis.    Chart Review:  Admitted to [facility] on 7/18/24 due to continued back pain that worsened over the preceding month. A thoracic spine MR was performed on 7/25/24 showed discitis/osteomyelitis at T11-12. S/p IR aspiration of T11/T12 disc space on 7/29/24 with IR culture negative. Subsequent MRI showed worsening epidural disease/compression and T2 signal. Patient s/p emergent evacuation of epidural abscess/phlegmon at T11-T12 with placement of hardware at T10-L2 for stabilization and plastics closure on 8/10/24. Patient initially placed on course of vancomycin and cefepime. Hospital course was complicated by right peroneal DVT found on US venous duplex on 8/10/24. Now s/p 6 week course of ceftriaxone 2 g every 24 hours to end date 9/21/2024. Surgical drain removed 8/16/24. Patient subsequently discharged to Acute Inpatient Rehabilitation with a multidisciplinary rehab program at [facility] on 8/16/24.  (19 Dec 2024 17:23)    Wound consult requested to assist w/ management of sacrum unstageable pressure injury and right buttock abscess present on admission. Patient unaware of wounds, denies pain and/or tenderness to areas while at rest; described discomfort during repositioning. Patient wife and son at bedside. Per son, Emery, he lives out of town, patient's wife has some memory impairment. Son reports that both his parents are not able to provide reliable history. Endorsed history above. Additionally reported that patient had recent outpatient visit with Plastic surgery because there was a wound that was open along the spinal incision line from patient's surgery performed on 8/10/24. Per outpatient documentation, pt was seen by Plastic surgery Dr. Guerra on 11/15, treated pin-point shallow ulceration at top of incision with silver nitrate with no signs of infection. Additionally patients reports feeling generalized itchiness, son reports the redness to his face seems to be new, although he has not seen his dad in a few weeks. Patient unable to report onset or duration of pruritis. Pt reports adequate appetite, denies CP, SOB, abdominal pain, n/v, diarrhea, chills. Per son prior to hospitalization, he was told that Mr. Barcenas was able to take a few steps with a walker and one to two person assist.     Current Diet: Diet, Regular:   Minced and Moist (MINCEDMOIST)  Supplement Feeding Modality:  Oral  Ensure Plus High Protein Cans or Servings Per Day:  1       Frequency:  Two Times a day (12-20-24 @ 15:31)      PAST MEDICAL & SURGICAL HISTORY:  HTN (hypertension)    Former smoker    Gout    History of BPH    S/P spinal surgery      REVIEW OF SYSTEMS: general, skin see above.  All other systems negative.     MEDICATIONS  (STANDING):  allopurinol 100 milliGRAM(s) Oral daily  DULoxetine 20 milliGRAM(s) Oral daily  enoxaparin Injectable 40 milliGRAM(s) SubCutaneous every 24 hours  finasteride 5 milliGRAM(s) Oral daily  gabapentin 600 milliGRAM(s) Oral three times a day  influenza  Vaccine (HIGH DOSE) 0.5 milliLiter(s) IntraMuscular once  lidocaine   4% Patch 1 Patch Transdermal daily  multivitamin 1 Tablet(s) Oral daily  piperacillin/tazobactam IVPB.- 3.375 Gram(s) IV Intermittent once  piperacillin/tazobactam IVPB.. 3.375 Gram(s) IV Intermittent every 8 hours  polyethylene glycol 3350 17 Gram(s) Oral daily  senna 2 Tablet(s) Oral at bedtime  sodium chloride 2 Gram(s) Oral every 12 hours  vancomycin  IVPB 750 milliGRAM(s) IV Intermittent every 12 hours    MEDICATIONS  (PRN):  albuterol    90 MICROgram(s) HFA Inhaler 2 Puff(s) Inhalation every 6 hours PRN Shortness of Breath and/or Wheezing      Allergies    No Known Allergies    Intolerances        SOCIAL HISTORY:  . Has children. Son (and HCP)Michell Land lives out of state. Current rehab resident. Chairbound.   Remote smoking history. Denies current smoking, etoh, illicit drug use.    FAMILY HISTORY:  No pertinent family history in first degree relatives        PHYSICAL EXAM:  Vital Signs Last 24 Hrs  T(C): 36.2 (20 Dec 2024 13:05), Max: 37.2 (19 Dec 2024 21:21)  T(F): 97.2 (20 Dec 2024 13:05), Max: 98.9 (19 Dec 2024 21:21)  HR: 68 (20 Dec 2024 13:05) (68 - 80)  BP: 140/63 (20 Dec 2024 13:05) (100/50 - 140/63)  BP(mean): --  RR: 18 (20 Dec 2024 13:05) (16 - 18)  SpO2: 100% (20 Dec 2024 13:05) (97% - 100%)    Parameters below as of 20 Dec 2024 13:05  Patient On (Oxygen Delivery Method): room air    Wt: 58.6 kg (12-)  BMI: 20.9 kg/m2    Constitutional: NAD, A&Ox3, bed/chairbound.   (+) low airloss support surface, (+) fluidized positioning devices, (+) complete cair boots  HEENT:  NC/AT, nonicteric, mucosa moist.   Cardiovascular: rate regular  Respiratory: nonlabored, room air, equal chest expansion  Gastrointestinal: soft NT/ND  Musculoskeletal: no gross deformities/contractures  Vascular: (+) xerosis bilateral plantar feet, BLE equally warm, +2 dp/pt pulses, capillary refill < 3 seconds, trace edema.   Skin:  moist w/ good turgor.  Face with patchy flaking erythema to bilateral eyelids, cheeks- no mucosal ulcerations noted.   Right chin with [id number] dry sanguineous crust, unable to remove with cleansing- no drainage.  Posterior trunk with patchy flaking erythematous pruritic rash- no open ulcerations  Thoracic to lumbar spine with linear well healed surgical scar.  Sacrum to left buttock- unstageable pressure injury- (patient turned left side for measurements)  -4cmx2.5cmx0.5cm  -100% tan-moist non-fluctuant adherent slough, circumferential rim of pink-red moist dermis.   -Scant serofibrinous drainage  -Periwound skin with no induration, no fluctuance, no obvious erythema, no increased warmth, no crepitus.  Right medial buttock- abscess  -2.5cmx1.9xot5gr, no tunneling, no undermining  -100% tan-moist adherent slough, with pinpoint ulceration along inferior wound edge (depth of 1cm) (+) induration beneath site of injury that extends to right buttock. With deep palpation able to express moderate purulent drainage wound culture obtained   -Wound base friable with cleansing  -Periwound skin with scattered hypopigmentation, induration improved, no fluctuance, no crepitus.   *Cleansed wounds with NS. Liquid barrier film applied to periwound skin. Packed right medial buttock abscess with aquacel ribbon, applied medihoney gel to remaining exposed slough of both right buttock and sacrum. Cover with silicone foam with border.      LABS/ CULTURES/ RADIOLOGY:                        10.3   19.71 )-----------( 252      ( 20 Dec 2024 06:46 )             29.8       132  |  100  |  22  ----------------------------<  117      [12-20-24 @ 06:46]  3.5   |  22  |  0.81        Ca     8.6     [12-20-24 @ 06:46]      Mg     1.70     [12-20-24 @ 06:46]      Phos  2.6     [12-20-24 @ 06:46]    TPro  6.3  /  Alb  2.7  /  TBili  0.9  /  DBili  x   /  AST  32  /  ALT  40  /  AlkPhos  170  [12-20-24 @ 06:46]        Culture - Blood (collected 12-19-24 @ 13:04)  Source: .Blood BLOOD  Gram Stain (12-20-24 @ 16:03):    Growth in aerobic bottle: Gram Positive Cocci in Clusters  Preliminary Report (12-20-24 @ 16:04):    Growth in aerobic bottle: Gram Positive Cocci in Clusters    Direct identification is available within approximately 3-5    hours either by Blood Panel Multiplexed PCR or Direct    MALDI-TOF. Details: [email or web address]        FluA/FluB/RSV/COVID PCR (12.19.24 @ 10:40)   SARS-CoV-2 Result: NotDete:

## 2024-12-20 NOTE — DIETITIAN INITIAL EVALUATION ADULT - PROBLEM SELECTOR PLAN 4
- Mild transaminitis on admission  - Alk phos 180, AST 49, ALT 47  - CTM w/ morning labs  - If continues to uptrend, can get liver imaging

## 2024-12-20 NOTE — DIETITIAN INITIAL EVALUATION ADULT - PROBLEM SELECTOR PLAN 2
- Per son, Pt is usually AO x 3  - Currently AO x 1  - Has fluctuating mental status  - Most likely secondary to acute infection and sepsis  - CTM

## 2024-12-20 NOTE — PHYSICAL THERAPY INITIAL EVALUATION ADULT - PERTINENT HX OF CURRENT PROBLEM, REHAB EVAL
Pt is a 84 y.o. 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  Found to have multifocal PNA on CT chest imaging.

## 2024-12-20 NOTE — DIETITIAN INITIAL EVALUATION ADULT - ADD RECOMMEND
- Swallow evaluation to assess current dysphagia diet given patient has had swallow evaluations in the past as well  - Continue w/ multivitamin  - Monitor PO intake, tolerance to diet/supplement, nutrition related lab values, weight trends, BMs/GI distress, hydration status, skin integrity

## 2024-12-20 NOTE — DIETITIAN INITIAL EVALUATION ADULT - PROBLEM SELECTOR PLAN 3
- Pt has hx of hyponatremia  - Na 133 on admission  - Home medication: salt tabs 2g BID  - C/w home med

## 2024-12-20 NOTE — DIETITIAN INITIAL EVALUATION ADULT - REASON
Unable to get consent, however noted w/ overt signs of severe muscle loss in clavicle region per observation.

## 2024-12-20 NOTE — CONSULT NOTE ADULT - ASSESSMENT
84yoM PMH HTN/BPH, T11-12 laminectomy and epidural abscess evacuation, T10-L2 fixation on 8/10/24 with Dr. Bergeron (currently DNR/DNI) came into ED yesterday from rehab with SOB/lethargy, found to have PNA, febrile to 103F meeting sepsis criteria. We were consulted for concerns for hardware infection. Pt currently denies back pain, weakness, numbness, tingling. Pt's mental status has been waxing/waning likely d/t infection, seemed to be more oriented upon assessment today. Pt was made DNR/DNI via his son who is his HCP when he was less oriented, but patient reiterated today that he would not want surgery.     12/20 Pt is not currently having any s/sx of cord compression/recurrence of the epidural abscess, he has no back pain and is moving all extremities with good strength. Patient also reiterated that he would not want any surgical intervention regardless.

## 2024-12-20 NOTE — DIETITIAN INITIAL EVALUATION ADULT - OTHER INFO
84 year old male with a PMH of HTN presenting from [facility] for SOB and lethargy. Found to have multifocal PNA on CT chest imaging per chart.    Patient seen during lunch and consuming meal slowly. Noted w/ 1 intake 26-50% per RN flow sheet. No GI distress reported. Has no food allergies. Reports losing weight from UBW at one point being 180 lbs. Per HIE, noted w/ weight 74 kg (7/24). Per previous RD note, noted w/ weight 64.3 kg (8/17). ABW is 58.6 kg (12/19) per chart indicating a -20.9% weight loss x 5 months, significant. Noted w/ 2 pressure injuries and no edema per RN flow sheet.

## 2024-12-20 NOTE — PROGRESS NOTE ADULT - SUBJECTIVE AND OBJECTIVE BOX
Teams Sarwat Clement PGY 1 MD    SUBJECTIVE / OVERNIGHT EVENTS:  - Pt seen and examined at bedside  - Patient continue to be SOB but improving    MEDICATIONS  (STANDING):  allopurinol 100 milliGRAM(s) Oral daily  azithromycin  IVPB      azithromycin  IVPB 500 milliGRAM(s) IV Intermittent every 24 hours  cefTRIAXone   IVPB 1000 milliGRAM(s) IV Intermittent every 24 hours  DULoxetine 20 milliGRAM(s) Oral daily  enoxaparin Injectable 40 milliGRAM(s) SubCutaneous every 24 hours  finasteride 5 milliGRAM(s) Oral daily  gabapentin 600 milliGRAM(s) Oral three times a day  influenza  Vaccine (HIGH DOSE) 0.5 milliLiter(s) IntraMuscular once  lidocaine   4% Patch 1 Patch Transdermal daily  multivitamin 1 Tablet(s) Oral daily  polyethylene glycol 3350 17 Gram(s) Oral daily  senna 2 Tablet(s) Oral at bedtime  sodium chloride 2 Gram(s) Oral every 12 hours    MEDICATIONS  (PRN):  albuterol    90 MICROgram(s) HFA Inhaler 2 Puff(s) Inhalation every 6 hours PRN Shortness of Breath and/or Wheezing          PHYSICAL EXAM:  Vital Signs Last 24 Hrs  T(C): 36.3 (20 Dec 2024 05:12), Max: 39.6 (19 Dec 2024 15:58)  T(F): 97.4 (20 Dec 2024 05:12), Max: 103.3 (19 Dec 2024 15:58)  HR: 73 (20 Dec 2024 05:12) (73 - 92)  BP: 118/50 (20 Dec 2024 05:12) (100/50 - 148/67)  BP(mean): --  RR: 17 (20 Dec 2024 05:12) (16 - 23)  SpO2: 100% (20 Dec 2024 05:12) (97% - 100%)    Parameters below as of 20 Dec 2024 05:12  Patient On (Oxygen Delivery Method): room air        CAPILLARY BLOOD GLUCOSE        I&O's Summary      GENERAL: Lethargic  HEAD:  Atraumatic, normocephalic, warm to the touch  EYES: EOMI, PERRLA, conjunctiva and sclera clear  ENT: Moist mucous membranes  NECK: Supple, no JVD  HEART: S1, S2, regular rate and rhythm, no murmurs, rubs, or gallops  LUNGS: Unlabored respirations.  Clear to auscultation bilaterally, no crackles, wheezing, or rhonchi  ABDOMEN: Soft, nontender, nondistended, +BS  BACK: No back tenderness, back bandage on lower back  EXTREMITIES: 2+ peripheral pulses bilaterally. No clubbing, cyanosis, or edema  NERVOUS SYSTEM:  A&Ox1, follows commands, doesn't answer questions appropriately. No focal deficits   SKIN: No rashes or lesions    LABS:                        10.3   19.71 )-----------( 252      ( 20 Dec 2024 06:46 )             29.8     12-20    132[L]  |  100  |  22  ----------------------------<  117[H]  3.5   |  22  |  0.81    Ca    8.6      20 Dec 2024 06:46  Phos  2.6     12-20  Mg     1.70     12-20    TPro  6.3  /  Alb  2.7[L]  /  TBili  0.9  /  DBili  x   /  AST  32  /  ALT  40  /  AlkPhos  170[H]  12-20          Urinalysis Basic - ( 20 Dec 2024 06:46 )    Color: x / Appearance: x / SG: x / pH: x  Gluc: 117 mg/dL / Ketone: x  / Bili: x / Urobili: x   Blood: x / Protein: x / Nitrite: x   Leuk Esterase: x / RBC: x / WBC x   Sq Epi: x / Non Sq Epi: x / Bacteria: x        Urinalysis with Rflx Culture (collected 20 Dec 2024 03:00)        IMAGING:    [X] All pertinent imaging reviewed by me Teams Sarwat Clement PGY 1 MD    SUBJECTIVE / OVERNIGHT EVENTS:  - Pt seen and examined at bedside  - Patient continue to be SOB but improving    MEDICATIONS  (STANDING):  allopurinol 100 milliGRAM(s) Oral daily  azithromycin  IVPB      azithromycin  IVPB 500 milliGRAM(s) IV Intermittent every 24 hours  cefTRIAXone   IVPB 1000 milliGRAM(s) IV Intermittent every 24 hours  DULoxetine 20 milliGRAM(s) Oral daily  enoxaparin Injectable 40 milliGRAM(s) SubCutaneous every 24 hours  finasteride 5 milliGRAM(s) Oral daily  gabapentin 600 milliGRAM(s) Oral three times a day  influenza  Vaccine (HIGH DOSE) 0.5 milliLiter(s) IntraMuscular once  lidocaine   4% Patch 1 Patch Transdermal daily  multivitamin 1 Tablet(s) Oral daily  polyethylene glycol 3350 17 Gram(s) Oral daily  senna 2 Tablet(s) Oral at bedtime  sodium chloride 2 Gram(s) Oral every 12 hours    MEDICATIONS  (PRN):  albuterol    90 MICROgram(s) HFA Inhaler 2 Puff(s) Inhalation every 6 hours PRN Shortness of Breath and/or Wheezing          PHYSICAL EXAM:  Vital Signs Last 24 Hrs  T(C): 36.3 (20 Dec 2024 05:12), Max: 39.6 (19 Dec 2024 15:58)  T(F): 97.4 (20 Dec 2024 05:12), Max: 103.3 (19 Dec 2024 15:58)  HR: 73 (20 Dec 2024 05:12) (73 - 92)  BP: 118/50 (20 Dec 2024 05:12) (100/50 - 148/67)  BP(mean): --  RR: 17 (20 Dec 2024 05:12) (16 - 23)  SpO2: 100% (20 Dec 2024 05:12) (97% - 100%)    Parameters below as of 20 Dec 2024 05:12  Patient On (Oxygen Delivery Method): room air        CAPILLARY BLOOD GLUCOSE        I&O's Summary      GENERAL: Lethargic  HEAD:  Atraumatic, normocephalic, warm to the touch  EYES: EOMI, PERRLA, conjunctiva and sclera clear  ENT: Moist mucous membranes  NECK: Supple, no JVD  HEART: S1, S2, regular rate and rhythm, no murmurs, rubs, or gallops  LUNGS: Unlabored respirations.  Clear to auscultation bilaterally, no crackles, wheezing, or rhonchi  ABDOMEN: Soft, nontender, nondistended, +BS  BACK: No back tenderness, sacral abscess on lower back.   EXTREMITIES: 2+ peripheral pulses bilaterally. No clubbing, cyanosis, or edema  NERVOUS SYSTEM:  A&Ox3 No focal deficits   SKIN: dry skin (with flaking on the back, face and feet).     LABS:                        10.3   19.71 )-----------( 252      ( 20 Dec 2024 06:46 )             29.8     12-20    132[L]  |  100  |  22  ----------------------------<  117[H]  3.5   |  22  |  0.81    Ca    8.6      20 Dec 2024 06:46  Phos  2.6     12-20  Mg     1.70     12-20    TPro  6.3  /  Alb  2.7[L]  /  TBili  0.9  /  DBili  x   /  AST  32  /  ALT  40  /  AlkPhos  170[H]  12-20          Urinalysis Basic - ( 20 Dec 2024 06:46 )    Color: x / Appearance: x / SG: x / pH: x  Gluc: 117 mg/dL / Ketone: x  / Bili: x / Urobili: x   Blood: x / Protein: x / Nitrite: x   Leuk Esterase: x / RBC: x / WBC x   Sq Epi: x / Non Sq Epi: x / Bacteria: x        Urinalysis with Rflx Culture (collected 20 Dec 2024 03:00)        IMAGING:    [X] All pertinent imaging reviewed by me

## 2024-12-20 NOTE — DIETITIAN INITIAL EVALUATION ADULT - PROBLEM SELECTOR PLAN 1
- Febrile to 103.3F and elevated white count  - CT imaging notable for multifocal PNA and bibasilar atelectasis   - C/w CTX and azithromycin  - F/u BCx   - F/u urine legionella  - F/u MRSA swab  - UA ordered

## 2024-12-20 NOTE — DIETITIAN INITIAL EVALUATION ADULT - ORAL INTAKE PTA/DIET HISTORY
Patient seen for assessment. Patient able to provide most nutrition history. States having poor appetite "from being sick" for a while. Per rehab records, patient was on mechanical soft/thin liquids w/ LPS and mighty shakes.

## 2024-12-21 ENCOUNTER — RESULT REVIEW (OUTPATIENT)
Age: 84
End: 2024-12-21

## 2024-12-21 DIAGNOSIS — R21 RASH AND OTHER NONSPECIFIC SKIN ERUPTION: ICD-10-CM

## 2024-12-21 LAB
ALBUMIN SERPL ELPH-MCNC: 2.6 G/DL — LOW (ref 3.3–5)
ALP SERPL-CCNC: 171 U/L — HIGH (ref 40–120)
ALT FLD-CCNC: 35 U/L — SIGNIFICANT CHANGE UP (ref 4–41)
ANION GAP SERPL CALC-SCNC: 18 MMOL/L — HIGH (ref 7–14)
AST SERPL-CCNC: 29 U/L — SIGNIFICANT CHANGE UP (ref 4–40)
BASOPHILS # BLD AUTO: 0.03 K/UL — SIGNIFICANT CHANGE UP (ref 0–0.2)
BASOPHILS NFR BLD AUTO: 0.2 % — SIGNIFICANT CHANGE UP (ref 0–2)
BILIRUB SERPL-MCNC: 0.6 MG/DL — SIGNIFICANT CHANGE UP (ref 0.2–1.2)
BUN SERPL-MCNC: 20 MG/DL — SIGNIFICANT CHANGE UP (ref 7–23)
CALCIUM SERPL-MCNC: 8.8 MG/DL — SIGNIFICANT CHANGE UP (ref 8.4–10.5)
CHLORIDE SERPL-SCNC: 98 MMOL/L — SIGNIFICANT CHANGE UP (ref 98–107)
CO2 SERPL-SCNC: 16 MMOL/L — LOW (ref 22–31)
CREAT SERPL-MCNC: 0.77 MG/DL — SIGNIFICANT CHANGE UP (ref 0.5–1.3)
EGFR: 89 ML/MIN/1.73M2 — SIGNIFICANT CHANGE UP
EOSINOPHIL # BLD AUTO: 0.81 K/UL — HIGH (ref 0–0.5)
EOSINOPHIL NFR BLD AUTO: 6.5 % — HIGH (ref 0–6)
FOLATE SERPL-MCNC: 7.5 NG/ML — SIGNIFICANT CHANGE UP (ref 3.1–17.5)
GLUCOSE SERPL-MCNC: 102 MG/DL — HIGH (ref 70–99)
GRAM STN FLD: ABNORMAL
GRAM STN FLD: ABNORMAL
HCT VFR BLD CALC: 30.3 % — LOW (ref 39–50)
HGB BLD-MCNC: 10.7 G/DL — LOW (ref 13–17)
IANC: 10.28 K/UL — HIGH (ref 1.8–7.4)
IMM GRANULOCYTES NFR BLD AUTO: 1 % — HIGH (ref 0–0.9)
LYMPHOCYTES # BLD AUTO: 0.58 K/UL — LOW (ref 1–3.3)
LYMPHOCYTES # BLD AUTO: 4.7 % — LOW (ref 13–44)
MAGNESIUM SERPL-MCNC: 1.7 MG/DL — SIGNIFICANT CHANGE UP (ref 1.6–2.6)
MCHC RBC-ENTMCNC: 29.2 PG — SIGNIFICANT CHANGE UP (ref 27–34)
MCHC RBC-ENTMCNC: 35.3 G/DL — SIGNIFICANT CHANGE UP (ref 32–36)
MCV RBC AUTO: 82.8 FL — SIGNIFICANT CHANGE UP (ref 80–100)
MONOCYTES # BLD AUTO: 0.59 K/UL — SIGNIFICANT CHANGE UP (ref 0–0.9)
MONOCYTES NFR BLD AUTO: 4.8 % — SIGNIFICANT CHANGE UP (ref 2–14)
NEUTROPHILS # BLD AUTO: 10.28 K/UL — HIGH (ref 1.8–7.4)
NEUTROPHILS NFR BLD AUTO: 82.8 % — HIGH (ref 43–77)
NRBC # BLD: 0 /100 WBCS — SIGNIFICANT CHANGE UP (ref 0–0)
NRBC # FLD: 0 K/UL — SIGNIFICANT CHANGE UP (ref 0–0)
PHOSPHATE SERPL-MCNC: 3.4 MG/DL — SIGNIFICANT CHANGE UP (ref 2.5–4.5)
PLATELET # BLD AUTO: 265 K/UL — SIGNIFICANT CHANGE UP (ref 150–400)
POTASSIUM SERPL-MCNC: 3.8 MMOL/L — SIGNIFICANT CHANGE UP (ref 3.5–5.3)
POTASSIUM SERPL-SCNC: 3.8 MMOL/L — SIGNIFICANT CHANGE UP (ref 3.5–5.3)
PROT SERPL-MCNC: 6.9 G/DL — SIGNIFICANT CHANGE UP (ref 6–8.3)
RBC # BLD: 3.66 M/UL — LOW (ref 4.2–5.8)
RBC # FLD: 13.6 % — SIGNIFICANT CHANGE UP (ref 10.3–14.5)
SODIUM SERPL-SCNC: 132 MMOL/L — LOW (ref 135–145)
SPECIMEN SOURCE: SIGNIFICANT CHANGE UP
VANCOMYCIN TROUGH SERPL-MCNC: 12.5 UG/ML — SIGNIFICANT CHANGE UP (ref 10–20)
VIT B12 SERPL-MCNC: 647 PG/ML — SIGNIFICANT CHANGE UP (ref 200–900)
WBC # BLD: 12.42 K/UL — HIGH (ref 3.8–10.5)
WBC # FLD AUTO: 12.42 K/UL — HIGH (ref 3.8–10.5)

## 2024-12-21 PROCEDURE — 99233 SBSQ HOSP IP/OBS HIGH 50: CPT | Mod: GC

## 2024-12-21 PROCEDURE — 93306 TTE W/DOPPLER COMPLETE: CPT | Mod: 26

## 2024-12-21 RX ORDER — MAGNESIUM SULFATE 500 MG/ML
2 INJECTION, SOLUTION INTRAMUSCULAR; INTRAVENOUS ONCE
Refills: 0 | Status: COMPLETED | OUTPATIENT
Start: 2024-12-21 | End: 2024-12-21

## 2024-12-21 RX ORDER — SODIUM CHLORIDE 9 MG/ML
1000 INJECTION, SOLUTION INTRAMUSCULAR; INTRAVENOUS; SUBCUTANEOUS
Refills: 0 | Status: DISCONTINUED | OUTPATIENT
Start: 2024-12-21 | End: 2024-12-26

## 2024-12-21 RX ORDER — HYDROCORTISONE 1 %
1 CREAM (GRAM) TOPICAL
Refills: 0 | Status: DISCONTINUED | OUTPATIENT
Start: 2024-12-21 | End: 2024-12-31

## 2024-12-21 RX ORDER — MUPIROCIN 2 %
1 OINTMENT (GRAM) TOPICAL
Refills: 0 | Status: DISCONTINUED | OUTPATIENT
Start: 2024-12-21 | End: 2024-12-21

## 2024-12-21 RX ORDER — KETOCONAZOLE 2 %
1 CREAM (GRAM) TOPICAL
Refills: 0 | Status: DISCONTINUED | OUTPATIENT
Start: 2024-12-21 | End: 2024-12-31

## 2024-12-21 RX ORDER — MUPIROCIN 2 %
1 OINTMENT (GRAM) TOPICAL
Refills: 0 | Status: DISCONTINUED | OUTPATIENT
Start: 2024-12-21 | End: 2024-12-31

## 2024-12-21 RX ADMIN — VANCOMYCIN HYDROCHLORIDE 250 MILLIGRAM(S): 5 INJECTION, POWDER, LYOPHILIZED, FOR SOLUTION INTRAVENOUS at 20:10

## 2024-12-21 RX ADMIN — VANCOMYCIN HYDROCHLORIDE 250 MILLIGRAM(S): 5 INJECTION, POWDER, LYOPHILIZED, FOR SOLUTION INTRAVENOUS at 05:25

## 2024-12-21 RX ADMIN — SODIUM CHLORIDE 2 GRAM(S): 9 INJECTION, SOLUTION INTRAMUSCULAR; INTRAVENOUS; SUBCUTANEOUS at 05:26

## 2024-12-21 RX ADMIN — Medication 1 APPLICATION(S): at 17:52

## 2024-12-21 RX ADMIN — Medication 5 MILLIGRAM(S): at 12:31

## 2024-12-21 RX ADMIN — GABAPENTIN 600 MILLIGRAM(S): 300 CAPSULE ORAL at 14:39

## 2024-12-21 RX ADMIN — SODIUM CHLORIDE 70 MILLILITER(S): 9 INJECTION, SOLUTION INTRAMUSCULAR; INTRAVENOUS; SUBCUTANEOUS at 12:29

## 2024-12-21 RX ADMIN — GABAPENTIN 600 MILLIGRAM(S): 300 CAPSULE ORAL at 05:24

## 2024-12-21 RX ADMIN — LIDOCAINE 1 PATCH: 50 OINTMENT TOPICAL at 12:36

## 2024-12-21 RX ADMIN — LIDOCAINE 1 PATCH: 50 OINTMENT TOPICAL at 00:24

## 2024-12-21 RX ADMIN — Medication 1 APPLICATION(S): at 05:26

## 2024-12-21 RX ADMIN — SODIUM CHLORIDE 2 GRAM(S): 9 INJECTION, SOLUTION INTRAMUSCULAR; INTRAVENOUS; SUBCUTANEOUS at 17:51

## 2024-12-21 RX ADMIN — MAGNESIUM SULFATE 25 GRAM(S): 500 INJECTION, SOLUTION INTRAMUSCULAR; INTRAVENOUS at 22:29

## 2024-12-21 RX ADMIN — ALLOPURINOL 100 MILLIGRAM(S): 100 TABLET ORAL at 12:31

## 2024-12-21 RX ADMIN — ENOXAPARIN SODIUM 40 MILLIGRAM(S): 60 INJECTION INTRAVENOUS; SUBCUTANEOUS at 21:49

## 2024-12-21 RX ADMIN — PIPERACILLIN AND TAZOBACTAM 25 GRAM(S): 3; .375 INJECTION, POWDER, LYOPHILIZED, FOR SOLUTION INTRAVENOUS at 14:40

## 2024-12-21 RX ADMIN — Medication 1 APPLICATION(S): at 05:25

## 2024-12-21 RX ADMIN — SENNOSIDES 2 TABLET(S): 8.6 TABLET, FILM COATED ORAL at 21:49

## 2024-12-21 RX ADMIN — PIPERACILLIN AND TAZOBACTAM 25 GRAM(S): 3; .375 INJECTION, POWDER, LYOPHILIZED, FOR SOLUTION INTRAVENOUS at 21:49

## 2024-12-21 RX ADMIN — LIDOCAINE 1 PATCH: 50 OINTMENT TOPICAL at 19:00

## 2024-12-21 RX ADMIN — Medication 1 APPLICATION(S): at 17:51

## 2024-12-21 RX ADMIN — GABAPENTIN 600 MILLIGRAM(S): 300 CAPSULE ORAL at 21:49

## 2024-12-21 RX ADMIN — PIPERACILLIN AND TAZOBACTAM 25 GRAM(S): 3; .375 INJECTION, POWDER, LYOPHILIZED, FOR SOLUTION INTRAVENOUS at 05:25

## 2024-12-21 RX ADMIN — DULOXETINE HYDROCHLORIDE 20 MILLIGRAM(S): 30 CAPSULE, DELAYED RELEASE ORAL at 12:29

## 2024-12-21 RX ADMIN — Medication 1 APPLICATION(S): at 12:32

## 2024-12-21 RX ADMIN — Medication 1 TABLET(S): at 12:31

## 2024-12-21 RX ADMIN — Medication 17 GRAM(S): at 12:30

## 2024-12-21 RX ADMIN — Medication 1 APPLICATION(S): at 05:50

## 2024-12-21 NOTE — PROVIDER CONTACT NOTE (OTHER) - DATE AND TIME:
20-Dec-2024 23:43
21-Dec-2024 06:04
21-Dec-2024 14:35
20-Dec-2024 05:48
19-Dec-2024 18:36
19-Dec-2024 23:16

## 2024-12-21 NOTE — PROGRESS NOTE ADULT - PROBLEM SELECTOR PLAN 7
DVT Prophylaxis: Lovenox 40mg QD  DIET: Mechanical soft diet  DISPO: To be determined  DNR/DNI: Trial NIV - Home medication: Gabapentin 600mg TID, duloxetine 20mg QD  - C/w home medications

## 2024-12-21 NOTE — PROGRESS NOTE ADULT - PROBLEM SELECTOR PLAN 4
- Home medications: Allopurinol 100mg QD  - C/w home med - Mild transaminitis on admission  - Alk phos 180, AST 49, ALT 47  Plan  C/w trend

## 2024-12-21 NOTE — PROGRESS NOTE ADULT - PROBLEM SELECTOR PLAN 5
- Home medication: Finasteride 5mg QD  - C/w home medication - Home medications: Allopurinol 100mg QD  - C/w home med

## 2024-12-21 NOTE — PROVIDER CONTACT NOTE (OTHER) - REASON
Patient blood pressure 120/50
pt's DBP less than 60 (56)
pt's DBP is less than 60
pt's DBP less than 60 (56)
pt's DBP less than 60 (54)
Patient blood pressure 100/50.

## 2024-12-21 NOTE — PROGRESS NOTE ADULT - SUBJECTIVE AND OBJECTIVE BOX
Follow Up:  sepsis    Interval History/ROS: pt afebrile and no vomiting or SOB, blood cx with MRSA            Allergies  No Known Allergies        ANTIMICROBIALS:  piperacillin/tazobactam IVPB.. 3.375 every 8 hours  vancomycin  IVPB 750 every 12 hours      OTHER MEDS:  albuterol    90 MICROgram(s) HFA Inhaler 2 Puff(s) Inhalation every 6 hours PRN  allopurinol 100 milliGRAM(s) Oral daily  Dakins Solution - 1/4 Strength 1 Application(s) Topical daily  DULoxetine 20 milliGRAM(s) Oral daily  enoxaparin Injectable 40 milliGRAM(s) SubCutaneous every 24 hours  finasteride 5 milliGRAM(s) Oral daily  gabapentin 600 milliGRAM(s) Oral three times a day  hydrocortisone 2.5% Ointment 1 Application(s) Topical two times a day  influenza  Vaccine (HIGH DOSE) 0.5 milliLiter(s) IntraMuscular once  ketoconazole 2% Cream 1 Application(s) Topical two times a day  lidocaine   4% Patch 1 Patch Transdermal daily  multivitamin 1 Tablet(s) Oral daily  mupirocin 2% Ointment 1 Application(s) Topical two times a day  polyethylene glycol 3350 17 Gram(s) Oral daily  senna 2 Tablet(s) Oral at bedtime  sodium chloride 2 Gram(s) Oral every 12 hours      Vital Signs Last 24 Hrs  T(C): 36.3 (21 Dec 2024 05:08), Max: 36.3 (20 Dec 2024 21:22)  T(F): 97.4 (21 Dec 2024 05:08), Max: 97.4 (21 Dec 2024 05:08)  HR: 65 (21 Dec 2024 05:08) (65 - 69)  BP: 133/52 (21 Dec 2024 05:08) (133/52 - 140/63)  BP(mean): --  RR: 17 (21 Dec 2024 05:08) (17 - 18)  SpO2: 100% (21 Dec 2024 05:08) (100% - 100%)    Parameters below as of 21 Dec 2024 05:08  Patient On (Oxygen Delivery Method): room air        Physical Exam:  General:    NAD, non toxic  Respiratory:  comfortable on RA  abd:   soft, not tender  :    no almodovar  Musculoskeletal : no joint swelling  Skin:   patchy erythematous lesions on face, torso and back with skin thickening hyperpigmentation and scaling, sacral wound no purulence but the R buttock had purulence with wound care  vascular: no phlebitis                          10.7   x     )-----------( 265      ( 21 Dec 2024 08:30 )             30.3       12-21    132[L]  |  98  |  20  ----------------------------<  102[H]  3.8   |  16[L]  |  0.77    Ca    8.8      21 Dec 2024 06:00  Phos  3.4     12-21  Mg     1.70     12-21    TPro  6.9  /  Alb  2.6[L]  /  TBili  0.6  /  DBili  x   /  AST  29  /  ALT  35  /  AlkPhos  171[H]  12-21      Urinalysis Basic - ( 21 Dec 2024 06:00 )    Color: x / Appearance: x / SG: x / pH: x  Gluc: 102 mg/dL / Ketone: x  / Bili: x / Urobili: x   Blood: x / Protein: x / Nitrite: x   Leuk Esterase: x / RBC: x / WBC x   Sq Epi: x / Non Sq Epi: x / Bacteria: x        MICROBIOLOGY:  v  .Sputum Sputum  12-20-24 --  --    Moderate polymorphonuclear leukocytes per low power field  Rare Squamous epithelial cells per low power field  Rare Gram positive cocci in pairs seen per oil power field  Rare Gram Positive Rods seen per oil power field  Rare Gram Negative Rods seen per oil power field  Rare Yeast like cells seen per oil power field      .Abscess  12-20-24 --  --    Numerous polymorphonuclear leukocytes seen per low power field  Few Gram Positive Cocci in Clusters seen per oil power field      .Blood BLOOD  12-19-24   Growth in aerobic bottle: Gram Positive Cocci in Clusters  Growth in anaerobic bottle: Gram Positive Cocci in Clusters  --    Growth in aerobic bottle: Gram Positive Cocci in Clusters  Growth in anaerobic bottle: Gram Positive Cocci in Clusters      .Blood BLOOD  12-19-24   Growth in aerobic bottle: Gram Positive Cocci in Clusters  Growth in anaerobic bottle: Gram Positive Cocci in Clusters  Direct identification is available within approximately 3-5  hours either by Blood Panel Multiplexed PCR or Direct  MALDI-TOF. Details: [email or web address]  --  Blood Culture PCR                RADIOLOGY:  Images independently visualized and reviewed personally, findings as below  < from: Xray Hip 2-3 Views, Left (12.20.24 @ 09:35) >  IMPRESSION:  No fractures or dislocations.    Redemonstrated lumbar spine degenerative change. Preserved left hip joint   space and no gross radiographic evidence for AVN.    Small enthesophytes along peripheral left iliac crest and ischial   tuberosity margins.    Generalized osteopenia otherwise no discrete suspicious lytic or blastic   lesions.    Vascular calcifications.    < end of copied text >  < from: CT Chest No Cont (12.19.24 @ 11:28) >  IMPRESSION:  *  Upper lobe predominant peripherally oriented groundglass opacities   with denser consolidation opacities in the dependent portions of the   bilateral lower lobes. Findings are suggestive of multifocal pneumonia   with superimposed basilar atelectasis. COVID infection is within the   differential given the peripheral location of the groundglass opacities.  *  Trace right effusion.      < end of copied text >

## 2024-12-21 NOTE — PROVIDER CONTACT NOTE (OTHER) - NAME OF MD/NP/PA/DO NOTIFIED:
MD Braeden Ryder
Dr.Rowley Zimmerman
MD Briana Garcia

## 2024-12-21 NOTE — PROVIDER CONTACT NOTE (OTHER) - ACTION/TREATMENT ORDERED:
no intervention at this time. will continue monitoring.
no intervention at this time. will continue monitoring.
MD notified and made aware. No new interventions ordered at this time.
MD notified and made aware. No new interventions ordered at this time.
no intervention at this time. will continue monitoring.
no intervention at this time. will continue monitoring.

## 2024-12-21 NOTE — PROVIDER CONTACT NOTE (OTHER) - SITUATION
pt's DBP less than 60 (56). 106/56, HR 73
Patient blood pressure 120/50
pt's DBP less than 60 (56). 118/50, HR 73
pt's bp 133/52, HR 65
Patient blood pressure 100/50.
pt's DBP less than 60 (54). 139/54, HR 69

## 2024-12-21 NOTE — PROVIDER CONTACT NOTE (OTHER) - BACKGROUND
pt is admitted with pneumonia
pt is admitted with pneumonia
Patient admitted with pneumonia due to infectious organism.   PMH of BPH, gout, HLD, HTN
pt is admitted with pneumonia
Patient admitted with pneumonia due to infectious organism.  PMH of BPH, gout, HTN, former smoker
pt is admitted with pneumonia

## 2024-12-21 NOTE — PROGRESS NOTE ADULT - SUBJECTIVE AND OBJECTIVE BOX
Patient is a 84y old  Male who presents with a chief complaint of SOB and lethargy (21 Dec 2024 09:21)    INTERVAL HX:  - no acute events overnight    Review of Systems: Focused ROS negative other that those stated above.    Allergies:  No Known Allergies    Medications:  albuterol    90 MICROgram(s) HFA Inhaler 2 Puff(s) Inhalation every 6 hours PRN  allopurinol 100 milliGRAM(s) Oral daily  Dakins Solution - 1/4 Strength 1 Application(s) Topical daily  DULoxetine 20 milliGRAM(s) Oral daily  enoxaparin Injectable 40 milliGRAM(s) SubCutaneous every 24 hours  finasteride 5 milliGRAM(s) Oral daily  gabapentin 600 milliGRAM(s) Oral three times a day  hydrocortisone 2.5% Ointment 1 Application(s) Topical two times a day  influenza  Vaccine (HIGH DOSE) 0.5 milliLiter(s) IntraMuscular once  ketoconazole 2% Cream 1 Application(s) Topical two times a day  lidocaine   4% Patch 1 Patch Transdermal daily  magnesium sulfate  IVPB 2 Gram(s) IV Intermittent once  multivitamin 1 Tablet(s) Oral daily  mupirocin 2% Ointment 1 Application(s) Topical two times a day  piperacillin/tazobactam IVPB.. 3.375 Gram(s) IV Intermittent every 8 hours  polyethylene glycol 3350 17 Gram(s) Oral daily  senna 2 Tablet(s) Oral at bedtime  sodium chloride 2 Gram(s) Oral every 12 hours  vancomycin  IVPB 750 milliGRAM(s) IV Intermittent every 12 hours    Vitals:  T(C): 36.3 (12-21-24 @ 05:08), Max: 36.3 (12-20-24 @ 21:22)  HR: 65 (12-21-24 @ 05:08) (65 - 69)  BP: 133/52 (12-21-24 @ 05:08) (133/52 - 140/63)  RR: 17 (12-21-24 @ 05:08) (17 - 18)  SpO2: 100% (12-21-24 @ 05:08) (100% - 100%)  I/O's:    Physical Exam:  CONSTITUTIONAL: no apparent distress.  SKIN: (+) scaly, erythematous rash on face. (+) sacral decubitus ulcers  EYES: PERRLA. EOMI. No scleral icterus.  ENT: Supple. No discharge or glossitis.   CV: RRR. Normal S1 and S2. No murmurs, rubs, or gallops. No edema.   PULM: Clear to auscultation throughout. Respirations unlabored. No wheezing, rales, or rhonchi.  GI: Soft, non-tender, non-distended. No palpable masses.   MSK: No cyanosis or clubbing.   NEURO: CN grossly intact  PSYCH: A+O x3    Labs:                        10.7   12.42 )-----------( 265      ( 21 Dec 2024 08:30 )             30.3     12-21    132[L]  |  98  |  20  ----------------------------<  102[H]  3.8   |  16[L]  |  0.77    Ca    8.8      21 Dec 2024 06:00  Phos  3.4     12-21  Mg     1.70     12-21    TPro  6.9  /  Alb  2.6[L]  /  TBili  0.6  /  DBili  x   /  AST  29  /  ALT  35  /  AlkPhos  171[H]  12-21    Radiology/Procedures: Reviewed.

## 2024-12-21 NOTE — PROGRESS NOTE ADULT - PROBLEM SELECTOR PLAN 1
Febrile, tachycardic, and leukocytosis.  - BCx 12/19: MRSA  - Decubitus ulcer culture 12/20: MRSA  - CT chest: multifocal PNA  - Neurosurgery consulted given hx of spinal hardware: no surgical intervention at this time.  - ID following  - continue Zosyn and Vancomycin  - MRI lumbosacral w/ albert pending Febrile, tachycardic, and leukocytosis.  - BCx 12/19: MRSA  - Decubitus ulcer culture 12/20: MRSA  - CT chest: multifocal PNA  - Neurosurgery consulted given hx of spinal hardware: no surgical intervention at this time.  - Wound care following for decubitus ulcers  - ID following  - continue Zosyn and Vancomycin  - MRI lumbosacral w/ albert pending

## 2024-12-21 NOTE — PROGRESS NOTE ADULT - PROBLEM SELECTOR PLAN 2
Hx of hyponatremia. Na 133 on admission  - Home medication: salt tabs 2g BID  - C/w home med Scaly, erythematous facial rash. Pruritic.  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 may biopsy on Sunday  - Ketoconazole cream BID

## 2024-12-21 NOTE — PROGRESS NOTE ADULT - PROBLEM SELECTOR PLAN 6
- Home medication: Gabapentin 600mg TID, duloxetine 20mg QD  - C/w home medications - Home medication: Finasteride 5mg QD  - C/w home medication

## 2024-12-21 NOTE — PROVIDER CONTACT NOTE (OTHER) - ASSESSMENT
Patient assessed. No signs or symptoms of acute distress noted.
pt is alert  and confused. not in any distress and denies any discomfort
Patient assessed. No signs or symptoms of acute distress noted.
pt is alert  and confused. not in any distress and denies any discomfort
pt is alert and oriented. denies pain or any discomfort.
pt is alert  and confused. not in any distress and denies any discomfort

## 2024-12-21 NOTE — PROGRESS NOTE ADULT - PROBLEM SELECTOR PLAN 3
- Mild transaminitis on admission  - Alk phos 180, AST 49, ALT 47  Plan  C/w trend Hx of hyponatremia. Na 133 on admission  - Home medication: salt tabs 2g BID  - C/w home med

## 2024-12-22 LAB
-  CLINDAMYCIN: SIGNIFICANT CHANGE UP
-  CLINDAMYCIN: SIGNIFICANT CHANGE UP
-  DAPTOMYCIN: SIGNIFICANT CHANGE UP
-  DAPTOMYCIN: SIGNIFICANT CHANGE UP
-  ERYTHROMYCIN: SIGNIFICANT CHANGE UP
-  ERYTHROMYCIN: SIGNIFICANT CHANGE UP
-  GENTAMICIN: SIGNIFICANT CHANGE UP
-  GENTAMICIN: SIGNIFICANT CHANGE UP
-  LINEZOLID: SIGNIFICANT CHANGE UP
-  LINEZOLID: SIGNIFICANT CHANGE UP
-  OXACILLIN: SIGNIFICANT CHANGE UP
-  OXACILLIN: SIGNIFICANT CHANGE UP
-  PENICILLIN: SIGNIFICANT CHANGE UP
-  PENICILLIN: SIGNIFICANT CHANGE UP
-  RIFAMPIN: SIGNIFICANT CHANGE UP
-  RIFAMPIN: SIGNIFICANT CHANGE UP
-  TETRACYCLINE: SIGNIFICANT CHANGE UP
-  TETRACYCLINE: SIGNIFICANT CHANGE UP
-  TRIMETHOPRIM/SULFAMETHOXAZOLE: SIGNIFICANT CHANGE UP
-  TRIMETHOPRIM/SULFAMETHOXAZOLE: SIGNIFICANT CHANGE UP
-  VANCOMYCIN: SIGNIFICANT CHANGE UP
-  VANCOMYCIN: SIGNIFICANT CHANGE UP
ALBUMIN SERPL ELPH-MCNC: 2.5 G/DL — LOW (ref 3.3–5)
ALP SERPL-CCNC: 134 U/L — HIGH (ref 40–120)
ALT FLD-CCNC: 32 U/L — SIGNIFICANT CHANGE UP (ref 4–41)
ANION GAP SERPL CALC-SCNC: 12 MMOL/L — SIGNIFICANT CHANGE UP (ref 7–14)
AST SERPL-CCNC: 31 U/L — SIGNIFICANT CHANGE UP (ref 4–40)
BASOPHILS # BLD AUTO: 0.06 K/UL — SIGNIFICANT CHANGE UP (ref 0–0.2)
BASOPHILS NFR BLD AUTO: 0.5 % — SIGNIFICANT CHANGE UP (ref 0–2)
BILIRUB SERPL-MCNC: 0.3 MG/DL — SIGNIFICANT CHANGE UP (ref 0.2–1.2)
BUN SERPL-MCNC: 22 MG/DL — SIGNIFICANT CHANGE UP (ref 7–23)
CALCIUM SERPL-MCNC: 8.5 MG/DL — SIGNIFICANT CHANGE UP (ref 8.4–10.5)
CHLORIDE SERPL-SCNC: 102 MMOL/L — SIGNIFICANT CHANGE UP (ref 98–107)
CO2 SERPL-SCNC: 21 MMOL/L — LOW (ref 22–31)
CREAT SERPL-MCNC: 0.94 MG/DL — SIGNIFICANT CHANGE UP (ref 0.5–1.3)
CULTURE RESULTS: ABNORMAL
EGFR: 81 ML/MIN/1.73M2 — SIGNIFICANT CHANGE UP
EOSINOPHIL # BLD AUTO: 1 K/UL — HIGH (ref 0–0.5)
EOSINOPHIL NFR BLD AUTO: 9.1 % — HIGH (ref 0–6)
GLUCOSE SERPL-MCNC: 91 MG/DL — SIGNIFICANT CHANGE UP (ref 70–99)
HCT VFR BLD CALC: 29.7 % — LOW (ref 39–50)
HGB BLD-MCNC: 10.5 G/DL — LOW (ref 13–17)
IANC: 8 K/UL — HIGH (ref 1.8–7.4)
IMM GRANULOCYTES NFR BLD AUTO: 1.2 % — HIGH (ref 0–0.9)
LYMPHOCYTES # BLD AUTO: 1.04 K/UL — SIGNIFICANT CHANGE UP (ref 1–3.3)
LYMPHOCYTES # BLD AUTO: 9.5 % — LOW (ref 13–44)
MAGNESIUM SERPL-MCNC: 2.2 MG/DL — SIGNIFICANT CHANGE UP (ref 1.6–2.6)
MCHC RBC-ENTMCNC: 29.7 PG — SIGNIFICANT CHANGE UP (ref 27–34)
MCHC RBC-ENTMCNC: 35.4 G/DL — SIGNIFICANT CHANGE UP (ref 32–36)
MCV RBC AUTO: 83.9 FL — SIGNIFICANT CHANGE UP (ref 80–100)
METHOD TYPE: SIGNIFICANT CHANGE UP
METHOD TYPE: SIGNIFICANT CHANGE UP
MONOCYTES # BLD AUTO: 0.76 K/UL — SIGNIFICANT CHANGE UP (ref 0–0.9)
MONOCYTES NFR BLD AUTO: 6.9 % — SIGNIFICANT CHANGE UP (ref 2–14)
NEUTROPHILS # BLD AUTO: 8 K/UL — HIGH (ref 1.8–7.4)
NEUTROPHILS NFR BLD AUTO: 72.8 % — SIGNIFICANT CHANGE UP (ref 43–77)
NRBC # BLD: 0 /100 WBCS — SIGNIFICANT CHANGE UP (ref 0–0)
NRBC # FLD: 0 K/UL — SIGNIFICANT CHANGE UP (ref 0–0)
ORGANISM # SPEC MICROSCOPIC CNT: ABNORMAL
PHOSPHATE SERPL-MCNC: 4 MG/DL — SIGNIFICANT CHANGE UP (ref 2.5–4.5)
PLATELET # BLD AUTO: 282 K/UL — SIGNIFICANT CHANGE UP (ref 150–400)
POTASSIUM SERPL-MCNC: 3.9 MMOL/L — SIGNIFICANT CHANGE UP (ref 3.5–5.3)
POTASSIUM SERPL-SCNC: 3.9 MMOL/L — SIGNIFICANT CHANGE UP (ref 3.5–5.3)
PROT SERPL-MCNC: 6.2 G/DL — SIGNIFICANT CHANGE UP (ref 6–8.3)
RBC # BLD: 3.54 M/UL — LOW (ref 4.2–5.8)
RBC # FLD: 13.8 % — SIGNIFICANT CHANGE UP (ref 10.3–14.5)
SODIUM SERPL-SCNC: 135 MMOL/L — SIGNIFICANT CHANGE UP (ref 135–145)
SPECIMEN SOURCE: SIGNIFICANT CHANGE UP
WBC # BLD: 10.99 K/UL — HIGH (ref 3.8–10.5)
WBC # FLD AUTO: 10.99 K/UL — HIGH (ref 3.8–10.5)

## 2024-12-22 PROCEDURE — 11105 PUNCH BX SKIN EA SEP/ADDL: CPT

## 2024-12-22 PROCEDURE — 99232 SBSQ HOSP IP/OBS MODERATE 35: CPT | Mod: GC

## 2024-12-22 PROCEDURE — 99221 1ST HOSP IP/OBS SF/LOW 40: CPT | Mod: 25

## 2024-12-22 PROCEDURE — 99233 SBSQ HOSP IP/OBS HIGH 50: CPT | Mod: GC

## 2024-12-22 PROCEDURE — 11104 PUNCH BX SKIN SINGLE LESION: CPT

## 2024-12-22 RX ORDER — VANCOMYCIN HYDROCHLORIDE 5 G/100ML
1250 INJECTION, POWDER, LYOPHILIZED, FOR SOLUTION INTRAVENOUS EVERY 24 HOURS
Refills: 0 | Status: DISCONTINUED | OUTPATIENT
Start: 2024-12-22 | End: 2024-12-22

## 2024-12-22 RX ORDER — VANCOMYCIN HYDROCHLORIDE 5 G/100ML
1250 INJECTION, POWDER, LYOPHILIZED, FOR SOLUTION INTRAVENOUS EVERY 24 HOURS
Refills: 0 | Status: DISCONTINUED | OUTPATIENT
Start: 2024-12-22 | End: 2024-12-26

## 2024-12-22 RX ADMIN — VANCOMYCIN HYDROCHLORIDE 166.67 MILLIGRAM(S): 5 INJECTION, POWDER, LYOPHILIZED, FOR SOLUTION INTRAVENOUS at 22:16

## 2024-12-22 RX ADMIN — Medication 1 TABLET(S): at 11:30

## 2024-12-22 RX ADMIN — PIPERACILLIN AND TAZOBACTAM 25 GRAM(S): 3; .375 INJECTION, POWDER, LYOPHILIZED, FOR SOLUTION INTRAVENOUS at 05:19

## 2024-12-22 RX ADMIN — LIDOCAINE 1 PATCH: 50 OINTMENT TOPICAL at 19:48

## 2024-12-22 RX ADMIN — Medication 1 APPLICATION(S): at 17:07

## 2024-12-22 RX ADMIN — SODIUM CHLORIDE 2 GRAM(S): 9 INJECTION, SOLUTION INTRAMUSCULAR; INTRAVENOUS; SUBCUTANEOUS at 05:21

## 2024-12-22 RX ADMIN — Medication 5 MILLIGRAM(S): at 11:30

## 2024-12-22 RX ADMIN — ENOXAPARIN SODIUM 40 MILLIGRAM(S): 60 INJECTION INTRAVENOUS; SUBCUTANEOUS at 22:00

## 2024-12-22 RX ADMIN — Medication 1 APPLICATION(S): at 05:23

## 2024-12-22 RX ADMIN — Medication 1 APPLICATION(S): at 11:40

## 2024-12-22 RX ADMIN — DULOXETINE HYDROCHLORIDE 20 MILLIGRAM(S): 30 CAPSULE, DELAYED RELEASE ORAL at 11:31

## 2024-12-22 RX ADMIN — Medication 1 APPLICATION(S): at 17:01

## 2024-12-22 RX ADMIN — Medication 1 APPLICATION(S): at 05:18

## 2024-12-22 RX ADMIN — ALLOPURINOL 100 MILLIGRAM(S): 100 TABLET ORAL at 11:30

## 2024-12-22 RX ADMIN — PIPERACILLIN AND TAZOBACTAM 25 GRAM(S): 3; .375 INJECTION, POWDER, LYOPHILIZED, FOR SOLUTION INTRAVENOUS at 14:22

## 2024-12-22 RX ADMIN — GABAPENTIN 600 MILLIGRAM(S): 300 CAPSULE ORAL at 05:21

## 2024-12-22 RX ADMIN — LIDOCAINE 1 PATCH: 50 OINTMENT TOPICAL at 11:39

## 2024-12-22 RX ADMIN — LIDOCAINE 1 PATCH: 50 OINTMENT TOPICAL at 00:15

## 2024-12-22 RX ADMIN — SODIUM CHLORIDE 2 GRAM(S): 9 INJECTION, SOLUTION INTRAMUSCULAR; INTRAVENOUS; SUBCUTANEOUS at 17:08

## 2024-12-22 RX ADMIN — GABAPENTIN 600 MILLIGRAM(S): 300 CAPSULE ORAL at 14:18

## 2024-12-22 RX ADMIN — GABAPENTIN 600 MILLIGRAM(S): 300 CAPSULE ORAL at 22:00

## 2024-12-22 RX ADMIN — Medication 1 APPLICATION(S): at 17:04

## 2024-12-22 RX ADMIN — VANCOMYCIN HYDROCHLORIDE 250 MILLIGRAM(S): 5 INJECTION, POWDER, LYOPHILIZED, FOR SOLUTION INTRAVENOUS at 08:39

## 2024-12-22 RX ADMIN — PIPERACILLIN AND TAZOBACTAM 25 GRAM(S): 3; .375 INJECTION, POWDER, LYOPHILIZED, FOR SOLUTION INTRAVENOUS at 22:01

## 2024-12-22 NOTE — PHARMACOTHERAPY INTERVENTION NOTE - COMMENTS
84 M with MRSA bacteremia, on vancomycin 750 mg IV q12h.  Last level 12.5 mg/mL 12/21 at 18:04.  Last dose given at 8:39 am.    Using PrecisePK, vancomycin AUC calculated as:  ·	Vancomycin 750 mg IV q12h  yields AUC = 629  ·	Vancomycin 1250 mg IV q24h yields AUC = 525    Recommendation(s):  1) Consider decreasing to vancomycin 1250 mg IV q24h starting 22:00 on 12/22/24 to achieve therapeutic AUC goal of 400-600.      Gabriel Champagne, PharmD, BCIDP  Clinical Pharmacy Specialist, Infectious Diseases  Spectra extension [phone]  . 84 M with MRSA bacteremia, on vancomycin 750 mg IV q12h.  Last level 12.5 mg/mL 12/21 at 18:04.  Last dose given at 8:39 am.    Using PrecisePK, vancomycin AUC calculated as:  ·	Vancomycin 750 mg IV q12h  yields AUC = 629  ·	Vancomycin 1250 mg IV q24h yields AUC = 525    Recommendation:  Consider decreasing to vancomycin 1250 mg IV q24h starting 22:00 on 12/22/24 to achieve therapeutic AUC goal of 400-600.      Gabriel Champagne, PharmD, [facility] HospitalDP  Clinical Pharmacy Specialist, Infectious Diseases  Spectra extension [phone]  .

## 2024-12-22 NOTE — CONSULT NOTE ADULT - ASSESSMENT
___________________________  #Favor likely seborrheic dermatitis vs pemphgius foliaceous vs fungal vs other    - Significant improvement with ketoconazole cream, points seb derm as likely culprit, however erythematous plaque on skin is atypical for seb derm  - In pemphigus foliaceous, can see scale in seborrheic distribution and significant desquamation, crusted erosions and scale on the trunk. Therefore, would recommend bx with H&E and DIF to r/o pemphigus foliaceous.   - Meanwhile, continue to apply ketoconazole cream mixed with hydrocortisone 2.5% ointment to AA BID, please provide patients with many tubes at bedside.  - Pt amenable to bx as above  - Dermatology Punch Biopsy Procedure Note    After risks and benefits of procedure including bleeding, infection and scar were reviewed (consents including photo consent reviewed, signed and in chart), allergies were reviewed and time out performed. Written consent given by the patient.    Area cleaned with rubbing alcohol and anesthetized with lidocaine and epinephrine.  # 2, 3mm punch biopsies were performed to R abdomen (R lower abdomen superior site: H&E, R lower abdomen/medial thigh inferior site: DIF) hemostasis achieved with a dissolvable chromic gut suture with pressure dressing placed. Wound care reviewed with patient and team. Biopsy site should remain covered with pressure bandage for 24-48 hours. Then apply Vaseline to biopsy site daily and cover with bandage until healed.         The patient's chart was reviewed in addition to being seen and examined virtuall with the dermatology attending Dr. Garza.  Recommendations were communicated with the primary team.  Please page [phone] with a 10-digit call-back number for further related questions.    Thank you for allowing us to participate in the care of this patient.  Please alert Dermatology for any new or worsening developments.    Ezra Joshua MD  Resident Physician, PGY-2  [facility] Dermatology  Pager: [phone]  Office: [phone] ___________________________  #Favor likely seborrheic dermatitis vs pemphgius foliaceous vs other    - Significant improvement with ketoconazole cream, points seb derm as likely culprit, however erythematous plaque on skin is atypical for seb derm  - In pemphigus foliaceous, can see scale in seborrheic distribution and significant desquamation, crusted erosions and scale on the trunk. Therefore, would recommend bx with H&E and DIF to r/o pemphigus foliaceous.   - Meanwhile, continue to apply ketoconazole cream mixed with hydrocortisone 2.5% ointment to AA BID, please provide patients with many tubes at bedside.  - Pt amenable to bx as above  - Dermatology Punch Biopsy Procedure Note    After risks and benefits of procedure including bleeding, infection and scar were reviewed (consents including photo consent reviewed, signed and in chart), allergies were reviewed and time out performed. Written consent given by the patient.    Area cleaned with rubbing alcohol and anesthetized with lidocaine and epinephrine.  # 2, 3mm punch biopsies were performed to R abdomen (R lower abdomen superior site: H&E, R lower abdomen/medial thigh inferior site: DIF) hemostasis achieved with a dissolvable chromic gut suture with pressure dressing placed. Wound care reviewed with patient and team. Biopsy site should remain covered with pressure bandage for 24-48 hours. Then apply Vaseline to biopsy site daily and cover with bandage until healed.         The patient's chart was reviewed in addition to being seen and examined virtuall with the dermatology attending Dr. Garza.  Recommendations were communicated with the primary team.  Please page [phone] with a 10-digit call-back number for further related questions.    Thank you for allowing us to participate in the care of this patient.  Please alert Dermatology for any new or worsening developments.    Ezra Joshua MD  Resident Physician, PGY-2  [facility] Dermatology  Pager: [phone]  Office: [phone]

## 2024-12-22 NOTE — DISCHARGE NOTE PROVIDER - CARE PROVIDERS DIRECT ADDRESSES
[email or web address]

## 2024-12-22 NOTE — DISCHARGE NOTE PROVIDER - NSDCFUADDAPPT_GEN_ALL_CORE_FT
APPTS ARE READY TO BE MADE: [X] YES    Best Family or Patient Contact (if needed):    Additional Information about above appointments (if needed):    1: PCP Francesca  2: ID Mando  3: Derm clinic [facility]      Other comments or requests:    APPTS ARE READY TO BE MADE: [X] YES    Best Family or Patient Contact (if needed):    Additional Information about above appointments (if needed):    1: PCP Francesca  2: ID Mando  3: Derm clinic [facility]  4. Neurosurgery      Other comments or requests:    APPTS ARE READY TO BE MADE: [X] YES    Best Family or Patient Contact (if needed):    Additional Information about above appointments (if needed):    1: PCP Francesca  2: ID Mando  3: Derm clinic [facility]  4. Neurosurgery      Other comments or requests:   Patient is being discharged to rehab. Patient/caregiver will arrange follow up appointments.

## 2024-12-22 NOTE — PROGRESS NOTE ADULT - PROBLEM SELECTOR PLAN 2
Scaly, erythematous facial rash. Pruritic.  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 may biopsy on Sunday  - Ketoconazole cream BID Scaly, erythematous facial rash. Pruritic.  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 Derm to see pt 12/22 will determine if biopsy needed.  - Ketoconazole cream BID

## 2024-12-22 NOTE — DISCHARGE NOTE PROVIDER - HOSPITAL COURSE
HPI:  Drew Barcenas is a 84 y.o. 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 At the rehab center CXR was taken with concerns for PNA and Pt was started on Keflex. Because of worsening condition Pt was sent to the hospital. The son Emery (HCP) was called for a history.    In the ED:  VSS. NS 1L bolus given, CT showed multifocal pneumonia vancomycin and ceftriaxone. Became febrile to 103F qualifying for sepsis.    Chart Review:  Admitted from Aug 16th to the 31st for back pain, Found to have discitis and osteo. Aspiration of the epidural abscess showed negative cultures. Surgery was done for epidural evacuation and hardware placement. Pt was on vanc and cefepime. C/b by DVT. Pt was sent to rehab and got 6 weeks of CTX through a PICC line.    (19 Dec 2024 17:23)    Hospital Course:  Neurosurgery was consulted regarding hx of spinal hardware and they recommended no surgical intervention at that time. Pt was switched from empiric ceftriaxone and azithromycin for multifocal pneumonia to vanc and zosyn. ID was consulted and recommended lumbosacral MRI with albert as well as a dermatological evaluation for his scaling, patchy, erythematous facial rash. Dermatology was consulted and recommended a skin biopsy. Blood cultures became positive with MRSA bacteremia from possible sacral wound source. TTE was ordered to assess for vegetations and was of poor quality.    Important Medication Changes and Reason:    Active or Pending Issues Requiring Follow-up:    Advanced Directives:   [ ] Full code  [ ] DNR  [ ] Hospice    Discharge Diagnoses:         HPI:  Drew Barcenas is a 84 y.o. 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 At the rehab center CXR was taken with concerns for PNA and Pt was started on Keflex. Because of worsening condition Pt was sent to the hospital. The son Emery (HCP) was called for a history.    In the ED:  VSS. NS 1L bolus given, CT showed multifocal pneumonia vancomycin and ceftriaxone. Became febrile to 103F qualifying for sepsis.    Chart Review:  Admitted from Aug 16th to the 31st for back pain, Found to have discitis and osteo. Aspiration of the epidural abscess showed negative cultures. Surgery was done for epidural evacuation and hardware placement. Pt was on vanc and cefepime. C/b by DVT. Pt was sent to rehab and got 6 weeks of CTX through a PICC line.    (19 Dec 2024 17:23)    Hospital Course:  Neurosurgery was consulted regarding hx of spinal hardware and they recommended no surgical intervention at that time. Pt was switched from empiric ceftriaxone and azithromycin for multifocal pneumonia to vanc and zosyn. ID was consulted and recommended lumbosacral MRI with albert as well as a dermatological evaluation for his scaling, patchy, erythematous facial rash. Dermatology was consulted and recommended a skin biopsy. Blood cultures became positive with MRSA bacteremia from possible sacral wound source. TTE was ordered to assess for vegetations and was of poor quality.    Important Medication Changes and Reason:  Vanc 1g q24 until 2/1    Active or Pending Issues Requiring Follow-up:  PICC line removal after abx    Advanced Directives:   [X] Full code  [ ] DNR  [ ] Hospice    Discharge Diagnoses:  Epidural abscess and MRSA bacteremia, multifocal PNA       HPI:  Drew Barcenas is a 84 y.o. 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 At the rehab center CXR was taken with concerns for PNA and Pt was started on Keflex. Because of worsening condition Pt was sent to the hospital. The son Emery (HCP) was called for a history.    In the ED:  VSS. NS 1L bolus given, CT showed multifocal pneumonia vancomycin and ceftriaxone. Became febrile to 103F qualifying for sepsis.    Chart Review:  Admitted from Aug 16th to the 31st for back pain, Found to have discitis and osteo. Aspiration of the epidural abscess showed negative cultures. Surgery was done for epidural evacuation and hardware placement. Pt was on vanc and cefepime. C/b by DVT. Pt was sent to rehab and got 6 weeks of CTX through a PICC line.    (19 Dec 2024 17:23)    Hospital Course:  Neurosurgery was consulted regarding hx of spinal hardware and they recommended no surgical intervention at that time. Pt was switched from empiric ceftriaxone and azithromycin for multifocal pneumonia to vanc and zosyn. ID was consulted and recommended lumbosacral MRI with albert as well as a dermatological evaluation for his scaling, patchy, erythematous facial rash. Dermatology was consulted and recommended a skin biopsy. Blood cultures became positive with MRSA bacteremia from possible sacral wound source. TTE was ordered to assess for vegetations and was of poor quality. AIDEE was done which showed no vegetations. MRI showed unclear resolution of the abscess vs persistent inflammation. ID recommended 6 weeks of vanc ending 2/1.    Important Medication Changes and Reason:  Vanc 1g q24 until 2/1    Active or Pending Issues Requiring Follow-up:  PICC line removal after abx    Advanced Directives:   [X] Full code  [ ] DNR  [ ] Hospice    Discharge Diagnoses:  Epidural abscess and MRSA bacteremia, multifocal PNA       HPI:  Drew Barcenas is a 84 y.o. 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 At the rehab center CXR was taken with concerns for PNA and Pt was started on Keflex. Because of worsening condition Pt was sent to the hospital. The son Emery (HCP) was called for a history.    In the ED:  VSS. NS 1L bolus given, CT showed multifocal pneumonia vancomycin and ceftriaxone. Became febrile to 103F qualifying for sepsis.    Chart Review:  Admitted from Aug 16th to the 31st for back pain, Found to have discitis and osteo. Aspiration of the epidural abscess showed negative cultures. Surgery was done for epidural evacuation and hardware placement. Pt was on vanc and cefepime. C/b by DVT. Pt was sent to rehab and got 6 weeks of CTX through a PICC line.    (19 Dec 2024 17:23)    Hospital Course:  Neurosurgery was consulted regarding hx of spinal hardware and they recommended no surgical intervention at that time. Pt was switched from empiric ceftriaxone and azithromycin for multifocal pneumonia to vanc and zosyn. ID was consulted and recommended lumbosacral MRI with albert as well as a dermatological evaluation for his scaling, patchy, erythematous facial rash. Dermatology was consulted and recommended a skin biopsy. Blood cultures became positive with MRSA bacteremia from possible sacral wound source. TTE was ordered to assess for vegetations and was of poor quality. AIDEE was done which showed no vegetations. MRI showed unclear resolution of the abscess vs persistent inflammation. ID recommended 6 weeks of vanc ending 2/1.    Important Medication Changes and Reason:  Vanc 1g q24 until 2/1  Weekly CBC, CMP, and vanc trough emailed to [email or web address]    Active or Pending Issues Requiring Follow-up:  PICC line removal after abx    Advanced Directives:   [X] Full code  [ ] DNR  [ ] Hospice    Discharge Diagnoses:  Epidural abscess and MRSA bacteremia, multifocal PNA       HPI:  Drew Barcenas is a 84 y.o. 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 At the rehab center CXR was taken with concerns for PNA and Pt was started on Keflex. Because of worsening condition Pt was sent to the hospital. The son Emery (HCP) was called for a history.    In the ED:  VSS. NS 1L bolus given, CT showed multifocal pneumonia vancomycin and ceftriaxone. Became febrile to 103F qualifying for sepsis.    Chart Review:  Admitted from Aug 16th to the 31st for back pain, Found to have discitis and osteo. Aspiration of the epidural abscess showed negative cultures. Surgery was done for epidural evacuation and hardware placement. Pt was on vanc and cefepime. C/b by DVT. Pt was sent to rehab and got 6 weeks of CTX through a PICC line.    (19 Dec 2024 17:23)    Hospital Course:  Neurosurgery was consulted regarding hx of spinal hardware and they recommended no surgical intervention at that time. Pt was switched from empiric ceftriaxone and azithromycin for multifocal pneumonia to vanc and zosyn. ID was consulted and recommended lumbosacral MRI with albert as well as a dermatological evaluation for his scaling, patchy, erythematous facial rash. Dermatology was consulted and recommended a skin biopsy. Blood cultures became positive with MRSA bacteremia from possible sacral wound source. TTE was ordered to assess for vegetations and was of poor quality. AIDEE was done which showed no vegetations. MRI showed unclear resolution of the abscess vs persistent inflammation. ID recommended 6 weeks of vanc ending 2/1.    Important Medication Changes and Reason:  Vanc 750mg q24 until 2/1  Weekly CBC, CMP, and vanc trough emailed to [email or web address]    Active or Pending Issues Requiring Follow-up:  PICC line removal after abx    Advanced Directives:   [X] Full code  [ ] DNR  [ ] Hospice    Discharge Diagnoses:  Epidural abscess and MRSA bacteremia, multifocal PNA       HPI:  Drew Barcenas is a 84 y.o. 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 At the rehab center CXR was taken with concerns for PNA and Pt was started on Keflex. Because of worsening condition Pt was sent to the hospital. The son Emery (HCP) was called for a history.    In the ED:  VSS. NS 1L bolus given, CT showed multifocal pneumonia vancomycin and ceftriaxone. Became febrile to 103F qualifying for sepsis.    Chart Review:  Admitted from Aug 16th to the 31st for back pain, Found to have discitis and osteo. Aspiration of the epidural abscess showed negative cultures. Surgery was done for epidural evacuation and hardware placement. Pt was on vanc and cefepime. C/b by DVT. Pt was sent to rehab and got 6 weeks of CTX through a PICC line.    (19 Dec 2024 17:23)    Hospital Course:  Neurosurgery was consulted regarding hx of spinal hardware and they recommended no surgical intervention at that time. Pt was switched from empiric ceftriaxone and azithromycin for multifocal pneumonia to vanc and zosyn. ID was consulted and recommended lumbosacral MRI with albert as well as a dermatological evaluation for his scaling, patchy, erythematous facial rash. Dermatology was consulted and recommended a skin biopsy. Blood cultures became positive with MRSA bacteremia from possible sacral wound source. TTE was ordered to assess for vegetations and was of poor quality. AIDEE was done which showed no vegetations. MRI showed unclear resolution of the abscess vs persistent inflammation. ID recommended 6 weeks of vancomycin ending 2/1/25    Important Medication Changes and Reason:  Vanc 750mg q24 until 2/1  Weekly CBC, CMP, and vanc trough emailed to [email or web address]    Active or Pending Issues Requiring Follow-up:  PICC line removal after abx    Advanced Directives:   [X] Full code  [ ] DNR  [ ] Hospice    Discharge Diagnoses:  Epidural abscess and MRSA bacteremia, multifocal PNA

## 2024-12-22 NOTE — DISCHARGE NOTE PROVIDER - CARE PROVIDER_API CALL
Sruthi Ma  Internal Medicine  [address] [facility] [address]  [place] [address]  Phone: [phone]  Fax: [phone]  Established Patient  Follow Up Time: 1 week    Ilda Gilmore)  Infectious Disease  [address] [place] [address]  Phone: [phone]  Fax: [phone]  Follow Up Time: 2 weeks   Sruthi Ma  Internal Medicine  [address]  [place] [address]  Phone: [phone]  Fax: [phone]  Established Patient  Follow Up Time: 1 week    Ilda Gilmore)  Infectious Disease  [address] [place] [id number]  Phone: [phone]  Fax: [phone]  Follow Up Time: 2 weeks    Jose Bergeron Osman  Neurosurgery  [address]  [place] [address]  Phone: [phone]  Fax: [phone]  Follow Up Time: 2 weeks

## 2024-12-22 NOTE — DISCHARGE NOTE PROVIDER - NSDCCPCAREPLAN_GEN_ALL_CORE_FT
PRINCIPAL DISCHARGE DIAGNOSIS  Diagnosis: MRSA bacteremia  Assessment and Plan of Treatment: Your blood cultures grew a bacteria called MRSA, or methicillin resistant staph aureas. This bacteial infection your blood was treated with an antibiotic called vancomycin. A possible source of this infection was a sacral wound. If you experience high fevers, chills, not in your usual state of health, please return to the hospital.      SECONDARY DISCHARGE DIAGNOSES  Diagnosis: MRSA bacteremia  Assessment and Plan of Treatment: You were found to have a pneumonia on the imaging of your chest. This infection was treated with antibiotics. If you experience high fevers, cough, shortness of breath, please return to the hospital.     PRINCIPAL DISCHARGE DIAGNOSIS  Diagnosis: MRSA bacteremia  Assessment and Plan of Treatment: Your blood cultures grew a bacteria called MRSA, or methicillin resistant staph aureas. This bacterial infection in your blood was treated with an antibiotic called vancomycin. Your MRI showed an epidural abscess which was likely the source of the infection. If you experience high fevers, chills, not in your usual state of health, please return to the hospital.      SECONDARY DISCHARGE DIAGNOSES  Diagnosis: MRSA bacteremia  Assessment and Plan of Treatment: You were found to have a pneumonia on the imaging of your chest. This infection was treated with antibiotics. If you experience high fevers, cough, shortness of breath, please return to the hospital.     PRINCIPAL DISCHARGE DIAGNOSIS  Diagnosis: MRSA bacteremia  Assessment and Plan of Treatment: Your blood cultures grew a bacteria called MRSA, or methicillin resistant staph aureas. This bacterial infection in your blood was treated with an antibiotic called vancomycin. Your MRI showed an epidural abscess which was likely the source of the infection. If you experience high fevers, chills, not in your usual state of health, please return to the hospital.      SECONDARY DISCHARGE DIAGNOSES  Diagnosis: Gram-negative pneumonia  Assessment and Plan of Treatment: You were found to have a pneumonia on the imaging of your chest. This infection was treated with antibiotics. If you experience high fevers, cough, shortness of breath, please return to the hospital.

## 2024-12-22 NOTE — DISCHARGE NOTE PROVIDER - NSDCFUSCHEDAPPT_GEN_ALL_CORE_FT
Adrien Lucero Physician [facility]  CARDIOLOGY [address]  Scheduled Appointment: 01/09/2025

## 2024-12-22 NOTE — DISCHARGE NOTE PROVIDER - NSDCCPTREATMENT_GEN_ALL_CORE_FT
PRINCIPAL PROCEDURE  Procedure: MRI lumbar spine w/w/o contrst  Findings and Treatment: IMPRESSION:  Post laminectomy and fusion for osteomyelitis discitis T11-12 there is   residual enhancement at the T11-12 vertebral bodies and disc space   without cord compression. Cannot distinguish treated sterile abscess from   continued infection.

## 2024-12-22 NOTE — DISCHARGE NOTE PROVIDER - PROVIDER TOKENS
PROVIDER:[TOKEN:[[id number]],FOLLOWUP:[1 week],ESTABLISHEDPATIENT:[T]],PROVIDER:[TOKEN:[[id number]],FOLLOWUP:[2 weeks]] PROVIDER:[TOKEN:[[id number]],FOLLOWUP:[1 week],ESTABLISHEDPATIENT:[T]],PROVIDER:[TOKEN:[[id number]],FOLLOWUP:[2 weeks]],PROVIDER:[TOKEN:[[id number]],FOLLOWUP:[2 weeks]]

## 2024-12-22 NOTE — PROGRESS NOTE ADULT - PROBLEM SELECTOR PLAN 1
Febrile, tachycardic, and leukocytosis.  - BCx 12/19: MRSA  - Decubitus ulcer culture 12/20: MRSA  - CT chest: multifocal PNA  - Neurosurgery consulted given hx of spinal hardware: no surgical intervention at this time.  - Wound care following for decubitus ulcers  - ID following  - continue Zosyn and Vancomycin  - MRI lumbosacral w/ albert pending Febrile, tachycardic, and leukocytosis.  - BCx 12/19: MRSA  - Decubitus ulcer culture 12/20: MRSA  - CT chest: multifocal PNA  - Neurosurgery consulted given hx of spinal hardware: no surgical intervention at this time.  - Wound care following for decubitus ulcers  - ID following  - continue Zosyn and Vancomycin (vanc dosed at 1250mg q24 per ID)  - MRI lumbosacral w/ albert pending -> MRI check list in chart  - Echo had poor image quality and not able to r/o thrombi -> recommends AIDEE

## 2024-12-22 NOTE — CONSULT NOTE ADULT - SUBJECTIVE AND OBJECTIVE BOX
HPI:  Drew Barcenas is a 84 y.o. 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 At the rehab center CXR was taken with concerns for PNA and Pt was started on Keflex. Because of worsening condition Pt was sent to the hospital. The son Emery (HCP) was called for a history.    In the ED:  VSS. NS 1L bolus given, CT showed multifocal pneumonia vancomycin and ceftriaxone. Became febrile to 103F qualifying for sepsis.    Chart Review:  Admitted from Aug 16th to the 31st for back pain, Found to have discitis and osteo. Aspiration of the epidural abscess showed negative cultures. Surgery was done for epidural evacuation and hardware placement. Pt was on vanc and cefepime. C/b by DVT. Pt was sent to rehab and got 6 weeks of CTX through a PICC line.    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 He is also bacteremic with blood cx + MRSA from yesterday and todays blood cultures showing gram pos cocci.  Also being treated for concomitant sacral ulcer. Being followed by wound care and ID. Was admitted yesterday. We were consulted for a “Rash” that has been present since prior to admission, for at least a few weeks, worse this week. Rash described as itchy, but otherwise not too bothersome, located on face and trunk. Nothing in mucosal area. Has not tried any topicals. No new medications prior to initiation of rash. On Friday, we recommended ketoconazole cream to AA BID and pt has been using. Per pt today, his rash is significantly improved and much less itchy.     PAST MEDICAL & SURGICAL HISTORY:  HTN (hypertension)      Former smoker      Gout      History of BPH      S/P spinal surgery        ROS:  As above    MEDICATIONS  (STANDING):  allopurinol 100 milliGRAM(s) Oral daily  Dakins Solution - 1/4 Strength 1 Application(s) Topical daily  DULoxetine 20 milliGRAM(s) Oral daily  enoxaparin Injectable 40 milliGRAM(s) SubCutaneous every 24 hours  finasteride 5 milliGRAM(s) Oral daily  gabapentin 600 milliGRAM(s) Oral three times a day  hydrocortisone 2.5% Ointment 1 Application(s) Topical two times a day  influenza  Vaccine (HIGH DOSE) 0.5 milliLiter(s) IntraMuscular once  ketoconazole 2% Cream 1 Application(s) Topical two times a day  lidocaine   4% Patch 1 Patch Transdermal daily  multivitamin 1 Tablet(s) Oral daily  mupirocin 2% Ointment 1 Application(s) Topical two times a day  piperacillin/tazobactam IVPB.. 3.375 Gram(s) IV Intermittent every 8 hours  polyethylene glycol 3350 17 Gram(s) Oral daily  senna 2 Tablet(s) Oral at bedtime  sodium chloride 2 Gram(s) Oral every 12 hours  sodium chloride 0.9%. 1000 milliLiter(s) (70 mL/Hr) IV Continuous <Continuous>  vancomycin  IVPB 1250 milliGRAM(s) IV Intermittent every 24 hours    MEDICATIONS  (PRN):  albuterol    90 MICROgram(s) HFA Inhaler 2 Puff(s) Inhalation every 6 hours PRN Shortness of Breath and/or Wheezing      Allergies    No Known Allergies    Intolerances        SOCIAL HISTORY:    FAMILY HISTORY:  No pertinent family history in first degree relatives        Vital Signs Last 24 Hrs  T(C): 36.1 (22 Dec 2024 15:06), Max: 36.6 (21 Dec 2024 22:14)  T(F): 97 (22 Dec 2024 15:06), Max: 97.8 (21 Dec 2024 22:14)  HR: 67 (22 Dec 2024 15:06) (67 - 75)  BP: 149/69 (22 Dec 2024 15:06) (135/65 - 149/69)  BP(mean): --  RR: 18 (22 Dec 2024 15:06) (17 - 18)  SpO2: 100% (22 Dec 2024 15:06) (99% - 100%)    Parameters below as of 22 Dec 2024 15:06  Patient On (Oxygen Delivery Method): room air      PHYSICAL EXAM:   The patient was alert and in no apparent distress.  There was no visible lymphadenopathy.  Conjunctiva were non injected  There was no clubbing or edema of extremities.    Of note on skin exam: Glabella, forehead, bitemporal scalp, and trent-orbital desquamation and flakiness noted. No ulcerations or skin fissures. Lower abdomen with pink to erythematous thin, large, ill defined plaque with significant overlying scale.     LABS:                        10.5   10.99 )-----------( 282      ( 22 Dec 2024 05:51 )             29.7     12-22    135  |  102  |  22  ----------------------------<  91  3.9   |  21[L]  |  0.94    Ca    8.5      22 Dec 2024 05:51  Phos  4.0     12-22  Mg     2.20     12-22    TPro  6.2  /  Alb  2.5[L]  /  TBili  0.3  /  DBili  x   /  AST  31  /  ALT  32  /  AlkPhos  134[H]  12-22      Urinalysis Basic - ( 22 Dec 2024 05:51 )    Color: x / Appearance: x / SG: x / pH: x  Gluc: 91 mg/dL / Ketone: x  / Bili: x / Urobili: x   Blood: x / Protein: x / Nitrite: x   Leuk Esterase: x / RBC: x / WBC x   Sq Epi: x / Non Sq Epi: x / Bacteria: x        RADIOLOGY & ADDITIONAL STUDIES:

## 2024-12-22 NOTE — DISCHARGE NOTE PROVIDER - NSFOLLOWUPCLINICS_GEN_ALL_ED_FT
[facility]erm[facility] - [place]  Dermatology  [address], [address]  [place] [address]  Phone: [phone]  Fax: [phone]  Follow Up Time: 1 week

## 2024-12-22 NOTE — PROGRESS NOTE ADULT - SUBJECTIVE AND OBJECTIVE BOX
Follow Up:      Interval History:    ROS:    Unobtainable because:  All other systems negative    Constitutional: no fever, no chills  Head: no trauma  Eyes: no vision changes, no eye pain  ENT:  no sore throat, no rhinorrhea  Cardiovascular:  no chest pain, no palpitation  Respiratory:  no SOB, no cough  GI:  no abd pain, no vomiting, no diarrhea  urinary: no dysuria, no hematuria, no flank pain  musculoskeletal:  no joint pain, no joint swelling  skin:  no rash  neurology:  no headache, no seizure, no change in mental status  psych: no anxiety, no depression         Allergies  No Known Allergies        ANTIMICROBIALS:  piperacillin/tazobactam IVPB.. 3.375 every 8 hours  vancomycin  IVPB 1250 every 24 hours      OTHER MEDS:  albuterol    90 MICROgram(s) HFA Inhaler 2 Puff(s) Inhalation every 6 hours PRN  allopurinol 100 milliGRAM(s) Oral daily  Dakins Solution - 1/4 Strength 1 Application(s) Topical daily  DULoxetine 20 milliGRAM(s) Oral daily  enoxaparin Injectable 40 milliGRAM(s) SubCutaneous every 24 hours  finasteride 5 milliGRAM(s) Oral daily  gabapentin 600 milliGRAM(s) Oral three times a day  hydrocortisone 2.5% Ointment 1 Application(s) Topical two times a day  influenza  Vaccine (HIGH DOSE) 0.5 milliLiter(s) IntraMuscular once  ketoconazole 2% Cream 1 Application(s) Topical two times a day  lidocaine   4% Patch 1 Patch Transdermal daily  multivitamin 1 Tablet(s) Oral daily  mupirocin 2% Ointment 1 Application(s) Topical two times a day  polyethylene glycol 3350 17 Gram(s) Oral daily  senna 2 Tablet(s) Oral at bedtime  sodium chloride 2 Gram(s) Oral every 12 hours  sodium chloride 0.9%. 1000 milliLiter(s) IV Continuous <Continuous>      Vital Signs Last 24 Hrs  T(C): 36.3 (22 Dec 2024 05:08), Max: 36.6 (21 Dec 2024 22:14)  T(F): 97.3 (22 Dec 2024 05:08), Max: 97.8 (21 Dec 2024 22:14)  HR: 75 (22 Dec 2024 05:08) (65 - 75)  BP: 135/65 (22 Dec 2024 05:08) (120/50 - 140/60)  BP(mean): --  RR: 18 (22 Dec 2024 05:08) (17 - 18)  SpO2: 99% (22 Dec 2024 05:08) (99% - 100%)    Parameters below as of 22 Dec 2024 05:08  Patient On (Oxygen Delivery Method): room air        Physical Exam:  General:    NAD,  non toxic  Head: atraumatic, normocephalic  Eye: normal sclera and conjunctiva  ENT:    no oropharyngeal lesions,   no LAD,   neck supple  Cardio:     regular S1, S2,  no murmur  Respiratory:    clear b/l,    no wheezing  abd:     soft,   BS +,   no tenderness  :   no CVAT,  no suprapubic tenderness,   no  almodovar  Musculoskeletal:   no joint swelling  vascular: no phlebitis  Skin:    no rash  Neurologic:     no focal deficit  psych: normal affect                          10.5   10.99 )-----------( 282      ( 22 Dec 2024 05:51 )             29.7       12-22    135  |  102  |  22  ----------------------------<  91  3.9   |  21[L]  |  0.94    Ca    8.5      22 Dec 2024 05:51  Phos  4.0     12-22  Mg     2.20     12-22    TPro  6.2  /  Alb  2.5[L]  /  TBili  0.3  /  DBili  x   /  AST  31  /  ALT  32  /  AlkPhos  134[H]  12-22      Urinalysis Basic - ( 22 Dec 2024 05:51 )    Color: x / Appearance: x / SG: x / pH: x  Gluc: 91 mg/dL / Ketone: x  / Bili: x / Urobili: x   Blood: x / Protein: x / Nitrite: x   Leuk Esterase: x / RBC: x / WBC x   Sq Epi: x / Non Sq Epi: x / Bacteria: x        MICROBIOLOGY:  Vancomycin Level, Trough: 12.5 ug/mL (12-21-24 @ 18:04)  v  .Blood BLOOD  12-21-24   No growth at 24 hours  --  --      .Sputum Sputum  12-20-24   Commensal loren consistent with body site  --    Moderate polymorphonuclear leukocytes per low power field  Rare Squamous epithelial cells per low power field  Rare Gram positive cocci in pairs seen per oil power field  Rare Gram Positive Rods seen per oil power field  Rare Gram Negative Rods seen per oil power field  Rare Yeast like cells seen per oil power field      .Abscess  12-20-24   Numerous Staphylococcus aureus  --    Numerous polymorphonuclear leukocytes seen per low power field  Few Gram Positive Cocci in Clusters seen per oil power field      .Blood BLOOD  12-19-24   Growth in aerobic and anaerobic bottles: Methicillin Resistant  Staphylococcus aureus  See previous culture [id number]  --    Growth in aerobic bottle: Gram Positive Cocci in Clusters  Growth in anaerobic bottle: Gram Positive Cocci in Clusters      .Blood BLOOD  12-19-24   Growth in aerobic and anaerobic bottles: Methicillin Resistant  Staphylococcus aureus  Direct identification is available within approximately 3-5  hours either by Blood Panel Multiplexed PCR or Direct  MALDI-TOF. Details: [email or web address]  --  Blood Culture PCR  Methicillin resistant Staphylococcus aureus                RADIOLOGY:  Images independently visualized and reviewed personally, findings as below  < from: Xray Hip 2-3 Views, Left (12.20.24 @ 09:35) >  IMPRESSION:  No fractures or dislocations.    Redemonstrated lumbar spine degenerative change. Preserved left hip joint   space and no gross radiographic evidence for AVN.    Small enthesophytes along peripheral left iliac crest and ischial   tuberosity margins.    Generalized osteopenia otherwise no discrete suspicious lytic or blastic   lesions.    Vascular calcifications.    < end of copied text >  < from: CT Chest No Cont (12.19.24 @ 11:28) >  IMPRESSION:  *  Upper lobe predominant peripherally oriented groundglass opacities   with denser consolidation opacities in the dependent portions of the   bilateral lower lobes. Findings are suggestive of multifocal pneumonia   with superimposed basilar atelectasis. COVID infection is within the   differential given the peripheral location of the groundglass opacities.  *  Trace right effusion.        < end of copied text >     Follow Up:  sepsis, MRSA bacteremia    Interval History/ROS: pt afebrile and no vomiting or SOB, the abscess cx also showed MRSA            Allergies  No Known Allergies        ANTIMICROBIALS:  piperacillin/tazobactam IVPB.. 3.375 every 8 hours  vancomycin  IVPB 1250 every 24 hours      OTHER MEDS:  albuterol    90 MICROgram(s) HFA Inhaler 2 Puff(s) Inhalation every 6 hours PRN  allopurinol 100 milliGRAM(s) Oral daily  Dakins Solution - 1/4 Strength 1 Application(s) Topical daily  DULoxetine 20 milliGRAM(s) Oral daily  enoxaparin Injectable 40 milliGRAM(s) SubCutaneous every 24 hours  finasteride 5 milliGRAM(s) Oral daily  gabapentin 600 milliGRAM(s) Oral three times a day  hydrocortisone 2.5% Ointment 1 Application(s) Topical two times a day  influenza  Vaccine (HIGH DOSE) 0.5 milliLiter(s) IntraMuscular once  ketoconazole 2% Cream 1 Application(s) Topical two times a day  lidocaine   4% Patch 1 Patch Transdermal daily  multivitamin 1 Tablet(s) Oral daily  mupirocin 2% Ointment 1 Application(s) Topical two times a day  polyethylene glycol 3350 17 Gram(s) Oral daily  senna 2 Tablet(s) Oral at bedtime  sodium chloride 2 Gram(s) Oral every 12 hours  sodium chloride 0.9%. 1000 milliLiter(s) IV Continuous <Continuous>      Vital Signs Last 24 Hrs  T(C): 36.3 (22 Dec 2024 05:08), Max: 36.6 (21 Dec 2024 22:14)  T(F): 97.3 (22 Dec 2024 05:08), Max: 97.8 (21 Dec 2024 22:14)  HR: 75 (22 Dec 2024 05:08) (65 - 75)  BP: 135/65 (22 Dec 2024 05:08) (120/50 - 140/60)  BP(mean): --  RR: 18 (22 Dec 2024 05:08) (17 - 18)  SpO2: 99% (22 Dec 2024 05:08) (99% - 100%)    Parameters below as of 22 Dec 2024 05:08  Patient On (Oxygen Delivery Method): room air        Physical Exam:  General:    NAD, non toxic  Respiratory:  comfortable on RA  abd:   soft, not tender  :    no almodovar  Musculoskeletal : no joint swelling  Skin:   patchy erythematous lesions on face, torso and back with skin thickening hyperpigmentation and scaling, sacral wound no purulence but the R buttock had purulence with wound care  vascular: no phlebitis                          10.5   10.99 )-----------( 282      ( 22 Dec 2024 05:51 )             29.7       12-22    135  |  102  |  22  ----------------------------<  91  3.9   |  21[L]  |  0.94    Ca    8.5      22 Dec 2024 05:51  Phos  4.0     12-22  Mg     2.20     12-22    TPro  6.2  /  Alb  2.5[L]  /  TBili  0.3  /  DBili  x   /  AST  31  /  ALT  32  /  AlkPhos  134[H]  12-22      Urinalysis Basic - ( 22 Dec 2024 05:51 )    Color: x / Appearance: x / SG: x / pH: x  Gluc: 91 mg/dL / Ketone: x  / Bili: x / Urobili: x   Blood: x / Protein: x / Nitrite: x   Leuk Esterase: x / RBC: x / WBC x   Sq Epi: x / Non Sq Epi: x / Bacteria: x        MICROBIOLOGY:  Vancomycin Level, Trough: 12.5 ug/mL (12-21-24 @ 18:04)  v  .Blood BLOOD  12-21-24   No growth at 24 hours  --  --      .Sputum Sputum  12-20-24   Commensal loren consistent with body site  --    Moderate polymorphonuclear leukocytes per low power field  Rare Squamous epithelial cells per low power field  Rare Gram positive cocci in pairs seen per oil power field  Rare Gram Positive Rods seen per oil power field  Rare Gram Negative Rods seen per oil power field  Rare Yeast like cells seen per oil power field      .Abscess  12-20-24   Numerous Staphylococcus aureus  --    Numerous polymorphonuclear leukocytes seen per low power field  Few Gram Positive Cocci in Clusters seen per oil power field      .Blood BLOOD  12-19-24   Growth in aerobic and anaerobic bottles: Methicillin Resistant  Staphylococcus aureus  See previous culture [id number]  --    Growth in aerobic bottle: Gram Positive Cocci in Clusters  Growth in anaerobic bottle: Gram Positive Cocci in Clusters      .Blood BLOOD  12-19-24   Growth in aerobic and anaerobic bottles: Methicillin Resistant  Staphylococcus aureus  Direct identification is available within approximately 3-5  hours either by Blood Panel Multiplexed PCR or Direct  MALDI-TOF. Details: [email or web address]  --  Blood Culture PCR  Methicillin resistant Staphylococcus aureus                RADIOLOGY:  Images independently visualized and reviewed personally, findings as below  < from: Xray Hip 2-3 Views, Left (12.20.24 @ 09:35) >  IMPRESSION:  No fractures or dislocations.    Redemonstrated lumbar spine degenerative change. Preserved left hip joint   space and no gross radiographic evidence for AVN.    Small enthesophytes along peripheral left iliac crest and ischial   tuberosity margins.    Generalized osteopenia otherwise no discrete suspicious lytic or blastic   lesions.    Vascular calcifications.    < end of copied text >  < from: CT Chest No Cont (12.19.24 @ 11:28) >  IMPRESSION:  *  Upper lobe predominant peripherally oriented groundglass opacities   with denser consolidation opacities in the dependent portions of the   bilateral lower lobes. Findings are suggestive of multifocal pneumonia   with superimposed basilar atelectasis. COVID infection is within the   differential given the peripheral location of the groundglass opacities.  *  Trace right effusion.        < end of copied text >

## 2024-12-22 NOTE — DISCHARGE NOTE PROVIDER - NSDCMRMEDTOKEN_GEN_ALL_CORE_FT
acetaminophen 325 mg oral tablet: 2 tab(s) orally every 6 hours As needed Mild Pain (1 - 3)  albuterol 2.5 mg/3 mL (0.083%) inhalation solution: 3 milliliter(s) by nebulizer every 4 hours as needed for  shortness of breath and/or wheezing  allopurinol 100 mg oral tablet: 1 tab(s) orally once a day  dextromethorphan-guaifenesin 10 mg-100 mg/10 mL oral liquid: 10 milliliter(s) orally every 6 hours as needed for  cough  docusate sodium 100 mg oral capsule: 2 cap(s) orally once a day (at bedtime)  DULoxetine 20 mg oral delayed release capsule: 1 cap(s) orally once a day  ferrous sulfate 325 mg (65 mg elemental iron) oral tablet: 1 tab(s) orally once a day  finasteride 5 mg oral tablet: 1 tab(s) orally once a day  gabapentin 300 mg oral tablet: 2 tab(s) orally 3 times a day  Lidocare Pain Relief Patch 4% topical film: Apply topically to affected area once a day for upper and lower back  Multiple Vitamins oral tablet: 1 tab(s) orally once a day  polyethylene glycol 3350 oral powder for reconstitution: 17 gram(s) orally once a day  senna leaf extract oral tablet: 2 tab(s) orally once a day (at bedtime)  Sodium Chloride 1 g oral tablet: 2 tab(s) orally every 12 hours   acetaminophen 325 mg oral tablet: 2 tab(s) orally every 6 hours As needed Mild Pain (1 - 3)  albuterol 2.5 mg/3 mL (0.083%) inhalation solution: 3 milliliter(s) by nebulizer every 4 hours as needed for  shortness of breath and/or wheezing  allopurinol 100 mg oral tablet: 1 tab(s) orally once a day  dextromethorphan-guaifenesin 10 mg-100 mg/10 mL oral liquid: 10 milliliter(s) orally every 6 hours as needed for  cough  docusate sodium 100 mg oral capsule: 2 cap(s) orally once a day (at bedtime)  DULoxetine 20 mg oral delayed release capsule: 1 cap(s) orally once a day  ferrous sulfate 325 mg (65 mg elemental iron) oral tablet: 1 tab(s) orally once a day  finasteride 5 mg oral tablet: 1 tab(s) orally once a day  gabapentin 300 mg oral tablet: 2 tab(s) orally 3 times a day  hydrocortisone 2.5% topical ointment: 1 Apply topically to affected area 2 times a day  ketoconazole 2% topical cream: 1 Apply topically to affected area 2 times a day  Lidocare Pain Relief Patch 4% topical film: Apply topically to affected area once a day for upper and lower back  Multiple Vitamins oral tablet: 1 tab(s) orally once a day  mupirocin 2% topical ointment: 1 Apply topically to affected area 2 times a day  Nizoral 2% topical shampoo: 1 Apply topically to affected area once a day  polyethylene glycol 3350 oral powder for reconstitution: 17 gram(s) orally once a day  senna leaf extract oral tablet: 2 tab(s) orally once a day (at bedtime)  Sodium Chloride 1 g oral tablet: 2 tab(s) orally every 12 hours  sodium hypochlorite 0.125% topical solution: 1 Apply topically to affected area once a day  triamcinolone 0.1% topical cream: 1 Apply topically to affected area 2 times a day  vancomycin 1 g intravenous injection: 1 gram(s) intravenous every 24 hours Through 2/1   acetaminophen 325 mg oral tablet: 2 tab(s) orally every 6 hours As needed Mild Pain (1 - 3)  allopurinol 100 mg oral tablet: 1 tab(s) orally once a day  docusate sodium 100 mg oral capsule: 2 cap(s) orally once a day (at bedtime)  DULoxetine 20 mg oral delayed release capsule: 1 cap(s) orally once a day  ferrous sulfate 325 mg (65 mg elemental iron) oral tablet: 1 tab(s) orally once a day  finasteride 5 mg oral tablet: 1 tab(s) orally once a day  gabapentin 300 mg oral tablet: 2 tab(s) orally 3 times a day  hydrocortisone 2.5% topical ointment: 1 Apply topically to affected area 2 times a day  ketoconazole 2% topical cream: 1 Apply topically to affected area 2 times a day  Lidocare Pain Relief Patch 4% topical film: Apply topically to affected area once a day for upper and lower back  Multiple Vitamins oral tablet: 1 tab(s) orally once a day  mupirocin 2% topical ointment: 1 Apply topically to affected area 2 times a day  Nizoral 2% topical shampoo: 1 Apply topically to affected area once a day  polyethylene glycol 3350 oral powder for reconstitution: 17 gram(s) orally once a day  senna leaf extract oral tablet: 2 tab(s) orally once a day (at bedtime)  Sodium Chloride 1 g oral tablet: 2 tab(s) orally every 12 hours  sodium hypochlorite 0.125% topical solution: 1 Apply topically to affected area once a day  triamcinolone 0.1% topical cream: 1 Apply topically to affected area 2 times a day  vancomycin 1 g intravenous injection: 1 gram(s) intravenous every 24 hours Through 2/1   acetaminophen 325 mg oral tablet: 2 tab(s) orally every 6 hours As needed Mild Pain (1 - 3)  allopurinol 100 mg oral tablet: 1 tab(s) orally once a day  docusate sodium 100 mg oral capsule: 2 cap(s) orally once a day (at bedtime)  DULoxetine 20 mg oral delayed release capsule: 1 cap(s) orally once a day  ferrous sulfate 325 mg (65 mg elemental iron) oral tablet: 1 tab(s) orally once a day  finasteride 5 mg oral tablet: 1 tab(s) orally once a day  gabapentin 300 mg oral tablet: 2 tab(s) orally 3 times a day  hydrocortisone 2.5% topical ointment: 1 Apply topically to affected area 2 times a day  ketoconazole 2% topical cream: 1 Apply topically to affected area 2 times a day  Lidocare Pain Relief Patch 4% topical film: Apply topically to affected area once a day for upper and lower back  Multiple Vitamins oral tablet: 1 tab(s) orally once a day  mupirocin 2% topical ointment: 1 Apply topically to affected area 2 times a day  Nizoral 2% topical shampoo: 1 Apply topically to affected area once a day  polyethylene glycol 3350 oral powder for reconstitution: 17 gram(s) orally once a day  senna leaf extract oral tablet: 2 tab(s) orally once a day (at bedtime)  Sodium Chloride 1 g oral tablet: 2 tab(s) orally every 12 hours  sodium hypochlorite 0.125% topical solution: 1 Apply topically to affected area once a day  triamcinolone 0.1% topical cream: 1 Apply topically to affected area 2 times a day  vancomycin 1 g intravenous injection: 1 gram(s) intravenous every 24 hours Through 2/1  vancomycin 1 g intravenous injection: 1 gram(s) intravenous every 24 hours   acetaminophen 325 mg oral tablet: 2 tab(s) orally every 6 hours As needed Mild Pain (1 - 3)  allopurinol 100 mg oral tablet: 1 tab(s) orally once a day  docusate sodium 100 mg oral capsule: 2 cap(s) orally once a day (at bedtime)  DULoxetine 20 mg oral delayed release capsule: 1 cap(s) orally once a day  ferrous sulfate 325 mg (65 mg elemental iron) oral tablet: 1 tab(s) orally once a day  finasteride 5 mg oral tablet: 1 tab(s) orally once a day  gabapentin 300 mg oral tablet: 2 tab(s) orally 3 times a day  hydrocortisone 2.5% topical ointment: 1 Apply topically to affected area 2 times a day  ketoconazole 2% topical cream: 1 Apply topically to affected area 2 times a day  Lidocare Pain Relief Patch 4% topical film: Apply topically to affected area once a day for upper and lower back  Multiple Vitamins oral tablet: 1 tab(s) orally once a day  mupirocin 2% topical ointment: 1 Apply topically to affected area 2 times a day  Nizoral 2% topical shampoo: 1 Apply topically to affected area once a day  polyethylene glycol 3350 oral powder for reconstitution: 17 gram(s) orally once a day  senna leaf extract oral tablet: 2 tab(s) orally once a day (at bedtime)  Sodium Chloride 1 g oral tablet: 2 tab(s) orally every 12 hours  sodium hypochlorite 0.125% topical solution: 1 Apply topically to affected area once a day  triamcinolone 0.1% topical cream: 1 Apply topically to affected area 2 times a day  vancomycin 750 mg intravenous injection: 750 milligram(s) intravenous every 24 hours Last dose 2/1

## 2024-12-22 NOTE — CONSULT NOTE ADULT - ATTENDING COMMENTS
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 Biopsy x 2 for H&E and DIF obtained. Continue topical keto 2% cream/HC2.5% mix on face, and ketoconazole 2%/Triamcinolone 0.1% mix on body. Dermatology will continue to follow.

## 2024-12-22 NOTE — PROGRESS NOTE ADULT - SUBJECTIVE AND OBJECTIVE BOX
***Plan not finalized until attending attestation***    Braeden Ryder MD (PGY-1)  Internal Medicine  Contact via [employer] TEAMS    ******************************************    PROGRESS NOTE:     Patient is a 84y old  Male who presents with a chief complaint of SOB and lethargy (21 Dec 2024 10:07)      INTERVAL EVENTS: No acute overnight events.     SUBJECTIVE: Patient seen and examined at bedside. This morning, the patient is comfortable and doing well. No acute complaints.    MEDICATIONS  (STANDING):  allopurinol 100 milliGRAM(s) Oral daily  Dakins Solution - 1/4 Strength 1 Application(s) Topical daily  DULoxetine 20 milliGRAM(s) Oral daily  enoxaparin Injectable 40 milliGRAM(s) SubCutaneous every 24 hours  finasteride 5 milliGRAM(s) Oral daily  gabapentin 600 milliGRAM(s) Oral three times a day  hydrocortisone 2.5% Ointment 1 Application(s) Topical two times a day  influenza  Vaccine (HIGH DOSE) 0.5 milliLiter(s) IntraMuscular once  ketoconazole 2% Cream 1 Application(s) Topical two times a day  lidocaine   4% Patch 1 Patch Transdermal daily  multivitamin 1 Tablet(s) Oral daily  mupirocin 2% Ointment 1 Application(s) Topical two times a day  piperacillin/tazobactam IVPB.. 3.375 Gram(s) IV Intermittent every 8 hours  polyethylene glycol 3350 17 Gram(s) Oral daily  senna 2 Tablet(s) Oral at bedtime  sodium chloride 2 Gram(s) Oral every 12 hours  sodium chloride 0.9%. 1000 milliLiter(s) (70 mL/Hr) IV Continuous <Continuous>  vancomycin  IVPB 750 milliGRAM(s) IV Intermittent every 12 hours    MEDICATIONS  (PRN):  albuterol    90 MICROgram(s) HFA Inhaler 2 Puff(s) Inhalation every 6 hours PRN Shortness of Breath and/or Wheezing      CAPILLARY BLOOD GLUCOSE        I&O's Summary      PHYSICAL EXAM:  Vital Signs Last 24 Hrs  T(C): 36.3 (22 Dec 2024 05:08), Max: 36.6 (21 Dec 2024 22:14)  T(F): 97.3 (22 Dec 2024 05:08), Max: 97.8 (21 Dec 2024 22:14)  HR: 75 (22 Dec 2024 05:08) (65 - 75)  BP: 135/65 (22 Dec 2024 05:08) (120/50 - 140/60)  BP(mean): --  RR: 18 (22 Dec 2024 05:08) (17 - 18)  SpO2: 99% (22 Dec 2024 05:08) (99% - 100%)    Parameters below as of 22 Dec 2024 05:08  Patient On (Oxygen Delivery Method): room air        GENERAL: NAD, lying in bed comfortably  HEAD: Atraumatic, normocephalic  EYES: EOMI, PERRLA, conjunctiva and sclera clear  ENT: Moist mucous membranes  NECK: Supple, no JVD  HEART: S1, S2, Regular rate and rhythm, no murmurs, rubs, or gallops  LUNGS: Unlabored respirations, clear to auscultation bilaterally, no crackles, wheezing, or rhonchi  ABDOMEN: Soft, nontender, nondistended, +BS  EXTREMITIES: 2+ peripheral pulses bilaterally. No clubbing, cyanosis, or edema  NERVOUS SYSTEM:  A&Ox3, no focal deficits   SKIN: No rashes or lesions    LABS:                        10.7   12.42 )-----------( 265      ( 21 Dec 2024 08:30 )             30.3     12-21    132[L]  |  98  |  20  ----------------------------<  102[H]  3.8   |  16[L]  |  0.77    Ca    8.8      21 Dec 2024 06:00  Phos  3.4     12-21  Mg     1.70     12-21    TPro  6.9  /  Alb  2.6[L]  /  TBili  0.6  /  DBili  x   /  AST  29  /  ALT  35  /  AlkPhos  171[H]  12-21          Urinalysis Basic - ( 21 Dec 2024 06:00 )    Color: x / Appearance: x / SG: x / pH: x  Gluc: 102 mg/dL / Ketone: x  / Bili: x / Urobili: x   Blood: x / Protein: x / Nitrite: x   Leuk Esterase: x / RBC: x / WBC x   Sq Epi: x / Non Sq Epi: x / Bacteria: x        Culture - Sputum (collected 20 Dec 2024 19:25)  Source: .Sputum Sputum  Gram Stain (21 Dec 2024 06:38):    Moderate polymorphonuclear leukocytes per low power field    Rare Squamous epithelial cells per low power field    Rare Gram positive cocci in pairs seen per oil power field    Rare Gram Positive Rods seen per oil power field    Rare Gram Negative Rods seen per oil power field    Rare Yeast like cells seen per oil power field  Preliminary Report (21 Dec 2024 17:38):    Commensal loren consistent with body site    Culture - Abscess with Gram Stain (collected 20 Dec 2024 12:56)  Source: .Abscess  Gram Stain (20 Dec 2024 21:16):    Numerous polymorphonuclear leukocytes seen per low power field    Few Gram Positive Cocci in Clusters seen per oil power field  Preliminary Report (21 Dec 2024 15:37):    Numerous Staphylococcus aureus    Urinalysis with Rflx Culture (collected 20 Dec 2024 03:00)    Culture - Blood (collected 19 Dec 2024 13:15)  Source: .Blood BLOOD  Gram Stain (21 Dec 2024 06:59):    Growth in aerobic bottle: Gram Positive Cocci in Clusters    Growth in anaerobic bottle: Gram Positive Cocci in Clusters  Preliminary Report (21 Dec 2024 19:32):    Growth in aerobic and anaerobic bottles: Staphylococcus aureus    See previous culture [id number]    Culture - Blood (collected 19 Dec 2024 13:04)  Source: .Blood BLOOD  Gram Stain (20 Dec 2024 23:25):    Growth in aerobic bottle: Gram Positive Cocci in Clusters    Growth in anaerobic bottle: Gram Positive Cocci in Clusters  Preliminary Report (21 Dec 2024 15:02):    Growth in aerobic and anaerobic bottles: Staphylococcus aureus    Direct identification is available within approximately 3-5    hours either by Blood Panel Multiplexed PCR or Direct    MALDI-TOF. Details: [email or web address]  Organism: Blood Culture PCR (20 Dec 2024 18:16)  Organism: Blood Culture PCR (20 Dec 2024 18:16)        RADIOLOGY & ADDITIONAL TESTS:  Results Reviewed:   Imaging Personally Reviewed:  Electrocardiogram Personally Reviewed:  Tele: ***Plan not finalized until attending attestation***    Braeden Ryder MD (PGY-1)  Internal Medicine  Contact via [employer] TEAMS    ******************************************    PROGRESS NOTE:     Patient is a 84y old  Male who presents with a chief complaint of SOB and lethargy (21 Dec 2024 10:07)      INTERVAL EVENTS: No acute overnight events.     SUBJECTIVE: Patient seen and examined at bedside. This morning, the patient is comfortable and doing well. No acute complaints.    MEDICATIONS  (STANDING):  allopurinol 100 milliGRAM(s) Oral daily  Dakins Solution - 1/4 Strength 1 Application(s) Topical daily  DULoxetine 20 milliGRAM(s) Oral daily  enoxaparin Injectable 40 milliGRAM(s) SubCutaneous every 24 hours  finasteride 5 milliGRAM(s) Oral daily  gabapentin 600 milliGRAM(s) Oral three times a day  hydrocortisone 2.5% Ointment 1 Application(s) Topical two times a day  influenza  Vaccine (HIGH DOSE) 0.5 milliLiter(s) IntraMuscular once  ketoconazole 2% Cream 1 Application(s) Topical two times a day  lidocaine   4% Patch 1 Patch Transdermal daily  multivitamin 1 Tablet(s) Oral daily  mupirocin 2% Ointment 1 Application(s) Topical two times a day  piperacillin/tazobactam IVPB.. 3.375 Gram(s) IV Intermittent every 8 hours  polyethylene glycol 3350 17 Gram(s) Oral daily  senna 2 Tablet(s) Oral at bedtime  sodium chloride 2 Gram(s) Oral every 12 hours  sodium chloride 0.9%. 1000 milliLiter(s) (70 mL/Hr) IV Continuous <Continuous>  vancomycin  IVPB 750 milliGRAM(s) IV Intermittent every 12 hours    MEDICATIONS  (PRN):  albuterol    90 MICROgram(s) HFA Inhaler 2 Puff(s) Inhalation every 6 hours PRN Shortness of Breath and/or Wheezing      CAPILLARY BLOOD GLUCOSE        I&O's Summary      PHYSICAL EXAM:  Vital Signs Last 24 Hrs  T(C): 36.3 (22 Dec 2024 05:08), Max: 36.6 (21 Dec 2024 22:14)  T(F): 97.3 (22 Dec 2024 05:08), Max: 97.8 (21 Dec 2024 22:14)  HR: 75 (22 Dec 2024 05:08) (65 - 75)  BP: 135/65 (22 Dec 2024 05:08) (120/50 - 140/60)  BP(mean): --  RR: 18 (22 Dec 2024 05:08) (17 - 18)  SpO2: 99% (22 Dec 2024 05:08) (99% - 100%)    Parameters below as of 22 Dec 2024 05:08  Patient On (Oxygen Delivery Method): room air        GENERAL: NAD, lying in bed comfortably  HEAD: Atraumatic, normocephalic  EYES: EOMI, PERRLA, conjunctiva and sclera clear  ENT: Moist mucous membranes  NECK: Supple, no JVD  HEART: S1, S2, Regular rate and rhythm, no murmurs, rubs, or gallops  LUNGS: Unlabored respirations, clear to auscultation bilaterally, no crackles, wheezing, or rhonchi  ABDOMEN: Soft, nontender, nondistended, +BS  EXTREMITIES: 2+ peripheral pulses bilaterally. No clubbing, cyanosis, or edema  NERVOUS SYSTEM:  A&Ox3, no focal deficits   SKIN: No rashes or lesions    LABS:                        10.7   12.42 )-----------( 265      ( 21 Dec 2024 08:30 )             30.3     12-21    132[L]  |  98  |  20  ----------------------------<  102[H]  3.8   |  16[L]  |  0.77    Ca    8.8      21 Dec 2024 06:00  Phos  3.4     12-21  Mg     1.70     12-21    TPro  6.9  /  Alb  2.6[L]  /  TBili  0.6  /  DBili  x   /  AST  29  /  ALT  35  /  AlkPhos  171[H]  12-21          Urinalysis Basic - ( 21 Dec 2024 06:00 )    Color: x / Appearance: x / SG: x / pH: x  Gluc: 102 mg/dL / Ketone: x  / Bili: x / Urobili: x   Blood: x / Protein: x / Nitrite: x   Leuk Esterase: x / RBC: x / WBC x   Sq Epi: x / Non Sq Epi: x / Bacteria: x        Culture - Sputum (collected 20 Dec 2024 19:25)  Source: .Sputum Sputum  Gram Stain (21 Dec 2024 06:38):    Moderate polymorphonuclear leukocytes per low power field    Rare Squamous epithelial cells per low power field    Rare Gram positive cocci in pairs seen per oil power field    Rare Gram Positive Rods seen per oil power field    Rare Gram Negative Rods seen per oil power field    Rare Yeast like cells seen per oil power field  Preliminary Report (21 Dec 2024 17:38):    Commensal loren consistent with body site    Culture - Abscess with Gram Stain (collected 20 Dec 2024 12:56)  Source: .Abscess  Gram Stain (20 Dec 2024 21:16):    Numerous polymorphonuclear leukocytes seen per low power field    Few Gram Positive Cocci in Clusters seen per oil power field  Preliminary Report (21 Dec 2024 15:37):    Numerous Staphylococcus aureus    Urinalysis with Rflx Culture (collected 20 Dec 2024 03:00)    Culture - Blood (collected 19 Dec 2024 13:15)  Source: .Blood BLOOD  Gram Stain (21 Dec 2024 06:59):    Growth in aerobic bottle: Gram Positive Cocci in Clusters    Growth in anaerobic bottle: Gram Positive Cocci in Clusters  Preliminary Report (21 Dec 2024 19:32):    Growth in aerobic and anaerobic bottles: Staphylococcus aureus    See previous culture [id number]    Culture - Blood (collected 19 Dec 2024 13:04)  Source: .Blood BLOOD  Gram Stain (20 Dec 2024 23:25):    Growth in aerobic bottle: Gram Positive Cocci in Clusters    Growth in anaerobic bottle: Gram Positive Cocci in Clusters  Preliminary Report (21 Dec 2024 15:02):    Growth in aerobic and anaerobic bottles: Staphylococcus aureus    Direct identification is available within approximately 3-5    hours either by Blood Panel Multiplexed PCR or Direct    MALDI-TOF. Details: [email or web address]  Organism: Blood Culture PCR (20 Dec 2024 18:16)  Organism: Blood Culture PCR (20 Dec 2024 18:16)        RADIOLOGY & ADDITIONAL TESTS:  < from: TTE W or WO Ultrasound Enhancing Agent (12.21.24 @ 14:58) >  ONCLUSIONS:      1. Technically very difficult study with no parasternal views feasible.   2. Technically difficult image quality.   3. Left ventricular systolic function is normal with an ejection fraction visually estimated at 60 to 65 %. There are no regional wall motion abnormalities seen.   4. The right ventricle is not well visualized.   5. Structurally normal mitral valve with normal leaflet excursion. There is calcification of themitral valve annulus. There is trace mitral regurgitation.   6. The aortic valve was not well visualized. The aortic valve anatomy cannot be determined. There is calcification of the aortic valve leaflets. There is mild to moderate aortic regurgitation.    < end of copied text >  < from: TTE W or WO Ultrasound Enhancing Agent (12.21.24 @ 14:58) >  Recommendations:  Unable to exclude valvular vegetation in the setting of thickened or calcified valves and/or technically limited/difficult study. Transesophageal echocardiogram (AIDEE) could be obtained for further evaluation.    < end of copied text >

## 2024-12-23 ENCOUNTER — APPOINTMENT (OUTPATIENT)
Age: 84
End: 2024-12-23
Payer: MEDICARE

## 2024-12-23 ENCOUNTER — RESULT REVIEW (OUTPATIENT)
Age: 84
End: 2024-12-23

## 2024-12-23 LAB
ALBUMIN SERPL ELPH-MCNC: 2.5 G/DL — LOW (ref 3.3–5)
ALP SERPL-CCNC: 129 U/L — HIGH (ref 40–120)
ALT FLD-CCNC: 40 U/L — SIGNIFICANT CHANGE UP (ref 4–41)
ANION GAP SERPL CALC-SCNC: 10 MMOL/L — SIGNIFICANT CHANGE UP (ref 7–14)
AST SERPL-CCNC: 35 U/L — SIGNIFICANT CHANGE UP (ref 4–40)
BASOPHILS # BLD AUTO: 0.05 K/UL — SIGNIFICANT CHANGE UP (ref 0–0.2)
BASOPHILS NFR BLD AUTO: 0.6 % — SIGNIFICANT CHANGE UP (ref 0–2)
BILIRUB SERPL-MCNC: 0.3 MG/DL — SIGNIFICANT CHANGE UP (ref 0.2–1.2)
BUN SERPL-MCNC: 22 MG/DL — SIGNIFICANT CHANGE UP (ref 7–23)
CALCIUM SERPL-MCNC: 8.6 MG/DL — SIGNIFICANT CHANGE UP (ref 8.4–10.5)
CHLORIDE SERPL-SCNC: 101 MMOL/L — SIGNIFICANT CHANGE UP (ref 98–107)
CO2 SERPL-SCNC: 24 MMOL/L — SIGNIFICANT CHANGE UP (ref 22–31)
CREAT SERPL-MCNC: 0.98 MG/DL — SIGNIFICANT CHANGE UP (ref 0.5–1.3)
EGFR: 77 ML/MIN/1.73M2 — SIGNIFICANT CHANGE UP
EOSINOPHIL # BLD AUTO: 0.93 K/UL — HIGH (ref 0–0.5)
EOSINOPHIL NFR BLD AUTO: 10.8 % — HIGH (ref 0–6)
GLUCOSE SERPL-MCNC: 89 MG/DL — SIGNIFICANT CHANGE UP (ref 70–99)
HCT VFR BLD CALC: 30.8 % — LOW (ref 39–50)
HGB BLD-MCNC: 10.2 G/DL — LOW (ref 13–17)
IANC: 6.18 K/UL — SIGNIFICANT CHANGE UP (ref 1.8–7.4)
IMM GRANULOCYTES NFR BLD AUTO: 1.2 % — HIGH (ref 0–0.9)
LYMPHOCYTES # BLD AUTO: 0.66 K/UL — LOW (ref 1–3.3)
LYMPHOCYTES # BLD AUTO: 7.7 % — LOW (ref 13–44)
MAGNESIUM SERPL-MCNC: 2.1 MG/DL — SIGNIFICANT CHANGE UP (ref 1.6–2.6)
MCHC RBC-ENTMCNC: 28.7 PG — SIGNIFICANT CHANGE UP (ref 27–34)
MCHC RBC-ENTMCNC: 33.1 G/DL — SIGNIFICANT CHANGE UP (ref 32–36)
MCV RBC AUTO: 86.8 FL — SIGNIFICANT CHANGE UP (ref 80–100)
MONOCYTES # BLD AUTO: 0.7 K/UL — SIGNIFICANT CHANGE UP (ref 0–0.9)
MONOCYTES NFR BLD AUTO: 8.1 % — SIGNIFICANT CHANGE UP (ref 2–14)
NEUTROPHILS # BLD AUTO: 6.18 K/UL — SIGNIFICANT CHANGE UP (ref 1.8–7.4)
NEUTROPHILS NFR BLD AUTO: 71.6 % — SIGNIFICANT CHANGE UP (ref 43–77)
NRBC # BLD: 0 /100 WBCS — SIGNIFICANT CHANGE UP (ref 0–0)
NRBC # FLD: 0 K/UL — SIGNIFICANT CHANGE UP (ref 0–0)
PHOSPHATE SERPL-MCNC: 4 MG/DL — SIGNIFICANT CHANGE UP (ref 2.5–4.5)
PLATELET # BLD AUTO: 320 K/UL — SIGNIFICANT CHANGE UP (ref 150–400)
POTASSIUM SERPL-MCNC: 3.9 MMOL/L — SIGNIFICANT CHANGE UP (ref 3.5–5.3)
POTASSIUM SERPL-SCNC: 3.9 MMOL/L — SIGNIFICANT CHANGE UP (ref 3.5–5.3)
PROT SERPL-MCNC: 6.4 G/DL — SIGNIFICANT CHANGE UP (ref 6–8.3)
RBC # BLD: 3.55 M/UL — LOW (ref 4.2–5.8)
RBC # FLD: 14.2 % — SIGNIFICANT CHANGE UP (ref 10.3–14.5)
SODIUM SERPL-SCNC: 135 MMOL/L — SIGNIFICANT CHANGE UP (ref 135–145)
WBC # BLD: 8.62 K/UL — SIGNIFICANT CHANGE UP (ref 3.8–10.5)
WBC # FLD AUTO: 8.62 K/UL — SIGNIFICANT CHANGE UP (ref 3.8–10.5)

## 2024-12-23 PROCEDURE — D0140: CPT

## 2024-12-23 PROCEDURE — 99232 SBSQ HOSP IP/OBS MODERATE 35: CPT

## 2024-12-23 PROCEDURE — 88312 SPECIAL STAINS GROUP 1: CPT | Mod: 26

## 2024-12-23 PROCEDURE — 72158 MRI LUMBAR SPINE W/O & W/DYE: CPT | Mod: 26

## 2024-12-23 PROCEDURE — 99232 SBSQ HOSP IP/OBS MODERATE 35: CPT | Mod: GC

## 2024-12-23 PROCEDURE — 88305 TISSUE EXAM BY PATHOLOGIST: CPT | Mod: 26

## 2024-12-23 RX ORDER — CHLORHEXIDINE GLUCONATE 1.2 MG/ML
1 RINSE ORAL DAILY
Refills: 0 | Status: DISCONTINUED | OUTPATIENT
Start: 2024-12-23 | End: 2024-12-31

## 2024-12-23 RX ORDER — GUAIFENESIN 100 MG/5ML
200 SYRUP ORAL EVERY 6 HOURS
Refills: 0 | Status: DISCONTINUED | OUTPATIENT
Start: 2024-12-23 | End: 2024-12-31

## 2024-12-23 RX ORDER — ACETAMINOPHEN 80 MG/.8ML
650 SOLUTION/ DROPS ORAL EVERY 6 HOURS
Refills: 0 | Status: DISCONTINUED | OUTPATIENT
Start: 2024-12-23 | End: 2024-12-31

## 2024-12-23 RX ORDER — KETOCONAZOLE 2 %
1 CREAM (GRAM) TOPICAL DAILY
Refills: 0 | Status: DISCONTINUED | OUTPATIENT
Start: 2024-12-23 | End: 2024-12-31

## 2024-12-23 RX ORDER — TRIAMCINOLONE ACETONIDE 0.15 MG/G
1 AEROSOL, SPRAY TOPICAL
Refills: 0 | Status: DISCONTINUED | OUTPATIENT
Start: 2024-12-23 | End: 2024-12-31

## 2024-12-23 RX ADMIN — Medication 1 APPLICATION(S): at 06:16

## 2024-12-23 RX ADMIN — Medication 1 APPLICATION(S): at 17:58

## 2024-12-23 RX ADMIN — LIDOCAINE 1 PATCH: 50 OINTMENT TOPICAL at 19:47

## 2024-12-23 RX ADMIN — Medication 1 TABLET(S): at 12:44

## 2024-12-23 RX ADMIN — CHLORHEXIDINE GLUCONATE 1 APPLICATION(S): 1.2 RINSE ORAL at 12:50

## 2024-12-23 RX ADMIN — Medication 1 APPLICATION(S): at 06:17

## 2024-12-23 RX ADMIN — LIDOCAINE 1 PATCH: 50 OINTMENT TOPICAL at 23:23

## 2024-12-23 RX ADMIN — ALLOPURINOL 100 MILLIGRAM(S): 100 TABLET ORAL at 12:44

## 2024-12-23 RX ADMIN — GABAPENTIN 600 MILLIGRAM(S): 300 CAPSULE ORAL at 13:51

## 2024-12-23 RX ADMIN — Medication 5 MILLIGRAM(S): at 12:44

## 2024-12-23 RX ADMIN — PIPERACILLIN AND TAZOBACTAM 25 GRAM(S): 3; .375 INJECTION, POWDER, LYOPHILIZED, FOR SOLUTION INTRAVENOUS at 13:55

## 2024-12-23 RX ADMIN — GABAPENTIN 600 MILLIGRAM(S): 300 CAPSULE ORAL at 06:15

## 2024-12-23 RX ADMIN — LIDOCAINE 1 PATCH: 50 OINTMENT TOPICAL at 12:46

## 2024-12-23 RX ADMIN — Medication 1 APPLICATION(S): at 17:56

## 2024-12-23 RX ADMIN — Medication 1 APPLICATION(S): at 12:48

## 2024-12-23 RX ADMIN — Medication 1 APPLICATION(S): at 18:02

## 2024-12-23 RX ADMIN — LIDOCAINE 1 PATCH: 50 OINTMENT TOPICAL at 00:36

## 2024-12-23 RX ADMIN — SODIUM CHLORIDE 2 GRAM(S): 9 INJECTION, SOLUTION INTRAMUSCULAR; INTRAVENOUS; SUBCUTANEOUS at 18:03

## 2024-12-23 RX ADMIN — ENOXAPARIN SODIUM 40 MILLIGRAM(S): 60 INJECTION INTRAVENOUS; SUBCUTANEOUS at 22:16

## 2024-12-23 RX ADMIN — VANCOMYCIN HYDROCHLORIDE 166.67 MILLIGRAM(S): 5 INJECTION, POWDER, LYOPHILIZED, FOR SOLUTION INTRAVENOUS at 22:17

## 2024-12-23 RX ADMIN — GABAPENTIN 600 MILLIGRAM(S): 300 CAPSULE ORAL at 22:16

## 2024-12-23 RX ADMIN — SENNOSIDES 2 TABLET(S): 8.6 TABLET, FILM COATED ORAL at 22:15

## 2024-12-23 RX ADMIN — DULOXETINE HYDROCHLORIDE 20 MILLIGRAM(S): 30 CAPSULE, DELAYED RELEASE ORAL at 12:45

## 2024-12-23 RX ADMIN — TRIAMCINOLONE ACETONIDE 1 APPLICATION(S): 0.15 AEROSOL, SPRAY TOPICAL at 17:56

## 2024-12-23 RX ADMIN — SODIUM CHLORIDE 2 GRAM(S): 9 INJECTION, SOLUTION INTRAMUSCULAR; INTRAVENOUS; SUBCUTANEOUS at 06:15

## 2024-12-23 RX ADMIN — PIPERACILLIN AND TAZOBACTAM 25 GRAM(S): 3; .375 INJECTION, POWDER, LYOPHILIZED, FOR SOLUTION INTRAVENOUS at 06:16

## 2024-12-23 NOTE — PROGRESS NOTE ADULT - PROBLEM SELECTOR PLAN 2
Scaly, erythematous facial rash. Pruritic.  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 Derm to see pt 12/22 will determine if biopsy needed.  - Ketoconazole cream BID Scaly, erythematous facial rash. Pruritic.  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 Derm to see pt 12/22 will determine if biopsy needed.  - Topical ketoconazole 2% cream/HC2.5% mix on face  - Ketoconazole 2%/Triamcinolone 0.1% mix on body

## 2024-12-23 NOTE — PROGRESS NOTE ADULT - SUBJECTIVE AND OBJECTIVE BOX
INTERVAL HPI/OVERNIGHT EVENTS: None    SUBJECTIVE: Patient seen and examined at bedside. Patient resting comfortably in bed with no acute complaints. Patient denies any fever, chills, chest pain, SOB, palpitations, abdominal pain, nausea, vomiting, or diarrhea.    ROS: All negative except as listed above.    VITAL SIGNS:  ICU Vital Signs Last 24 Hrs  T(C): 36.3 (23 Dec 2024 05:11), Max: 37.1 (22 Dec 2024 22:18)  T(F): 97.4 (23 Dec 2024 05:11), Max: 98.7 (22 Dec 2024 22:18)  HR: 66 (23 Dec 2024 05:11) (66 - 70)  BP: 145/58 (23 Dec 2024 05:11) (144/86 - 149/69)  BP(mean): --  ABP: --  ABP(mean): --  RR: 18 (23 Dec 2024 05:11) (18 - 18)  SpO2: 100% (23 Dec 2024 05:11) (100% - 100%)    O2 Parameters below as of 23 Dec 2024 05:11  Patient On (Oxygen Delivery Method): room air            Plateau pressure:   P/F ratio:     12-22 @ 07:01  -  12-23 @ 07:00  --------------------------------------------------------  IN: 0 mL / OUT: 1000 mL / NET: -1000 mL      CAPILLARY BLOOD GLUCOSE          ECG: reviewed.    PHYSICAL EXAM:  General: WN/WD NAD  HEENT: PERRLA, EOMI, moist mucous membranes  Respiratory: CTA B/L, normal respiratory effort, no wheezes, crackles, rales  CV: RRR, S1S2, no murmurs, rubs or gallops  Abdominal: Soft, NT, ND +BS   Extremities: No edema, + peripheral pulses   Neurology: A&Ox3, nonfocal, BALLESTEROS x 4  Skin: No rashes or lesions noted.    MEDICATIONS:  MEDICATIONS  (STANDING):  allopurinol 100 milliGRAM(s) Oral daily  Dakins Solution - 1/4 Strength 1 Application(s) Topical daily  DULoxetine 20 milliGRAM(s) Oral daily  enoxaparin Injectable 40 milliGRAM(s) SubCutaneous every 24 hours  finasteride 5 milliGRAM(s) Oral daily  gabapentin 600 milliGRAM(s) Oral three times a day  hydrocortisone 2.5% Ointment 1 Application(s) Topical two times a day  influenza  Vaccine (HIGH DOSE) 0.5 milliLiter(s) IntraMuscular once  ketoconazole 2% Cream 1 Application(s) Topical two times a day  lidocaine   4% Patch 1 Patch Transdermal daily  multivitamin 1 Tablet(s) Oral daily  mupirocin 2% Ointment 1 Application(s) Topical two times a day  piperacillin/tazobactam IVPB.. 3.375 Gram(s) IV Intermittent every 8 hours  polyethylene glycol 3350 17 Gram(s) Oral daily  senna 2 Tablet(s) Oral at bedtime  sodium chloride 2 Gram(s) Oral every 12 hours  sodium chloride 0.9%. 1000 milliLiter(s) (70 mL/Hr) IV Continuous <Continuous>  vancomycin  IVPB 1250 milliGRAM(s) IV Intermittent every 24 hours    MEDICATIONS  (PRN):  acetaminophen   Oral Liquid .. 650 milliGRAM(s) Oral every 6 hours PRN Temp greater or equal to 38C (100.4F), Mild Pain (1 - 3)  albuterol    90 MICROgram(s) HFA Inhaler 2 Puff(s) Inhalation every 6 hours PRN Shortness of Breath and/or Wheezing  guaiFENesin Oral Liquid (Sugar-Free) 200 milliGRAM(s) Oral every 6 hours PRN Cough      ALLERGIES:  Allergies    No Known Allergies    Intolerances        LABS:                        10.2   8.62  )-----------( 320      ( 23 Dec 2024 05:20 )             30.8     12-23    135  |  101  |  22  ----------------------------<  89  3.9   |  24  |  0.98    Ca    8.6      23 Dec 2024 05:20  Phos  4.0     12-23  Mg     2.10     12-23    TPro  6.4  /  Alb  2.5[L]  /  TBili  0.3  /  DBili  x   /  AST  35  /  ALT  40  /  AlkPhos  129[H]  12-23      Urinalysis Basic - ( 23 Dec 2024 05:20 )    Color: x / Appearance: x / SG: x / pH: x  Gluc: 89 mg/dL / Ketone: x  / Bili: x / Urobili: x   Blood: x / Protein: x / Nitrite: x   Leuk Esterase: x / RBC: x / WBC x   Sq Epi: x / Non Sq Epi: x / Bacteria: x      ABG:      vBG:    Micro:    Culture - Blood (collected 12-21-24 @ 06:00)  Source: .Blood BLOOD  Preliminary Report (12-22-24 @ 10:01):    No growth at 24 hours    Culture - Blood (collected 12-19-24 @ 13:15)  Source: .Blood BLOOD  Gram Stain (12-21-24 @ 06:59):    Growth in aerobic bottle: Gram Positive Cocci in Clusters    Growth in anaerobic bottle: Gram Positive Cocci in Clusters  Final Report (12-22-24 @ 08:28):    Growth in aerobic and anaerobic bottles: Methicillin Resistant    Staphylococcus aureus    See previous culture [id number]    Culture - Blood (collected 12-19-24 @ 13:04)  Source: .Blood BLOOD  Gram Stain (12-20-24 @ 23:25):    Growth in aerobic bottle: Gram Positive Cocci in Clusters    Growth in anaerobic bottle: Gram Positive Cocci in Clusters  Final Report (12-22-24 @ 08:27):    Growth in aerobic and anaerobic bottles: Methicillin Resistant    Staphylococcus aureus    Direct identification is available within approximately 3-5    hours either by Blood Panel Multiplexed PCR or Direct    MALDI-TOF. Details: [email or web address]  Organism: Blood Culture PCR  Methicillin resistant Staphylococcus aureus (12-22-24 @ 08:27)  Organism: Methicillin resistant Staphylococcus aureus (12-22-24 @ 08:27)      Method Type: JULY      -  Clindamycin: S <=0.25      -  Daptomycin: S 1      -  Erythromycin: R >4      -  Gentamicin: S <=4 Should not be used as monotherapy      -  Linezolid: S 2      -  Oxacillin: R >2      -  Penicillin: R >2      -  Rifampin: S <=1 Should not be used as monotherapy      -  Tetracycline: S <=4      -  Trimethoprim/Sulfamethoxazole: S <=0.5/9.5      -  Vancomycin: S 1  Organism: Blood Culture PCR (12-22-24 @ 08:27)      Method Type: PCR      -  Methicillin resistant Staphylococcus aureus (MRSA): Detec       Urinalysis with Rflx Culture (collected 12-20-24 @ 03:00)       Culture - Sputum (collected 12-20-24 @ 19:25)  Source: .Sputum Sputum  Gram Stain (12-21-24 @ 06:38):    Moderate polymorphonuclear leukocytes per low power field    Rare Squamous epithelial cells per low power field    Rare Gram positive cocci in pairs seen per oil power field    Rare Gram Positive Rods seen per oil power field    Rare Gram Negative Rods seen per oil power field    Rare Yeast like cells seen per oil power field  Final Report (12-22-24 @ 11:53):    Commensal loren consistent with body site        RADIOLOGY & ADDITIONAL TESTS: Reviewed.   INTERVAL HPI/OVERNIGHT EVENTS: None    SUBJECTIVE: Patient seen and examined at bedside. Patient resting comfortably in bed endorsing a productive cough. Patient denies any fever, chills, chest pain, SOB, palpitations, abdominal pain, nausea, vomiting, or diarrhea.    ROS: All negative except as listed above.    VITAL SIGNS:  ICU Vital Signs Last 24 Hrs  T(C): 36.3 (23 Dec 2024 05:11), Max: 37.1 (22 Dec 2024 22:18)  T(F): 97.4 (23 Dec 2024 05:11), Max: 98.7 (22 Dec 2024 22:18)  HR: 66 (23 Dec 2024 05:11) (66 - 70)  BP: 145/58 (23 Dec 2024 05:11) (144/86 - 149/69)  BP(mean): --  ABP: --  ABP(mean): --  RR: 18 (23 Dec 2024 05:11) (18 - 18)  SpO2: 100% (23 Dec 2024 05:11) (100% - 100%)    O2 Parameters below as of 23 Dec 2024 05:11  Patient On (Oxygen Delivery Method): room air            Plateau pressure:   P/F ratio:     12-22 @ 07:01  -  12-23 @ 07:00  --------------------------------------------------------  IN: 0 mL / OUT: 1000 mL / NET: -1000 mL      CAPILLARY BLOOD GLUCOSE          ECG: reviewed.    PHYSICAL EXAM:  General: WN/WD NAD  HEENT: PERRLA, EOMI, moist mucous membranes  Respiratory: CTA B/L, normal respiratory effort, no wheezes, crackles, rales  CV: RRR, S1S2, no murmurs, rubs or gallops  Abdominal: Soft, NT, ND +BS   Extremities: No edema, + peripheral pulses   Neurology: A&Ox3, nonfocal, BALLESTEROS x 4  Skin: Diffuse, dry, scaly rash noted on right side of face as well as lower abdomen    MEDICATIONS:  MEDICATIONS  (STANDING):  allopurinol 100 milliGRAM(s) Oral daily  Dakins Solution - 1/4 Strength 1 Application(s) Topical daily  DULoxetine 20 milliGRAM(s) Oral daily  enoxaparin Injectable 40 milliGRAM(s) SubCutaneous every 24 hours  finasteride 5 milliGRAM(s) Oral daily  gabapentin 600 milliGRAM(s) Oral three times a day  hydrocortisone 2.5% Ointment 1 Application(s) Topical two times a day  influenza  Vaccine (HIGH DOSE) 0.5 milliLiter(s) IntraMuscular once  ketoconazole 2% Cream 1 Application(s) Topical two times a day  lidocaine   4% Patch 1 Patch Transdermal daily  multivitamin 1 Tablet(s) Oral daily  mupirocin 2% Ointment 1 Application(s) Topical two times a day  piperacillin/tazobactam IVPB.. 3.375 Gram(s) IV Intermittent every 8 hours  polyethylene glycol 3350 17 Gram(s) Oral daily  senna 2 Tablet(s) Oral at bedtime  sodium chloride 2 Gram(s) Oral every 12 hours  sodium chloride 0.9%. 1000 milliLiter(s) (70 mL/Hr) IV Continuous <Continuous>  vancomycin  IVPB 1250 milliGRAM(s) IV Intermittent every 24 hours    MEDICATIONS  (PRN):  acetaminophen   Oral Liquid .. 650 milliGRAM(s) Oral every 6 hours PRN Temp greater or equal to 38C (100.4F), Mild Pain (1 - 3)  albuterol    90 MICROgram(s) HFA Inhaler 2 Puff(s) Inhalation every 6 hours PRN Shortness of Breath and/or Wheezing  guaiFENesin Oral Liquid (Sugar-Free) 200 milliGRAM(s) Oral every 6 hours PRN Cough      ALLERGIES:  Allergies    No Known Allergies    Intolerances        LABS:                        10.2   8.62  )-----------( 320      ( 23 Dec 2024 05:20 )             30.8     12-23    135  |  101  |  22  ----------------------------<  89  3.9   |  24  |  0.98    Ca    8.6      23 Dec 2024 05:20  Phos  4.0     12-23  Mg     2.10     12-23    TPro  6.4  /  Alb  2.5[L]  /  TBili  0.3  /  DBili  x   /  AST  35  /  ALT  40  /  AlkPhos  129[H]  12-23      Urinalysis Basic - ( 23 Dec 2024 05:20 )    Color: x / Appearance: x / SG: x / pH: x  Gluc: 89 mg/dL / Ketone: x  / Bili: x / Urobili: x   Blood: x / Protein: x / Nitrite: x   Leuk Esterase: x / RBC: x / WBC x   Sq Epi: x / Non Sq Epi: x / Bacteria: x      ABG:      vBG:    Micro:    Culture - Blood (collected 12-21-24 @ 06:00)  Source: .Blood BLOOD  Preliminary Report (12-22-24 @ 10:01):    No growth at 24 hours    Culture - Blood (collected 12-19-24 @ 13:15)  Source: .Blood BLOOD  Gram Stain (12-21-24 @ 06:59):    Growth in aerobic bottle: Gram Positive Cocci in Clusters    Growth in anaerobic bottle: Gram Positive Cocci in Clusters  Final Report (12-22-24 @ 08:28):    Growth in aerobic and anaerobic bottles: Methicillin Resistant    Staphylococcus aureus    See previous culture [id number]    Culture - Blood (collected 12-19-24 @ 13:04)  Source: .Blood BLOOD  Gram Stain (12-20-24 @ 23:25):    Growth in aerobic bottle: Gram Positive Cocci in Clusters    Growth in anaerobic bottle: Gram Positive Cocci in Clusters  Final Report (12-22-24 @ 08:27):    Growth in aerobic and anaerobic bottles: Methicillin Resistant    Staphylococcus aureus    Direct identification is available within approximately 3-5    hours either by Blood Panel Multiplexed PCR or Direct    MALDI-TOF. Details: [email or web address]  Organism: Blood Culture PCR  Methicillin resistant Staphylococcus aureus (12-22-24 @ 08:27)  Organism: Methicillin resistant Staphylococcus aureus (12-22-24 @ 08:27)      Method Type: JULY      -  Clindamycin: S <=0.25      -  Daptomycin: S 1      -  Erythromycin: R >4      -  Gentamicin: S <=4 Should not be used as monotherapy      -  Linezolid: S 2      -  Oxacillin: R >2      -  Penicillin: R >2      -  Rifampin: S <=1 Should not be used as monotherapy      -  Tetracycline: S <=4      -  Trimethoprim/Sulfamethoxazole: S <=0.5/9.5      -  Vancomycin: S 1  Organism: Blood Culture PCR (12-22-24 @ 08:27)      Method Type: PCR      -  Methicillin resistant Staphylococcus aureus (MRSA): Detec       Urinalysis with Rflx Culture (collected 12-20-24 @ 03:00)       Culture - Sputum (collected 12-20-24 @ 19:25)  Source: .Sputum Sputum  Gram Stain (12-21-24 @ 06:38):    Moderate polymorphonuclear leukocytes per low power field    Rare Squamous epithelial cells per low power field    Rare Gram positive cocci in pairs seen per oil power field    Rare Gram Positive Rods seen per oil power field    Rare Gram Negative Rods seen per oil power field    Rare Yeast like cells seen per oil power field  Final Report (12-22-24 @ 11:53):    Commensal loren consistent with body site        RADIOLOGY & ADDITIONAL TESTS: Reviewed.

## 2024-12-23 NOTE — PROGRESS NOTE ADULT - PROBLEM SELECTOR PLAN 1
Febrile, tachycardic, and leukocytosis.  - BCx 12/19: MRSA  - Decubitus ulcer culture 12/20: MRSA  - CT chest: multifocal PNA  - Neurosurgery consulted given hx of spinal hardware: no surgical intervention at this time.  - Wound care following for decubitus ulcers  - ID following  - continue Zosyn and Vancomycin (vanc dosed at 1250mg q24 per ID)  - MRI lumbosacral w/ albert pending -> MRI check list in chart  - Echo had poor image quality and not able to r/o thrombi -> recommends AIDEE Febrile, tachycardic, and leukocytosis.  - BCx 12/19: MRSA  - BCx 12/21 NGTD  - Decubitus ulcer culture 12/20: MRSA  - CT chest: multifocal PNA  - Neurosurgery consulted given hx of spinal hardware: no surgical intervention at this time.  - Wound care following for decubitus ulcers  - ID following  - continue Zosyn and Vancomycin (vanc dosed at 1250mg q24 per ID)  - Will reach out to ID to discuss possible DC for Zosyn to avoid nephrotoxicity.   - MRI lumbosacral w/ albert pending -> MRI check list in chart  - Pending AIDEE. Dental to clear patient given poor dentition.

## 2024-12-23 NOTE — PROGRESS NOTE ADULT - PROBLEM SELECTOR PLAN 4
- Mild transaminitis on admission  - Alk phos 180, AST 49, ALT 47  Plan  C/w trend - Mild transaminitis on admission  - Alk phos 180, AST 49, ALT 47  - RESOLVED

## 2024-12-23 NOTE — PROGRESS NOTE ADULT - SUBJECTIVE AND OBJECTIVE BOX
Follow Up:  sepsis, MRSA bacteremia    Interval History/ROS: pt afebrile and no vomiting or SOB, repeat blood cx negative for now        Allergies  No Known Allergies        ANTIMICROBIALS:  piperacillin/tazobactam IVPB.. 3.375 every 8 hours  vancomycin  IVPB 1250 every 24 hours      OTHER MEDS:  acetaminophen   Oral Liquid .. 650 milliGRAM(s) Oral every 6 hours PRN  albuterol    90 MICROgram(s) HFA Inhaler 2 Puff(s) Inhalation every 6 hours PRN  allopurinol 100 milliGRAM(s) Oral daily  chlorhexidine 2% Cloths 1 Application(s) Topical daily  Dakins Solution - 1/4 Strength 1 Application(s) Topical daily  DULoxetine 20 milliGRAM(s) Oral daily  enoxaparin Injectable 40 milliGRAM(s) SubCutaneous every 24 hours  finasteride 5 milliGRAM(s) Oral daily  gabapentin 600 milliGRAM(s) Oral three times a day  guaiFENesin Oral Liquid (Sugar-Free) 200 milliGRAM(s) Oral every 6 hours PRN  hydrocortisone 2.5% Ointment 1 Application(s) Topical two times a day  influenza  Vaccine (HIGH DOSE) 0.5 milliLiter(s) IntraMuscular once  ketoconazole 2% Cream 1 Application(s) Topical two times a day  ketoconazole 2% Shampoo 1 Application(s) Topical daily  lidocaine   4% Patch 1 Patch Transdermal daily  multivitamin 1 Tablet(s) Oral daily  mupirocin 2% Ointment 1 Application(s) Topical two times a day  polyethylene glycol 3350 17 Gram(s) Oral daily  senna 2 Tablet(s) Oral at bedtime  sodium chloride 2 Gram(s) Oral every 12 hours  sodium chloride 0.9%. 1000 milliLiter(s) IV Continuous <Continuous>  triamcinolone 0.1% Cream 1 Application(s) Topical two times a day      Vital Signs Last 24 Hrs  T(C): 36.4 (23 Dec 2024 13:25), Max: 37.1 (22 Dec 2024 22:18)  T(F): 97.5 (23 Dec 2024 13:25), Max: 98.7 (22 Dec 2024 22:18)  HR: 74 (23 Dec 2024 13:25) (66 - 74)  BP: 137/86 (23 Dec 2024 13:25) (137/86 - 145/58)  BP(mean): --  RR: 17 (23 Dec 2024 13:25) (17 - 18)  SpO2: 100% (23 Dec 2024 13:25) (100% - 100%)    Parameters below as of 23 Dec 2024 13:25  Patient On (Oxygen Delivery Method): room air        Physical Exam:  General:    NAD, non toxic  Respiratory:  comfortable on RA  abd:   soft, not tender  :    no almodovar  Musculoskeletal : no joint swelling  Skin:   patchy erythematous lesions on face, torso and back with skin thickening hyperpigmentation and scaling, sacral wound no purulence but the R buttock had purulence with wound care  vascular: no phlebitis                          10.2   8.62  )-----------( 320      ( 23 Dec 2024 05:20 )             30.8       12-23    135  |  101  |  22  ----------------------------<  89  3.9   |  24  |  0.98    Ca    8.6      23 Dec 2024 05:20  Phos  4.0     12-23  Mg     2.10     12-23    TPro  6.4  /  Alb  2.5[L]  /  TBili  0.3  /  DBili  x   /  AST  35  /  ALT  40  /  AlkPhos  129[H]  12-23      Urinalysis Basic - ( 23 Dec 2024 05:20 )    Color: x / Appearance: x / SG: x / pH: x  Gluc: 89 mg/dL / Ketone: x  / Bili: x / Urobili: x   Blood: x / Protein: x / Nitrite: x   Leuk Esterase: x / RBC: x / WBC x   Sq Epi: x / Non Sq Epi: x / Bacteria: x        MICROBIOLOGY:  v  .Blood BLOOD  12-21-24   No growth at 48 Hours  --  --      .Sputum Sputum  12-20-24   Commensal loren consistent with body site  --    Moderate polymorphonuclear leukocytes per low power field  Rare Squamous epithelial cells per low power field  Rare Gram positive cocci in pairs seen per oil power field  Rare Gram Positive Rods seen per oil power field  Rare Gram Negative Rods seen per oil power field  Rare Yeast like cells seen per oil power field      .Abscess  12-20-24   Numerous Methicillin Resistant Staphylococcus aureus  Few Prevotella bivia "Susceptibilities not performed"  --  Methicillin resistant Staphylococcus aureus      .Blood BLOOD  12-19-24   Growth in aerobic and anaerobic bottles: Methicillin Resistant  Staphylococcus aureus  See previous culture [id number]  --    Growth in aerobic bottle: Gram Positive Cocci in Clusters  Growth in anaerobic bottle: Gram Positive Cocci in Clusters      .Blood BLOOD  12-19-24   Growth in aerobic and anaerobic bottles: Methicillin Resistant  Staphylococcus aureus  Direct identification is available within approximately 3-5  hours either by Blood Panel Multiplexed PCR or Direct  MALDI-TOF. 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 Cannot distinguish treated sterile abscess from   continued infection.    < end of copied text >  < from: CT Chest No Cont (12.19.24 @ 11:28) >  IMPRESSION:  *  Upper lobe predominant peripherally oriented groundglass opacities   with denser consolidation opacities in the dependent portions of the   bilateral lower lobes. Findings are suggestive of multifocal pneumonia   with superimposed basilar atelectasis. COVID infection is within the   differential given the peripheral location of the groundglass opacities.  *  Trace right effusion.      < end of copied text >  < from: TTE W or WO Ultrasound Enhancing Agent (12.21.24 @ 14:58) >  CONCLUSIONS:      1. Technically very difficult study with no parasternal views feasible.   2. Technically difficult image quality.   3. Left ventricular systolic function is normal with an ejection fraction visually estimated at 60 to 65 %. There are no regional wall motion abnormalities seen.   4. The right ventricle is not well visualized.   5. Structurally normal mitral valve with normal leaflet excursion. There is calcification of themitral valve annulus. There is trace mitral regurgitation.   6. The aortic valve was not well visualized. The aortic valve anatomy cannot be determined. There is calcification of the aortic valve leaflets. There is mild to moderate aortic regurgitation.    < end of copied text >

## 2024-12-23 NOTE — PROGRESS NOTE ADULT - PROBLEM SELECTOR PLAN 3
Hx of hyponatremia. Na 133 on admission  - Home medication: salt tabs 2g BID  - C/w home med Hx of hyponatremia. Na 133 on admission. RESOLVED  - Home medication: salt tabs 2g BID  - C/w home med

## 2024-12-23 NOTE — CONSULT NOTE ADULT - SUBJECTIVE AND OBJECTIVE BOX
Allergies   No Known Allergies                Problems/Past History    Pneumonia due to infectious organism       Former smoker    PMH      Gout    PMH      History of BPH    PMH      HTN (hypertension)    PMH      MRSA bacteremia         BPH (benign prostatic hyperplasia)    Problem/DX      Facial rash    Problem/DX      Gout    Problem/DX      H/O spinal fusion    Problem/DX      Hyponatremia    Problem/DX      Neuropathy    Problem/DX      Prophylactic measure    Problem/DX      Sepsis    Problem/DX      Septic encephalopathy    Problem/DX      Transaminitis    Problem/DX      S/P spinal surgery    PSH      SOB    Pt Compl          Home Medications  MEDICATION      acetaminophen 325 mg oral tablet          albuterol 2.5 mg/3 mL (0.083%) inhalation solution          allopurinol 100 mg oral tablet          dextromethorphan-guaifenesin 10 mg-100 mg/10 mL oral liquid         docusate sodium 100 mg oral capsule          DULoxetine 20 mg oral delayed release capsule          ferrous sulfate 325 mg (65 mg elemental iron) oral tablet          finasteride 5 mg oral tablet          gabapentin 300 mg oral tablet          Lidocare Pain Relief Patch 4% topical film          Multiple Vitamins oral tablet          polyethylene glycol 3350 oral powder for reconstitution          senna leaf extract oral tablet          Sodium Chloride 1 g oral tablet            84 year old male patient presented to [facility] dental inpatient for clearance prior to AIDEE surgery.     Medical team paged dental earlier today reporting that anesthesiologist noticed poor dentition and wanted clearance prior to surgery (tomorrow).    Patient transported to dental clinic in stretcher. Limited dental examination completed in stretcher.     No extraoral swelling.     Teeth present: 3,5,6,7,8,9,10,11,14, 17, 22, 23, 24, 25, 26, 27 ,32  Tooth #13 [place] (attached to tooth #11 and crown #14).    No mobility detected.     No vestibular swelling. No signs of acute dental infection.    Periodontal: poor oral hygiene, gross calculus build up, generalized recession    Hairy tongue.     Assessment: no mobile teeth that pose aspiration risk to intubation.     Veronica Jarquin DDS #[id number]   Adrien Cohen DDS #[id number]  Attending: Dr. Scott Chavez, [place], M[facility] [id number]

## 2024-12-24 ENCOUNTER — RESULT REVIEW (OUTPATIENT)
Age: 84
End: 2024-12-24

## 2024-12-24 PROBLEM — F10.90 ALCOHOL USE: Status: ACTIVE | Noted: 2017-12-13

## 2024-12-24 LAB
ALBUMIN SERPL ELPH-MCNC: 2.6 G/DL — LOW (ref 3.3–5)
ALP SERPL-CCNC: 124 U/L — HIGH (ref 40–120)
ALT FLD-CCNC: 41 U/L — SIGNIFICANT CHANGE UP (ref 4–41)
ANION GAP SERPL CALC-SCNC: 10 MMOL/L — SIGNIFICANT CHANGE UP (ref 7–14)
APTT BLD: 42 SEC — HIGH (ref 24.5–35.6)
AST SERPL-CCNC: 33 U/L — SIGNIFICANT CHANGE UP (ref 4–40)
BASOPHILS # BLD AUTO: 0.04 K/UL — SIGNIFICANT CHANGE UP (ref 0–0.2)
BASOPHILS NFR BLD AUTO: 0.5 % — SIGNIFICANT CHANGE UP (ref 0–2)
BILIRUB SERPL-MCNC: 0.2 MG/DL — SIGNIFICANT CHANGE UP (ref 0.2–1.2)
BUN SERPL-MCNC: 21 MG/DL — SIGNIFICANT CHANGE UP (ref 7–23)
CALCIUM SERPL-MCNC: 8.5 MG/DL — SIGNIFICANT CHANGE UP (ref 8.4–10.5)
CHLORIDE SERPL-SCNC: 100 MMOL/L — SIGNIFICANT CHANGE UP (ref 98–107)
CO2 SERPL-SCNC: 24 MMOL/L — SIGNIFICANT CHANGE UP (ref 22–31)
CREAT SERPL-MCNC: 0.9 MG/DL — SIGNIFICANT CHANGE UP (ref 0.5–1.3)
EGFR: 85 ML/MIN/1.73M2 — SIGNIFICANT CHANGE UP
EOSINOPHIL # BLD AUTO: 0.82 K/UL — HIGH (ref 0–0.5)
EOSINOPHIL NFR BLD AUTO: 9.6 % — HIGH (ref 0–6)
GLUCOSE SERPL-MCNC: 96 MG/DL — SIGNIFICANT CHANGE UP (ref 70–99)
HCT VFR BLD CALC: 28.4 % — LOW (ref 39–50)
HGB BLD-MCNC: 9.5 G/DL — LOW (ref 13–17)
IANC: 5.89 K/UL — SIGNIFICANT CHANGE UP (ref 1.8–7.4)
IMM GRANULOCYTES NFR BLD AUTO: 0.7 % — SIGNIFICANT CHANGE UP (ref 0–0.9)
INR BLD: 1.03 RATIO — SIGNIFICANT CHANGE UP (ref 0.85–1.16)
LYMPHOCYTES # BLD AUTO: 0.95 K/UL — LOW (ref 1–3.3)
LYMPHOCYTES # BLD AUTO: 11.1 % — LOW (ref 13–44)
MAGNESIUM SERPL-MCNC: 2 MG/DL — SIGNIFICANT CHANGE UP (ref 1.6–2.6)
MCHC RBC-ENTMCNC: 28.9 PG — SIGNIFICANT CHANGE UP (ref 27–34)
MCHC RBC-ENTMCNC: 33.5 G/DL — SIGNIFICANT CHANGE UP (ref 32–36)
MCV RBC AUTO: 86.3 FL — SIGNIFICANT CHANGE UP (ref 80–100)
MONOCYTES # BLD AUTO: 0.78 K/UL — SIGNIFICANT CHANGE UP (ref 0–0.9)
MONOCYTES NFR BLD AUTO: 9.1 % — SIGNIFICANT CHANGE UP (ref 2–14)
NEUTROPHILS # BLD AUTO: 5.89 K/UL — SIGNIFICANT CHANGE UP (ref 1.8–7.4)
NEUTROPHILS NFR BLD AUTO: 69 % — SIGNIFICANT CHANGE UP (ref 43–77)
NRBC # BLD: 0 /100 WBCS — SIGNIFICANT CHANGE UP (ref 0–0)
NRBC # FLD: 0 K/UL — SIGNIFICANT CHANGE UP (ref 0–0)
PHOSPHATE SERPL-MCNC: 3.7 MG/DL — SIGNIFICANT CHANGE UP (ref 2.5–4.5)
PLATELET # BLD AUTO: 305 K/UL — SIGNIFICANT CHANGE UP (ref 150–400)
POTASSIUM SERPL-MCNC: 4 MMOL/L — SIGNIFICANT CHANGE UP (ref 3.5–5.3)
POTASSIUM SERPL-SCNC: 4 MMOL/L — SIGNIFICANT CHANGE UP (ref 3.5–5.3)
PROT SERPL-MCNC: 6.6 G/DL — SIGNIFICANT CHANGE UP (ref 6–8.3)
PROTHROM AB SERPL-ACNC: 12.2 SEC — SIGNIFICANT CHANGE UP (ref 9.9–13.4)
RBC # BLD: 3.29 M/UL — LOW (ref 4.2–5.8)
RBC # FLD: 14.2 % — SIGNIFICANT CHANGE UP (ref 10.3–14.5)
SODIUM SERPL-SCNC: 134 MMOL/L — LOW (ref 135–145)
VANCOMYCIN TROUGH SERPL-MCNC: 17 UG/ML — SIGNIFICANT CHANGE UP (ref 10–20)
WBC # BLD: 8.54 K/UL — SIGNIFICANT CHANGE UP (ref 3.8–10.5)
WBC # FLD AUTO: 8.54 K/UL — SIGNIFICANT CHANGE UP (ref 3.8–10.5)

## 2024-12-24 PROCEDURE — 93320 DOPPLER ECHO COMPLETE: CPT | Mod: 26,GC

## 2024-12-24 PROCEDURE — 93325 DOPPLER ECHO COLOR FLOW MAPG: CPT | Mod: 26,GC

## 2024-12-24 PROCEDURE — 99232 SBSQ HOSP IP/OBS MODERATE 35: CPT

## 2024-12-24 PROCEDURE — 76376 3D RENDER W/INTRP POSTPROCES: CPT | Mod: 26

## 2024-12-24 PROCEDURE — 99232 SBSQ HOSP IP/OBS MODERATE 35: CPT | Mod: GC

## 2024-12-24 PROCEDURE — 93312 ECHO TRANSESOPHAGEAL: CPT | Mod: 26

## 2024-12-24 RX ADMIN — Medication 1 APPLICATION(S): at 05:09

## 2024-12-24 RX ADMIN — GABAPENTIN 600 MILLIGRAM(S): 300 CAPSULE ORAL at 05:08

## 2024-12-24 RX ADMIN — Medication 17 GRAM(S): at 12:36

## 2024-12-24 RX ADMIN — LIDOCAINE 1 PATCH: 50 OINTMENT TOPICAL at 12:34

## 2024-12-24 RX ADMIN — TRIAMCINOLONE ACETONIDE 1 APPLICATION(S): 0.15 AEROSOL, SPRAY TOPICAL at 18:02

## 2024-12-24 RX ADMIN — Medication 1 APPLICATION(S): at 18:02

## 2024-12-24 RX ADMIN — ALLOPURINOL 100 MILLIGRAM(S): 100 TABLET ORAL at 12:33

## 2024-12-24 RX ADMIN — Medication 1 TABLET(S): at 12:33

## 2024-12-24 RX ADMIN — SENNOSIDES 2 TABLET(S): 8.6 TABLET, FILM COATED ORAL at 22:33

## 2024-12-24 RX ADMIN — DULOXETINE HYDROCHLORIDE 20 MILLIGRAM(S): 30 CAPSULE, DELAYED RELEASE ORAL at 12:33

## 2024-12-24 RX ADMIN — Medication 1 APPLICATION(S): at 12:34

## 2024-12-24 RX ADMIN — Medication 1 APPLICATION(S): at 18:01

## 2024-12-24 RX ADMIN — ENOXAPARIN SODIUM 40 MILLIGRAM(S): 60 INJECTION INTRAVENOUS; SUBCUTANEOUS at 22:33

## 2024-12-24 RX ADMIN — CHLORHEXIDINE GLUCONATE 1 APPLICATION(S): 1.2 RINSE ORAL at 12:40

## 2024-12-24 RX ADMIN — LIDOCAINE 1 PATCH: 50 OINTMENT TOPICAL at 19:58

## 2024-12-24 RX ADMIN — Medication 5 MILLIGRAM(S): at 12:33

## 2024-12-24 RX ADMIN — LIDOCAINE 1 PATCH: 50 OINTMENT TOPICAL at 23:55

## 2024-12-24 RX ADMIN — SODIUM CHLORIDE 2 GRAM(S): 9 INJECTION, SOLUTION INTRAMUSCULAR; INTRAVENOUS; SUBCUTANEOUS at 18:03

## 2024-12-24 RX ADMIN — Medication 1 APPLICATION(S): at 12:36

## 2024-12-24 RX ADMIN — GABAPENTIN 600 MILLIGRAM(S): 300 CAPSULE ORAL at 22:32

## 2024-12-24 RX ADMIN — TRIAMCINOLONE ACETONIDE 1 APPLICATION(S): 0.15 AEROSOL, SPRAY TOPICAL at 05:09

## 2024-12-24 RX ADMIN — GABAPENTIN 600 MILLIGRAM(S): 300 CAPSULE ORAL at 12:33

## 2024-12-24 RX ADMIN — SODIUM CHLORIDE 2 GRAM(S): 9 INJECTION, SOLUTION INTRAMUSCULAR; INTRAVENOUS; SUBCUTANEOUS at 05:08

## 2024-12-24 NOTE — PACU DISCHARGE NOTE - AIRWAY PATENCY:
Pt here for Lexiscan nuclear stress test.  Medication and side effects reviewed with patient. Lung sounds clear to auscultation bilaterally.  Denied caffeine use.  Patient tolerated Lexiscan dose without any adverse reactions.  Monitored post injection and then taken back to nuclear medicine for follow up imaging.  
Satisfactory
Satisfactory

## 2024-12-24 NOTE — PROGRESS NOTE ADULT - PROBLEM SELECTOR PLAN 2
Scaly, erythematous facial rash. Pruritic.  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 Derm to see pt 12/22 will determine if biopsy needed.  - Topical ketoconazole 2% cream/HC2.5% mix on face  - Ketoconazole 2%/Triamcinolone 0.1% mix on body

## 2024-12-24 NOTE — PROGRESS NOTE ADULT - SUBJECTIVE AND OBJECTIVE BOX
[facility]-- WOUND TEAM -- FOLLOW UP NOTE  --------------------------------------------------------------------------------    subjective: Patient seen and examined at bedside on contact isolation precautions. Patient reports that his back is itchy, but otherwise is feeling better. Denies CP, SOB, abdominal pain, n/v, fever, chills, diarrhea.     Interval HPI/24 hour events:   -afebrile, WBC wnl  -Right buttock abscess (+) MRSA and Blood cultures (+) MRSA- on Vanco for recommended 6 week course per ID  -Seen by Dermatology for rash  -Followed by Neurosurgery c/f spinal hardware infection     Chart reviewed including labs and relevant images      Diet:  Diet, Regular:   Minced and Moist (MINCEDMOIST)  Supplement Feeding Modality:  Oral  Ensure Plus High Protein Cans or Servings Per Day:  1       Frequency:  Two Times a day (12-24-24 @ 11:48)      ROS: General, skin see above.  All other systems negative    ALLERGIES & MEDICATIONS  --------------------------------------------------------------------------------  Allergies    No Known Allergies    Intolerances          STANDING INPATIENT MEDICATIONS    allopurinol 100 milliGRAM(s) Oral daily  chlorhexidine 2% Cloths 1 Application(s) Topical daily  Dakins Solution - 1/4 Strength 1 Application(s) Topical daily  DULoxetine 20 milliGRAM(s) Oral daily  enoxaparin Injectable 40 milliGRAM(s) SubCutaneous every 24 hours  finasteride 5 milliGRAM(s) Oral daily  gabapentin 600 milliGRAM(s) Oral three times a day  hydrocortisone 2.5% Ointment 1 Application(s) Topical two times a day  influenza  Vaccine (HIGH DOSE) 0.5 milliLiter(s) IntraMuscular once  ketoconazole 2% Cream 1 Application(s) Topical two times a day  ketoconazole 2% Shampoo 1 Application(s) Topical daily  lidocaine   4% Patch 1 Patch Transdermal daily  multivitamin 1 Tablet(s) Oral daily  mupirocin 2% Ointment 1 Application(s) Topical two times a day  polyethylene glycol 3350 17 Gram(s) Oral daily  senna 2 Tablet(s) Oral at bedtime  sodium chloride 2 Gram(s) Oral every 12 hours  sodium chloride 0.9%. 1000 milliLiter(s) IV Continuous <Continuous>  triamcinolone 0.1% Cream 1 Application(s) Topical two times a day  vancomycin  IVPB 1250 milliGRAM(s) IV Intermittent every 24 hours      PRN INPATIENT MEDICATION  acetaminophen   Oral Liquid .. 650 milliGRAM(s) Oral every 6 hours PRN  albuterol    90 MICROgram(s) HFA Inhaler 2 Puff(s) Inhalation every 6 hours PRN  guaiFENesin Oral Liquid (Sugar-Free) 200 milliGRAM(s) Oral every 6 hours PRN        Vital signs:  T(C): 36.3 (12-24-24 @ 14:23), Max: 36.4 (12-23-24 @ 22:29)  HR: 67 (12-24-24 @ 14:23) (65 - 78)  BP: 126/63 (12-24-24 @ 14:23) (85/57 - 154/71)  RR: 17 (12-24-24 @ 14:23) (15 - 18)  SpO2: 100% (12-24-24 @ 14:23) (96% - 100%)  Wt(kg): --  Height (cm): 167.6 (12-24-24 @ 09:01)  Weight (kg): 58.6 (12-24-24 @ 09:01)  BMI (kg/m2): 20.9 (12-24-24 @ 09:01)  BSA (m2): 1.66 (12-24-24 @ 09:01)      12-23-24 @ 07:01  -  12-24-24 @ 07:00  --------------------------------------------------------  IN: 0 mL / OUT: 350 mL / NET: -350 mL        Constitutional: NAD, A&Ox3, bed/chairbound.   (+) low airloss support surface, (+) fluidized positioning devices, (+) complete cair boots  HEENT:  NC/AT, nonicteric, mucosa moist.   Cardiovascular: rate regular  Respiratory: nonlabored, room air, equal chest expansion  Gastrointestinal: soft NT/ND  Musculoskeletal: no gross deformities/contractures  Vascular: (+) xerosis bilateral plantar feet resolving, BLE equally warm, +2 dp/pt pulses, capillary refill < 3 seconds, trace edema.   Skin:  moist w/ good turgor.  Face with resolving patchy flaking erythema to bilateral eyelids, cheeks- no mucosal ulcerations noted.  Right chin serosanguinous crust now resolved..  Posterior trunk with resolving patchy flaking erythematous pruritic rash- no open ulcerations  Thoracic to lumbar spine with linear well healed surgical scar.  Sacrum to left buttock- unstageable pressure injury- (patient turned left side for measurements)  -3cmx2.5cmx0.5cm (prev 4cmx2.5cmx0.5cm)  -90% tan-moist non-fluctuant adherent slough, circumferential re-epithelialization migrating from wound edges.   -Scant serofibrinous drainage  -Periwound skin with no induration, no fluctuance, no obvious erythema, no increased warmth, no crepitus.  Right medial buttock- abscess  -1.5cmx1.5cmx0.5cm (prev 2.5cmx1.2sxy4md), no tunneling, no undermining  -100% tan-moist loosening adherent slough- no induration, no fluctuance  -Scant serofibrinous drainage, no purulent drainage, no odor.  -Periwound skin with hypopigmentation. No induration, no fluctuance, no crepitus, no erythema, no edema, no increased warmth.  *Cleansed wounds with NS. Liquid barrier film applied to periwound skin. Applied medihoney gel to wound base. Covered with silicone foam with border.      LABS/ CULTURES/ RADIOLOGY:              9.5    8.54  >-----------<  305      [12-24-24 @ 04:00]              28.4     134  |  100  |  21  ----------------------------<  96      [12-24-24 @ 04:00]  4.0   |  24  |  0.90        Ca     8.5     [12-24-24 @ 04:00]      Mg     2.00     [12-24-24 @ 04:00]      Phos  3.7     [12-24-24 @ 04:00]    TPro  6.6  /  Alb  2.6  /  TBili  0.2  /  DBili  x   /  AST  33  /  ALT  41  /  AlkPhos  124  [12-24-24 @ 04:00]    PT/INR: PT 12.2 , INR 1.03       [12-24-24 @ 04:00]  PTT: 42.0       [12-24-24 @ 04:00]          A1C with Estimated Average Glucose Result: 5.1 % (08-10-24 @ 01:18)  A1C with Estimated Average Glucose Result: 4.9 % (07-20-24 @ 05:55)        Culture - Blood (collected 12-21-24 @ 06:00)  Source: .Blood BLOOD  Preliminary Report (12-24-24 @ 10:01):    No growth at 72 Hours    Culture - Blood (collected 12-19-24 @ 13:15)  Source: .Blood BLOOD  Gram Stain (12-21-24 @ 06:59):    Growth in aerobic bottle: Gram Positive Cocci in Clusters    Growth in anaerobic bottle: Gram Positive Cocci in Clusters  Final Report (12-22-24 @ 08:28):    Growth in aerobic and anaerobic bottles: Methicillin Resistant    Staphylococcus aureus    See previous culture [id number]    Culture - Blood (collected 12-19-24 @ 13:04)  Source: .Blood BLOOD  Gram Stain (12-20-24 @ 23:25):    Growth in aerobic bottle: Gram Positive Cocci in Clusters    Growth in anaerobic bottle: Gram Positive Cocci in Clusters  Final Report (12-22-24 @ 08:27):    Growth in aerobic and anaerobic bottles: Methicillin Resistant    Staphylococcus aureus    Direct identification is available within approximately 3-5    hours either by Blood Panel Multiplexed PCR or Direct    MALDI-TOF. Details: [email or web address]  Organism: Blood Culture PCR  Methicillin resistant Staphylococcus aureus (12-22-24 @ 08:27)  Organism: Methicillin resistant Staphylococcus aureus (12-22-24 @ 08:27)      Method Type: JULY      -  Clindamycin: S <=0.25      -  Daptomycin: S 1      -  Erythromycin: R >4      -  Gentamicin: S <=4 Should not be used as monotherapy      -  Linezolid: S 2      -  Oxacillin: R >2      -  Penicillin: R >2      -  Rifampin: S <=1 Should not be used as monotherapy      -  Tetracycline: S <=4      -  Trimethoprim/Sulfamethoxazole: S <=0.5/9.5      -  Vancomycin: S 1  Organism: Blood Culture PCR (12-22-24 @ 08:27)      Method Type: PCR      -  Methicillin resistant Staphylococcus aureus (MRSA): Detec

## 2024-12-24 NOTE — PROGRESS NOTE ADULT - PROBLEM SELECTOR PLAN 3
Hx of hyponatremia. Na 133 on admission. RESOLVED  - Home medication: salt tabs 2g BID  - C/w home med - Home medications: Allopurinol 100mg QD  - C/w home med

## 2024-12-24 NOTE — CHART NOTE - NSCHARTNOTEFT_GEN_A_CORE
Procedure: Transesophageal Echocardiogram (AIDEE)    :     Indication:  [ ] evaluation of Intracardiac thrombus (e.g. Stroke, pre-DCCV, CIED/LAAO implantation, or ablation)  [ ] evaluation of Valvulopathy  [x] evaluation of Infective endocarditis/cardiac mass  [ ] evaluation of cardiac structure and function in setting of inadequate TTE  [ ] others:    Pre-op evaluations:  Respiratory: no active exacerbation of Asthma, COPD, CECY, or recent respiratory illness.  GI: No significant dysphagia and odynophagia. No recent Esophageal surgeries, strictures, diverticula, masses, varices, diaphragmatic hernia, stomach ulcers, stomach surgeries, bariatric surgery, GI bleed.  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 No history of significant autoimmune disease related to atlantoaxial instability.  Oral/Dental: no loose, broken tooth/teeth, mouth sores.   NPO except medications  Vitals, Labs, and Medications reviewed  ASA/ Mallampati assessed  Consent: Obtained and located in physical chart    AIDEE Probe used:  [x ] x8-2t Transesophageal Transducer   [ ] Pediatric Ultrasound Transducer  Estimated blood loss:   [x ] none   [ ] minimal (< 10 cc)  Complications: None    Anesthesia type: Conscious sedation w/ or w/o topical anesthesia      Procedural success:  [x] successfully completed without complication  [ ] procedure aborted due to:  [ ] procedure cancelled due to:      Findings: Normal LV Function, trace MR, mod-sev AI, no evidence of endocarditis       This is a preliminary report. Full AIDEE report to follow.

## 2024-12-24 NOTE — PROGRESS NOTE ADULT - PROBLEM SELECTOR PLAN 6
- Home medication: Finasteride 5mg QD  - C/w home medication DVT Prophylaxis: Lovenox 40mg QD  DIET: Mechanical soft diet  DISPO: Pending clinical course  DNR/DNI: Trial NIV

## 2024-12-24 NOTE — PROGRESS NOTE ADULT - PROBLEM SELECTOR PLAN 5
- Home medications: Allopurinol 100mg QD  - C/w home med - Home medication: Gabapentin 600mg TID, duloxetine 20mg QD  - C/w home medications

## 2024-12-24 NOTE — PROGRESS NOTE ADULT - PROBLEM SELECTOR PLAN 1
Febrile, tachycardic, and leukocytosis.  - BCx 12/19: MRSA  - BCx 12/21 NGTD  - Decubitus ulcer culture 12/20: MRSA  - CT chest: multifocal PNA  - Neurosurgery consulted given hx of spinal hardware: no surgical intervention at this time.  - Wound care following for decubitus ulcers  - ID following  - continue Zosyn and Vancomycin (vanc dosed at 1250mg q24 per ID)  - Will reach out to ID to discuss possible DC for Zosyn to avoid nephrotoxicity.   - MRI lumbosacral w/ albert pending -> MRI check list in chart  - Pending AIDEE. Dental to clear patient given poor dentition. Febrile, tachycardic, and leukocytosis.  - BCx 12/19: MRSA  - BCx 12/21 NGTD  - Decubitus ulcer culture 12/20: MRSA  - CT chest: multifocal PNA  - Neurosurgery consulted given hx of spinal hardware: no surgical intervention at this time.  - Wound care following for decubitus ulcers  - ID following  - DC'd Zosyn  - C/w Vancomycin (vanc dosed at 1250mg q24 per ID)  - MRI lumbosacral w/ albert was unable to distinguish treated sterile abscess from continued infection. Made nsgy aware.   - Pending AIDEE. Cleared by dental on 12/23

## 2024-12-24 NOTE — CHART NOTE - NSCHARTNOTEFT_GEN_A_CORE
PRE-INTERVENTIONAL RADIOLOGY PROCEDURE NOTE  ============================  Sue Batres PGY2  NS/JENNIFERJ Medicine Resident  ============================  Patient Name: ILENE LOGAN    Patient Age: 84y    Patient Gender: Male    Procedure: PICC    Diagnosis/Indication: Epidural abscess    Interventional Radiology Attending Physician: EDDIE ALFORD    Ordering Attending Physician: Carina Bower    Pertinent Medical History: Epidural abscess requiring vancomycin for 6 weeks    Pertinent labs:                      9.5    8.54  )-----------( 305      ( 24 Dec 2024 04:00 )             28.4       12-24    134[L]  |  100  |  21  ----------------------------<  96  4.0   |  24  |  0.90    Ca    8.5      24 Dec 2024 04:00  Phos  3.7     12-24  Mg     2.00     12-24    TPro  6.6  /  Alb  2.6[L]  /  TBili  0.2  /  DBili  x   /  AST  33  /  ALT  41  /  AlkPhos  124[H]  12-24      PT/INR - ( 24 Dec 2024 04:00 )   PT: 12.2 sec;   INR: 1.03 ratio         PTT - ( 24 Dec 2024 04:00 )  PTT:42.0 sec        Patient and Family Aware ? Yes PRE-INTERVENTIONAL RADIOLOGY PROCEDURE NOTE  ============================  Sue Batres PGY2  NS/JENNIFERJ Medicine Resident  ============================  Patient Name: ILENE LOGAN    Patient Age: 84y    Patient Gender: Male    Procedure: PICC    Diagnosis/Indication: Epidural abscess    Interventional Radiology Attending Physician: Shantel Fisher    Ordering Attending Physician: Carina Bower    Pertinent Medical History: Epidural abscess requiring vancomycin for 6 weeks    Pertinent labs:                      9.5    8.54  )-----------( 305      ( 24 Dec 2024 04:00 )             28.4       12-24    134[L]  |  100  |  21  ----------------------------<  96  4.0   |  24  |  0.90    Ca    8.5      24 Dec 2024 04:00  Phos  3.7     12-24  Mg     2.00     12-24    TPro  6.6  /  Alb  2.6[L]  /  TBili  0.2  /  DBili  x   /  AST  33  /  ALT  41  /  AlkPhos  124[H]  12-24      PT/INR - ( 24 Dec 2024 04:00 )   PT: 12.2 sec;   INR: 1.03 ratio         PTT - ( 24 Dec 2024 04:00 )  PTT:42.0 sec        Patient and Family Aware ? Yes

## 2024-12-24 NOTE — PROGRESS NOTE ADULT - SUBJECTIVE AND OBJECTIVE BOX
INTERVAL HPI/OVERNIGHT EVENTS: None    SUBJECTIVE: Patient seen and examined at bedside. Patient resting comfortably in bed with no acute complaints. Patient denies any fever, chills, chest pain, SOB, palpitations, abdominal pain, nausea, vomiting, or diarrhea.    ROS: All negative except as listed above.    VITAL SIGNS:  ICU Vital Signs Last 24 Hrs  T(C): 36.2 (24 Dec 2024 05:29), Max: 36.4 (23 Dec 2024 13:25)  T(F): 97.1 (24 Dec 2024 05:29), Max: 97.5 (23 Dec 2024 13:25)  HR: 75 (24 Dec 2024 05:29) (74 - 75)  BP: 123/65 (24 Dec 2024 05:29) (123/65 - 154/71)  BP(mean): --  ABP: --  ABP(mean): --  RR: 17 (24 Dec 2024 05:29) (17 - 18)  SpO2: 96% (24 Dec 2024 05:29) (96% - 100%)    O2 Parameters below as of 24 Dec 2024 05:29  Patient On (Oxygen Delivery Method): room air            Plateau pressure:   P/F ratio:     12-23 @ 07:01  -  12-24 @ 07:00  --------------------------------------------------------  IN: 0 mL / OUT: 350 mL / NET: -350 mL      CAPILLARY BLOOD GLUCOSE          ECG: reviewed.    PHYSICAL EXAM:  General: WN/WD NAD  HEENT: PERRLA, EOMI, moist mucous membranes  Respiratory: CTA B/L, normal respiratory effort, no wheezes, crackles, rales  CV: RRR, S1S2, no murmurs, rubs or gallops  Abdominal: Soft, NT, ND +BS   Extremities: No edema, + peripheral pulses   Neurology: A&Ox3, nonfocal, BALLESTEROS x 4  Skin: No rashes or lesions noted.    MEDICATIONS:  MEDICATIONS  (STANDING):  allopurinol 100 milliGRAM(s) Oral daily  chlorhexidine 2% Cloths 1 Application(s) Topical daily  Dakins Solution - 1/4 Strength 1 Application(s) Topical daily  DULoxetine 20 milliGRAM(s) Oral daily  enoxaparin Injectable 40 milliGRAM(s) SubCutaneous every 24 hours  finasteride 5 milliGRAM(s) Oral daily  gabapentin 600 milliGRAM(s) Oral three times a day  hydrocortisone 2.5% Ointment 1 Application(s) Topical two times a day  influenza  Vaccine (HIGH DOSE) 0.5 milliLiter(s) IntraMuscular once  ketoconazole 2% Cream 1 Application(s) Topical two times a day  ketoconazole 2% Shampoo 1 Application(s) Topical daily  lidocaine   4% Patch 1 Patch Transdermal daily  multivitamin 1 Tablet(s) Oral daily  mupirocin 2% Ointment 1 Application(s) Topical two times a day  polyethylene glycol 3350 17 Gram(s) Oral daily  senna 2 Tablet(s) Oral at bedtime  sodium chloride 2 Gram(s) Oral every 12 hours  sodium chloride 0.9%. 1000 milliLiter(s) (70 mL/Hr) IV Continuous <Continuous>  triamcinolone 0.1% Cream 1 Application(s) Topical two times a day  vancomycin  IVPB 1250 milliGRAM(s) IV Intermittent every 24 hours    MEDICATIONS  (PRN):  acetaminophen   Oral Liquid .. 650 milliGRAM(s) Oral every 6 hours PRN Temp greater or equal to 38C (100.4F), Mild Pain (1 - 3)  albuterol    90 MICROgram(s) HFA Inhaler 2 Puff(s) Inhalation every 6 hours PRN Shortness of Breath and/or Wheezing  guaiFENesin Oral Liquid (Sugar-Free) 200 milliGRAM(s) Oral every 6 hours PRN Cough      ALLERGIES:  Allergies    No Known Allergies    Intolerances        LABS:                        9.5    8.54  )-----------( 305      ( 24 Dec 2024 04:00 )             28.4     12-24    134[L]  |  100  |  21  ----------------------------<  96  4.0   |  24  |  0.90    Ca    8.5      24 Dec 2024 04:00  Phos  3.7     12-24  Mg     2.00     12-24    TPro  6.6  /  Alb  2.6[L]  /  TBili  0.2  /  DBili  x   /  AST  33  /  ALT  41  /  AlkPhos  124[H]  12-24    PT/INR - ( 24 Dec 2024 04:00 )   PT: 12.2 sec;   INR: 1.03 ratio         PTT - ( 24 Dec 2024 04:00 )  PTT:42.0 sec  Urinalysis Basic - ( 24 Dec 2024 04:00 )    Color: x / Appearance: x / SG: x / pH: x  Gluc: 96 mg/dL / Ketone: x  / Bili: x / Urobili: x   Blood: x / Protein: x / Nitrite: x   Leuk Esterase: x / RBC: x / WBC x   Sq Epi: x / Non Sq Epi: x / Bacteria: x      ABG:      vBG:    Micro:    Culture - Blood (collected 12-21-24 @ 06:00)  Source: .Blood BLOOD  Preliminary Report (12-23-24 @ 10:02):    No growth at 48 Hours    Culture - Blood (collected 12-19-24 @ 13:15)  Source: .Blood BLOOD  Gram Stain (12-21-24 @ 06:59):    Growth in aerobic bottle: Gram Positive Cocci in Clusters    Growth in anaerobic bottle: Gram Positive Cocci in Clusters  Final Report (12-22-24 @ 08:28):    Growth in aerobic and anaerobic bottles: Methicillin Resistant    Staphylococcus aureus    See previous culture [id number]    Culture - Blood (collected 12-19-24 @ 13:04)  Source: .Blood BLOOD  Gram Stain (12-20-24 @ 23:25):    Growth in aerobic bottle: Gram Positive Cocci in Clusters    Growth in anaerobic bottle: Gram Positive Cocci in Clusters  Final Report (12-22-24 @ 08:27):    Growth in aerobic and anaerobic bottles: Methicillin Resistant    Staphylococcus aureus    Direct identification is available within approximately 3-5    hours either by Blood Panel Multiplexed PCR or Direct    MALDI-TOF. Details: [email or web address]  Organism: Blood Culture PCR  Methicillin resistant Staphylococcus aureus (12-22-24 @ 08:27)  Organism: Methicillin resistant Staphylococcus aureus (12-22-24 @ 08:27)      Method Type: JULY      -  Clindamycin: S <=0.25      -  Daptomycin: S 1      -  Erythromycin: R >4      -  Gentamicin: S <=4 Should not be used as monotherapy      -  Linezolid: S 2      -  Oxacillin: R >2      -  Penicillin: R >2      -  Rifampin: S <=1 Should not be used as monotherapy      -  Tetracycline: S <=4      -  Trimethoprim/Sulfamethoxazole: S <=0.5/9.5      -  Vancomycin: S 1  Organism: Blood Culture PCR (12-22-24 @ 08:27)      Method Type: PCR      -  Methicillin resistant Staphylococcus aureus (MRSA): Detec       Urinalysis with Rflx Culture (collected 12-20-24 @ 03:00)       Culture - Sputum (collected 12-20-24 @ 19:25)  Source: .Sputum Sputum  Gram Stain (12-21-24 @ 06:38):    Moderate polymorphonuclear leukocytes per low power field    Rare Squamous epithelial cells per low power field    Rare Gram positive cocci in pairs seen per oil power field    Rare Gram Positive Rods seen per oil power field    Rare Gram Negative Rods seen per oil power field    Rare Yeast like cells seen per oil power field  Final Report (12-22-24 @ 11:53):    Commensal loren consistent with body site        RADIOLOGY & ADDITIONAL TESTS: Reviewed.   INTERVAL HPI/OVERNIGHT EVENTS: None    SUBJECTIVE: Patient seen and examined at bedside. Patient resting comfortably in bed with no acute complaints. He is asking if he can ambulate around the unit. Patient denies any fever, chills, chest pain, SOB, palpitations, abdominal pain, nausea, vomiting, or diarrhea.    ROS: All negative except as listed above.    VITAL SIGNS:  ICU Vital Signs Last 24 Hrs  T(C): 36.2 (24 Dec 2024 05:29), Max: 36.4 (23 Dec 2024 13:25)  T(F): 97.1 (24 Dec 2024 05:29), Max: 97.5 (23 Dec 2024 13:25)  HR: 75 (24 Dec 2024 05:29) (74 - 75)  BP: 123/65 (24 Dec 2024 05:29) (123/65 - 154/71)  BP(mean): --  ABP: --  ABP(mean): --  RR: 17 (24 Dec 2024 05:29) (17 - 18)  SpO2: 96% (24 Dec 2024 05:29) (96% - 100%)    O2 Parameters below as of 24 Dec 2024 05:29  Patient On (Oxygen Delivery Method): room air            Plateau pressure:   P/F ratio:     12-23 @ 07:01  -  12-24 @ 07:00  --------------------------------------------------------  IN: 0 mL / OUT: 350 mL / NET: -350 mL      CAPILLARY BLOOD GLUCOSE          ECG: reviewed.    PHYSICAL EXAM:  General: WN/WD NAD  HEENT: PERRLA, EOMI, moist mucous membranes  Respiratory: CTA B/L, normal respiratory effort, no wheezes, crackles, rales  CV: RRR, S1S2, no murmurs, rubs or gallops  Abdominal: Soft, NT, ND +BS   Extremities: No edema, + peripheral pulses   Neurology: A&Ox3, nonfocal, BALLESTEROS x 4  Skin: Diffuse, dry, scaly rash noted on right side of face as well as lower abdomen      MEDICATIONS:  MEDICATIONS  (STANDING):  allopurinol 100 milliGRAM(s) Oral daily  chlorhexidine 2% Cloths 1 Application(s) Topical daily  Dakins Solution - 1/4 Strength 1 Application(s) Topical daily  DULoxetine 20 milliGRAM(s) Oral daily  enoxaparin Injectable 40 milliGRAM(s) SubCutaneous every 24 hours  finasteride 5 milliGRAM(s) Oral daily  gabapentin 600 milliGRAM(s) Oral three times a day  hydrocortisone 2.5% Ointment 1 Application(s) Topical two times a day  influenza  Vaccine (HIGH DOSE) 0.5 milliLiter(s) IntraMuscular once  ketoconazole 2% Cream 1 Application(s) Topical two times a day  ketoconazole 2% Shampoo 1 Application(s) Topical daily  lidocaine   4% Patch 1 Patch Transdermal daily  multivitamin 1 Tablet(s) Oral daily  mupirocin 2% Ointment 1 Application(s) Topical two times a day  polyethylene glycol 3350 17 Gram(s) Oral daily  senna 2 Tablet(s) Oral at bedtime  sodium chloride 2 Gram(s) Oral every 12 hours  sodium chloride 0.9%. 1000 milliLiter(s) (70 mL/Hr) IV Continuous <Continuous>  triamcinolone 0.1% Cream 1 Application(s) Topical two times a day  vancomycin  IVPB 1250 milliGRAM(s) IV Intermittent every 24 hours    MEDICATIONS  (PRN):  acetaminophen   Oral Liquid .. 650 milliGRAM(s) Oral every 6 hours PRN Temp greater or equal to 38C (100.4F), Mild Pain (1 - 3)  albuterol    90 MICROgram(s) HFA Inhaler 2 Puff(s) Inhalation every 6 hours PRN Shortness of Breath and/or Wheezing  guaiFENesin Oral Liquid (Sugar-Free) 200 milliGRAM(s) Oral every 6 hours PRN Cough      ALLERGIES:  Allergies    No Known Allergies    Intolerances        LABS:                        9.5    8.54  )-----------( 305      ( 24 Dec 2024 04:00 )             28.4     12-24    134[L]  |  100  |  21  ----------------------------<  96  4.0   |  24  |  0.90    Ca    8.5      24 Dec 2024 04:00  Phos  3.7     12-24  Mg     2.00     12-24    TPro  6.6  /  Alb  2.6[L]  /  TBili  0.2  /  DBili  x   /  AST  33  /  ALT  41  /  AlkPhos  124[H]  12-24    PT/INR - ( 24 Dec 2024 04:00 )   PT: 12.2 sec;   INR: 1.03 ratio         PTT - ( 24 Dec 2024 04:00 )  PTT:42.0 sec  Urinalysis Basic - ( 24 Dec 2024 04:00 )    Color: x / Appearance: x / SG: x / pH: x  Gluc: 96 mg/dL / Ketone: x  / Bili: x / Urobili: x   Blood: x / Protein: x / Nitrite: x   Leuk Esterase: x / RBC: x / WBC x   Sq Epi: x / Non Sq Epi: x / Bacteria: x      ABG:      vBG:    Micro:    Culture - Blood (collected 12-21-24 @ 06:00)  Source: .Blood BLOOD  Preliminary Report (12-23-24 @ 10:02):    No growth at 48 Hours    Culture - Blood (collected 12-19-24 @ 13:15)  Source: .Blood BLOOD  Gram Stain (12-21-24 @ 06:59):    Growth in aerobic bottle: Gram Positive Cocci in Clusters    Growth in anaerobic bottle: Gram Positive Cocci in Clusters  Final Report (12-22-24 @ 08:28):    Growth in aerobic and anaerobic bottles: Methicillin Resistant    Staphylococcus aureus    See previous culture [id number]    Culture - Blood (collected 12-19-24 @ 13:04)  Source: .Blood BLOOD  Gram Stain (12-20-24 @ 23:25):    Growth in aerobic bottle: Gram Positive Cocci in Clusters    Growth in anaerobic bottle: Gram Positive Cocci in Clusters  Final Report (12-22-24 @ 08:27):    Growth in aerobic and anaerobic bottles: Methicillin Resistant    Staphylococcus aureus    Direct identification is available within approximately 3-5    hours either by Blood Panel Multiplexed PCR or Direct    MALDI-TOF. Details: [email or web address]  Organism: Blood Culture PCR  Methicillin resistant Staphylococcus aureus (12-22-24 @ 08:27)  Organism: Methicillin resistant Staphylococcus aureus (12-22-24 @ 08:27)      Method Type: JULY      -  Clindamycin: S <=0.25      -  Daptomycin: S 1      -  Erythromycin: R >4      -  Gentamicin: S <=4 Should not be used as monotherapy      -  Linezolid: S 2      -  Oxacillin: R >2      -  Penicillin: R >2      -  Rifampin: S <=1 Should not be used as monotherapy      -  Tetracycline: S <=4      -  Trimethoprim/Sulfamethoxazole: S <=0.5/9.5      -  Vancomycin: S 1  Organism: Blood Culture PCR (12-22-24 @ 08:27)      Method Type: PCR      -  Methicillin resistant Staphylococcus aureus (MRSA): Detec       Urinalysis with Rflx Culture (collected 12-20-24 @ 03:00)       Culture - Sputum (collected 12-20-24 @ 19:25)  Source: .Sputum Sputum  Gram Stain (12-21-24 @ 06:38):    Moderate polymorphonuclear leukocytes per low power field    Rare Squamous epithelial cells per low power field    Rare Gram positive cocci in pairs seen per oil power field    Rare Gram Positive Rods seen per oil power field    Rare Gram Negative Rods seen per oil power field    Rare Yeast like cells seen per oil power field  Final Report (12-22-24 @ 11:53):    Commensal loren consistent with body site        RADIOLOGY & ADDITIONAL TESTS: Reviewed.

## 2024-12-24 NOTE — PROGRESS NOTE ADULT - SUBJECTIVE AND OBJECTIVE BOX
Follow Up:  sepsis, MRSA bacteremia    Interval History/ROS: pt afebrile and no vomiting or SOB, repeat blood cx negative, AIDEE no veg          Allergies  No Known Allergies        ANTIMICROBIALS:  vancomycin  IVPB 1250 every 24 hours      OTHER MEDS:  acetaminophen   Oral Liquid .. 650 milliGRAM(s) Oral every 6 hours PRN  albuterol    90 MICROgram(s) HFA Inhaler 2 Puff(s) Inhalation every 6 hours PRN  allopurinol 100 milliGRAM(s) Oral daily  chlorhexidine 2% Cloths 1 Application(s) Topical daily  Dakins Solution - 1/4 Strength 1 Application(s) Topical daily  DULoxetine 20 milliGRAM(s) Oral daily  enoxaparin Injectable 40 milliGRAM(s) SubCutaneous every 24 hours  finasteride 5 milliGRAM(s) Oral daily  gabapentin 600 milliGRAM(s) Oral three times a day  guaiFENesin Oral Liquid (Sugar-Free) 200 milliGRAM(s) Oral every 6 hours PRN  hydrocortisone 2.5% Ointment 1 Application(s) Topical two times a day  influenza  Vaccine (HIGH DOSE) 0.5 milliLiter(s) IntraMuscular once  ketoconazole 2% Cream 1 Application(s) Topical two times a day  ketoconazole 2% Shampoo 1 Application(s) Topical daily  lidocaine   4% Patch 1 Patch Transdermal daily  multivitamin 1 Tablet(s) Oral daily  mupirocin 2% Ointment 1 Application(s) Topical two times a day  polyethylene glycol 3350 17 Gram(s) Oral daily  senna 2 Tablet(s) Oral at bedtime  sodium chloride 2 Gram(s) Oral every 12 hours  sodium chloride 0.9%. 1000 milliLiter(s) IV Continuous <Continuous>  triamcinolone 0.1% Cream 1 Application(s) Topical two times a day      Vital Signs Last 24 Hrs  T(C): 35.6 (24 Dec 2024 09:01), Max: 36.4 (23 Dec 2024 13:25)  T(F): 96.1 (24 Dec 2024 09:01), Max: 97.5 (23 Dec 2024 13:25)  HR: 66 (24 Dec 2024 11:35) (65 - 78)  BP: 128/50 (24 Dec 2024 11:35) (85/57 - 154/71)  BP(mean): --  RR: 17 (24 Dec 2024 11:35) (15 - 18)  SpO2: 100% (24 Dec 2024 11:35) (96% - 100%)    Parameters below as of 24 Dec 2024 05:29  Patient On (Oxygen Delivery Method): room air        Physical Exam:  General:    NAD, non toxic  Respiratory:  comfortable on RA  abd:   soft, not tender  :    no almodovar  Musculoskeletal : no joint swelling  Skin:   patchy erythematous lesions on face, torso and back with skin thickening hyperpigmentation and scaling, sacral and R buttock wouinds  vascular: no phlebitis                          9.5    8.54  )-----------( 305      ( 24 Dec 2024 04:00 )             28.4       12-24    134[L]  |  100  |  21  ----------------------------<  96  4.0   |  24  |  0.90    Ca    8.5      24 Dec 2024 04:00  Phos  3.7     12-24  Mg     2.00     12-24    TPro  6.6  /  Alb  2.6[L]  /  TBili  0.2  /  DBili  x   /  AST  33  /  ALT  41  /  AlkPhos  124[H]  12-24      Urinalysis Basic - ( 24 Dec 2024 04:00 )    Color: x / Appearance: x / SG: x / pH: x  Gluc: 96 mg/dL / Ketone: x  / Bili: x / Urobili: x   Blood: x / Protein: x / Nitrite: x   Leuk Esterase: x / RBC: x / WBC x   Sq Epi: x / Non Sq Epi: x / Bacteria: x        MICROBIOLOGY:  v  .Blood BLOOD  12-21-24   No growth at 72 Hours  --  --      .Sputum Sputum  12-20-24   Commensal loren consistent with body site  --    Moderate polymorphonuclear leukocytes per low power field  Rare Squamous epithelial cells per low power field  Rare Gram positive cocci in pairs seen per oil power field  Rare Gram Positive Rods seen per oil power field  Rare Gram Negative Rods seen per oil power field  Rare Yeast like cells seen per oil power field      .Abscess  12-20-24   Numerous Methicillin Resistant Staphylococcus aureus  Few Prevotella bivia "Susceptibilities not performed"  --  Methicillin resistant Staphylococcus aureus      .Blood BLOOD  12-19-24   Growth in aerobic and anaerobic bottles: Methicillin Resistant  Staphylococcus aureus  See previous culture [id number]  --    Growth in aerobic bottle: Gram Positive Cocci in Clusters  Growth in anaerobic bottle: Gram Positive Cocci in Clusters      .Blood BLOOD  12-19-24   Growth in aerobic and anaerobic bottles: Methicillin Resistant  Staphylococcus aureus  Direct identification is available within approximately 3-5  hours either by Blood Panel Multiplexed PCR or Direct  MALDI-TOF. Details: 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 Cannot distinguish treated sterile abscess from   continued infection.    < end of copied text >  < from: AIDEE W or WO Ultrasound Enhancing Agent (12.24.24 @ 09:57) >  CONCLUSIONS:      1. Left ventricular systolic function is normal. There are no regional wall motion abnormalities seen.   2. Normal right ventricular cavity size and normal right ventricular systolic function.   3. Structurally normal mitral valve with normal leaflet excursion. No vegetations seen in association with the mitral valve. There is calcification of the mitral valve annulus. There is mild mitral regurgitation.   4. The aortic valve appears trileaflet with normal systolic excursion. There is calcification of the aortic valve leaflets. No vegetations seen in association with the aortic valve. There is moderate to severe aortic regurgitation. Vena contracta width ~ 0.5 cm.   5. No atheroma in the visualized portions of the proximal ascending aorta. Mild non-mobile atheroma in the visualized portions of the transverse aortic arch. Mild non-mobile atheroma in the visualized portions of the descending aorta.   6. The left atrium is normal in size. There is no evidence of left atrial or left atrial appendage thrombus.   7. Agitated saline injection was negative for intracardiac shunt.   8. No echocardiographic evidence of vegetations.      < end of copied text >

## 2024-12-24 NOTE — PROGRESS NOTE ADULT - PROBLEM SELECTOR PLAN 4
- Mild transaminitis on admission  - Alk phos 180, AST 49, ALT 47  - RESOLVED - Home medication: Finasteride 5mg QD  - C/w home medication

## 2024-12-25 LAB
ALBUMIN SERPL ELPH-MCNC: 2.8 G/DL — LOW (ref 3.3–5)
ALP SERPL-CCNC: 134 U/L — HIGH (ref 40–120)
ALT FLD-CCNC: 53 U/L — HIGH (ref 4–41)
ANION GAP SERPL CALC-SCNC: 11 MMOL/L — SIGNIFICANT CHANGE UP (ref 7–14)
AST SERPL-CCNC: 55 U/L — HIGH (ref 4–40)
BASOPHILS # BLD AUTO: 0.04 K/UL — SIGNIFICANT CHANGE UP (ref 0–0.2)
BASOPHILS NFR BLD AUTO: 0.5 % — SIGNIFICANT CHANGE UP (ref 0–2)
BILIRUB SERPL-MCNC: 0.2 MG/DL — SIGNIFICANT CHANGE UP (ref 0.2–1.2)
BUN SERPL-MCNC: 26 MG/DL — HIGH (ref 7–23)
CALCIUM SERPL-MCNC: 8.9 MG/DL — SIGNIFICANT CHANGE UP (ref 8.4–10.5)
CHLORIDE SERPL-SCNC: 100 MMOL/L — SIGNIFICANT CHANGE UP (ref 98–107)
CO2 SERPL-SCNC: 23 MMOL/L — SIGNIFICANT CHANGE UP (ref 22–31)
CREAT SERPL-MCNC: 0.91 MG/DL — SIGNIFICANT CHANGE UP (ref 0.5–1.3)
EGFR: 84 ML/MIN/1.73M2 — SIGNIFICANT CHANGE UP
EOSINOPHIL # BLD AUTO: 0.68 K/UL — HIGH (ref 0–0.5)
EOSINOPHIL NFR BLD AUTO: 8.7 % — HIGH (ref 0–6)
GLUCOSE SERPL-MCNC: 87 MG/DL — SIGNIFICANT CHANGE UP (ref 70–99)
HCT VFR BLD CALC: 31.3 % — LOW (ref 39–50)
HGB BLD-MCNC: 10.2 G/DL — LOW (ref 13–17)
IANC: 5.25 K/UL — SIGNIFICANT CHANGE UP (ref 1.8–7.4)
IMM GRANULOCYTES NFR BLD AUTO: 1 % — HIGH (ref 0–0.9)
LYMPHOCYTES # BLD AUTO: 0.96 K/UL — LOW (ref 1–3.3)
LYMPHOCYTES # BLD AUTO: 12.3 % — LOW (ref 13–44)
MAGNESIUM SERPL-MCNC: 2.2 MG/DL — SIGNIFICANT CHANGE UP (ref 1.6–2.6)
MCHC RBC-ENTMCNC: 28.8 PG — SIGNIFICANT CHANGE UP (ref 27–34)
MCHC RBC-ENTMCNC: 32.6 G/DL — SIGNIFICANT CHANGE UP (ref 32–36)
MCV RBC AUTO: 88.4 FL — SIGNIFICANT CHANGE UP (ref 80–100)
MONOCYTES # BLD AUTO: 0.8 K/UL — SIGNIFICANT CHANGE UP (ref 0–0.9)
MONOCYTES NFR BLD AUTO: 10.2 % — SIGNIFICANT CHANGE UP (ref 2–14)
NEUTROPHILS # BLD AUTO: 5.25 K/UL — SIGNIFICANT CHANGE UP (ref 1.8–7.4)
NEUTROPHILS NFR BLD AUTO: 67.3 % — SIGNIFICANT CHANGE UP (ref 43–77)
NRBC # BLD: 0 /100 WBCS — SIGNIFICANT CHANGE UP (ref 0–0)
NRBC # FLD: 0 K/UL — SIGNIFICANT CHANGE UP (ref 0–0)
PHOSPHATE SERPL-MCNC: 3.9 MG/DL — SIGNIFICANT CHANGE UP (ref 2.5–4.5)
PLATELET # BLD AUTO: 305 K/UL — SIGNIFICANT CHANGE UP (ref 150–400)
POTASSIUM SERPL-MCNC: 4.2 MMOL/L — SIGNIFICANT CHANGE UP (ref 3.5–5.3)
POTASSIUM SERPL-SCNC: 4.2 MMOL/L — SIGNIFICANT CHANGE UP (ref 3.5–5.3)
PROT SERPL-MCNC: 7.2 G/DL — SIGNIFICANT CHANGE UP (ref 6–8.3)
RBC # BLD: 3.54 M/UL — LOW (ref 4.2–5.8)
RBC # FLD: 14.6 % — HIGH (ref 10.3–14.5)
SODIUM SERPL-SCNC: 134 MMOL/L — LOW (ref 135–145)
WBC # BLD: 7.81 K/UL — SIGNIFICANT CHANGE UP (ref 3.8–10.5)
WBC # FLD AUTO: 7.81 K/UL — SIGNIFICANT CHANGE UP (ref 3.8–10.5)

## 2024-12-25 PROCEDURE — 99233 SBSQ HOSP IP/OBS HIGH 50: CPT

## 2024-12-25 RX ORDER — HYDROCORTISONE 1 %
1 CREAM (GRAM) TOPICAL
Qty: 0 | Refills: 0 | DISCHARGE
Start: 2024-12-25

## 2024-12-25 RX ORDER — TRIAMCINOLONE ACETONIDE 0.15 MG/G
1 AEROSOL, SPRAY TOPICAL
Qty: 0 | Refills: 0 | DISCHARGE
Start: 2024-12-25

## 2024-12-25 RX ORDER — SODIUM HYPOCHLORITE 0.057 %
1 SOLUTION, IRRIGATION IRRIGATION
Qty: 0 | Refills: 0 | DISCHARGE
Start: 2024-12-25

## 2024-12-25 RX ORDER — MUPIROCIN 2 %
1 OINTMENT (GRAM) TOPICAL
Qty: 0 | Refills: 0 | DISCHARGE
Start: 2024-12-25

## 2024-12-25 RX ORDER — KETOCONAZOLE 2 %
1 CREAM (GRAM) TOPICAL
Qty: 0 | Refills: 0 | DISCHARGE
Start: 2024-12-25

## 2024-12-25 RX ADMIN — SODIUM CHLORIDE 2 GRAM(S): 9 INJECTION, SOLUTION INTRAMUSCULAR; INTRAVENOUS; SUBCUTANEOUS at 06:22

## 2024-12-25 RX ADMIN — Medication 1 APPLICATION(S): at 12:38

## 2024-12-25 RX ADMIN — Medication 1 APPLICATION(S): at 18:31

## 2024-12-25 RX ADMIN — ENOXAPARIN SODIUM 40 MILLIGRAM(S): 60 INJECTION INTRAVENOUS; SUBCUTANEOUS at 21:58

## 2024-12-25 RX ADMIN — Medication 17 GRAM(S): at 12:35

## 2024-12-25 RX ADMIN — GABAPENTIN 600 MILLIGRAM(S): 300 CAPSULE ORAL at 18:29

## 2024-12-25 RX ADMIN — ALLOPURINOL 100 MILLIGRAM(S): 100 TABLET ORAL at 12:35

## 2024-12-25 RX ADMIN — GABAPENTIN 600 MILLIGRAM(S): 300 CAPSULE ORAL at 06:22

## 2024-12-25 RX ADMIN — Medication 1 APPLICATION(S): at 06:23

## 2024-12-25 RX ADMIN — CHLORHEXIDINE GLUCONATE 1 APPLICATION(S): 1.2 RINSE ORAL at 12:36

## 2024-12-25 RX ADMIN — Medication 1 TABLET(S): at 12:35

## 2024-12-25 RX ADMIN — Medication 1 APPLICATION(S): at 18:30

## 2024-12-25 RX ADMIN — SODIUM CHLORIDE 2 GRAM(S): 9 INJECTION, SOLUTION INTRAMUSCULAR; INTRAVENOUS; SUBCUTANEOUS at 18:30

## 2024-12-25 RX ADMIN — Medication 5 MILLIGRAM(S): at 12:36

## 2024-12-25 RX ADMIN — Medication 1 APPLICATION(S): at 12:37

## 2024-12-25 RX ADMIN — VANCOMYCIN HYDROCHLORIDE 166.67 MILLIGRAM(S): 5 INJECTION, POWDER, LYOPHILIZED, FOR SOLUTION INTRAVENOUS at 02:47

## 2024-12-25 RX ADMIN — TRIAMCINOLONE ACETONIDE 1 APPLICATION(S): 0.15 AEROSOL, SPRAY TOPICAL at 06:24

## 2024-12-25 RX ADMIN — DULOXETINE HYDROCHLORIDE 20 MILLIGRAM(S): 30 CAPSULE, DELAYED RELEASE ORAL at 12:36

## 2024-12-25 RX ADMIN — TRIAMCINOLONE ACETONIDE 1 APPLICATION(S): 0.15 AEROSOL, SPRAY TOPICAL at 18:30

## 2024-12-25 NOTE — PROGRESS NOTE ADULT - PROBLEM SELECTOR PLAN 1
Febrile, tachycardic, and leukocytosis.  - BCx 12/19: MRSA  - BCx 12/21 NGTD  - Decubitus ulcer culture 12/20: MRSA  - CT chest: multifocal PNA  - Neurosurgery consulted given hx of spinal hardware: no surgical intervention at this time.  - Wound care following for decubitus ulcers  - ID following  - DC'd Zosyn  - C/w Vancomycin (vanc dosed at 1250mg q24 per ID)  - MRI lumbosacral w/ albert was unable to distinguish treated sterile abscess from continued infection. Made nsgy aware.   - Pending AIDEE. Cleared by dental on 12/23 Febrile, tachycardic, and leukocytosis upon admission  - BCx 12/19: MRSA  - BCx 12/21 NGTD  - Decubitus ulcer culture 12/20: MRSA  - CT chest suggestive of multifocal PNA  - Neurosurgery consulted given hx of spinal hardware: no surgical intervention at this time.  - Wound care following for decubitus ulcers  - ID following  - DC'd Zosyn. C/w Vancomycin   - MRI lumbosacral w/ albert was unable to distinguish treated sterile abscess from continued infection. Made nsgy aware.   - AIDEE showed no signs of vegetation    PLAN:  -Pending PICC line for extended abx Febrile, tachycardic, and leukocytosis upon admission. Sepsis RESOLVED.  - BCx 12/19: MRSA  - BCx 12/21 NGTD  - Decubitus ulcer culture 12/20: MRSA  - CT chest suggestive of multifocal PNA  - Neurosurgery consulted given hx of spinal hardware: no surgical intervention at this time.   - ID following  - DC'd Zosyn.   - C/w Vancomycin for 6 weeks until 2/1, per ID  - MRI lumbosacral w/ albert was unable to distinguish treated sterile abscess from continued infection. Made nsgy aware.   - AIDEE showed no signs of vegetation    PLAN:  -Pending PICC line for extended abx

## 2024-12-25 NOTE — PROVIDER CONTACT NOTE (CRITICAL VALUE NOTIFICATION) - SITUATION
Abscess Culture 12/20 Final report 12/22 numerous MRSA, few prevotella bivia. Susceptibility not preformed.
aerobic blood culture bottle from 12/19 growing gram positive cocci in clusters also wound abscess culture growing numerous polymorphonuclear leukocytes and few gram positive cocci in clusters
both aerobic and anaerobic blood culture bottles growing gram positive cocci in clusters.
blood culture from 12/19 showed growth in aerobic bottles gram + cocci in clusters. sputum culture cancelled due to oropharyn contamination
both aerobic and anaerobic blood culture bottles growth and mrsa

## 2024-12-25 NOTE — PROVIDER CONTACT NOTE (CRITICAL VALUE NOTIFICATION) - PERSON GIVING RESULT:
Barbie Uribe/[facility]
Doris Stover
Anderson Currie/[facility] SERGIO Zabala

## 2024-12-25 NOTE — PROVIDER CONTACT NOTE (CRITICAL VALUE NOTIFICATION) - ASSESSMENT
pt is alert and oriented. denies any discomfort.
Patient assessment complete. No signs or symptoms of distress noted.
Patient assessed. No signs or symptoms of acute distress noted.
pt is alert and oriented. denies any discomfort.
pt is alert and oriented. denies any discomfort.

## 2024-12-25 NOTE — PROGRESS NOTE ADULT - SUBJECTIVE AND OBJECTIVE BOX
INTERVAL HPI/OVERNIGHT EVENTS: None    SUBJECTIVE: Patient seen and examined at bedside. Patient resting comfortably in bed with no acute complaints. Patient denies any fever, chills, chest pain, SOB, palpitations, abdominal pain, nausea, vomiting, or diarrhea.    ROS: All negative except as listed above.    VITAL SIGNS:  ICU Vital Signs Last 24 Hrs  T(C): 36.4 (25 Dec 2024 05:30), Max: 36.7 (24 Dec 2024 22:33)  T(F): 97.6 (25 Dec 2024 05:30), Max: 98 (24 Dec 2024 22:33)  HR: 68 (25 Dec 2024 05:30) (65 - 81)  BP: 147/55 (25 Dec 2024 05:30) (85/57 - 147/55)  BP(mean): --  ABP: --  ABP(mean): --  RR: 17 (25 Dec 2024 05:30) (15 - 18)  SpO2: 100% (25 Dec 2024 05:30) (94% - 100%)    O2 Parameters below as of 25 Dec 2024 05:30  Patient On (Oxygen Delivery Method): room air            Plateau pressure:   P/F ratio:     12-23 @ 07:01  -  12-24 @ 07:00  --------------------------------------------------------  IN: 0 mL / OUT: 350 mL / NET: -350 mL      CAPILLARY BLOOD GLUCOSE          ECG: reviewed.    PHYSICAL EXAM:  General: WN/WD NAD  HEENT: PERRLA, EOMI, moist mucous membranes  Respiratory: CTA B/L, normal respiratory effort, no wheezes, crackles, rales  CV: RRR, S1S2, no murmurs, rubs or gallops  Abdominal: Soft, NT, ND +BS   Extremities: No edema, + peripheral pulses   Neurology: A&Ox3, nonfocal, BALLESTEROS x 4  Skin: No rashes or lesions noted.    MEDICATIONS:  MEDICATIONS  (STANDING):  allopurinol 100 milliGRAM(s) Oral daily  chlorhexidine 2% Cloths 1 Application(s) Topical daily  Dakins Solution - 1/4 Strength 1 Application(s) Topical daily  DULoxetine 20 milliGRAM(s) Oral daily  enoxaparin Injectable 40 milliGRAM(s) SubCutaneous every 24 hours  finasteride 5 milliGRAM(s) Oral daily  gabapentin 600 milliGRAM(s) Oral three times a day  hydrocortisone 2.5% Ointment 1 Application(s) Topical two times a day  influenza  Vaccine (HIGH DOSE) 0.5 milliLiter(s) IntraMuscular once  ketoconazole 2% Cream 1 Application(s) Topical two times a day  ketoconazole 2% Shampoo 1 Application(s) Topical daily  lidocaine   4% Patch 1 Patch Transdermal daily  multivitamin 1 Tablet(s) Oral daily  mupirocin 2% Ointment 1 Application(s) Topical two times a day  polyethylene glycol 3350 17 Gram(s) Oral daily  senna 2 Tablet(s) Oral at bedtime  sodium chloride 2 Gram(s) Oral every 12 hours  sodium chloride 0.9%. 1000 milliLiter(s) (70 mL/Hr) IV Continuous <Continuous>  triamcinolone 0.1% Cream 1 Application(s) Topical two times a day  vancomycin  IVPB 1250 milliGRAM(s) IV Intermittent every 24 hours    MEDICATIONS  (PRN):  acetaminophen   Oral Liquid .. 650 milliGRAM(s) Oral every 6 hours PRN Temp greater or equal to 38C (100.4F), Mild Pain (1 - 3)  albuterol    90 MICROgram(s) HFA Inhaler 2 Puff(s) Inhalation every 6 hours PRN Shortness of Breath and/or Wheezing  guaiFENesin Oral Liquid (Sugar-Free) 200 milliGRAM(s) Oral every 6 hours PRN Cough      ALLERGIES:  Allergies    No Known Allergies    Intolerances        LABS:                        9.5    8.54  )-----------( 305      ( 24 Dec 2024 04:00 )             28.4     12-24    134[L]  |  100  |  21  ----------------------------<  96  4.0   |  24  |  0.90    Ca    8.5      24 Dec 2024 04:00  Phos  3.7     12-24  Mg     2.00     12-24    TPro  6.6  /  Alb  2.6[L]  /  TBili  0.2  /  DBili  x   /  AST  33  /  ALT  41  /  AlkPhos  124[H]  12-24    PT/INR - ( 24 Dec 2024 04:00 )   PT: 12.2 sec;   INR: 1.03 ratio         PTT - ( 24 Dec 2024 04:00 )  PTT:42.0 sec  Urinalysis Basic - ( 24 Dec 2024 04:00 )    Color: x / Appearance: x / SG: x / pH: x  Gluc: 96 mg/dL / Ketone: x  / Bili: x / Urobili: x   Blood: x / Protein: x / Nitrite: x   Leuk Esterase: x / RBC: x / WBC x   Sq Epi: x / Non Sq Epi: x / Bacteria: x      ABG:      vBG:    Micro:    Culture - Blood (collected 12-21-24 @ 06:00)  Source: .Blood BLOOD  Preliminary Report (12-24-24 @ 10:01):    No growth at 72 Hours    Culture - Blood (collected 12-19-24 @ 13:15)  Source: .Blood BLOOD  Gram Stain (12-21-24 @ 06:59):    Growth in aerobic bottle: Gram Positive Cocci in Clusters    Growth in anaerobic bottle: Gram Positive Cocci in Clusters  Final Report (12-22-24 @ 08:28):    Growth in aerobic and anaerobic bottles: Methicillin Resistant    Staphylococcus aureus    See previous culture [id number]    Culture - Blood (collected 12-19-24 @ 13:04)  Source: .Blood BLOOD  Gram Stain (12-20-24 @ 23:25):    Growth in aerobic bottle: Gram Positive Cocci in Clusters    Growth in anaerobic bottle: Gram Positive Cocci in Clusters  Final Report (12-22-24 @ 08:27):    Growth in aerobic and anaerobic bottles: Methicillin Resistant    Staphylococcus aureus    Direct identification is available within approximately 3-5    hours either by Blood Panel Multiplexed PCR or Direct    MALDI-TOF. Details: [email or web address]  Organism: Blood Culture PCR  Methicillin resistant Staphylococcus aureus (12-22-24 @ 08:27)  Organism: Methicillin resistant Staphylococcus aureus (12-22-24 @ 08:27)      Method Type: JULY      -  Clindamycin: S <=0.25      -  Daptomycin: S 1      -  Erythromycin: R >4      -  Gentamicin: S <=4 Should not be used as monotherapy      -  Linezolid: S 2      -  Oxacillin: R >2      -  Penicillin: R >2      -  Rifampin: S <=1 Should not be used as monotherapy      -  Tetracycline: S <=4      -  Trimethoprim/Sulfamethoxazole: S <=0.5/9.5      -  Vancomycin: S 1  Organism: Blood Culture PCR (12-22-24 @ 08:27)      Method Type: PCR      -  Methicillin resistant Staphylococcus aureus (MRSA): Detec       Urinalysis with Rflx Culture (collected 12-20-24 @ 03:00)       Culture - Sputum (collected 12-20-24 @ 19:25)  Source: .Sputum Sputum  Gram Stain (12-21-24 @ 06:38):    Moderate polymorphonuclear leukocytes per low power field    Rare Squamous epithelial cells per low power field    Rare Gram positive cocci in pairs seen per oil power field    Rare Gram Positive Rods seen per oil power field    Rare Gram Negative Rods seen per oil power field    Rare Yeast like cells seen per oil power field  Final Report (12-22-24 @ 11:53):    Commensal loren consistent with body site        RADIOLOGY & ADDITIONAL TESTS: Reviewed.

## 2024-12-25 NOTE — PROGRESS NOTE ADULT - PROBLEM SELECTOR PLAN 2
<-- Click to add NO pertinent Family History Scaly, erythematous facial rash. Pruritic.  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 Derm to see pt 12/22 will determine if biopsy needed.  - Topical ketoconazole 2% cream/HC2.5% mix on face  - Ketoconazole 2%/Triamcinolone 0.1% mix on body

## 2024-12-25 NOTE — PROVIDER CONTACT NOTE (CRITICAL VALUE NOTIFICATION) - TEST AND RESULT REPORTED:
blood culture from 12/19 showed growth in aerobic bottles gram + cocci in clusters. sputum culture cancelled due to oropharyn contamination
positive blood culture and wound abscess culture
positive blood culture
Abscess Culture 12/20
both aerobic and anaerobic blood culture bottles growth and mrsa

## 2024-12-25 NOTE — PROGRESS NOTE ADULT - PROBLEM SELECTOR PLAN 6
DVT Prophylaxis: Lovenox 40mg QD  DIET: Mechanical soft diet  DISPO: Pending clinical course  DNR/DNI: Trial NIV

## 2024-12-25 NOTE — PROVIDER CONTACT NOTE (CRITICAL VALUE NOTIFICATION) - ACTION/TREATMENT ORDERED:
MD bertrand kraemr aware no new interventions at this time
MD notified and made aware. No new interventions ordered at this time.
pt is on vancomycin and zosyn iv. will continue monitoring.
pt is on iv vanco and zosyn. will continue monitoring.
MD Tong Bernardo made aware. No new interventions ordered at this time.

## 2024-12-25 NOTE — PROVIDER CONTACT NOTE (CRITICAL VALUE NOTIFICATION) - BACKGROUND
84 year old male admitted for pneumonia. Histiry fo BPH, gout, and spine osteomyelitis.
pt is admitted with pneumonia
Patient admitted with pneumonia due to infectious organism.  PMH of BPH, gout, HTN

## 2024-12-26 DIAGNOSIS — G06.2 EXTRADURAL AND SUBDURAL ABSCESS, UNSPECIFIED: ICD-10-CM

## 2024-12-26 LAB
ALBUMIN SERPL ELPH-MCNC: 2.7 G/DL — LOW (ref 3.3–5)
ALP SERPL-CCNC: 124 U/L — HIGH (ref 40–120)
ALT FLD-CCNC: 44 U/L — HIGH (ref 4–41)
ANION GAP SERPL CALC-SCNC: 10 MMOL/L — SIGNIFICANT CHANGE UP (ref 7–14)
APTT BLD: 41.2 SEC — HIGH (ref 24.5–35.6)
AST SERPL-CCNC: 40 U/L — SIGNIFICANT CHANGE UP (ref 4–40)
BILIRUB SERPL-MCNC: 0.3 MG/DL — SIGNIFICANT CHANGE UP (ref 0.2–1.2)
BLD GP AB SCN SERPL QL: NEGATIVE — SIGNIFICANT CHANGE UP
BUN SERPL-MCNC: 29 MG/DL — HIGH (ref 7–23)
CALCIUM SERPL-MCNC: 9.2 MG/DL — SIGNIFICANT CHANGE UP (ref 8.4–10.5)
CHLORIDE SERPL-SCNC: 97 MMOL/L — LOW (ref 98–107)
CO2 SERPL-SCNC: 23 MMOL/L — SIGNIFICANT CHANGE UP (ref 22–31)
CREAT SERPL-MCNC: 0.85 MG/DL — SIGNIFICANT CHANGE UP (ref 0.5–1.3)
CULTURE RESULTS: SIGNIFICANT CHANGE UP
EGFR: 87 ML/MIN/1.73M2 — SIGNIFICANT CHANGE UP
GLUCOSE SERPL-MCNC: 120 MG/DL — HIGH (ref 70–99)
HCT VFR BLD CALC: 28.7 % — LOW (ref 39–50)
HGB BLD-MCNC: 9.9 G/DL — LOW (ref 13–17)
INR BLD: 0.98 RATIO — SIGNIFICANT CHANGE UP (ref 0.85–1.16)
MAGNESIUM SERPL-MCNC: 2 MG/DL — SIGNIFICANT CHANGE UP (ref 1.6–2.6)
MCHC RBC-ENTMCNC: 29.2 PG — SIGNIFICANT CHANGE UP (ref 27–34)
MCHC RBC-ENTMCNC: 34.5 G/DL — SIGNIFICANT CHANGE UP (ref 32–36)
MCV RBC AUTO: 84.7 FL — SIGNIFICANT CHANGE UP (ref 80–100)
NRBC # BLD: 0 /100 WBCS — SIGNIFICANT CHANGE UP (ref 0–0)
NRBC # FLD: 0 K/UL — SIGNIFICANT CHANGE UP (ref 0–0)
PHOSPHATE SERPL-MCNC: 3.9 MG/DL — SIGNIFICANT CHANGE UP (ref 2.5–4.5)
PLATELET # BLD AUTO: 332 K/UL — SIGNIFICANT CHANGE UP (ref 150–400)
POTASSIUM SERPL-MCNC: 4.5 MMOL/L — SIGNIFICANT CHANGE UP (ref 3.5–5.3)
POTASSIUM SERPL-SCNC: 4.5 MMOL/L — SIGNIFICANT CHANGE UP (ref 3.5–5.3)
PROT SERPL-MCNC: 7.1 G/DL — SIGNIFICANT CHANGE UP (ref 6–8.3)
PROTHROM AB SERPL-ACNC: 11.7 SEC — SIGNIFICANT CHANGE UP (ref 9.9–13.4)
RBC # BLD: 3.39 M/UL — LOW (ref 4.2–5.8)
RBC # FLD: 14.2 % — SIGNIFICANT CHANGE UP (ref 10.3–14.5)
RH IG SCN BLD-IMP: POSITIVE — SIGNIFICANT CHANGE UP
SODIUM SERPL-SCNC: 130 MMOL/L — LOW (ref 135–145)
SPECIMEN SOURCE: SIGNIFICANT CHANGE UP
VANCOMYCIN TROUGH SERPL-MCNC: 20.2 UG/ML — HIGH (ref 10–20)
WBC # BLD: 8.48 K/UL — SIGNIFICANT CHANGE UP (ref 3.8–10.5)
WBC # FLD AUTO: 8.48 K/UL — SIGNIFICANT CHANGE UP (ref 3.8–10.5)

## 2024-12-26 PROCEDURE — 99232 SBSQ HOSP IP/OBS MODERATE 35: CPT | Mod: GC

## 2024-12-26 PROCEDURE — 99232 SBSQ HOSP IP/OBS MODERATE 35: CPT

## 2024-12-26 PROCEDURE — 99223 1ST HOSP IP/OBS HIGH 75: CPT

## 2024-12-26 RX ORDER — SODIUM CHLORIDE 9 MG/ML
1000 INJECTION, SOLUTION INTRAMUSCULAR; INTRAVENOUS; SUBCUTANEOUS
Refills: 0 | Status: DISCONTINUED | OUTPATIENT
Start: 2024-12-26 | End: 2024-12-31

## 2024-12-26 RX ORDER — SODIUM CHLORIDE 9 MG/ML
1000 INJECTION, SOLUTION INTRAVENOUS
Refills: 0 | Status: DISCONTINUED | OUTPATIENT
Start: 2024-12-26 | End: 2024-12-26

## 2024-12-26 RX ORDER — GUAIFENESIN/DEXTROMETHORPHAN 200-10MG/5
10 LIQUID (ML) ORAL
Refills: 0 | DISCHARGE

## 2024-12-26 RX ORDER — VANCOMYCIN HYDROCHLORIDE 5 G/100ML
1 INJECTION, POWDER, LYOPHILIZED, FOR SOLUTION INTRAVENOUS
Qty: 0 | Refills: 0 | DISCHARGE
Start: 2024-12-26

## 2024-12-26 RX ORDER — ALBUTEROL SULFATE 90 UG/1
3 INHALANT RESPIRATORY (INHALATION)
Refills: 0 | DISCHARGE

## 2024-12-26 RX ORDER — VANCOMYCIN HYDROCHLORIDE 5 G/100ML
1000 INJECTION, POWDER, LYOPHILIZED, FOR SOLUTION INTRAVENOUS EVERY 24 HOURS
Refills: 0 | Status: DISCONTINUED | OUTPATIENT
Start: 2024-12-27 | End: 2024-12-29

## 2024-12-26 RX ADMIN — Medication 1 APPLICATION(S): at 05:10

## 2024-12-26 RX ADMIN — SODIUM CHLORIDE 75 MILLILITER(S): 9 INJECTION, SOLUTION INTRAMUSCULAR; INTRAVENOUS; SUBCUTANEOUS at 08:32

## 2024-12-26 RX ADMIN — Medication 1 APPLICATION(S): at 12:40

## 2024-12-26 RX ADMIN — SODIUM CHLORIDE 2 GRAM(S): 9 INJECTION, SOLUTION INTRAMUSCULAR; INTRAVENOUS; SUBCUTANEOUS at 05:09

## 2024-12-26 RX ADMIN — DULOXETINE HYDROCHLORIDE 20 MILLIGRAM(S): 30 CAPSULE, DELAYED RELEASE ORAL at 12:39

## 2024-12-26 RX ADMIN — Medication 1 APPLICATION(S): at 18:46

## 2024-12-26 RX ADMIN — ENOXAPARIN SODIUM 40 MILLIGRAM(S): 60 INJECTION INTRAVENOUS; SUBCUTANEOUS at 21:32

## 2024-12-26 RX ADMIN — SENNOSIDES 2 TABLET(S): 8.6 TABLET, FILM COATED ORAL at 21:32

## 2024-12-26 RX ADMIN — GABAPENTIN 600 MILLIGRAM(S): 300 CAPSULE ORAL at 13:06

## 2024-12-26 RX ADMIN — VANCOMYCIN HYDROCHLORIDE 166.67 MILLIGRAM(S): 5 INJECTION, POWDER, LYOPHILIZED, FOR SOLUTION INTRAVENOUS at 01:01

## 2024-12-26 RX ADMIN — CHLORHEXIDINE GLUCONATE 1 APPLICATION(S): 1.2 RINSE ORAL at 13:07

## 2024-12-26 RX ADMIN — SODIUM CHLORIDE 2 GRAM(S): 9 INJECTION, SOLUTION INTRAMUSCULAR; INTRAVENOUS; SUBCUTANEOUS at 18:47

## 2024-12-26 RX ADMIN — GABAPENTIN 600 MILLIGRAM(S): 300 CAPSULE ORAL at 21:32

## 2024-12-26 RX ADMIN — TRIAMCINOLONE ACETONIDE 1 APPLICATION(S): 0.15 AEROSOL, SPRAY TOPICAL at 05:09

## 2024-12-26 RX ADMIN — TRIAMCINOLONE ACETONIDE 1 APPLICATION(S): 0.15 AEROSOL, SPRAY TOPICAL at 18:46

## 2024-12-26 RX ADMIN — Medication 1 TABLET(S): at 12:39

## 2024-12-26 RX ADMIN — LIDOCAINE 1 PATCH: 50 OINTMENT TOPICAL at 12:44

## 2024-12-26 RX ADMIN — GABAPENTIN 600 MILLIGRAM(S): 300 CAPSULE ORAL at 10:25

## 2024-12-26 RX ADMIN — GABAPENTIN 600 MILLIGRAM(S): 300 CAPSULE ORAL at 01:00

## 2024-12-26 RX ADMIN — Medication 17 GRAM(S): at 12:40

## 2024-12-26 RX ADMIN — Medication 5 MILLIGRAM(S): at 12:39

## 2024-12-26 RX ADMIN — Medication 1 APPLICATION(S): at 05:11

## 2024-12-26 RX ADMIN — ALLOPURINOL 100 MILLIGRAM(S): 100 TABLET ORAL at 12:39

## 2024-12-26 NOTE — CONSULT NOTE ADULT - CONSULT REASON
Rash
sepsis
Sacrum unstageable pressure injury  Right buttock abscess- wound culture obtained
clearance
concern for hardware infection i/s/o sepsis
rash

## 2024-12-26 NOTE — CHART NOTE - NSCHARTNOTEFT_GEN_A_CORE
NUTRITION FOLLOW UP NOTE    Pt seen for severe malnutrition f/u    SOURCE: [x] Patient [x] Medical record [] RN/PCA [] Family/Caregiver []Patient unavailable [] Other:    Medical Course: 84 year old male with a PMH of HTN presenting from [facility] for SOB and lethargy.  Found to be septic due to MRSA bacteremia, decubitus ulcers, and multifocal PNA, pending JAN per chart.    Diet Prescription: Diet, Regular:   Minced and Moist (MINCEDMOIST)  Supplement Feeding Modality:  Oral  Ensure Plus High Protein Cans or Servings Per Day:  1       Frequency:  Two Times a day (12-24-24 @ 11:48)    Nutrition Course: Patient seen during breakfast. Consumed a few bites of food, but did finish ensure supplement. No GI distress or intolerances to diet consistency reported. Last bowel movement (12/25) per RN flow sheet. No edema noted per RN flow sheet. Per wound care (12/24), noted w/ improving sacrum unstageable pressure injury.    Pertinent Medications: MEDICATIONS  (STANDING):  allopurinol 100 milliGRAM(s) Oral daily  chlorhexidine 2% Cloths 1 Application(s) Topical daily  Dakins Solution - 1/4 Strength 1 Application(s) Topical daily  DULoxetine 20 milliGRAM(s) Oral daily  enoxaparin Injectable 40 milliGRAM(s) SubCutaneous every 24 hours  finasteride 5 milliGRAM(s) Oral daily  gabapentin 600 milliGRAM(s) Oral three times a day  hydrocortisone 2.5% Ointment 1 Application(s) Topical two times a day  influenza  Vaccine (HIGH DOSE) 0.5 milliLiter(s) IntraMuscular once  ketoconazole 2% Cream 1 Application(s) Topical two times a day  ketoconazole 2% Shampoo 1 Application(s) Topical daily  lidocaine   4% Patch 1 Patch Transdermal daily  multivitamin 1 Tablet(s) Oral daily  mupirocin 2% Ointment 1 Application(s) Topical two times a day  polyethylene glycol 3350 17 Gram(s) Oral daily  senna 2 Tablet(s) Oral at bedtime  sodium chloride 2 Gram(s) Oral every 12 hours  sodium chloride 0.9%. 1000 milliLiter(s) (75 mL/Hr) IV Continuous <Continuous>  triamcinolone 0.1% Cream 1 Application(s) Topical two times a day    MEDICATIONS  (PRN):  acetaminophen   Oral Liquid .. 650 milliGRAM(s) Oral every 6 hours PRN Temp greater or equal to 38C (100.4F), Mild Pain (1 - 3)  albuterol    90 MICROgram(s) HFA Inhaler 2 Puff(s) Inhalation every 6 hours PRN Shortness of Breath and/or Wheezing  guaiFENesin Oral Liquid (Sugar-Free) 200 milliGRAM(s) Oral every 6 hours PRN Cough    Pertinent Labs: 12-26 Na130 mmol/L[L] Glu 120 mg/dL[H] K+ 4.5 mmol/L Cr  0.85 mg/dL BUN 29 mg/dL[H] 12-26 Phos 3.9 mg/dL 12-26 Alb 2.7 g/dL[L]    A1C with Estimated Average Glucose Result: 5.1 % (08-10-24 @ 01:18)  A1C with Estimated Average Glucose Result: 4.9 % (07-20-24 @ 05:55)    CAPILLARY BLOOD GLUCOSE    Weight: Weight (kg): 58.6 (12-24 @ 09:01)  58.6 kg (12/19)  Weight Assessment: stable weight    Estimated Needs: [x] no change since previous assessment: based on weight 58.6 kg  Calories - 1,758-2,051 kcal (30-35 kcal/kg)  Protein - 70-88 g pro (1.2-1.5 g/kg)    Previous Nutrition Diagnosis: Severe malnutrition  Nutrition Diagnosis is [x ] ongoing  [ ] resolved [ ] not applicable   New Nutrition Diagnosis: [x ] not applicable     Education:  [ ] Provided pt with verbal / written education on   [ ] Provided on previous assessment by RD  [x ] Not applicable  [ ] Pt refused     Interventions:   - swallow evaluation to assess dysphagia diet  - continue w/ ensure plus high protein BID (700 kcal, 40 g pro)  - nutrition department will provide magic cup 1x daily (290 kcal, 9 g pro)  - continue w/ multivitamin  - obtain weekly weight    Monitor & Evaluate:  PO intake, tolerance to diet/supplement, nutrition related lab values, weight trends, BMs/GI distress, hydration status, skin integrity.    Vitaly Slater, [id number] or TEAMS

## 2024-12-26 NOTE — CONSULT NOTE ADULT - REASON FOR ADMISSION
SOB and lethargy

## 2024-12-26 NOTE — PROGRESS NOTE ADULT - SUBJECTIVE AND OBJECTIVE BOX
Follow Up:  sepsis, MRSA bacteremia    Interval History/ROS: pt afebrile and no vomiting or SOB, facial rash has improved          Allergies  No Known Allergies        ANTIMICROBIALS:      OTHER MEDS:  acetaminophen   Oral Liquid .. 650 milliGRAM(s) Oral every 6 hours PRN  albuterol    90 MICROgram(s) HFA Inhaler 2 Puff(s) Inhalation every 6 hours PRN  allopurinol 100 milliGRAM(s) Oral daily  chlorhexidine 2% Cloths 1 Application(s) Topical daily  Dakins Solution - 1/4 Strength 1 Application(s) Topical daily  DULoxetine 20 milliGRAM(s) Oral daily  enoxaparin Injectable 40 milliGRAM(s) SubCutaneous every 24 hours  finasteride 5 milliGRAM(s) Oral daily  gabapentin 600 milliGRAM(s) Oral three times a day  guaiFENesin Oral Liquid (Sugar-Free) 200 milliGRAM(s) Oral every 6 hours PRN  hydrocortisone 2.5% Ointment 1 Application(s) Topical two times a day  influenza  Vaccine (HIGH DOSE) 0.5 milliLiter(s) IntraMuscular once  ketoconazole 2% Cream 1 Application(s) Topical two times a day  ketoconazole 2% Shampoo 1 Application(s) Topical daily  lidocaine   4% Patch 1 Patch Transdermal daily  multivitamin 1 Tablet(s) Oral daily  mupirocin 2% Ointment 1 Application(s) Topical two times a day  polyethylene glycol 3350 17 Gram(s) Oral daily  senna 2 Tablet(s) Oral at bedtime  sodium chloride 2 Gram(s) Oral every 12 hours  sodium chloride 0.9%. 1000 milliLiter(s) IV Continuous <Continuous>  triamcinolone 0.1% Cream 1 Application(s) Topical two times a day      Vital Signs Last 24 Hrs  T(C): 36.7 (26 Dec 2024 12:53), Max: 36.7 (26 Dec 2024 12:53)  T(F): 98 (26 Dec 2024 12:53), Max: 98 (26 Dec 2024 12:53)  HR: 75 (26 Dec 2024 12:53) (65 - 75)  BP: 143/77 (26 Dec 2024 12:53) (132/66 - 143/77)  BP(mean): --  RR: 17 (26 Dec 2024 12:53) (17 - 17)  SpO2: 100% (26 Dec 2024 12:53) (100% - 100%)    Parameters below as of 26 Dec 2024 12:53  Patient On (Oxygen Delivery Method): room air        Physical Exam:  General:    NAD, non toxic  Respiratory:  comfortable on RA  abd:   soft, not tender  :    no almodovar  Musculoskeletal : no joint swelling  Skin:   resolved facial rash, torso and back with skin thickening hyperpigmentation and scaling, sacral and R buttock wounds  vascular: no phlebitis                            9.9    8.48  )-----------( 332      ( 26 Dec 2024 02:20 )             28.7       12-26    130[L]  |  97[L]  |  29[H]  ----------------------------<  120[H]  4.5   |  23  |  0.85    Ca    9.2      26 Dec 2024 02:20  Phos  3.9     12-26  Mg     2.00     12-26    TPro  7.1  /  Alb  2.7[L]  /  TBili  0.3  /  DBili  x   /  AST  40  /  ALT  44[H]  /  AlkPhos  124[H]  12-26      Urinalysis Basic - ( 26 Dec 2024 02:20 )    Color: x / Appearance: x / SG: x / pH: x  Gluc: 120 mg/dL / Ketone: x  / Bili: x / Urobili: x   Blood: x / Protein: x / Nitrite: x   Leuk Esterase: x / RBC: x / WBC x   Sq Epi: x / Non Sq Epi: x / Bacteria: x        MICROBIOLOGY:  Vancomycin Level, Trough: 20.2 ug/mL (12-25-24 @ 23:16)  v  .Blood BLOOD  12-21-24   No growth at 5 days  --  --      .Sputum Sputum  12-20-24   Commensal loren consistent with body site  --    Moderate polymorphonuclear leukocytes per low power field  Rare Squamous epithelial cells per low power field  Rare Gram positive cocci in pairs seen per oil power field  Rare Gram Positive Rods seen per oil power field  Rare Gram Negative Rods seen per oil power field  Rare Yeast like cells seen per oil power field      .Abscess  12-20-24   Numerous Methicillin Resistant Staphylococcus aureus  Few Prevotella bivia "Susceptibilities not performed"  --  Methicillin resistant Staphylococcus aureus      .Blood BLOOD  12-19-24   Growth in aerobic and anaerobic bottles: Methicillin Resistant  Staphylococcus aureus  See previous culture [id number]  --    Growth in aerobic bottle: Gram Positive Cocci in Clusters  Growth in anaerobic bottle: Gram Positive Cocci in Clusters      .Blood BLOOD  12-19-24   Growth in aerobic and anaerobic bottles: Methicillin Resistant  Staphylococcus aureus  Direct identification is available within approximately 3-5  hours either by Blood Panel Multiplexed PCR or Direct  MALDI-TOF. 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 Cannot distinguish treated sterile abscess from   continued infection.    < end of copied text >  < from: AIDEE W or WO Ultrasound Enhancing Agent (12.24.24 @ 09:57) >  CONCLUSIONS:      1. Left ventricular systolic function is normal. There are no regional wall motion abnormalities seen.   2. Normal right ventricular cavity size and normal right ventricular systolic function.   3. Structurally normal mitral valve with normal leaflet excursion. No vegetations seen in association with the mitral valve. There is calcification of the mitral valve annulus. There is mild mitral regurgitation.   4. The aortic valve appears trileaflet with normal systolic excursion. There is calcification of the aortic valve leaflets. No vegetations seen in association with the aortic valve. There is moderate to severe aortic regurgitation. Vena contracta width ~ 0.5 cm.   5. No atheroma in the visualized portions of the proximal ascending aorta. Mild non-mobile atheroma in the visualized portions of the transverse aortic arch. Mild non-mobile atheroma in the visualized portions of the descending aorta.   6. The left atrium is normal in size. There is no evidence of left atrial or left atrial appendage thrombus.   7. Agitated saline injection was negative for intracardiac shunt.   8. No echocardiographic evidence of vegetations.    < end of copied text >

## 2024-12-26 NOTE — PROGRESS NOTE ADULT - PROBLEM SELECTOR PLAN 1
Febrile, tachycardic, and leukocytosis upon admission  - BCx 12/19: MRSA  - BCx 12/21 NGTD  - Decubitus ulcer culture 12/20: MRSA  - CT chest suggestive of multifocal PNA  - Neurosurgery consulted given hx of spinal hardware: no surgical intervention at this time.  - Wound care following for decubitus ulcers  - ID following  - DC'd Zosyn. C/w Vancomycin   - MRI lumbosacral w/ albert was unable to distinguish treated sterile abscess from continued infection. Made nsgy aware.   - AIDEE showed no signs of vegetation    PLAN:  -Pending PICC line for extended abx Febrile, tachycardic, and leukocytosis upon admission  - BCx 12/19: MRSA  - BCx 12/21 NGTD  - Decubitus ulcer culture 12/20: MRSA  - CT chest suggestive of multifocal PNA  - Neurosurgery consulted given hx of spinal hardware: no surgical intervention at this time.  - Wound care following for decubitus ulcers  - ID following  - DC'd Zosyn. C/w Vancomycin   - MRI lumbosacral w/ albert was unable to distinguish treated sterile abscess from continued infection. Made nsgy aware.   - AIDEE showed no signs of vegetation  - PICC line placed     PLAN:  - Pending vanc dose given elevated vanc troph of 20.3 on 12/25 Febrile, tachycardic, and leukocytosis upon admission  - BCx 12/19: MRSA  - BCx 12/21 NGTD  - Decubitus ulcer culture 12/20: MRSA  - CT chest suggestive of multifocal PNA  - Neurosurgery consulted given hx of spinal hardware: no surgical intervention at this time.  - Wound care following for decubitus ulcers  - ID following  - DC'd Zosyn. C/w Vancomycin   - MRI lumbosacral w/ albert was unable to distinguish treated sterile abscess from continued infection. Made nsgy aware.   - AIDEE showed no signs of vegetation  - PICC line placed     PLAN:  - Vanc dose adjusted to 1g q24h  - Pending PICC

## 2024-12-26 NOTE — CONSULT NOTE ADULT - ASSESSMENT
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 Nicholas.  Recommendations were communicated with the primary team.    Please page [phone] with a 10-[phone] call-back number for further related questions.  Please re-consult Dermatology for any new or worsening developments.    Margaux Sullivan MD  Resident Physician, PGY-3  [facility] Dermatology  Pager: [phone]  Office: [phone]

## 2024-12-26 NOTE — PROGRESS NOTE ADULT - SUBJECTIVE AND OBJECTIVE BOX
INTERVAL HPI/OVERNIGHT EVENTS: None    SUBJECTIVE: Patient seen and examined at bedside. Patient resting comfortably in bed with no acute complaints. Patient denies any fever, chills, chest pain, SOB, palpitations, abdominal pain, nausea, vomiting, or diarrhea.    ROS: All negative except as listed above.    VITAL SIGNS:  ICU Vital Signs Last 24 Hrs  T(C): 36.4 (26 Dec 2024 05:03), Max: 36.6 (25 Dec 2024 12:48)  T(F): 97.5 (26 Dec 2024 05:03), Max: 97.8 (25 Dec 2024 12:48)  HR: 65 (26 Dec 2024 05:03) (65 - 74)  BP: 132/66 (26 Dec 2024 05:03) (132/66 - 137/64)  BP(mean): --  ABP: --  ABP(mean): --  RR: 17 (26 Dec 2024 05:03) (17 - 17)  SpO2: 100% (26 Dec 2024 05:03) (99% - 100%)    O2 Parameters below as of 26 Dec 2024 05:03  Patient On (Oxygen Delivery Method): room air            Plateau pressure:   P/F ratio:     CAPILLARY BLOOD GLUCOSE          ECG: reviewed.    PHYSICAL EXAM:  General: WN/WD NAD  HEENT: PERRLA, EOMI, moist mucous membranes  Respiratory: CTA B/L, normal respiratory effort, no wheezes, crackles, rales  CV: RRR, S1S2, no murmurs, rubs or gallops  Abdominal: Soft, NT, ND +BS   Extremities: No edema, + peripheral pulses   Neurology: A&Ox3, nonfocal, BALLESTEROS x 4  Skin: No rashes or lesions noted.    MEDICATIONS:  MEDICATIONS  (STANDING):  allopurinol 100 milliGRAM(s) Oral daily  chlorhexidine 2% Cloths 1 Application(s) Topical daily  Dakins Solution - 1/4 Strength 1 Application(s) Topical daily  DULoxetine 20 milliGRAM(s) Oral daily  enoxaparin Injectable 40 milliGRAM(s) SubCutaneous every 24 hours  finasteride 5 milliGRAM(s) Oral daily  gabapentin 600 milliGRAM(s) Oral three times a day  hydrocortisone 2.5% Ointment 1 Application(s) Topical two times a day  influenza  Vaccine (HIGH DOSE) 0.5 milliLiter(s) IntraMuscular once  ketoconazole 2% Cream 1 Application(s) Topical two times a day  ketoconazole 2% Shampoo 1 Application(s) Topical daily  lidocaine   4% Patch 1 Patch Transdermal daily  multivitamin 1 Tablet(s) Oral daily  mupirocin 2% Ointment 1 Application(s) Topical two times a day  polyethylene glycol 3350 17 Gram(s) Oral daily  senna 2 Tablet(s) Oral at bedtime  sodium chloride 2 Gram(s) Oral every 12 hours  sodium chloride 0.9%. 1000 milliLiter(s) (70 mL/Hr) IV Continuous <Continuous>  triamcinolone 0.1% Cream 1 Application(s) Topical two times a day  vancomycin  IVPB 1250 milliGRAM(s) IV Intermittent every 24 hours    MEDICATIONS  (PRN):  acetaminophen   Oral Liquid .. 650 milliGRAM(s) Oral every 6 hours PRN Temp greater or equal to 38C (100.4F), Mild Pain (1 - 3)  albuterol    90 MICROgram(s) HFA Inhaler 2 Puff(s) Inhalation every 6 hours PRN Shortness of Breath and/or Wheezing  guaiFENesin Oral Liquid (Sugar-Free) 200 milliGRAM(s) Oral every 6 hours PRN Cough      ALLERGIES:  Allergies    No Known Allergies    Intolerances        LABS:                        9.9    8.48  )-----------( 332      ( 26 Dec 2024 02:20 )             28.7     12-26    130[L]  |  97[L]  |  29[H]  ----------------------------<  120[H]  4.5   |  23  |  0.85    Ca    9.2      26 Dec 2024 02:20  Phos  3.9     12-26  Mg     2.00     12-26    TPro  7.1  /  Alb  2.7[L]  /  TBili  0.3  /  DBili  x   /  AST  40  /  ALT  44[H]  /  AlkPhos  124[H]  12-26    PT/INR - ( 26 Dec 2024 02:20 )   PT: 11.7 sec;   INR: 0.98 ratio         PTT - ( 26 Dec 2024 02:20 )  PTT:41.2 sec  Urinalysis Basic - ( 26 Dec 2024 02:20 )    Color: x / Appearance: x / SG: x / pH: x  Gluc: 120 mg/dL / Ketone: x  / Bili: x / Urobili: x   Blood: x / Protein: x / Nitrite: x   Leuk Esterase: x / RBC: x / WBC x   Sq Epi: x / Non Sq Epi: x / Bacteria: x      ABG:      vBG:    Micro:    Culture - Blood (collected 12-21-24 @ 06:00)  Source: .Blood BLOOD  Preliminary Report (12-25-24 @ 10:00):    No growth at 4 days    Culture - Blood (collected 12-19-24 @ 13:15)  Source: .Blood BLOOD  Gram Stain (12-21-24 @ 06:59):    Growth in aerobic bottle: Gram Positive Cocci in Clusters    Growth in anaerobic bottle: Gram Positive Cocci in Clusters  Final Report (12-22-24 @ 08:28):    Growth in aerobic and anaerobic bottles: Methicillin Resistant    Staphylococcus aureus    See previous culture [id number]    Culture - Blood (collected 12-19-24 @ 13:04)  Source: .Blood BLOOD  Gram Stain (12-20-24 @ 23:25):    Growth in aerobic bottle: Gram Positive Cocci in Clusters    Growth in anaerobic bottle: Gram Positive Cocci in Clusters  Final Report (12-22-24 @ 08:27):    Growth in aerobic and anaerobic bottles: Methicillin Resistant    Staphylococcus aureus    Direct identification is available within approximately 3-5    hours either by Blood Panel Multiplexed PCR or Direct    MALDI-TOF. Details: [email or web address]  Organism: Blood Culture PCR  Methicillin resistant Staphylococcus aureus (12-22-24 @ 08:27)  Organism: Methicillin resistant Staphylococcus aureus (12-22-24 @ 08:27)      Method Type: JULY      -  Clindamycin: S <=0.25      -  Daptomycin: S 1      -  Erythromycin: R >4      -  Gentamicin: S <=4 Should not be used as monotherapy      -  Linezolid: S 2      -  Oxacillin: R >2      -  Penicillin: R >2      -  Rifampin: S <=1 Should not be used as monotherapy      -  Tetracycline: S <=4      -  Trimethoprim/Sulfamethoxazole: S <=0.5/9.5      -  Vancomycin: S 1  Organism: Blood Culture PCR (12-22-24 @ 08:27)      Method Type: PCR      -  Methicillin resistant Staphylococcus aureus (MRSA): Detec       Urinalysis with Rflx Culture (collected 12-20-24 @ 03:00)       Culture - Sputum (collected 12-20-24 @ 19:25)  Source: .Sputum Sputum  Gram Stain (12-21-24 @ 06:38):    Moderate polymorphonuclear leukocytes per low power field    Rare Squamous epithelial cells per low power field    Rare Gram positive cocci in pairs seen per oil power field    Rare Gram Positive Rods seen per oil power field    Rare Gram Negative Rods seen per oil power field    Rare Yeast like cells seen per oil power field  Final Report (12-22-24 @ 11:53):    Commensal loren consistent with body site        RADIOLOGY & ADDITIONAL TESTS: Reviewed.   INTERVAL HPI/OVERNIGHT EVENTS: None    SUBJECTIVE: Patient seen and examined at bedside. Patient resting comfortably in bed with no acute complaints. Patient denies any fever, chills, chest pain, SOB, palpitations, abdominal pain, nausea, vomiting, or diarrhea.    ROS: All negative except as listed above.    VITAL SIGNS:  ICU Vital Signs Last 24 Hrs  T(C): 36.4 (26 Dec 2024 05:03), Max: 36.6 (25 Dec 2024 12:48)  T(F): 97.5 (26 Dec 2024 05:03), Max: 97.8 (25 Dec 2024 12:48)  HR: 65 (26 Dec 2024 05:03) (65 - 74)  BP: 132/66 (26 Dec 2024 05:03) (132/66 - 137/64)  BP(mean): --  ABP: --  ABP(mean): --  RR: 17 (26 Dec 2024 05:03) (17 - 17)  SpO2: 100% (26 Dec 2024 05:03) (99% - 100%)    O2 Parameters below as of 26 Dec 2024 05:03  Patient On (Oxygen Delivery Method): room air            Plateau pressure:   P/F ratio:     CAPILLARY BLOOD GLUCOSE          ECG: reviewed.    PHYSICAL EXAM:  General: WN/WD NAD  HEENT: PERRLA, EOMI, moist mucous membranes  Respiratory: CTA B/L, normal respiratory effort, no wheezes, crackles, rales  CV: RRR, S1S2, no murmurs, rubs or gallops  Abdominal: Soft, NT, ND +BS   Extremities: No edema, + peripheral pulses   Neurology: A&Ox3, nonfocal, BALLESTEROS x 4  Skin: No rashes or lesions noted.  LINES: PICC line in place in right brachial vein    MEDICATIONS:  MEDICATIONS  (STANDING):  allopurinol 100 milliGRAM(s) Oral daily  chlorhexidine 2% Cloths 1 Application(s) Topical daily  Dakins Solution - 1/4 Strength 1 Application(s) Topical daily  DULoxetine 20 milliGRAM(s) Oral daily  enoxaparin Injectable 40 milliGRAM(s) SubCutaneous every 24 hours  finasteride 5 milliGRAM(s) Oral daily  gabapentin 600 milliGRAM(s) Oral three times a day  hydrocortisone 2.5% Ointment 1 Application(s) Topical two times a day  influenza  Vaccine (HIGH DOSE) 0.5 milliLiter(s) IntraMuscular once  ketoconazole 2% Cream 1 Application(s) Topical two times a day  ketoconazole 2% Shampoo 1 Application(s) Topical daily  lidocaine   4% Patch 1 Patch Transdermal daily  multivitamin 1 Tablet(s) Oral daily  mupirocin 2% Ointment 1 Application(s) Topical two times a day  polyethylene glycol 3350 17 Gram(s) Oral daily  senna 2 Tablet(s) Oral at bedtime  sodium chloride 2 Gram(s) Oral every 12 hours  sodium chloride 0.9%. 1000 milliLiter(s) (70 mL/Hr) IV Continuous <Continuous>  triamcinolone 0.1% Cream 1 Application(s) Topical two times a day  vancomycin  IVPB 1250 milliGRAM(s) IV Intermittent every 24 hours    MEDICATIONS  (PRN):  acetaminophen   Oral Liquid .. 650 milliGRAM(s) Oral every 6 hours PRN Temp greater or equal to 38C (100.4F), Mild Pain (1 - 3)  albuterol    90 MICROgram(s) HFA Inhaler 2 Puff(s) Inhalation every 6 hours PRN Shortness of Breath and/or Wheezing  guaiFENesin Oral Liquid (Sugar-Free) 200 milliGRAM(s) Oral every 6 hours PRN Cough      ALLERGIES:  Allergies    No Known Allergies    Intolerances        LABS:                        9.9    8.48  )-----------( 332      ( 26 Dec 2024 02:20 )             28.7     12-26    130[L]  |  97[L]  |  29[H]  ----------------------------<  120[H]  4.5   |  23  |  0.85    Ca    9.2      26 Dec 2024 02:20  Phos  3.9     12-26  Mg     2.00     12-26    TPro  7.1  /  Alb  2.7[L]  /  TBili  0.3  /  DBili  x   /  AST  40  /  ALT  44[H]  /  AlkPhos  124[H]  12-26    PT/INR - ( 26 Dec 2024 02:20 )   PT: 11.7 sec;   INR: 0.98 ratio         PTT - ( 26 Dec 2024 02:20 )  PTT:41.2 sec  Urinalysis Basic - ( 26 Dec 2024 02:20 )    Color: x / Appearance: x / SG: x / pH: x  Gluc: 120 mg/dL / Ketone: x  / Bili: x / Urobili: x   Blood: x / Protein: x / Nitrite: x   Leuk Esterase: x / RBC: x / WBC x   Sq Epi: x / Non Sq Epi: x / Bacteria: x      ABG:      vBG:    Micro:    Culture - Blood (collected 12-21-24 @ 06:00)  Source: .Blood BLOOD  Preliminary Report (12-25-24 @ 10:00):    No growth at 4 days    Culture - Blood (collected 12-19-24 @ 13:15)  Source: .Blood BLOOD  Gram Stain (12-21-24 @ 06:59):    Growth in aerobic bottle: Gram Positive Cocci in Clusters    Growth in anaerobic bottle: Gram Positive Cocci in Clusters  Final Report (12-22-24 @ 08:28):    Growth in aerobic and anaerobic bottles: Methicillin Resistant    Staphylococcus aureus    See previous culture [id number]    Culture - Blood (collected 12-19-24 @ 13:04)  Source: .Blood BLOOD  Gram Stain (12-20-24 @ 23:25):    Growth in aerobic bottle: Gram Positive Cocci in Clusters    Growth in anaerobic bottle: Gram Positive Cocci in Clusters  Final Report (12-22-24 @ 08:27):    Growth in aerobic and anaerobic bottles: Methicillin Resistant    Staphylococcus aureus    Direct identification is available within approximately 3-5    hours either by Blood Panel Multiplexed PCR or Direct    MALDI-TOF. Details: [email or web address]  Organism: Blood Culture PCR  Methicillin resistant Staphylococcus aureus (12-22-24 @ 08:27)  Organism: Methicillin resistant Staphylococcus aureus (12-22-24 @ 08:27)      Method Type: JULY      -  Clindamycin: S <=0.25      -  Daptomycin: S 1      -  Erythromycin: R >4      -  Gentamicin: S <=4 Should not be used as monotherapy      -  Linezolid: S 2      -  Oxacillin: R >2      -  Penicillin: R >2      -  Rifampin: S <=1 Should not be used as monotherapy      -  Tetracycline: S <=4      -  Trimethoprim/Sulfamethoxazole: S <=0.5/9.5      -  Vancomycin: S 1  Organism: Blood Culture PCR (12-22-24 @ 08:27)      Method Type: PCR      -  Methicillin resistant Staphylococcus aureus (MRSA): Detec       Urinalysis with Rflx Culture (collected 12-20-24 @ 03:00)       Culture - Sputum (collected 12-20-24 @ 19:25)  Source: .Sputum Sputum  Gram Stain (12-21-24 @ 06:38):    Moderate polymorphonuclear leukocytes per low power field    Rare Squamous epithelial cells per low power field    Rare Gram positive cocci in pairs seen per oil power field    Rare Gram Positive Rods seen per oil power field    Rare Gram Negative Rods seen per oil power field    Rare Yeast like cells seen per oil power field  Final Report (12-22-24 @ 11:53):    Commensal loren consistent with body site        RADIOLOGY & ADDITIONAL TESTS: Reviewed.

## 2024-12-26 NOTE — CONSULT NOTE ADULT - SUBJECTIVE AND OBJECTIVE BOX
HPI:  Drew Barcenas is a 84 y.o. 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 At the rehab center CXR was taken with concerns for PNA and Pt was started on Keflex. Because of worsening condition Pt was sent to the hospital. The son Emery (HCP) was called for a history.    In the ED:  VSS. NS 1L bolus given, CT showed multifocal pneumonia vancomycin and ceftriaxone. Became febrile to 103F qualifying for sepsis.    Chart Review:  Admitted from Aug 16th to the 31st for back pain, Found to have discitis and osteo. Aspiration of the epidural abscess showed negative cultures. Surgery was done for epidural evacuation and hardware placement. Pt was on vanc and cefepime. C/b by DVT. Pt was sent to rehab and got 6 weeks of CTX through a PICC line.    (19 Dec 2024 17:23)    We were consulted for a “Rash” that has been present since prior to admission, for at least a few weeks, worse this week. Rash described as itchy, but otherwise not too bothersome, located on face and trunk. Nothing in mucosal area. Has not tried any topicals. No new medications prior to initiation of rash. On Friday, we recommended ketoconazole cream to AA BID and pt has been using. Per pt today, his rash is significantly improved and much less itchy with the hydrocortisone and ketoconazole cream    PAST MEDICAL & SURGICAL HISTORY:  HTN (hypertension)      Former smoker      Gout      History of BPH      S/P spinal surgery          REVIEW OF SYSTEMS:  General: no fevers/chills; no lethargy  Skin/Breast: see HPI  Ophthalmologic: no eye pain or changes in vision  ENT: no dysphagia or changes in hearing  Respiratory: no SOB or cough  Cardiovascular: no palpitations or chest pain  Gastrointestinal: no abdominal pain or blood in stool  Genitourinary: no dysuria or frequency  Musculoskeletal: no joint pains or weakness  Neurological: no weakness, numbness, or tingling    MEDICATIONS  (STANDING):  allopurinol 100 milliGRAM(s) Oral daily  chlorhexidine 2% Cloths 1 Application(s) Topical daily  Dakins Solution - 1/4 Strength 1 Application(s) Topical daily  DULoxetine 20 milliGRAM(s) Oral daily  enoxaparin Injectable 40 milliGRAM(s) SubCutaneous every 24 hours  finasteride 5 milliGRAM(s) Oral daily  gabapentin 600 milliGRAM(s) Oral three times a day  hydrocortisone 2.5% Ointment 1 Application(s) Topical two times a day  influenza  Vaccine (HIGH DOSE) 0.5 milliLiter(s) IntraMuscular once  ketoconazole 2% Cream 1 Application(s) Topical two times a day  ketoconazole 2% Shampoo 1 Application(s) Topical daily  lidocaine   4% Patch 1 Patch Transdermal daily  multivitamin 1 Tablet(s) Oral daily  mupirocin 2% Ointment 1 Application(s) Topical two times a day  polyethylene glycol 3350 17 Gram(s) Oral daily  senna 2 Tablet(s) Oral at bedtime  sodium chloride 2 Gram(s) Oral every 12 hours  sodium chloride 0.9%. 1000 milliLiter(s) (75 mL/Hr) IV Continuous <Continuous>  triamcinolone 0.1% Cream 1 Application(s) Topical two times a day    MEDICATIONS  (PRN):  acetaminophen   Oral Liquid .. 650 milliGRAM(s) Oral every 6 hours PRN Temp greater or equal to 38C (100.4F), Mild Pain (1 - 3)  albuterol    90 MICROgram(s) HFA Inhaler 2 Puff(s) Inhalation every 6 hours PRN Shortness of Breath and/or Wheezing  guaiFENesin Oral Liquid (Sugar-Free) 200 milliGRAM(s) Oral every 6 hours PRN Cough      Allergies    No Known Allergies    Intolerances        SOCIAL HISTORY:     hx smoking  non-smoker    FAMILY HISTORY:  No pertinent family history in first degree relatives      noncontributory    Vital Signs Last 24 Hrs  T(C): 36.7 (26 Dec 2024 12:53), Max: 36.7 (26 Dec 2024 12:53)  T(F): 98 (26 Dec 2024 12:53), Max: 98 (26 Dec 2024 12:53)  HR: 75 (26 Dec 2024 12:53) (65 - 75)  BP: 143/77 (26 Dec 2024 12:53) (132/66 - 143/77)  BP(mean): --  RR: 17 (26 Dec 2024 12:53) (17 - 17)  SpO2: 100% (26 Dec 2024 12:53) (100% - 100%)    Parameters below as of 26 Dec 2024 12:53  Patient On (Oxygen Delivery Method): room air        PHYSICAL EXAM:  The patient was in NAD.  OP showed no ulcerations.  There was no visible LAD.  Conjunctiva were non-injected.  There was no clubbing or edema of extremities.   The scalp, hair, face, eyebrows, lips, OP, neck, chest, back, extremities x4, and nails were examined.  There was no hyperhidrosis or bromhidrosis.     Of note on skin exam:   Glabella, forehead, bitemporal scalp, and trent-orbital desquamation and flakiness noted. No ulcerations or skin fissures. Lower abdomen with hyperpigmented thin, large, ill defined patch with desquamative scale.     LABS:                        9.9    8.48  )-----------( 332      ( 26 Dec 2024 02:20 )             28.7     12-26    130[L]  |  97[L]  |  29[H]  ----------------------------<  120[H]  4.5   |  23  |  0.85    Ca    9.2      26 Dec 2024 02:20  Phos  3.9     12-26  Mg     2.00     12-26    TPro  7.1  /  Alb  2.7[L]  /  TBili  0.3  /  DBili  x   /  AST  40  /  ALT  44[H]  /  AlkPhos  124[H]  12-26    PT/INR - ( 26 Dec 2024 02:20 )   PT: 11.7 sec;   INR: 0.98 ratio         PTT - ( 26 Dec 2024 02:20 )  PTT:41.2 sec  Urinalysis Basic - ( 26 Dec 2024 02:20 )    Color: x / Appearance: x / SG: x / pH: x  Gluc: 120 mg/dL / Ketone: x  / Bili: x / Urobili: x   Blood: x / Protein: x / Nitrite: x   Leuk Esterase: x / RBC: x / WBC x   Sq Epi: x / Non Sq Epi: x / Bacteria: x        RADIOLOGY & ADDITIONAL STUDIES: reviewed; no pertinent findings

## 2024-12-26 NOTE — CHART NOTE - NSCHARTNOTEFT_GEN_A_CORE
MRI L spine reviewed, does not look like there is concern for compressive collection. Can continue with treatment with vancomycin x 6 weeks per ID. Pt is currently DNR/DNI and declined sx intervention. No neurosurgical intervention at this time. Neurosurgery signing off. Reconsult PRN. Pt can follow up outpatient with Dr. Bergeron. Case d/w attending Dr. Bergeron.

## 2024-12-27 LAB
ANION GAP SERPL CALC-SCNC: 11 MMOL/L — SIGNIFICANT CHANGE UP (ref 7–14)
BUN SERPL-MCNC: 32 MG/DL — HIGH (ref 7–23)
CALCIUM SERPL-MCNC: 9.2 MG/DL — SIGNIFICANT CHANGE UP (ref 8.4–10.5)
CHLORIDE SERPL-SCNC: 104 MMOL/L — SIGNIFICANT CHANGE UP (ref 98–107)
CO2 SERPL-SCNC: 19 MMOL/L — LOW (ref 22–31)
CREAT SERPL-MCNC: 0.85 MG/DL — SIGNIFICANT CHANGE UP (ref 0.5–1.3)
EGFR: 87 ML/MIN/1.73M2 — SIGNIFICANT CHANGE UP
GLUCOSE SERPL-MCNC: 76 MG/DL — SIGNIFICANT CHANGE UP (ref 70–99)
HCT VFR BLD CALC: 33.6 % — LOW (ref 39–50)
HGB BLD-MCNC: 10.7 G/DL — LOW (ref 13–17)
MAGNESIUM SERPL-MCNC: 2.2 MG/DL — SIGNIFICANT CHANGE UP (ref 1.6–2.6)
MCHC RBC-ENTMCNC: 28.7 PG — SIGNIFICANT CHANGE UP (ref 27–34)
MCHC RBC-ENTMCNC: 31.8 G/DL — LOW (ref 32–36)
MCV RBC AUTO: 90.1 FL — SIGNIFICANT CHANGE UP (ref 80–100)
NRBC # BLD: 0 /100 WBCS — SIGNIFICANT CHANGE UP (ref 0–0)
NRBC # FLD: 0.02 K/UL — HIGH (ref 0–0)
PHOSPHATE SERPL-MCNC: 4.3 MG/DL — SIGNIFICANT CHANGE UP (ref 2.5–4.5)
PLATELET # BLD AUTO: 241 K/UL — SIGNIFICANT CHANGE UP (ref 150–400)
POTASSIUM SERPL-MCNC: 4.9 MMOL/L — SIGNIFICANT CHANGE UP (ref 3.5–5.3)
POTASSIUM SERPL-SCNC: 4.9 MMOL/L — SIGNIFICANT CHANGE UP (ref 3.5–5.3)
RBC # BLD: 3.73 M/UL — LOW (ref 4.2–5.8)
RBC # FLD: 15.2 % — HIGH (ref 10.3–14.5)
SODIUM SERPL-SCNC: 134 MMOL/L — LOW (ref 135–145)
WBC # BLD: 7.37 K/UL — SIGNIFICANT CHANGE UP (ref 3.8–10.5)
WBC # FLD AUTO: 7.37 K/UL — SIGNIFICANT CHANGE UP (ref 3.8–10.5)

## 2024-12-27 PROCEDURE — 99232 SBSQ HOSP IP/OBS MODERATE 35: CPT | Mod: GC

## 2024-12-27 RX ADMIN — Medication 1 APPLICATION(S): at 05:22

## 2024-12-27 RX ADMIN — SODIUM CHLORIDE 2 GRAM(S): 9 INJECTION, SOLUTION INTRAMUSCULAR; INTRAVENOUS; SUBCUTANEOUS at 05:20

## 2024-12-27 RX ADMIN — Medication 1 TABLET(S): at 13:17

## 2024-12-27 RX ADMIN — ENOXAPARIN SODIUM 40 MILLIGRAM(S): 60 INJECTION INTRAVENOUS; SUBCUTANEOUS at 22:34

## 2024-12-27 RX ADMIN — DULOXETINE HYDROCHLORIDE 20 MILLIGRAM(S): 30 CAPSULE, DELAYED RELEASE ORAL at 13:16

## 2024-12-27 RX ADMIN — LIDOCAINE 1 PATCH: 50 OINTMENT TOPICAL at 00:16

## 2024-12-27 RX ADMIN — Medication 1 APPLICATION(S): at 18:09

## 2024-12-27 RX ADMIN — GABAPENTIN 600 MILLIGRAM(S): 300 CAPSULE ORAL at 22:34

## 2024-12-27 RX ADMIN — GABAPENTIN 600 MILLIGRAM(S): 300 CAPSULE ORAL at 05:20

## 2024-12-27 RX ADMIN — Medication 1 APPLICATION(S): at 13:18

## 2024-12-27 RX ADMIN — Medication 5 MILLIGRAM(S): at 13:17

## 2024-12-27 RX ADMIN — GABAPENTIN 600 MILLIGRAM(S): 300 CAPSULE ORAL at 13:16

## 2024-12-27 RX ADMIN — TRIAMCINOLONE ACETONIDE 1 APPLICATION(S): 0.15 AEROSOL, SPRAY TOPICAL at 18:09

## 2024-12-27 RX ADMIN — Medication 1 APPLICATION(S): at 18:10

## 2024-12-27 RX ADMIN — TRIAMCINOLONE ACETONIDE 1 APPLICATION(S): 0.15 AEROSOL, SPRAY TOPICAL at 05:23

## 2024-12-27 RX ADMIN — LIDOCAINE 1 PATCH: 50 OINTMENT TOPICAL at 05:16

## 2024-12-27 RX ADMIN — VANCOMYCIN HYDROCHLORIDE 250 MILLIGRAM(S): 5 INJECTION, POWDER, LYOPHILIZED, FOR SOLUTION INTRAVENOUS at 08:51

## 2024-12-27 RX ADMIN — SODIUM CHLORIDE 2 GRAM(S): 9 INJECTION, SOLUTION INTRAMUSCULAR; INTRAVENOUS; SUBCUTANEOUS at 18:11

## 2024-12-27 RX ADMIN — ALLOPURINOL 100 MILLIGRAM(S): 100 TABLET ORAL at 13:16

## 2024-12-27 RX ADMIN — Medication 1 APPLICATION(S): at 13:19

## 2024-12-27 RX ADMIN — CHLORHEXIDINE GLUCONATE 1 APPLICATION(S): 1.2 RINSE ORAL at 13:21

## 2024-12-27 RX ADMIN — Medication 1 APPLICATION(S): at 05:21

## 2024-12-27 NOTE — PROGRESS NOTE ADULT - SUBJECTIVE AND OBJECTIVE BOX
Follow Up:  sepsis, MRSA bacteremia    Interval History/ROS: pt afebrile and no vomiting or SOB            Allergies  No Known Allergies        ANTIMICROBIALS:  vancomycin  IVPB 1000 every 24 hours      OTHER MEDS:  acetaminophen   Oral Liquid .. 650 milliGRAM(s) Oral every 6 hours PRN  albuterol    90 MICROgram(s) HFA Inhaler 2 Puff(s) Inhalation every 6 hours PRN  allopurinol 100 milliGRAM(s) Oral daily  chlorhexidine 2% Cloths 1 Application(s) Topical daily  Dakins Solution - 1/4 Strength 1 Application(s) Topical daily  DULoxetine 20 milliGRAM(s) Oral daily  enoxaparin Injectable 40 milliGRAM(s) SubCutaneous every 24 hours  finasteride 5 milliGRAM(s) Oral daily  gabapentin 600 milliGRAM(s) Oral three times a day  guaiFENesin Oral Liquid (Sugar-Free) 200 milliGRAM(s) Oral every 6 hours PRN  hydrocortisone 2.5% Ointment 1 Application(s) Topical two times a day  influenza  Vaccine (HIGH DOSE) 0.5 milliLiter(s) IntraMuscular once  ketoconazole 2% Cream 1 Application(s) Topical two times a day  ketoconazole 2% Shampoo 1 Application(s) Topical daily  lidocaine   4% Patch 1 Patch Transdermal daily  multivitamin 1 Tablet(s) Oral daily  mupirocin 2% Ointment 1 Application(s) Topical two times a day  polyethylene glycol 3350 17 Gram(s) Oral daily  senna 2 Tablet(s) Oral at bedtime  sodium chloride 2 Gram(s) Oral every 12 hours  sodium chloride 0.9%. 1000 milliLiter(s) IV Continuous <Continuous>  triamcinolone 0.1% Cream 1 Application(s) Topical two times a day      Vital Signs Last 24 Hrs  T(C): 36.3 (27 Dec 2024 13:03), Max: 36.3 (27 Dec 2024 05:19)  T(F): 97.4 (27 Dec 2024 13:03), Max: 97.4 (27 Dec 2024 13:03)  HR: 72 (27 Dec 2024 13:03) (69 - 78)  BP: 151/77 (27 Dec 2024 13:03) (133/63 - 151/77)  BP(mean): --  RR: 18 (27 Dec 2024 13:03) (17 - 18)  SpO2: 100% (27 Dec 2024 13:03) (98% - 100%)    Parameters below as of 27 Dec 2024 13:03  Patient On (Oxygen Delivery Method): room air        Physical Exam:  General:    NAD, non toxic  Respiratory:  comfortable on RA  abd:   soft, not tender  :    no almodovar  Musculoskeletal : no joint swelling  Skin:   resolved facial rash, torso and back with skin thickening hyperpigmentation and scaling, sacral and R buttock wounds  vascular: no phlebitis                          10.7   7.37  )-----------( 241      ( 27 Dec 2024 06:45 )             33.6       12-27    134[L]  |  104  |  32[H]  ----------------------------<  76  4.9   |  19[L]  |  0.85    Ca    9.2      27 Dec 2024 06:45  Phos  4.3     12-27  Mg     2.20     12-27    TPro  7.1  /  Alb  2.7[L]  /  TBili  0.3  /  DBili  x   /  AST  40  /  ALT  44[H]  /  AlkPhos  124[H]  12-26      Urinalysis Basic - ( 27 Dec 2024 06:45 )    Color: x / Appearance: x / SG: x / pH: x  Gluc: 76 mg/dL / Ketone: x  / Bili: x / Urobili: x   Blood: x / Protein: x / Nitrite: x   Leuk Esterase: x / RBC: x / WBC x   Sq Epi: x / Non Sq Epi: x / Bacteria: x        MICROBIOLOGY:  v  .Blood BLOOD  12-21-24   No growth at 5 days  --  --      .Sputum Sputum  12-20-24   Commensal loren consistent with body site  --    Moderate polymorphonuclear leukocytes per low power field  Rare Squamous epithelial cells per low power field  Rare Gram positive cocci in pairs seen per oil power field  Rare Gram Positive Rods seen per oil power field  Rare Gram Negative Rods seen per oil power field  Rare Yeast like cells seen per oil power field      .Abscess  12-20-24   Numerous Methicillin Resistant Staphylococcus aureus  Few Prevotella bivia "Susceptibilities not performed"  --  Methicillin resistant Staphylococcus aureus      .Blood BLOOD  12-19-24   Growth in aerobic and anaerobic bottles: Methicillin Resistant  Staphylococcus aureus  See previous culture [id number]  --    Growth in aerobic bottle: Gram Positive Cocci in Clusters  Growth in anaerobic bottle: Gram Positive Cocci in Clusters      .Blood BLOOD  12-19-24   Growth in aerobic and anaerobic bottles: Methicillin Resistant  Staphylococcus aureus  Direct identification is available within approximately 3-5  hours either by Blood Panel Multiplexed PCR or Direct  MALDI-TOF. Details: [email or web address]  --  Blood Culture PCR  Methicillin resistant Staphylococcus aureus                RADIOLOGY:  Images independently visualized and reviewed personally, findings as below  < from: MR Lumbar Spine w/wo IV Cont (12.23.24 @ 12:07) >  IMPRESSION:    Post laminectomy and fusion for osteomyelitis discitis T11-12 there is   residual enhancement at the T11-12 vertebral bodies and disc space   without cord compression. Cannot distinguish treated sterile abscess from   continued infection.    < end of copied text >  < from: CT Chest No Cont (12.19.24 @ 11:28) >  IMPRESSION:  *  Upper lobe predominant peripherally oriented groundglass opacities   with denser consolidation opacities in the dependent portions of the   bilateral lower lobes. Findings are suggestive of multifocal pneumonia   with superimposed basilar atelectasis. COVID infection is within the   differential given the peripheral location of the groundglass opacities.  *  Trace right effusion.    < end of copied text >

## 2024-12-27 NOTE — PROGRESS NOTE ADULT - PROBLEM SELECTOR PLAN 6
DVT Prophylaxis: Lovenox 40mg QD  DIET: Mechanical soft diet  DISPO: Pending clinical course  DNR/DNI: Trial NIV DVT Prophylaxis: Lovenox 40mg QD  DIET: Mechanical soft diet  DISPO: Pending PICC and acceptance back to long term facility   DNR/DNI: Trial NIV

## 2024-12-27 NOTE — PROGRESS NOTE ADULT - SUBJECTIVE AND OBJECTIVE BOX
INTERVAL HPI/OVERNIGHT EVENTS: None    SUBJECTIVE: Patient seen and examined at bedside. Patient resting comfortably in bed with no acute complaints. Patient denies any fever, chills, chest pain, SOB, palpitations, abdominal pain, nausea, vomiting, or diarrhea.    ROS: All negative except as listed above.    VITAL SIGNS:  ICU Vital Signs Last 24 Hrs  T(C): 36.3 (27 Dec 2024 05:19), Max: 36.7 (26 Dec 2024 12:53)  T(F): 97.3 (27 Dec 2024 05:19), Max: 98 (26 Dec 2024 12:53)  HR: 69 (27 Dec 2024 05:19) (69 - 78)  BP: 133/63 (27 Dec 2024 05:19) (133/63 - 144/79)  BP(mean): --  ABP: --  ABP(mean): --  RR: 17 (27 Dec 2024 05:19) (17 - 18)  SpO2: 99% (27 Dec 2024 05:19) (98% - 100%)    O2 Parameters below as of 27 Dec 2024 05:19  Patient On (Oxygen Delivery Method): room air            Plateau pressure:   P/F ratio:     CAPILLARY BLOOD GLUCOSE          ECG: reviewed.    PHYSICAL EXAM:  General: WN/WD NAD  HEENT: PERRLA, EOMI, moist mucous membranes  Respiratory: CTA B/L, normal respiratory effort, no wheezes, crackles, rales  CV: RRR, S1S2, no murmurs, rubs or gallops  Abdominal: Soft, NT, ND +BS   Extremities: No edema, + peripheral pulses   Neurology: A&Ox3, nonfocal, BALLESTEROS x 4  Skin: No rashes or lesions noted.    MEDICATIONS:  MEDICATIONS  (STANDING):  allopurinol 100 milliGRAM(s) Oral daily  chlorhexidine 2% Cloths 1 Application(s) Topical daily  Dakins Solution - 1/4 Strength 1 Application(s) Topical daily  DULoxetine 20 milliGRAM(s) Oral daily  enoxaparin Injectable 40 milliGRAM(s) SubCutaneous every 24 hours  finasteride 5 milliGRAM(s) Oral daily  gabapentin 600 milliGRAM(s) Oral three times a day  hydrocortisone 2.5% Ointment 1 Application(s) Topical two times a day  influenza  Vaccine (HIGH DOSE) 0.5 milliLiter(s) IntraMuscular once  ketoconazole 2% Cream 1 Application(s) Topical two times a day  ketoconazole 2% Shampoo 1 Application(s) Topical daily  lidocaine   4% Patch 1 Patch Transdermal daily  multivitamin 1 Tablet(s) Oral daily  mupirocin 2% Ointment 1 Application(s) Topical two times a day  polyethylene glycol 3350 17 Gram(s) Oral daily  senna 2 Tablet(s) Oral at bedtime  sodium chloride 2 Gram(s) Oral every 12 hours  sodium chloride 0.9%. 1000 milliLiter(s) (75 mL/Hr) IV Continuous <Continuous>  triamcinolone 0.1% Cream 1 Application(s) Topical two times a day  vancomycin  IVPB 1000 milliGRAM(s) IV Intermittent every 24 hours    MEDICATIONS  (PRN):  acetaminophen   Oral Liquid .. 650 milliGRAM(s) Oral every 6 hours PRN Temp greater or equal to 38C (100.4F), Mild Pain (1 - 3)  albuterol    90 MICROgram(s) HFA Inhaler 2 Puff(s) Inhalation every 6 hours PRN Shortness of Breath and/or Wheezing  guaiFENesin Oral Liquid (Sugar-Free) 200 milliGRAM(s) Oral every 6 hours PRN Cough      ALLERGIES:  Allergies    No Known Allergies    Intolerances        LABS:                        9.9    8.48  )-----------( 332      ( 26 Dec 2024 02:20 )             28.7     12-26    130[L]  |  97[L]  |  29[H]  ----------------------------<  120[H]  4.5   |  23  |  0.85    Ca    9.2      26 Dec 2024 02:20  Phos  3.9     12-26  Mg     2.00     12-26    TPro  7.1  /  Alb  2.7[L]  /  TBili  0.3  /  DBili  x   /  AST  40  /  ALT  44[H]  /  AlkPhos  124[H]  12-26    PT/INR - ( 26 Dec 2024 02:20 )   PT: 11.7 sec;   INR: 0.98 ratio         PTT - ( 26 Dec 2024 02:20 )  PTT:41.2 sec  Urinalysis Basic - ( 26 Dec 2024 02:20 )    Color: x / Appearance: x / SG: x / pH: x  Gluc: 120 mg/dL / Ketone: x  / Bili: x / Urobili: x   Blood: x / Protein: x / Nitrite: x   Leuk Esterase: x / RBC: x / WBC x   Sq Epi: x / Non Sq Epi: x / Bacteria: x      ABG:      vBG:    Micro:    Culture - Blood (collected 12-21-24 @ 06:00)  Source: .Blood BLOOD  Final Report (12-26-24 @ 10:00):    No growth at 5 days    Culture - Blood (collected 12-19-24 @ 13:15)  Source: .Blood BLOOD  Gram Stain (12-21-24 @ 06:59):    Growth in aerobic bottle: Gram Positive Cocci in Clusters    Growth in anaerobic bottle: Gram Positive Cocci in Clusters  Final Report (12-22-24 @ 08:28):    Growth in aerobic and anaerobic bottles: Methicillin Resistant    Staphylococcus aureus    See previous culture [id number]    Culture - Blood (collected 12-19-24 @ 13:04)  Source: .Blood BLOOD  Gram Stain (12-20-24 @ 23:25):    Growth in aerobic bottle: Gram Positive Cocci in Clusters    Growth in anaerobic bottle: Gram Positive Cocci in Clusters  Final Report (12-22-24 @ 08:27):    Growth in aerobic and anaerobic bottles: Methicillin Resistant    Staphylococcus aureus    Direct identification is available within approximately 3-5    hours either by Blood Panel Multiplexed PCR or Direct    MALDI-TOF. Details: [email or web address]  Organism: Blood Culture PCR  Methicillin resistant Staphylococcus aureus (12-22-24 @ 08:27)  Organism: Methicillin resistant Staphylococcus aureus (12-22-24 @ 08:27)      Method Type: JULY      -  Clindamycin: S <=0.25      -  Daptomycin: S 1      -  Erythromycin: R >4      -  Gentamicin: S <=4 Should not be used as monotherapy      -  Linezolid: S 2      -  Oxacillin: R >2      -  Penicillin: R >2      -  Rifampin: S <=1 Should not be used as monotherapy      -  Tetracycline: S <=4      -  Trimethoprim/Sulfamethoxazole: S <=0.5/9.5      -  Vancomycin: S 1  Organism: Blood Culture PCR (12-22-24 @ 08:27)      Method Type: PCR      -  Methicillin resistant Staphylococcus aureus (MRSA): Detec       Urinalysis with Rflx Culture (collected 12-20-24 @ 03:00)       Culture - Sputum (collected 12-20-24 @ 19:25)  Source: .Sputum Sputum  Gram Stain (12-21-24 @ 06:38):    Moderate polymorphonuclear leukocytes per low power field    Rare Squamous epithelial cells per low power field    Rare Gram positive cocci in pairs seen per oil power field    Rare Gram Positive Rods seen per oil power field    Rare Gram Negative Rods seen per oil power field    Rare Yeast like cells seen per oil power field  Final Report (12-22-24 @ 11:53):    Commensal loren consistent with body site        RADIOLOGY & ADDITIONAL TESTS: Reviewed.   INTERVAL HPI/OVERNIGHT EVENTS: None    SUBJECTIVE: Patient seen and examined at bedside. Patient resting comfortably in bed with no acute complaints. Patient denies any fever, chills, chest pain, SOB, palpitations, abdominal pain, nausea, vomiting, or diarrhea.    ROS: All negative except as listed above.    VITAL SIGNS:  ICU Vital Signs Last 24 Hrs  T(C): 36.3 (27 Dec 2024 05:19), Max: 36.7 (26 Dec 2024 12:53)  T(F): 97.3 (27 Dec 2024 05:19), Max: 98 (26 Dec 2024 12:53)  HR: 69 (27 Dec 2024 05:19) (69 - 78)  BP: 133/63 (27 Dec 2024 05:19) (133/63 - 144/79)  BP(mean): --  ABP: --  ABP(mean): --  RR: 17 (27 Dec 2024 05:19) (17 - 18)  SpO2: 99% (27 Dec 2024 05:19) (98% - 100%)    O2 Parameters below as of 27 Dec 2024 05:19  Patient On (Oxygen Delivery Method): room air            Plateau pressure:   P/F ratio:     CAPILLARY BLOOD GLUCOSE          ECG: reviewed.    PHYSICAL EXAM:  General: WN/WD NAD  HEENT: PERRLA, EOMI, moist mucous membranes  Respiratory: CTA B/L, normal respiratory effort, no wheezes, crackles, rales  CV: RRR, S1S2, no murmurs, rubs or gallops  Abdominal: Soft, NT, ND +BS   Extremities: No edema, + peripheral pulses   Neurology: A&Ox3, nonfocal, BALLESTEROS x 4  Skin: Rash notably improved compared to earlier exam findings this week    MEDICATIONS:  MEDICATIONS  (STANDING):  allopurinol 100 milliGRAM(s) Oral daily  chlorhexidine 2% Cloths 1 Application(s) Topical daily  Dakins Solution - 1/4 Strength 1 Application(s) Topical daily  DULoxetine 20 milliGRAM(s) Oral daily  enoxaparin Injectable 40 milliGRAM(s) SubCutaneous every 24 hours  finasteride 5 milliGRAM(s) Oral daily  gabapentin 600 milliGRAM(s) Oral three times a day  hydrocortisone 2.5% Ointment 1 Application(s) Topical two times a day  influenza  Vaccine (HIGH DOSE) 0.5 milliLiter(s) IntraMuscular once  ketoconazole 2% Cream 1 Application(s) Topical two times a day  ketoconazole 2% Shampoo 1 Application(s) Topical daily  lidocaine   4% Patch 1 Patch Transdermal daily  multivitamin 1 Tablet(s) Oral daily  mupirocin 2% Ointment 1 Application(s) Topical two times a day  polyethylene glycol 3350 17 Gram(s) Oral daily  senna 2 Tablet(s) Oral at bedtime  sodium chloride 2 Gram(s) Oral every 12 hours  sodium chloride 0.9%. 1000 milliLiter(s) (75 mL/Hr) IV Continuous <Continuous>  triamcinolone 0.1% Cream 1 Application(s) Topical two times a day  vancomycin  IVPB 1000 milliGRAM(s) IV Intermittent every 24 hours    MEDICATIONS  (PRN):  acetaminophen   Oral Liquid .. 650 milliGRAM(s) Oral every 6 hours PRN Temp greater or equal to 38C (100.4F), Mild Pain (1 - 3)  albuterol    90 MICROgram(s) HFA Inhaler 2 Puff(s) Inhalation every 6 hours PRN Shortness of Breath and/or Wheezing  guaiFENesin Oral Liquid (Sugar-Free) 200 milliGRAM(s) Oral every 6 hours PRN Cough      ALLERGIES:  Allergies    No Known Allergies    Intolerances        LABS:                        9.9    8.48  )-----------( 332      ( 26 Dec 2024 02:20 )             28.7     12-26    130[L]  |  97[L]  |  29[H]  ----------------------------<  120[H]  4.5   |  23  |  0.85    Ca    9.2      26 Dec 2024 02:20  Phos  3.9     12-26  Mg     2.00     12-26    TPro  7.1  /  Alb  2.7[L]  /  TBili  0.3  /  DBili  x   /  AST  40  /  ALT  44[H]  /  AlkPhos  124[H]  12-26    PT/INR - ( 26 Dec 2024 02:20 )   PT: 11.7 sec;   INR: 0.98 ratio         PTT - ( 26 Dec 2024 02:20 )  PTT:41.2 sec  Urinalysis Basic - ( 26 Dec 2024 02:20 )    Color: x / Appearance: x / SG: x / pH: x  Gluc: 120 mg/dL / Ketone: x  / Bili: x / Urobili: x   Blood: x / Protein: x / Nitrite: x   Leuk Esterase: x / RBC: x / WBC x   Sq Epi: x / Non Sq Epi: x / Bacteria: x      ABG:      vBG:    Micro:    Culture - Blood (collected 12-21-24 @ 06:00)  Source: .Blood BLOOD  Final Report (12-26-24 @ 10:00):    No growth at 5 days    Culture - Blood (collected 12-19-24 @ 13:15)  Source: .Blood BLOOD  Gram Stain (12-21-24 @ 06:59):    Growth in aerobic bottle: Gram Positive Cocci in Clusters    Growth in anaerobic bottle: Gram Positive Cocci in Clusters  Final Report (12-22-24 @ 08:28):    Growth in aerobic and anaerobic bottles: Methicillin Resistant    Staphylococcus aureus    See previous culture [id number]    Culture - Blood (collected 12-19-24 @ 13:04)  Source: .Blood BLOOD  Gram Stain (12-20-24 @ 23:25):    Growth in aerobic bottle: Gram Positive Cocci in Clusters    Growth in anaerobic bottle: Gram Positive Cocci in Clusters  Final Report (12-22-24 @ 08:27):    Growth in aerobic and anaerobic bottles: Methicillin Resistant    Staphylococcus aureus    Direct identification is available within approximately 3-5    hours either by Blood Panel Multiplexed PCR or Direct    MALDI-TOF. Details: [email or web address]  Organism: Blood Culture PCR  Methicillin resistant Staphylococcus aureus (12-22-24 @ 08:27)  Organism: Methicillin resistant Staphylococcus aureus (12-22-24 @ 08:27)      Method Type: JULY      -  Clindamycin: S <=0.25      -  Daptomycin: S 1      -  Erythromycin: R >4      -  Gentamicin: S <=4 Should not be used as monotherapy      -  Linezolid: S 2      -  Oxacillin: R >2      -  Penicillin: R >2      -  Rifampin: S <=1 Should not be used as monotherapy      -  Tetracycline: S <=4      -  Trimethoprim/Sulfamethoxazole: S <=0.5/9.5      -  Vancomycin: S 1  Organism: Blood Culture PCR (12-22-24 @ 08:27)      Method Type: PCR      -  Methicillin resistant Staphylococcus aureus (MRSA): Detec       Urinalysis with Rflx Culture (collected 12-20-24 @ 03:00)       Culture - Sputum (collected 12-20-24 @ 19:25)  Source: .Sputum Sputum  Gram Stain (12-21-24 @ 06:38):    Moderate polymorphonuclear leukocytes per low power field    Rare Squamous epithelial cells per low power field    Rare Gram positive cocci in pairs seen per oil power field    Rare Gram Positive Rods seen per oil power field    Rare Gram Negative Rods seen per oil power field    Rare Yeast like cells seen per oil power field  Final Report (12-22-24 @ 11:53):    Commensal loren consistent with body site        RADIOLOGY & ADDITIONAL TESTS: Reviewed.

## 2024-12-27 NOTE — PROGRESS NOTE ADULT - PROBLEM SELECTOR PLAN 1
Febrile, tachycardic, and leukocytosis upon admission  - BCx 12/19: MRSA  - BCx 12/21 NGTD  - Decubitus ulcer culture 12/20: MRSA  - CT chest suggestive of multifocal PNA  - Neurosurgery consulted given hx of spinal hardware: no surgical intervention at this time.  - Wound care following for decubitus ulcers  - ID following  - DC'd Zosyn. C/w Vancomycin   - MRI lumbosacral w/ albert was unable to distinguish treated sterile abscess from continued infection. Made nsgy aware.   - AIDEE showed no signs of vegetation  - PICC line placed     PLAN:  - Vanc dose adjusted to 1g q24h  - Pending PICC Febrile, tachycardic, and leukocytosis upon admission  - BCx 12/19: MRSA  - BCx 12/21 NGTD  - Decubitus ulcer culture 12/20: MRSA  - CT chest suggestive of multifocal PNA  - Neurosurgery consulted given hx of spinal hardware: no surgical intervention at this time.  - Wound care following for decubitus ulcers  - ID following  - DC'd Zosyn. C/w Vancomycin   - MRI lumbosacral w/ albert was unable to distinguish treated sterile abscess from continued infection. Made nsgy aware.   - AIDEE showed no signs of vegetation  - PICC line placed     PLAN:  - Vanc dose adjusted to 1g q24h  - Pending PICC once return back to long term facility is organized Febrile, tachycardic, and leukocytosis upon admission  - BCx 12/19: MRSA  - BCx 12/21 NGTD  - Decubitus ulcer culture 12/20: MRSA  - CT chest suggestive of multifocal PNA  - Neurosurgery consulted given hx of spinal hardware: no surgical intervention at this time.  - Wound care following for decubitus ulcers  - ID following  - DC'd Zosyn. C/w Vancomycin   - MRI lumbosacral w/ albert was unable to distinguish treated sterile abscess from continued infection. Made nsgy aware.   - AIDEE showed no signs of vegetation  - PICC line pending    PLAN:  - Vanc dose adjusted to 1g q24h  - Pending PICC once return back to long term facility is organized

## 2024-12-27 NOTE — PROGRESS NOTE ADULT - PROBLEM SELECTOR PLAN 2
Scaly, erythematous facial rash. Pruritic.  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 Derm to see pt 12/22 will determine if biopsy needed.  - Topical ketoconazole 2% cream/HC2.5% mix on face  - Ketoconazole 2%/Triamcinolone 0.1% mix on body Scaly, erythematous facial rash. Pruritic.  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 Derm to see pt 12/22 will determine if biopsy needed.  - Topical ketoconazole 2% cream/HC2.5% mix on face  - Ketoconazole 2%/Triamcinolone 0.1% mix on body  - Derm discharge recs are 2 weeks on for the topicals and 1 week off, then repeat, until outpatient derm follow up

## 2024-12-28 LAB
ALBUMIN SERPL ELPH-MCNC: 2.9 G/DL — LOW (ref 3.3–5)
ALP SERPL-CCNC: 114 U/L — SIGNIFICANT CHANGE UP (ref 40–120)
ALT FLD-CCNC: 42 U/L — HIGH (ref 4–41)
ANION GAP SERPL CALC-SCNC: 11 MMOL/L — SIGNIFICANT CHANGE UP (ref 7–14)
AST SERPL-CCNC: 35 U/L — SIGNIFICANT CHANGE UP (ref 4–40)
BILIRUB SERPL-MCNC: 0.3 MG/DL — SIGNIFICANT CHANGE UP (ref 0.2–1.2)
BUN SERPL-MCNC: 34 MG/DL — HIGH (ref 7–23)
CALCIUM SERPL-MCNC: 9.5 MG/DL — SIGNIFICANT CHANGE UP (ref 8.4–10.5)
CHLORIDE SERPL-SCNC: 103 MMOL/L — SIGNIFICANT CHANGE UP (ref 98–107)
CO2 SERPL-SCNC: 21 MMOL/L — LOW (ref 22–31)
CREAT SERPL-MCNC: 0.86 MG/DL — SIGNIFICANT CHANGE UP (ref 0.5–1.3)
EGFR: 86 ML/MIN/1.73M2 — SIGNIFICANT CHANGE UP
GLUCOSE SERPL-MCNC: 81 MG/DL — SIGNIFICANT CHANGE UP (ref 70–99)
HCT VFR BLD CALC: 32.7 % — LOW (ref 39–50)
HGB BLD-MCNC: 11 G/DL — LOW (ref 13–17)
MAGNESIUM SERPL-MCNC: 2.2 MG/DL — SIGNIFICANT CHANGE UP (ref 1.6–2.6)
MCHC RBC-ENTMCNC: 29.2 PG — SIGNIFICANT CHANGE UP (ref 27–34)
MCHC RBC-ENTMCNC: 33.6 G/DL — SIGNIFICANT CHANGE UP (ref 32–36)
MCV RBC AUTO: 86.7 FL — SIGNIFICANT CHANGE UP (ref 80–100)
NRBC # BLD: 0 /100 WBCS — SIGNIFICANT CHANGE UP (ref 0–0)
NRBC # FLD: 0 K/UL — SIGNIFICANT CHANGE UP (ref 0–0)
PHOSPHATE SERPL-MCNC: 4.2 MG/DL — SIGNIFICANT CHANGE UP (ref 2.5–4.5)
PLATELET # BLD AUTO: 289 K/UL — SIGNIFICANT CHANGE UP (ref 150–400)
POTASSIUM SERPL-MCNC: 5.1 MMOL/L — SIGNIFICANT CHANGE UP (ref 3.5–5.3)
POTASSIUM SERPL-SCNC: 5.1 MMOL/L — SIGNIFICANT CHANGE UP (ref 3.5–5.3)
PROT SERPL-MCNC: 7.2 G/DL — SIGNIFICANT CHANGE UP (ref 6–8.3)
RBC # BLD: 3.77 M/UL — LOW (ref 4.2–5.8)
RBC # FLD: 15.1 % — HIGH (ref 10.3–14.5)
SODIUM SERPL-SCNC: 135 MMOL/L — SIGNIFICANT CHANGE UP (ref 135–145)
WBC # BLD: 7.11 K/UL — SIGNIFICANT CHANGE UP (ref 3.8–10.5)
WBC # FLD AUTO: 7.11 K/UL — SIGNIFICANT CHANGE UP (ref 3.8–10.5)

## 2024-12-28 PROCEDURE — 99232 SBSQ HOSP IP/OBS MODERATE 35: CPT

## 2024-12-28 RX ADMIN — TRIAMCINOLONE ACETONIDE 1 APPLICATION(S): 0.15 AEROSOL, SPRAY TOPICAL at 17:01

## 2024-12-28 RX ADMIN — Medication 1 APPLICATION(S): at 06:52

## 2024-12-28 RX ADMIN — LIDOCAINE 1 PATCH: 50 OINTMENT TOPICAL at 14:16

## 2024-12-28 RX ADMIN — SENNOSIDES 2 TABLET(S): 8.6 TABLET, FILM COATED ORAL at 21:12

## 2024-12-28 RX ADMIN — ENOXAPARIN SODIUM 40 MILLIGRAM(S): 60 INJECTION INTRAVENOUS; SUBCUTANEOUS at 21:13

## 2024-12-28 RX ADMIN — TRIAMCINOLONE ACETONIDE 1 APPLICATION(S): 0.15 AEROSOL, SPRAY TOPICAL at 06:52

## 2024-12-28 RX ADMIN — Medication 1 APPLICATION(S): at 06:51

## 2024-12-28 RX ADMIN — Medication 1 TABLET(S): at 14:17

## 2024-12-28 RX ADMIN — DULOXETINE HYDROCHLORIDE 20 MILLIGRAM(S): 30 CAPSULE, DELAYED RELEASE ORAL at 14:17

## 2024-12-28 RX ADMIN — Medication 17 GRAM(S): at 14:16

## 2024-12-28 RX ADMIN — GABAPENTIN 600 MILLIGRAM(S): 300 CAPSULE ORAL at 06:50

## 2024-12-28 RX ADMIN — Medication 1 APPLICATION(S): at 17:01

## 2024-12-28 RX ADMIN — GABAPENTIN 600 MILLIGRAM(S): 300 CAPSULE ORAL at 14:16

## 2024-12-28 RX ADMIN — VANCOMYCIN HYDROCHLORIDE 250 MILLIGRAM(S): 5 INJECTION, POWDER, LYOPHILIZED, FOR SOLUTION INTRAVENOUS at 14:15

## 2024-12-28 RX ADMIN — Medication 1 APPLICATION(S): at 14:18

## 2024-12-28 RX ADMIN — SODIUM CHLORIDE 2 GRAM(S): 9 INJECTION, SOLUTION INTRAMUSCULAR; INTRAVENOUS; SUBCUTANEOUS at 06:50

## 2024-12-28 RX ADMIN — Medication 1 APPLICATION(S): at 17:00

## 2024-12-28 RX ADMIN — Medication 1 APPLICATION(S): at 14:17

## 2024-12-28 RX ADMIN — ALLOPURINOL 100 MILLIGRAM(S): 100 TABLET ORAL at 14:17

## 2024-12-28 RX ADMIN — Medication 5 MILLIGRAM(S): at 14:17

## 2024-12-28 RX ADMIN — GABAPENTIN 600 MILLIGRAM(S): 300 CAPSULE ORAL at 21:12

## 2024-12-28 RX ADMIN — SODIUM CHLORIDE 2 GRAM(S): 9 INJECTION, SOLUTION INTRAMUSCULAR; INTRAVENOUS; SUBCUTANEOUS at 17:00

## 2024-12-28 RX ADMIN — CHLORHEXIDINE GLUCONATE 1 APPLICATION(S): 1.2 RINSE ORAL at 14:19

## 2024-12-28 NOTE — PROGRESS NOTE ADULT - SUBJECTIVE AND OBJECTIVE BOX
INTERVAL HPI/OVERNIGHT EVENTS: None    SUBJECTIVE: Patient seen and examined at bedside. Patient resting comfortably in bed with no acute complaints. Patient denies any fever, chills, chest pain, SOB, palpitations, abdominal pain, nausea, vomiting, or diarrhea.    ROS: All negative except as listed above.    VITAL SIGNS:  ICU Vital Signs Last 24 Hrs  T(C): 36.8 (28 Dec 2024 05:05), Max: 36.8 (28 Dec 2024 05:05)  T(F): 98.2 (28 Dec 2024 05:05), Max: 98.2 (28 Dec 2024 05:05)  HR: 71 (28 Dec 2024 05:05) (71 - 78)  BP: 120/55 (28 Dec 2024 05:05) (120/55 - 151/77)  BP(mean): --  ABP: --  ABP(mean): --  RR: 17 (28 Dec 2024 05:05) (17 - 18)  SpO2: 100% (28 Dec 2024 05:05) (100% - 100%)    O2 Parameters below as of 28 Dec 2024 05:05  Patient On (Oxygen Delivery Method): room air            Plateau pressure:   P/F ratio:     12-27 @ 07:01  -  12-28 @ 07:00  --------------------------------------------------------  IN: 0 mL / OUT: 750 mL / NET: -750 mL      CAPILLARY BLOOD GLUCOSE          ECG: reviewed.    PHYSICAL EXAM:  General: WN/WD NAD  HEENT: PERRLA, EOMI, moist mucous membranes  Respiratory: CTA B/L, normal respiratory effort, no wheezes, crackles, rales  CV: RRR, S1S2, no murmurs, rubs or gallops  Abdominal: Soft, NT, ND +BS   Extremities: No edema, + peripheral pulses   Neurology: A&Ox3, nonfocal, BALLESTEROS x 4  Skin: No rashes or lesions noted.    MEDICATIONS:  MEDICATIONS  (STANDING):  allopurinol 100 milliGRAM(s) Oral daily  chlorhexidine 2% Cloths 1 Application(s) Topical daily  Dakins Solution - 1/4 Strength 1 Application(s) Topical daily  DULoxetine 20 milliGRAM(s) Oral daily  enoxaparin Injectable 40 milliGRAM(s) SubCutaneous every 24 hours  finasteride 5 milliGRAM(s) Oral daily  gabapentin 600 milliGRAM(s) Oral three times a day  hydrocortisone 2.5% Ointment 1 Application(s) Topical two times a day  influenza  Vaccine (HIGH DOSE) 0.5 milliLiter(s) IntraMuscular once  ketoconazole 2% Cream 1 Application(s) Topical two times a day  ketoconazole 2% Shampoo 1 Application(s) Topical daily  lidocaine   4% Patch 1 Patch Transdermal daily  multivitamin 1 Tablet(s) Oral daily  mupirocin 2% Ointment 1 Application(s) Topical two times a day  polyethylene glycol 3350 17 Gram(s) Oral daily  senna 2 Tablet(s) Oral at bedtime  sodium chloride 2 Gram(s) Oral every 12 hours  sodium chloride 0.9%. 1000 milliLiter(s) (75 mL/Hr) IV Continuous <Continuous>  triamcinolone 0.1% Cream 1 Application(s) Topical two times a day  vancomycin  IVPB 1000 milliGRAM(s) IV Intermittent every 24 hours    MEDICATIONS  (PRN):  acetaminophen   Oral Liquid .. 650 milliGRAM(s) Oral every 6 hours PRN Temp greater or equal to 38C (100.4F), Mild Pain (1 - 3)  albuterol    90 MICROgram(s) HFA Inhaler 2 Puff(s) Inhalation every 6 hours PRN Shortness of Breath and/or Wheezing  guaiFENesin Oral Liquid (Sugar-Free) 200 milliGRAM(s) Oral every 6 hours PRN Cough      ALLERGIES:  Allergies    No Known Allergies    Intolerances        LABS:                        10.7   7.37  )-----------( 241      ( 27 Dec 2024 06:45 )             33.6     12-27    134[L]  |  104  |  32[H]  ----------------------------<  76  4.9   |  19[L]  |  0.85    Ca    9.2      27 Dec 2024 06:45  Phos  4.3     12-27  Mg     2.20     12-27        Urinalysis Basic - ( 27 Dec 2024 06:45 )    Color: x / Appearance: x / SG: x / pH: x  Gluc: 76 mg/dL / Ketone: x  / Bili: x / Urobili: x   Blood: x / Protein: x / Nitrite: x   Leuk Esterase: x / RBC: x / WBC x   Sq Epi: x / Non Sq Epi: x / Bacteria: x      ABG:      vBG:    Micro:    Culture - Blood (collected 12-21-24 @ 06:00)  Source: .Blood BLOOD  Final Report (12-26-24 @ 10:00):    No growth at 5 days    Culture - Blood (collected 12-19-24 @ 13:15)  Source: .Blood BLOOD  Gram Stain (12-21-24 @ 06:59):    Growth in aerobic bottle: Gram Positive Cocci in Clusters    Growth in anaerobic bottle: Gram Positive Cocci in Clusters  Final Report (12-22-24 @ 08:28):    Growth in aerobic and anaerobic bottles: Methicillin Resistant    Staphylococcus aureus    See previous culture [id number]    Culture - Blood (collected 12-19-24 @ 13:04)  Source: .Blood BLOOD  Gram Stain (12-20-24 @ 23:25):    Growth in aerobic bottle: Gram Positive Cocci in Clusters    Growth in anaerobic bottle: Gram Positive Cocci in Clusters  Final Report (12-22-24 @ 08:27):    Growth in aerobic and anaerobic bottles: Methicillin Resistant    Staphylococcus aureus    Direct identification is available within approximately 3-5    hours either by Blood Panel Multiplexed PCR or Direct    MALDI-TOF. Details: [email or web address]  Organism: Blood Culture PCR  Methicillin resistant Staphylococcus aureus (12-22-24 @ 08:27)  Organism: Methicillin resistant Staphylococcus aureus (12-22-24 @ 08:27)      Method Type: JULY      -  Clindamycin: S <=0.25      -  Daptomycin: S 1      -  Erythromycin: R >4      -  Gentamicin: S <=4 Should not be used as monotherapy      -  Linezolid: S 2      -  Oxacillin: R >2      -  Penicillin: R >2      -  Rifampin: S <=1 Should not be used as monotherapy      -  Tetracycline: S <=4      -  Trimethoprim/Sulfamethoxazole: S <=0.5/9.5      -  Vancomycin: S 1  Organism: Blood Culture PCR (12-22-24 @ 08:27)      Method Type: PCR      -  Methicillin resistant Staphylococcus aureus (MRSA): Detec       Urinalysis with Rflx Culture (collected 12-20-24 @ 03:00)       Culture - Sputum (collected 12-20-24 @ 19:25)  Source: .Sputum Sputum  Gram Stain (12-21-24 @ 06:38):    Moderate polymorphonuclear leukocytes per low power field    Rare Squamous epithelial cells per low power field    Rare Gram positive cocci in pairs seen per oil power field    Rare Gram Positive Rods seen per oil power field    Rare Gram Negative Rods seen per oil power field    Rare Yeast like cells seen per oil power field  Final Report (12-22-24 @ 11:53):    Commensal loren consistent with body site        RADIOLOGY & ADDITIONAL TESTS: Reviewed.

## 2024-12-28 NOTE — PROGRESS NOTE ADULT - PROBLEM SELECTOR PLAN 6
DVT Prophylaxis: Lovenox 40mg QD  DIET: Mechanical soft diet  DISPO: Pending PICC and acceptance back to long term facility   DNR/DNI: Trial NIV

## 2024-12-28 NOTE — PROGRESS NOTE ADULT - PROBLEM SELECTOR PLAN 2
Scaly, erythematous facial rash. Pruritic.  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 Derm to see pt 12/22 will determine if biopsy needed.  - Topical ketoconazole 2% cream/HC2.5% mix on face  - Ketoconazole 2%/Triamcinolone 0.1% mix on body  - Derm discharge recs are 2 weeks on for the topicals and 1 week off, then repeat, until outpatient derm follow up

## 2024-12-28 NOTE — PROGRESS NOTE ADULT - PROBLEM SELECTOR PLAN 1
Febrile, tachycardic, and leukocytosis upon admission  - BCx 12/19: MRSA  - BCx 12/21 NGTD  - Decubitus ulcer culture 12/20: MRSA  - CT chest suggestive of multifocal PNA  - Neurosurgery consulted given hx of spinal hardware: no surgical intervention at this time.  - Wound care following for decubitus ulcers  - ID following  - DC'd Zosyn. C/w Vancomycin   - MRI lumbosacral w/ albert was unable to distinguish treated sterile abscess from continued infection. Made nsgy aware.   - AIDEE showed no signs of vegetation  - PICC line pending    PLAN:  - Vanc dose adjusted to 1g q24h  - Pending PICC once return back to long term facility is organized Febrile, tachycardic, and leukocytosis upon admission  - BCx 12/19: MRSA  - BCx 12/21 NGTD  - Decubitus ulcer culture 12/20: MRSA  - CT chest suggestive of multifocal PNA  - Neurosurgery consulted given hx of spinal hardware: no surgical intervention at this time.  - Wound care following for decubitus ulcers  - ID following  - DC'd Zosyn. C/w Vancomycin   - MRI lumbosacral w/ albert was unable to distinguish treated sterile abscess from continued infection. Made nsgy aware.   - AIDEE showed no signs of vegetation  - PICC line pending    PLAN:  - Tentative PICC planned for Monday 12/30/24

## 2024-12-29 LAB — VANCOMYCIN TROUGH SERPL-MCNC: 20.7 UG/ML — HIGH (ref 10–20)

## 2024-12-29 PROCEDURE — 99232 SBSQ HOSP IP/OBS MODERATE 35: CPT

## 2024-12-29 RX ORDER — VANCOMYCIN HYDROCHLORIDE 5 G/100ML
1000 INJECTION, POWDER, LYOPHILIZED, FOR SOLUTION INTRAVENOUS EVERY 24 HOURS
Refills: 0 | Status: DISCONTINUED | OUTPATIENT
Start: 2024-12-29 | End: 2024-12-29

## 2024-12-29 RX ORDER — VANCOMYCIN HYDROCHLORIDE 5 G/100ML
750 INJECTION, POWDER, LYOPHILIZED, FOR SOLUTION INTRAVENOUS EVERY 24 HOURS
Refills: 0 | Status: DISCONTINUED | OUTPATIENT
Start: 2024-12-29 | End: 2024-12-31

## 2024-12-29 RX ADMIN — TRIAMCINOLONE ACETONIDE 1 APPLICATION(S): 0.15 AEROSOL, SPRAY TOPICAL at 05:33

## 2024-12-29 RX ADMIN — GABAPENTIN 600 MILLIGRAM(S): 300 CAPSULE ORAL at 14:00

## 2024-12-29 RX ADMIN — CHLORHEXIDINE GLUCONATE 1 APPLICATION(S): 1.2 RINSE ORAL at 11:09

## 2024-12-29 RX ADMIN — Medication 1 APPLICATION(S): at 17:09

## 2024-12-29 RX ADMIN — GABAPENTIN 600 MILLIGRAM(S): 300 CAPSULE ORAL at 21:32

## 2024-12-29 RX ADMIN — Medication 1 APPLICATION(S): at 05:32

## 2024-12-29 RX ADMIN — ALLOPURINOL 100 MILLIGRAM(S): 100 TABLET ORAL at 11:10

## 2024-12-29 RX ADMIN — SODIUM CHLORIDE 2 GRAM(S): 9 INJECTION, SOLUTION INTRAMUSCULAR; INTRAVENOUS; SUBCUTANEOUS at 17:09

## 2024-12-29 RX ADMIN — Medication 17 GRAM(S): at 11:10

## 2024-12-29 RX ADMIN — Medication 1 TABLET(S): at 11:10

## 2024-12-29 RX ADMIN — Medication 1 APPLICATION(S): at 05:33

## 2024-12-29 RX ADMIN — VANCOMYCIN HYDROCHLORIDE 250 MILLIGRAM(S): 5 INJECTION, POWDER, LYOPHILIZED, FOR SOLUTION INTRAVENOUS at 15:28

## 2024-12-29 RX ADMIN — Medication 5 MILLIGRAM(S): at 11:10

## 2024-12-29 RX ADMIN — Medication 1 APPLICATION(S): at 17:08

## 2024-12-29 RX ADMIN — Medication 1 APPLICATION(S): at 11:09

## 2024-12-29 RX ADMIN — Medication 1 APPLICATION(S): at 11:08

## 2024-12-29 RX ADMIN — TRIAMCINOLONE ACETONIDE 1 APPLICATION(S): 0.15 AEROSOL, SPRAY TOPICAL at 17:08

## 2024-12-29 RX ADMIN — DULOXETINE HYDROCHLORIDE 20 MILLIGRAM(S): 30 CAPSULE, DELAYED RELEASE ORAL at 11:10

## 2024-12-29 RX ADMIN — GABAPENTIN 600 MILLIGRAM(S): 300 CAPSULE ORAL at 05:31

## 2024-12-29 RX ADMIN — ENOXAPARIN SODIUM 40 MILLIGRAM(S): 60 INJECTION INTRAVENOUS; SUBCUTANEOUS at 21:31

## 2024-12-29 RX ADMIN — SODIUM CHLORIDE 2 GRAM(S): 9 INJECTION, SOLUTION INTRAMUSCULAR; INTRAVENOUS; SUBCUTANEOUS at 05:31

## 2024-12-29 NOTE — PROGRESS NOTE ADULT - PROBLEM SELECTOR PLAN 1
Febrile, tachycardic, and leukocytosis upon admission  - BCx 12/19: MRSA  - BCx 12/21 NGTD  - Decubitus ulcer culture 12/20: MRSA  - CT chest suggestive of multifocal PNA  - Neurosurgery consulted given hx of spinal hardware: no surgical intervention at this time.  - Wound care following for decubitus ulcers  - ID following  - DC'd Zosyn. C/w Vancomycin   - MRI lumbosacral w/ albert was unable to distinguish treated sterile abscess from continued infection. Made nsgy aware.   - AIDEE showed no signs of vegetation  - PICC line pending    PLAN:  - Tentative PICC planned for Monday 12/30/24

## 2024-12-29 NOTE — PROGRESS NOTE ADULT - SUBJECTIVE AND OBJECTIVE BOX
Quinn Heath | PGY3| [employer] TEAMS ONLY  Interval Events: No acute events overnight. Pt seen and examined at bedside.  Patient denies any complaints overnight. The patient denies any fevers, chills, nausea, vomiting, or increased pain.  Pending picc placement tomorrow 12/30      OBJECTIVE:  ICU Vital Signs Last 24 Hrs  T(C): 36.2 (29 Dec 2024 05:47), Max: 36.4 (28 Dec 2024 13:14)  T(F): 97.1 (29 Dec 2024 05:47), Max: 97.5 (28 Dec 2024 13:14)  HR: 74 (29 Dec 2024 05:47) (74 - 77)  BP: 123/75 (29 Dec 2024 05:47) (116/70 - 129/63)  BP(mean): --  ABP: --  ABP(mean): --  RR: 17 (29 Dec 2024 05:47) (16 - 17)  SpO2: 99% (29 Dec 2024 05:47) (99% - 100%)    O2 Parameters below as of 29 Dec 2024 05:47  Patient On (Oxygen Delivery Method): room air              CAPILLARY BLOOD GLUCOSE        PHYSICAL EXAM:  General: WN/WD NAD  Neurology: A&Ox2, nonfocal, BALLESTEROS x 4  Eyes: PERRLA/ EOMI, Gross vision intact  ENT/Neck: Neck supple, trachea midline, No JVD, Gross hearing intact  Respiratory: CTA B/L, No wheezing, rales, rhonchi  CV: RRR, +S1/S2, -S3/S4, no murmurs, rubs or gallops  Abdominal: Soft, NT, ND +BS, No HSM  MSK: 5/5 strength UE/LE bilaterally  Extremities: No edema, 2+ peripheral pulses  Skin: No Rashes, Hematoma, Ecchymosis  Incisions:   Tubes:    HOSPITAL MEDICATIONS:  MEDICATIONS  (STANDING):  allopurinol 100 milliGRAM(s) Oral daily  chlorhexidine 2% Cloths 1 Application(s) Topical daily  Dakins Solution - 1/4 Strength 1 Application(s) Topical daily  DULoxetine 20 milliGRAM(s) Oral daily  enoxaparin Injectable 40 milliGRAM(s) SubCutaneous every 24 hours  finasteride 5 milliGRAM(s) Oral daily  gabapentin 600 milliGRAM(s) Oral three times a day  hydrocortisone 2.5% Ointment 1 Application(s) Topical two times a day  influenza  Vaccine (HIGH DOSE) 0.5 milliLiter(s) IntraMuscular once  ketoconazole 2% Cream 1 Application(s) Topical two times a day  ketoconazole 2% Shampoo 1 Application(s) Topical daily  lidocaine   4% Patch 1 Patch Transdermal daily  multivitamin 1 Tablet(s) Oral daily  mupirocin 2% Ointment 1 Application(s) Topical two times a day  polyethylene glycol 3350 17 Gram(s) Oral daily  senna 2 Tablet(s) Oral at bedtime  sodium chloride 2 Gram(s) Oral every 12 hours  sodium chloride 0.9%. 1000 milliLiter(s) (75 mL/Hr) IV Continuous <Continuous>  triamcinolone 0.1% Cream 1 Application(s) Topical two times a day  vancomycin  IVPB 1000 milliGRAM(s) IV Intermittent every 24 hours    MEDICATIONS  (PRN):  acetaminophen   Oral Liquid .. 650 milliGRAM(s) Oral every 6 hours PRN Temp greater or equal to 38C (100.4F), Mild Pain (1 - 3)  albuterol    90 MICROgram(s) HFA Inhaler 2 Puff(s) Inhalation every 6 hours PRN Shortness of Breath and/or Wheezing  guaiFENesin Oral Liquid (Sugar-Free) 200 milliGRAM(s) Oral every 6 hours PRN Cough      LABS:                        11.0   7.11  )-----------( 289      ( 28 Dec 2024 07:20 )             32.7     Hgb Trend: 11.0<--, 10.7<--, 9.9<--, 10.2<--, 9.5<--  12-28    135  |  103  |  34[H]  ----------------------------<  81  5.1   |  21[L]  |  0.86    Ca    9.5      28 Dec 2024 07:20  Phos  4.2     12-28  Mg     2.20     12-28    TPro  7.2  /  Alb  2.9[L]  /  TBili  0.3  /  DBili  x   /  AST  35  /  ALT  42[H]  /  AlkPhos  114  12-28    Creatinine Trend: 0.86<--, 0.85<--, 0.85<--, 0.91<--, 0.90<--, 0.98<--    Urinalysis Basic - ( 28 Dec 2024 07:20 )    Color: x / Appearance: x / SG: x / pH: x  Gluc: 81 mg/dL / Ketone: x  / Bili: x / Urobili: x   Blood: x / Protein: x / Nitrite: x   Leuk Esterase: x / RBC: x / WBC x   Sq Epi: x / Non Sq Epi: x / Bacteria: x            MICROBIOLOGY:

## 2024-12-30 LAB
ALBUMIN SERPL ELPH-MCNC: 3.3 G/DL — SIGNIFICANT CHANGE UP (ref 3.3–5)
ALP SERPL-CCNC: 120 U/L — SIGNIFICANT CHANGE UP (ref 40–120)
ALT FLD-CCNC: 46 U/L — HIGH (ref 4–41)
ANION GAP SERPL CALC-SCNC: 8 MMOL/L — SIGNIFICANT CHANGE UP (ref 7–14)
APTT BLD: 41.8 SEC — HIGH (ref 24.5–35.6)
AST SERPL-CCNC: 34 U/L — SIGNIFICANT CHANGE UP (ref 4–40)
BILIRUB SERPL-MCNC: 0.4 MG/DL — SIGNIFICANT CHANGE UP (ref 0.2–1.2)
BLD GP AB SCN SERPL QL: NEGATIVE — SIGNIFICANT CHANGE UP
BP 180, S: 20 RU/ML — HIGH
BP 230, S: 87 RU/ML — HIGH
BUN SERPL-MCNC: 42 MG/DL — HIGH (ref 7–23)
CALCIUM SERPL-MCNC: 9.7 MG/DL — SIGNIFICANT CHANGE UP (ref 8.4–10.5)
CHLORIDE SERPL-SCNC: 98 MMOL/L — SIGNIFICANT CHANGE UP (ref 98–107)
CO2 SERPL-SCNC: 28 MMOL/L — SIGNIFICANT CHANGE UP (ref 22–31)
CREAT SERPL-MCNC: 1.01 MG/DL — SIGNIFICANT CHANGE UP (ref 0.5–1.3)
EGFR: 74 ML/MIN/1.73M2 — SIGNIFICANT CHANGE UP
GLUCOSE SERPL-MCNC: 88 MG/DL — SIGNIFICANT CHANGE UP (ref 70–99)
HCT VFR BLD CALC: 33 % — LOW (ref 39–50)
HGB BLD-MCNC: 11.2 G/DL — LOW (ref 13–17)
INR BLD: 0.96 RATIO — SIGNIFICANT CHANGE UP (ref 0.85–1.16)
MAGNESIUM SERPL-MCNC: 2.2 MG/DL — SIGNIFICANT CHANGE UP (ref 1.6–2.6)
MCHC RBC-ENTMCNC: 29.3 PG — SIGNIFICANT CHANGE UP (ref 27–34)
MCHC RBC-ENTMCNC: 33.9 G/DL — SIGNIFICANT CHANGE UP (ref 32–36)
MCV RBC AUTO: 86.4 FL — SIGNIFICANT CHANGE UP (ref 80–100)
NRBC # BLD: 0 /100 WBCS — SIGNIFICANT CHANGE UP (ref 0–0)
NRBC # FLD: 0 K/UL — SIGNIFICANT CHANGE UP (ref 0–0)
PHOSPHATE SERPL-MCNC: 4.1 MG/DL — SIGNIFICANT CHANGE UP (ref 2.5–4.5)
PLATELET # BLD AUTO: 335 K/UL — SIGNIFICANT CHANGE UP (ref 150–400)
POTASSIUM SERPL-MCNC: 4.4 MMOL/L — SIGNIFICANT CHANGE UP (ref 3.5–5.3)
POTASSIUM SERPL-SCNC: 4.4 MMOL/L — SIGNIFICANT CHANGE UP (ref 3.5–5.3)
PROT SERPL-MCNC: 7.8 G/DL — SIGNIFICANT CHANGE UP (ref 6–8.3)
PROTHROM AB SERPL-ACNC: 11.1 SEC — SIGNIFICANT CHANGE UP (ref 9.9–13.4)
RBC # BLD: 3.82 M/UL — LOW (ref 4.2–5.8)
RBC # FLD: 15.6 % — HIGH (ref 10.3–14.5)
RH IG SCN BLD-IMP: POSITIVE — SIGNIFICANT CHANGE UP
SODIUM SERPL-SCNC: 134 MMOL/L — LOW (ref 135–145)
WBC # BLD: 8.6 K/UL — SIGNIFICANT CHANGE UP (ref 3.8–10.5)
WBC # FLD AUTO: 8.6 K/UL — SIGNIFICANT CHANGE UP (ref 3.8–10.5)

## 2024-12-30 PROCEDURE — 99232 SBSQ HOSP IP/OBS MODERATE 35: CPT

## 2024-12-30 PROCEDURE — 99232 SBSQ HOSP IP/OBS MODERATE 35: CPT | Mod: GC

## 2024-12-30 RX ADMIN — DULOXETINE HYDROCHLORIDE 20 MILLIGRAM(S): 30 CAPSULE, DELAYED RELEASE ORAL at 12:02

## 2024-12-30 RX ADMIN — Medication 1 APPLICATION(S): at 18:29

## 2024-12-30 RX ADMIN — TRIAMCINOLONE ACETONIDE 1 APPLICATION(S): 0.15 AEROSOL, SPRAY TOPICAL at 18:30

## 2024-12-30 RX ADMIN — Medication 5 MILLIGRAM(S): at 12:01

## 2024-12-30 RX ADMIN — Medication 1 APPLICATION(S): at 18:30

## 2024-12-30 RX ADMIN — VANCOMYCIN HYDROCHLORIDE 250 MILLIGRAM(S): 5 INJECTION, POWDER, LYOPHILIZED, FOR SOLUTION INTRAVENOUS at 14:21

## 2024-12-30 RX ADMIN — CHLORHEXIDINE GLUCONATE 1 APPLICATION(S): 1.2 RINSE ORAL at 12:00

## 2024-12-30 RX ADMIN — Medication 1 APPLICATION(S): at 12:10

## 2024-12-30 RX ADMIN — TRIAMCINOLONE ACETONIDE 1 APPLICATION(S): 0.15 AEROSOL, SPRAY TOPICAL at 06:26

## 2024-12-30 RX ADMIN — GABAPENTIN 600 MILLIGRAM(S): 300 CAPSULE ORAL at 14:22

## 2024-12-30 RX ADMIN — ALLOPURINOL 100 MILLIGRAM(S): 100 TABLET ORAL at 12:02

## 2024-12-30 RX ADMIN — Medication 1 TABLET(S): at 12:01

## 2024-12-30 RX ADMIN — Medication 1 APPLICATION(S): at 06:40

## 2024-12-30 RX ADMIN — GABAPENTIN 600 MILLIGRAM(S): 300 CAPSULE ORAL at 21:46

## 2024-12-30 RX ADMIN — SENNOSIDES 2 TABLET(S): 8.6 TABLET, FILM COATED ORAL at 21:45

## 2024-12-30 RX ADMIN — Medication 1 APPLICATION(S): at 12:02

## 2024-12-30 RX ADMIN — SODIUM CHLORIDE 2 GRAM(S): 9 INJECTION, SOLUTION INTRAMUSCULAR; INTRAVENOUS; SUBCUTANEOUS at 18:28

## 2024-12-30 RX ADMIN — Medication 1 APPLICATION(S): at 06:26

## 2024-12-30 RX ADMIN — LIDOCAINE 1 PATCH: 50 OINTMENT TOPICAL at 20:22

## 2024-12-30 RX ADMIN — ENOXAPARIN SODIUM 40 MILLIGRAM(S): 60 INJECTION INTRAVENOUS; SUBCUTANEOUS at 21:52

## 2024-12-30 RX ADMIN — SODIUM CHLORIDE 2 GRAM(S): 9 INJECTION, SOLUTION INTRAMUSCULAR; INTRAVENOUS; SUBCUTANEOUS at 06:25

## 2024-12-30 RX ADMIN — GABAPENTIN 600 MILLIGRAM(S): 300 CAPSULE ORAL at 06:26

## 2024-12-30 RX ADMIN — LIDOCAINE 1 PATCH: 50 OINTMENT TOPICAL at 12:09

## 2024-12-30 NOTE — PROGRESS NOTE ADULT - SUBJECTIVE AND OBJECTIVE BOX
INTERVAL HPI/OVERNIGHT EVENTS: None    SUBJECTIVE: Patient seen and examined at bedside. Patient resting comfortably in bed with no acute complaints. Patient denies any fever, chills, chest pain, SOB, palpitations, abdominal pain, nausea, vomiting, or diarrhea.    ROS: All negative except as listed above.    VITAL SIGNS:  ICU Vital Signs Last 24 Hrs  T(C): 36.3 (30 Dec 2024 06:37), Max: 36.4 (29 Dec 2024 20:15)  T(F): 97.4 (30 Dec 2024 06:37), Max: 97.6 (29 Dec 2024 20:15)  HR: 83 (30 Dec 2024 06:37) (77 - 83)  BP: 114/52 (30 Dec 2024 06:37) (111/58 - 144/67)  BP(mean): --  ABP: --  ABP(mean): --  RR: 18 (30 Dec 2024 06:37) (17 - 18)  SpO2: 97% (30 Dec 2024 06:37) (97% - 100%)    O2 Parameters below as of 30 Dec 2024 06:37  Patient On (Oxygen Delivery Method): room air            Plateau pressure:   P/F ratio:     12-29 @ 07:01  -  12-30 @ 07:00  --------------------------------------------------------  IN: 0 mL / OUT: 450 mL / NET: -450 mL      CAPILLARY BLOOD GLUCOSE          ECG: reviewed.    PHYSICAL EXAM:  General: WN/WD NAD  HEENT: PERRLA, EOMI, moist mucous membranes  Respiratory: CTA B/L, normal respiratory effort, no wheezes, crackles, rales  CV: RRR, S1S2, no murmurs, rubs or gallops  Abdominal: Soft, NT, ND +BS   Extremities: No edema, + peripheral pulses   Neurology: A&Ox3, nonfocal, BALLESTEROS x 4  Skin: No rashes or lesions noted.    MEDICATIONS:  MEDICATIONS  (STANDING):  allopurinol 100 milliGRAM(s) Oral daily  chlorhexidine 2% Cloths 1 Application(s) Topical daily  Dakins Solution - 1/4 Strength 1 Application(s) Topical daily  DULoxetine 20 milliGRAM(s) Oral daily  enoxaparin Injectable 40 milliGRAM(s) SubCutaneous every 24 hours  finasteride 5 milliGRAM(s) Oral daily  gabapentin 600 milliGRAM(s) Oral three times a day  hydrocortisone 2.5% Ointment 1 Application(s) Topical two times a day  influenza  Vaccine (HIGH DOSE) 0.5 milliLiter(s) IntraMuscular once  ketoconazole 2% Cream 1 Application(s) Topical two times a day  ketoconazole 2% Shampoo 1 Application(s) Topical daily  lidocaine   4% Patch 1 Patch Transdermal daily  multivitamin 1 Tablet(s) Oral daily  mupirocin 2% Ointment 1 Application(s) Topical two times a day  polyethylene glycol 3350 17 Gram(s) Oral daily  senna 2 Tablet(s) Oral at bedtime  sodium chloride 2 Gram(s) Oral every 12 hours  sodium chloride 0.9%. 1000 milliLiter(s) (75 mL/Hr) IV Continuous <Continuous>  triamcinolone 0.1% Cream 1 Application(s) Topical two times a day  vancomycin  IVPB 750 milliGRAM(s) IV Intermittent every 24 hours    MEDICATIONS  (PRN):  acetaminophen   Oral Liquid .. 650 milliGRAM(s) Oral every 6 hours PRN Temp greater or equal to 38C (100.4F), Mild Pain (1 - 3)  albuterol    90 MICROgram(s) HFA Inhaler 2 Puff(s) Inhalation every 6 hours PRN Shortness of Breath and/or Wheezing  guaiFENesin Oral Liquid (Sugar-Free) 200 milliGRAM(s) Oral every 6 hours PRN Cough      ALLERGIES:  Allergies    No Known Allergies    Intolerances        LABS:                        11.0   7.11  )-----------( 289      ( 28 Dec 2024 07:20 )             32.7     12-28    135  |  103  |  34[H]  ----------------------------<  81  5.1   |  21[L]  |  0.86    Ca    9.5      28 Dec 2024 07:20  Phos  4.2     12-28  Mg     2.20     12-28    TPro  7.2  /  Alb  2.9[L]  /  TBili  0.3  /  DBili  x   /  AST  35  /  ALT  42[H]  /  AlkPhos  114  12-28      Urinalysis Basic - ( 28 Dec 2024 07:20 )    Color: x / Appearance: x / SG: x / pH: x  Gluc: 81 mg/dL / Ketone: x  / Bili: x / Urobili: x   Blood: x / Protein: x / Nitrite: x   Leuk Esterase: x / RBC: x / WBC x   Sq Epi: x / Non Sq Epi: x / Bacteria: x      ABG:      vBG:    Micro:    Culture - Blood (collected 12-21-24 @ 06:00)  Source: .Blood BLOOD  Final Report (12-26-24 @ 10:00):    No growth at 5 days    Culture - Blood (collected 12-19-24 @ 13:15)  Source: .Blood BLOOD  Gram Stain (12-21-24 @ 06:59):    Growth in aerobic bottle: Gram Positive Cocci in Clusters    Growth in anaerobic bottle: Gram Positive Cocci in Clusters  Final Report (12-22-24 @ 08:28):    Growth in aerobic and anaerobic bottles: Methicillin Resistant    Staphylococcus aureus    See previous culture [id number]    Culture - Blood (collected 12-19-24 @ 13:04)  Source: .Blood BLOOD  Gram Stain (12-20-24 @ 23:25):    Growth in aerobic bottle: Gram Positive Cocci in Clusters    Growth in anaerobic bottle: Gram Positive Cocci in Clusters  Final Report (12-22-24 @ 08:27):    Growth in aerobic and anaerobic bottles: Methicillin Resistant    Staphylococcus aureus    Direct identification is available within approximately 3-5    hours either by Blood Panel Multiplexed PCR or Direct    MALDI-TOF. Details: [email or web address]  Organism: Blood Culture PCR  Methicillin resistant Staphylococcus aureus (12-22-24 @ 08:27)  Organism: Methicillin resistant Staphylococcus aureus (12-22-24 @ 08:27)      Method Type: JULY      -  Clindamycin: S <=0.25      -  Daptomycin: S 1      -  Erythromycin: R >4      -  Gentamicin: S <=4 Should not be used as monotherapy      -  Linezolid: S 2      -  Oxacillin: R >2      -  Penicillin: R >2      -  Rifampin: S <=1 Should not be used as monotherapy      -  Tetracycline: S <=4      -  Trimethoprim/Sulfamethoxazole: S <=0.5/9.5      -  Vancomycin: S 1  Organism: Blood Culture PCR (12-22-24 @ 08:27)      Method Type: PCR      -  Methicillin resistant Staphylococcus aureus (MRSA): Detec       Urinalysis with Rflx Culture (collected 12-20-24 @ 03:00)       Culture - Sputum (collected 12-20-24 @ 19:25)  Source: .Sputum Sputum  Gram Stain (12-21-24 @ 06:38):    Moderate polymorphonuclear leukocytes per low power field    Rare Squamous epithelial cells per low power field    Rare Gram positive cocci in pairs seen per oil power field    Rare Gram Positive Rods seen per oil power field    Rare Gram Negative Rods seen per oil power field    Rare Yeast like cells seen per oil power field  Final Report (12-22-24 @ 11:53):    Commensal loren consistent with body site        RADIOLOGY & ADDITIONAL TESTS: Reviewed.

## 2024-12-30 NOTE — PROCEDURE NOTE - PROCEDURE FINDINGS AND DETAILS
Successful insertion of 4Fr single lumen PICC via the left basilic vein.   Tip of PICC in SVC. PICC length: 41cm

## 2024-12-30 NOTE — PROGRESS NOTE ADULT - SUBJECTIVE AND OBJECTIVE BOX
Follow Up:  sepsis, MRSA bacteremia    Interval History/ROS: pt afebrile and no vomiting or SOB            Allergies  No Known Allergies        ANTIMICROBIALS:  vancomycin  IVPB 750 every 24 hours      OTHER MEDS:  acetaminophen   Oral Liquid .. 650 milliGRAM(s) Oral every 6 hours PRN  albuterol    90 MICROgram(s) HFA Inhaler 2 Puff(s) Inhalation every 6 hours PRN  allopurinol 100 milliGRAM(s) Oral daily  chlorhexidine 2% Cloths 1 Application(s) Topical daily  Dakins Solution - 1/4 Strength 1 Application(s) Topical daily  DULoxetine 20 milliGRAM(s) Oral daily  enoxaparin Injectable 40 milliGRAM(s) SubCutaneous every 24 hours  finasteride 5 milliGRAM(s) Oral daily  gabapentin 600 milliGRAM(s) Oral three times a day  guaiFENesin Oral Liquid (Sugar-Free) 200 milliGRAM(s) Oral every 6 hours PRN  hydrocortisone 2.5% Ointment 1 Application(s) Topical two times a day  influenza  Vaccine (HIGH DOSE) 0.5 milliLiter(s) IntraMuscular once  ketoconazole 2% Cream 1 Application(s) Topical two times a day  ketoconazole 2% Shampoo 1 Application(s) Topical daily  lidocaine   4% Patch 1 Patch Transdermal daily  multivitamin 1 Tablet(s) Oral daily  mupirocin 2% Ointment 1 Application(s) Topical two times a day  polyethylene glycol 3350 17 Gram(s) Oral daily  senna 2 Tablet(s) Oral at bedtime  sodium chloride 2 Gram(s) Oral every 12 hours  sodium chloride 0.9%. 1000 milliLiter(s) IV Continuous <Continuous>  triamcinolone 0.1% Cream 1 Application(s) Topical two times a day      Vital Signs Last 24 Hrs  T(C): 36.3 (30 Dec 2024 06:37), Max: 36.4 (29 Dec 2024 20:15)  T(F): 97.4 (30 Dec 2024 06:37), Max: 97.6 (29 Dec 2024 20:15)  HR: 83 (30 Dec 2024 06:37) (77 - 83)  BP: 114/52 (30 Dec 2024 06:37) (111/58 - 144/67)  BP(mean): --  RR: 18 (30 Dec 2024 06:37) (17 - 18)  SpO2: 97% (30 Dec 2024 06:37) (97% - 100%)    Parameters below as of 30 Dec 2024 06:37  Patient On (Oxygen Delivery Method): room air        Physical Exam:  General:    NAD, non toxic  Respiratory:  comfortable on RA  abd:   soft, not tender  :    no almodovar  Musculoskeletal : no joint swelling  Skin:   resolved facial rash, torso and back with skin thickening hyperpigmentation and scaling, sacral and R buttock wounds  vascular: no phlebitis                          11.2   8.60  )-----------( 335      ( 30 Dec 2024 06:22 )             33.0                   MICROBIOLOGY:  v  .Blood BLOOD  12-21-24   No growth at 5 days  --  --      .Sputum Sputum  12-20-24   Commensal loren consistent with body site  --    Moderate polymorphonuclear leukocytes per low power field  Rare Squamous epithelial cells per low power field  Rare Gram positive cocci in pairs seen per oil power field  Rare Gram Positive Rods seen per oil power field  Rare Gram Negative Rods seen per oil power field  Rare Yeast like cells seen per oil power field      .Abscess  12-20-24   Numerous Methicillin Resistant Staphylococcus aureus  Few Prevotella bivia "Susceptibilities not performed"  --  Methicillin resistant Staphylococcus aureus      .Blood BLOOD  12-19-24   Growth in aerobic and anaerobic bottles: Methicillin Resistant  Staphylococcus aureus  See previous culture [id number]  --    Growth in aerobic bottle: Gram Positive Cocci in Clusters  Growth in anaerobic bottle: Gram Positive Cocci in Clusters      .Blood BLOOD  12-19-24   Growth in aerobic and anaerobic bottles: Methicillin Resistant  Staphylococcus aureus  Direct identification is available within approximately 3-5  hours either by Blood Panel Multiplexed PCR or Direct  MALDI-TOF. Details: 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 Cannot distinguish treated sterile abscess from   continued infection.    < end of copied text >  < from: AIDEE W or WO Ultrasound Enhancing Agent (12.24.24 @ 09:57) >  CONCLUSIONS:      1. Left ventricular systolic function is normal. There are no regional wall motion abnormalities seen.   2. Normal right ventricular cavity size and normal right ventricular systolic function.   3. Structurally normal mitral valve with normal leaflet excursion. No vegetations seen in association with the mitral valve. There is calcification of the mitral valve annulus. There is mild mitral regurgitation.   4. The aortic valve appears trileaflet with normal systolic excursion. There is calcification of the aortic valve leaflets. No vegetations seen in association with the aortic valve. There is moderate to severe aortic regurgitation. Vena contracta width ~ 0.5 cm.   5. No atheroma in the visualized portions of the proximal ascending aorta. Mild non-mobile atheroma in the visualized portions of the transverse aortic arch. Mild non-mobile atheroma in the visualized portions of the descending aorta.   6. The left atrium is normal in size. There is no evidence of left atrial or left atrial appendage thrombus.   7. Agitated saline injection was negative for intracardiac shunt.   8. No echocardiographic evidence of vegetations.    < end of copied text >

## 2024-12-31 ENCOUNTER — NON-APPOINTMENT (OUTPATIENT)
Age: 84
End: 2024-12-31

## 2024-12-31 ENCOUNTER — TRANSCRIPTION ENCOUNTER (OUTPATIENT)
Age: 84
End: 2024-12-31

## 2024-12-31 VITALS
DIASTOLIC BLOOD PRESSURE: 62 MMHG | HEART RATE: 77 BPM | TEMPERATURE: 97 F | SYSTOLIC BLOOD PRESSURE: 117 MMHG | OXYGEN SATURATION: 97 % | RESPIRATION RATE: 17 BRPM

## 2024-12-31 PROCEDURE — 99232 SBSQ HOSP IP/OBS MODERATE 35: CPT

## 2024-12-31 PROCEDURE — 99239 HOSP IP/OBS DSCHRG MGMT >30: CPT | Mod: GC

## 2024-12-31 RX ORDER — VANCOMYCIN HYDROCHLORIDE 5 G/100ML
750 INJECTION, POWDER, LYOPHILIZED, FOR SOLUTION INTRAVENOUS
Qty: 0 | Refills: 0 | DISCHARGE
Start: 2024-12-31

## 2024-12-31 RX ADMIN — Medication 1 APPLICATION(S): at 06:11

## 2024-12-31 RX ADMIN — GABAPENTIN 600 MILLIGRAM(S): 300 CAPSULE ORAL at 06:07

## 2024-12-31 RX ADMIN — Medication 1 APPLICATION(S): at 06:08

## 2024-12-31 RX ADMIN — SODIUM CHLORIDE 2 GRAM(S): 9 INJECTION, SOLUTION INTRAMUSCULAR; INTRAVENOUS; SUBCUTANEOUS at 06:10

## 2024-12-31 RX ADMIN — TRIAMCINOLONE ACETONIDE 1 APPLICATION(S): 0.15 AEROSOL, SPRAY TOPICAL at 06:11

## 2024-12-31 RX ADMIN — LIDOCAINE 1 PATCH: 50 OINTMENT TOPICAL at 00:12

## 2024-12-31 NOTE — DISCHARGE NOTE NURSING/CASE MANAGEMENT/SOCIAL WORK - NSDCPNINST_GEN_ALL_CORE
Follow up with your physician. PICC line care as instructed. Follow up with your physician. PICC line care as instructed. Administer IV antibiotic as instructed. Sacral wound care: cleanse with Dakins solution daily. Unstageable sacral pressure injury. Pt. is DNR. Diet is moist and minced with ensure supplements twice daily

## 2024-12-31 NOTE — PROGRESS NOTE ADULT - TIME BILLING
Time spent includes direct patient care  (interview and examination of patient), discussion with other providers, support staff and/or patient's family members, review of medical records, ordering diagnostic tests and analyzing results, and documentation. Time spent was exclusive of teaching residents.
Time-based billing (NON-critical care).     More than 50% of the visit was spent counseling and / or coordinating care by the attending physician.      The necessity of the time spent during the encounter on this date of service was due to: documentation in [facility], reviewing chart and coordinating care with patient/residents and interdisciplinary staff (such as , social workers, etc) as well as reviewing vitals, laboratory data, radiology, medication list, consultants' recommendations and prior records. Interventions were performed as documented above.
Time spent includes direct patient care  (interview and examination of patient), discussion with other providers, support staff and/or patient's family members, review of medical records, ordering diagnostic tests and analyzing results, and documentation. Time spent was exclusive of teaching residents.
- Ordering, reviewing, and interpreting labs, testing, and imaging.  - Independently obtaining a review of systems and performing a physical exam  - Reviewing consultant documentation/recommendations in addition to discussing plan of care with consultants.  - Counselling and educating patient and family regarding interpretation of aforementioned items and plan of care.
- Ordering, reviewing, and interpreting labs, testing, and imaging.  - Independently obtaining a review of systems and performing a physical exam  - Reviewing consultant documentation/recommendations in addition to discussing plan of care with consultants.  - Counselling and educating patient and family regarding interpretation of aforementioned items and plan of care.
Patient encounter, including chart review, medication review, patient interview, ordering labs and medications, interpreting labs and imaging results, and coordination of care with consultants
Time spent includes direct patient care  (interview and examination of patient), discussion with other providers, support staff and/or patient's family members, review of medical records, ordering diagnostic tests and analyzing results, and documentation. Time spent was exclusive of teaching residents.

## 2024-12-31 NOTE — PROGRESS NOTE ADULT - SUBJECTIVE AND OBJECTIVE BOX
Follow Up:  sepsis, MRSA bacteremia    Interval History/ROS: pt afebrile and no vomiting or SOB, s/p L picc            Allergies  No Known Allergies        ANTIMICROBIALS:  vancomycin  IVPB 750 every 24 hours      OTHER MEDS:  acetaminophen   Oral Liquid .. 650 milliGRAM(s) Oral every 6 hours PRN  albuterol    90 MICROgram(s) HFA Inhaler 2 Puff(s) Inhalation every 6 hours PRN  allopurinol 100 milliGRAM(s) Oral daily  chlorhexidine 2% Cloths 1 Application(s) Topical daily  Dakins Solution - 1/4 Strength 1 Application(s) Topical daily  DULoxetine 20 milliGRAM(s) Oral daily  enoxaparin Injectable 40 milliGRAM(s) SubCutaneous every 24 hours  finasteride 5 milliGRAM(s) Oral daily  gabapentin 600 milliGRAM(s) Oral three times a day  guaiFENesin Oral Liquid (Sugar-Free) 200 milliGRAM(s) Oral every 6 hours PRN  hydrocortisone 2.5% Ointment 1 Application(s) Topical two times a day  ketoconazole 2% Cream 1 Application(s) Topical two times a day  ketoconazole 2% Shampoo 1 Application(s) Topical daily  lidocaine   4% Patch 1 Patch Transdermal daily  multivitamin 1 Tablet(s) Oral daily  mupirocin 2% Ointment 1 Application(s) Topical two times a day  polyethylene glycol 3350 17 Gram(s) Oral daily  senna 2 Tablet(s) Oral at bedtime  sodium chloride 2 Gram(s) Oral every 12 hours  sodium chloride 0.9%. 1000 milliLiter(s) IV Continuous <Continuous>  triamcinolone 0.1% Cream 1 Application(s) Topical two times a day      Vital Signs Last 24 Hrs  T(C): 36.1 (31 Dec 2024 09:04), Max: 36.6 (30 Dec 2024 14:41)  T(F): 97 (31 Dec 2024 09:04), Max: 97.9 (30 Dec 2024 14:41)  HR: 77 (31 Dec 2024 09:04) (74 - 93)  BP: 117/62 (31 Dec 2024 09:04) (108/60 - 134/69)  BP(mean): --  RR: 17 (31 Dec 2024 09:04) (17 - 18)  SpO2: 97% (31 Dec 2024 09:04) (97% - 100%)    Parameters below as of 31 Dec 2024 09:04  Patient On (Oxygen Delivery Method): room air        Physical Exam:  General:    NAD, non toxic  Respiratory:  comfortable on RA  abd:   soft, not tender  :    no almodovar  Musculoskeletal : no joint swelling  Skin:   resolved facial rash, torso and back with skin thickening hyperpigmentation and scaling, sacral and R buttock wounds  vascular: L picc                          11.2   8.60  )-----------( 335      ( 30 Dec 2024 06:22 )             33.0       12-30    134[L]  |  98  |  42[H]  ----------------------------<  88  4.4   |  28  |  1.01    Ca    9.7      30 Dec 2024 06:22  Phos  4.1     12-30  Mg     2.20     12-30    TPro  7.8  /  Alb  3.3  /  TBili  0.4  /  DBili  x   /  AST  34  /  ALT  46[H]  /  AlkPhos  120  12-30      Urinalysis Basic - ( 30 Dec 2024 06:22 )    Color: x / Appearance: x / SG: x / pH: x  Gluc: 88 mg/dL / Ketone: x  / Bili: x / Urobili: x   Blood: x / Protein: x / Nitrite: x   Leuk Esterase: x / RBC: x / WBC x   Sq Epi: x / Non Sq Epi: x / Bacteria: x        MICROBIOLOGY:  v  .Blood BLOOD  12-21-24   No growth at 5 days  --  --      .Sputum Sputum  12-20-24   Commensal loren consistent with body site  --    Moderate polymorphonuclear leukocytes per low power field  Rare Squamous epithelial cells per low power field  Rare Gram positive cocci in pairs seen per oil power field  Rare Gram Positive Rods seen per oil power field  Rare Gram Negative Rods seen per oil power field  Rare Yeast like cells seen per oil power field      .Abscess  12-20-24   Numerous Methicillin Resistant Staphylococcus aureus  Few Prevotella bivia "Susceptibilities not performed"  --  Methicillin resistant Staphylococcus aureus      .Blood BLOOD  12-19-24   Growth in aerobic and anaerobic bottles: Methicillin Resistant  Staphylococcus aureus  See previous culture [id number]  --    Growth in aerobic bottle: Gram Positive Cocci in Clusters  Growth in anaerobic bottle: Gram Positive Cocci in Clusters      .Blood BLOOD  12-19-24   Growth in aerobic and anaerobic bottles: Methicillin Resistant  Staphylococcus aureus  Direct identification is available within approximately 3-5  hours either by Blood Panel Multiplexed PCR or Direct  MALDI-TOF. Details: [email or web address]  --  Blood Culture PCR  Methicillin resistant Staphylococcus aureus                RADIOLOGY:  Images independently visualized and reviewed personally, findings as below  Successful insertion of 4Fr single lumen PICC via the left basilic vein. 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 Cannot distinguish treated sterile abscess from   continued infection.    < end of copied text >   OSIRIS Plasencia:  Dr. Farris called back, he will have his office call patient to schedule him for an appt on monday 2/6

## 2024-12-31 NOTE — PROGRESS NOTE ADULT - REASON FOR ADMISSION
SOB and lethargy

## 2024-12-31 NOTE — PROGRESS NOTE ADULT - ASSESSMENT
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  MRI showed worsening epidural disease/compression. Increase inflammatory markers. s/p OR 8/10/2024 for a posterior thoracic laminectomy and evacuation of epidural abscess/phlegmon as well as hardware placement on T10-L2 for stabilization. OR cultures negative, pt was discharged with 6 weeks of vanco and ceftriaxone post OR but had a non healing wound vs dehiscence and neurosurgery recommended to extend the course, MRI was repeated which showed resolved epidural abscess but  a new small 3.7 cm fluid collection within the right posterior paraspinal soft tissues communicating to the wound possibly  seroma although sequelae of superimposed infection cannot be entirely excluded. The antibiotic course was extended to 11/17 and pt has been in the rehab, now brought in from rehab as he had ?SOB and CXR showed pneumonia  here febrile to 103.3, tachypnea but no hypoxia WBC: 19  pt with a L low back pain and a sacral wound that as per the son developed in the rehab  exam with diffuse skin thickening with hyperpigmentation and scaling, patchy erythematous lesions on the face and sacral wound  chest CT:  Upper lobe predominant peripherally oriented groundglass opacities with denser consolidation opacities in the dependent portions of the bilateral lower lobes. Findings are suggestive of multifocal pneumonia     T11-T12 discitis/osteo with negative bone biopsy cx 7/2024, developed LE weakness on antibiotics with epidural abscess s/p T10-L2 hardware and evacuation of abscess 8/10 with negative OR cx and was on vanco, ceftriaxone developed non healing wound, MRI 10/2024 with resolved epidural abscess but a new soft tissue collection communicating to the wound which could be seroma or infection, no further surgical interventions done but antibiotics course was extended to 11/17 now with fever to 103.3, leukocytosis 19, tachypnea, sepsis, MRSA bacteremia, multifocal pneumonia on chest CT, but has L lower back pain with a sacral wound and R buttock abscess which wound care expressed moderate purulence, which also showed MRSA, ?source of MRSA bacteremia  TTE was a difficult study, AIDEE negative, MRI: Post laminectomy and fusion for osteomyelitis discitis T11-12 there is residual enhancement at the T11-12 vertebral bodies and disc space without cord compression. Cannot distinguish treated sterile abscess from continued infection  diffuse skin thickening with hyperpigmentation and scaling, patchy erythematous lesions on the face derm on board, ?seborrheic dermatitis vs pemphgius foliaceous vs other s/p biopsy    * repeat blood cx 12/21, negative  * follow with derm for the rash  * c/w vanco  1250 qd  * with the abnormal enhancement in MRi would treat with a 6 week course (instead of 4 for the bacteremia) through 2/1  * place a picc, weekly CBC/diff, Cr and vanco trough to be emailed to [email or web address]  * follow with neurosurgery     The above assessment and plan was discussed with the primary team    Ilda Gilmore MD  contact on teams  After 5pm and on weekends call [phone]
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  MRI showed worsening epidural disease/compression. Increase inflammatory markers. s/p OR 8/10/2024 for a posterior thoracic laminectomy and evacuation of epidural abscess/phlegmon as well as hardware placement on T10-L2 for stabilization. OR cultures negative, pt was discharged with 6 weeks of vanco and ceftriaxone post OR but had a non healing wound vs dehiscence and neurosurgery recommended to extend the course, MRI was repeated which showed resolved epidural abscess but  a new small 3.7 cm fluid collection within the right posterior paraspinal soft tissues communicating to the wound possibly  seroma although sequelae of superimposed infection cannot be entirely excluded. The antibiotic course was extended to 11/17 and pt has been in the rehab, now brought in from rehab as he had ?SOB and CXR showed pneumonia  here febrile to 103.3, tachypnea but no hypoxia WBC: 19  pt with a L low back pain and a sacral wound that as per the son developed in the rehab  exam with diffuse skin thickening with hyperpigmentation and scaling, patchy erythematous lesions on the face and sacral wound  chest CT:  Upper lobe predominant peripherally oriented groundglass opacities with denser consolidation opacities in the dependent portions of the bilateral lower lobes. Findings are suggestive of multifocal pneumonia     T11-T12 discitis/osteo with negative bone biopsy cx 7/2024, developed LE weakness on antibiotics with epidural abscess s/p T10-L2 hardware and evacuation of abscess 8/10 with negative OR cx and was on vanco, ceftriaxone developed non healing wound, MRI 10/2024 with resolved epidural abscess but a new soft tissue collection communicating to the wound which could be seroma or infection, no further surgical interventions done but antibiotics course was extended to 11/17 now with fever to 103.3, leukocytosis 19, tachypnea, sepsis, MRSA bacteremia, multifocal pneumonia on chest CT, but has L lower back pain with a sacral wound and R buttock abscess which wound care expressed moderate purulence, which also showed MRSA, ?source of MRSA bacteremia  TTE was a difficult study, AIDEE negative, MRI: Post laminectomy and fusion for osteomyelitis discitis T11-12 there is residual enhancement at the T11-12 vertebral bodies and disc space without cord compression. Cannot distinguish treated sterile abscess from continued infection  diffuse skin thickening with hyperpigmentation and scaling, patchy erythematous lesions on the face derm on board, ?seborrheic dermatitis vs pemphgius foliaceous vs other s/p biopsy    * repeat blood cx 12/21, negative  * follow with derm for the rash  * decrease vanco to 1 qd from tomorrow 8 am  * with the abnormal enhancement in MRi would treat with a 6 week course (instead of 4 for the bacteremia) through 2/1  *  weekly CBC/diff, Cr and vanco trough to be emailed to [email or web address]  * follow with neurosurgery     The above assessment and plan was discussed with the primary team    Ilda Gilmore MD  contact on teams  After 5pm and on weekends call [phone]  
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  MRI showed worsening epidural disease/compression. Increase inflammatory markers. s/p OR 8/10/2024 for a posterior thoracic laminectomy and evacuation of epidural abscess/phlegmon as well as hardware placement on T10-L2 for stabilization. OR cultures negative, pt was discharged with 6 weeks of vanco and ceftriaxone post OR but had a non healing wound vs dehiscence and neurosurgery recommended to extend the course, MRI was repeated which showed resolved epidural abscess but  a new small 3.7 cm fluid collection within the right posterior paraspinal soft tissues communicating to the wound possibly  seroma although sequelae of superimposed infection cannot be entirely excluded. The antibiotic course was extended to 11/17 and pt has been in the rehab, now brought in from rehab as he had ?SOB and CXR showed pneumonia  here febrile to 103.3, tachypnea but no hypoxia WBC: 19  pt with a L low back pain and a sacral wound that as per the son developed in the rehab  exam with diffuse skin thickening with hyperpigmentation and scaling, patchy erythematous lesions on the face and sacral wound  chest CT:  Upper lobe predominant peripherally oriented groundglass opacities with denser consolidation opacities in the dependent portions of the bilateral lower lobes. Findings are suggestive of multifocal pneumonia     T11-T12 discitis/osteo with negative bone biopsy cx 7/2024, developed LE weakness on antibiotics with epidural abscess s/p T10-L2 hardware and evacuation of abscess 8/10 with negative OR cx and was on vanco, ceftriaxone developed non healing wound, MRI 10/2024 with resolved epidural abscess but a new soft tissue collection communicating to the wound which could be seroma or infection, no further surgical interventions done but antibiotics course was extended to 11/17 now with fever to 103.3, leukocytosis 19, tachypnea, sepsis, MRSA bacteremia, multifocal pneumonia on chest CT, but has L lower back pain with a sacral wound and R buttock abscess which wound care expressed moderate purulence, which also showed MRSA, ?source of MRSA bacteremia  TTE was a difficult study, AIDEE negative, MRI: Post laminectomy and fusion for osteomyelitis discitis T11-12 there is residual enhancement at the T11-12 vertebral bodies and disc space without cord compression. Cannot distinguish treated sterile abscess from continued infection  diffuse skin thickening with hyperpigmentation and scaling, patchy erythematous lesions on the face derm on board, ?seborrheic dermatitis vs pemphgius foliaceous vs other s/p biopsy    * repeat blood cx 12/21, negative  * follow with derm for the rash  * decrease vanco to 1 qd from tomorrow 8 am  * with the abnormal enhancement in MRi would treat with a 6 week course (instead of 4 for the bacteremia) through 2/1  *  weekly CBC/diff, Cr and vanco trough to be emailed to [email or web address]  * follow with neurosurgery     The above assessment and plan was discussed with the primary team    Ilda Gilmore MD  contact on teams  After 5pm and on weekends call [phone]  
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  MRI showed worsening epidural disease/compression. Increase inflammatory markers. s/p OR 8/10/2024 for a posterior thoracic laminectomy and evacuation of epidural abscess/phlegmon as well as hardware placement on T10-L2 for stabilization. OR cultures negative, pt was discharged with 6 weeks of vanco and ceftriaxone post OR but had a non healing wound vs dehiscence and neurosurgery recommended to extend the course, MRI was repeated which showed resolved epidural abscess but  a new small 3.7 cm fluid collection within the right posterior paraspinal soft tissues communicating to the wound possibly  seroma although sequelae of superimposed infection cannot be entirely excluded. The antibiotic course was extended to 11/17 and pt has been in the rehab, now brought in from rehab as he had ?SOB and CXR showed pneumonia  here febrile to 103.3, tachypnea but no hypoxia WBC: 19  pt with a L low back pain and a sacral wound that as per the son developed in the rehab  exam with diffuse skin thickening with hyperpigmentation and scaling, patchy erythematous lesions on the face and sacral wound  chest CT:  Upper lobe predominant peripherally oriented groundglass opacities with denser consolidation opacities in the dependent portions of the bilateral lower lobes. Findings are suggestive of multifocal pneumonia     T11-T12 discitis/osteo with negative bone biopsy cx 7/2024, developed LE weakness on antibiotics with epidural abscess s/p T10-L2 hardware and evacuation of abscess 8/10 with negative OR cx and was on vanco, ceftriaxone developed non healing wound, MRI 10/2024 with resolved epidural abscess but a new soft tissue collection communicating to the wound which could be seroma or infection, no further surgical interventions done but antibiotics course was extended to 11/17 now with fever to 103.3, leukocytosis 19, tachypnea, sepsis, MRSA bacteremia, multifocal pneumonia on chest CT, but has L lower back pain with a sacral wound and R buttock abscess which wound care expressed moderate purulence, ?source of MRSA bacteremia  diffuse skin thickening with hyperpigmentation and scaling, patchy erythematous lesions on the face     * follow the repeat  blood cx  * echo  * lumbosacral MRi with albert  * follow the abscess cx and sputum cx  * derm eval for the rash  * c/w vanco and zosyn for now  * monitor CBC/diff, CMP and vanco trough    The above assessment and plan was discussed with the primary team    Ilda Gilmore MD  contact on teams  After 5pm and on weekends call [phone]
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  MRI showed worsening epidural disease/compression. Increase inflammatory markers. s/p OR 8/10/2024 for a posterior thoracic laminectomy and evacuation of epidural abscess/phlegmon as well as hardware placement on T10-L2 for stabilization. OR cultures negative, pt was discharged with 6 weeks of vanco and ceftriaxone post OR but had a non healing wound vs dehiscence and neurosurgery recommended to extend the course, MRI was repeated which showed resolved epidural abscess but  a new small 3.7 cm fluid collection within the right posterior paraspinal soft tissues communicating to the wound possibly  seroma although sequelae of superimposed infection cannot be entirely excluded. The antibiotic course was extended to 11/17 and pt has been in the rehab, now brought in from rehab as he had ?SOB and CXR showed pneumonia  here febrile to 103.3, tachypnea but no hypoxia WBC: 19  pt with a L low back pain and a sacral wound that as per the son developed in the rehab  exam with diffuse skin thickening with hyperpigmentation and scaling, patchy erythematous lesions on the face and sacral wound  chest CT:  Upper lobe predominant peripherally oriented groundglass opacities with denser consolidation opacities in the dependent portions of the bilateral lower lobes. Findings are suggestive of multifocal pneumonia     T11-T12 discitis/osteo with negative bone biopsy cx 7/2024, developed LE weakness on antibiotics with epidural abscess s/p T10-L2 hardware and evacuation of abscess 8/10 with negative OR cx and was on vanco, ceftriaxone developed non healing wound, MRI 10/2024 with resolved epidural abscess but a new soft tissue collection communicating to the wound which could be seroma or infection, no further surgical interventions done but antibiotics course was extended to 11/17 now with fever to 103.3, leukocytosis 19, tachypnea, sepsis, MRSA bacteremia, multifocal pneumonia on chest CT, but has L lower back pain with a sacral wound and R buttock abscess which wound care expressed moderate purulence, which also showed MRSA, ?source of MRSA bacteremia  TTE was a difficult study, AIDEE negative, MRI: Post laminectomy and fusion for osteomyelitis discitis T11-12 there is residual enhancement at the T11-12 vertebral bodies and disc space without cord compression. Cannot distinguish treated sterile abscess from continued infection  diffuse skin thickening with hyperpigmentation and scaling, patchy erythematous lesions on the face derm on board, ?seborrheic dermatitis vs pemphgius foliaceous vs other s/p biopsy    * repeat blood cx 12/21, negative  * follow with derm for the rash  * decreaseed vanco to 750 qd   * with the abnormal enhancement in MRi would treat with a 6 week course (instead of 4 for the bacteremia) through 2/1  *  weekly CBC/diff, Cr and vanco trough to be emailed to [email or web address]  * follow with neurosurgery     The above assessment and plan was discussed with the primary team    Ilda Gilmore MD  contact on teams  After 5pm and on weekends call [phone]      
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  MRI showed worsening epidural disease/compression. Increase inflammatory markers. s/p OR 8/10/2024 for a posterior thoracic laminectomy and evacuation of epidural abscess/phlegmon as well as hardware placement on T10-L2 for stabilization. OR cultures negative, pt was discharged with 6 weeks of vanco and ceftriaxone post OR but had a non healing wound vs dehiscence and neurosurgery recommended to extend the course, MRI was repeated which showed resolved epidural abscess but  a new small 3.7 cm fluid collection within the right posterior paraspinal soft tissues communicating to the wound possibly  seroma although sequelae of superimposed infection cannot be entirely excluded. The antibiotic course was extended to 11/17 and pt has been in the rehab, now brought in from rehab as he had ?SOB and CXR showed pneumonia  here febrile to 103.3, tachypnea but no hypoxia WBC: 19  pt with a L low back pain and a sacral wound that as per the son developed in the rehab  exam with diffuse skin thickening with hyperpigmentation and scaling, patchy erythematous lesions on the face and sacral wound  chest CT:  Upper lobe predominant peripherally oriented groundglass opacities with denser consolidation opacities in the dependent portions of the bilateral lower lobes. Findings are suggestive of multifocal pneumonia     T11-T12 discitis/osteo with negative bone biopsy cx 7/2024, developed LE weakness on antibiotics with epidural abscess s/p T10-L2 hardware and evacuation of abscess 8/10 with negative OR cx and was on vanco, ceftriaxone developed non healing wound, MRI 10/2024 with resolved epidural abscess but a new soft tissue collection communicating to the wound which could be seroma or infection, no further surgical interventions done but antibiotics course was extended to 11/17 now with fever to 103.3, leukocytosis 19, tachypnea, sepsis, MRSA bacteremia, multifocal pneumonia on chest CT, but has L lower back pain with a sacral wound and R buttock abscess which wound care expressed moderate purulence, which also showed MRSA, ?source of MRSA bacteremia  TTE was a difficult study, AIDEE negative, MRI: Post laminectomy and fusion for osteomyelitis discitis T11-12 there is residual enhancement at the T11-12 vertebral bodies and disc space without cord compression. Cannot distinguish treated sterile abscess from continued infection  diffuse skin thickening with hyperpigmentation and scaling, patchy erythematous lesions on the face derm on board, ?seborrheic dermatitis vs pemphgius foliaceous vs other s/p biopsy    * repeat blood cx 12/21, negative, s/p picc  * c/w  vanco to 750 qd   * with the abnormal enhancement in MRi would treat with a 6 week course (instead of 4 for the bacteremia) through 2/1  *  weekly CBC/diff, Cr and vanco trough to be emailed to [email or web address]  * follow with neurosurgery and derm    The above assessment and plan was discussed with the primary team    Ilda Gilmore MD  contact on teams  After 5pm and on weekends call [phone]      
Assessment/Plan: Mr. Barcenas is a 85 yo man with PMH HTN, gout, BPH, neuropathy, hyponatremia, hx RLE DVT and recent hospitalization for spine osteomyelitis s/p epidural evacuation and hardware placement presenting from [facility] for SOB and lethargy. Found to be septic due to MRSA bacteremia, right buttock abscess, and multifocal PNA.    Wound follow up for right buttock abscess and sacrum unstageable pressure injury.     Right buttock abscess  -Wound culture (+) MRSA, on Vanco per ID  -Dimensions slightly improving, tissue type stable, purulent drainage now resolved. No tunneling or undermining  -acute infection seems to be resolving  -No sharp debridement indicated, slough adherent but loosening, on lovenox; risk of bleeding outweighs benefit.  -Topical recommendation- cleanse with Dakins 1/4 strength. Apply Liquid barrier film to periwound skin. Apply medihoney gel to wound base, cover with silicone foam with border. Change daily and prn if soiled/compromised.  -Continue to offload pressure    Sacrum unstageable pressure injury  -Dimensions and tissue type improving  -No sharp debridement indicated, slough adherent  -No obvious s/s of acute soft tissue infection.  -Topical recommendations: cleanse with Dakins 1/4 strength. Apply Liquid barrier film to periwound skin. Cover with silicone foam with border. Change daily and prn if soiled/compromised.  -Continue to offload pressure; low airloss support surface, turn and position per protocol with use of fluidized positioning devices, continue incontinence care per protocol and use of single breathable incontinence pads, continue to offload heels with complete cair boots.  -Continue Nutritional management as per RD recommendations.    Face and posterior trunk with patchy-flaking erythematous rash  -Improving  -Management per Dermatology    Findings and plan discussed with patient and primary team. All questions and concerns addressed to meet patient's satisfaction. Will continue to follow periodically while inpatient, please reconsult earlier as needed.   Upon discharge may follow up at [facility]- [phone].    Remainder of care per primary team.  Thank you.    ELIAZAR Deleon, ZEHRA (MS Teams)    If after 4PM or before 7:30AM on Mon-Friday or weekend/holiday please contact general surgery for urgent matters.   Team A- [id number]   Team B- [id number]  For non-urgent matters e-mail [email or web address]    I spent 35 minutes face-to-face with this patient of which more than 50% of the time was spent counseling/coordinating care of this patient.
Drew Barcenas is a 84 y.o. man PMH HTN, gout, BPH, neuropathy, hyponatremia, hx DVT and recent hospitalization for spine osteomyelitis s/p epidural evacuation and hardware placement presenting from [facility] for SOB and lethargy.  Found to be septic due to MRSA bacteremia, decubitus ulcers, and multifocal PNA.
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  MRI showed worsening epidural disease/compression. Increase inflammatory markers. s/p OR 8/10/2024 for a posterior thoracic laminectomy and evacuation of epidural abscess/phlegmon as well as hardware placement on T10-L2 for stabilization. OR cultures negative, pt was discharged with 6 weeks of vanco and ceftriaxone post OR but had a non healing wound vs dehiscence and neurosurgery recommended to extend the course, MRI was repeated which showed resolved epidural abscess but  a new small 3.7 cm fluid collection within the right posterior paraspinal soft tissues communicating to the wound possibly  seroma although sequelae of superimposed infection cannot be entirely excluded. The antibiotic course was extended to 11/17 and pt has been in the rehab, now brought in from rehab as he had ?SOB and CXR showed pneumonia  here febrile to 103.3, tachypnea but no hypoxia WBC: 19  pt with a L low back pain and a sacral wound that as per the son developed in the rehab  exam with diffuse skin thickening with hyperpigmentation and scaling, patchy erythematous lesions on the face and sacral wound  chest CT:  Upper lobe predominant peripherally oriented groundglass opacities with denser consolidation opacities in the dependent portions of the bilateral lower lobes. Findings are suggestive of multifocal pneumonia     T11-T12 discitis/osteo with negative bone biopsy cx 7/2024, developed LE weakness on antibiotics with epidural abscess s/p T10-L2 hardware and evacuation of abscess 8/10 with negative OR cx and was on vanco, ceftriaxone developed non healing wound, MRI 10/2024 with resolved epidural abscess but a new soft tissue collection communicating to the wound which could be seroma or infection, no further surgical interventions done but antibiotics course was extended to 11/17 now with fever to 103.3, leukocytosis 19, tachypnea, sepsis, MRSA bacteremia, multifocal pneumonia on chest CT, but has L lower back pain with a sacral wound and R buttock abscess which wound care expressed moderate purulence, which also showed MRSA, ?source of MRSA bacteremia  TTE was a difficult study, MRI: Post laminectomy and fusion for osteomyelitis discitis T11-12 there is residual enhancement at the T11-12 vertebral bodies and disc space without cord compression. Cannot distinguish treated sterile abscess from continued infection  diffuse skin thickening with hyperpigmentation and scaling, patchy erythematous lesions on the face derm on board, ?seborrheic dermatitis vs pemphgius foliaceous vs other s/p biopsy    * follow the repeat blood cx  * follow with derm for the rash  * c/w vanco  1250 qd  * stop the zosyn  * monitor CBC/diff, CMP and vanco trough  * follow with neurosurgery regarding the MRI read    The above assessment and plan was discussed with the primary team    Ilda Gilmore MD  contact on teams  After 5pm and on weekends call [phone]  
Drew Barcenas is a 84 y.o. 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  Found to be septic due to MRSA bacteremia, decubitus ulcers, and multifocal PNA.
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  MRI showed worsening epidural disease/compression. Increase inflammatory markers. s/p OR 8/10/2024 for a posterior thoracic laminectomy and evacuation of epidural abscess/phlegmon as well as hardware placement on T10-L2 for stabilization. OR cultures negative, pt was discharged with 6 weeks of vanco and ceftriaxone post OR but had a non healing wound vs dehiscence and neurosurgery recommended to extend the course, MRI was repeated which showed resolved epidural abscess but  a new small 3.7 cm fluid collection within the right posterior paraspinal soft tissues communicating to the wound possibly  seroma although sequelae of superimposed infection cannot be entirely excluded. The antibiotic course was extended to 11/17 and pt has been in the rehab, now brought in from rehab as he had ?SOB and CXR showed pneumonia  here febrile to 103.3, tachypnea but no hypoxia WBC: 19  pt with a L low back pain and a sacral wound that as per the son developed in the rehab  exam with diffuse skin thickening with hyperpigmentation and scaling, patchy erythematous lesions on the face and sacral wound  chest CT:  Upper lobe predominant peripherally oriented groundglass opacities with denser consolidation opacities in the dependent portions of the bilateral lower lobes. Findings are suggestive of multifocal pneumonia     T11-T12 discitis/osteo with negative bone biopsy cx 7/2024, developed LE weakness on antibiotics with epidural abscess s/p T10-L2 hardware and evacuation of abscess 8/10 with negative OR cx and was on vanco, ceftriaxone developed non healing wound, MRI 10/2024 with resolved epidural abscess but a new soft tissue collection communicating to the wound which could be seroma or infection, no further surgical interventions done but antibiotics course was extended to 11/17 now with fever to 103.3, leukocytosis 19, tachypnea, sepsis, MRSA bacteremia, multifocal pneumonia on chest CT, but has L lower back pain with a sacral wound and R buttock abscess which wound care expressed moderate purulence, which also showed MRSA, ?source of MRSA bacteremia  diffuse skin thickening with hyperpigmentation and scaling, patchy erythematous lesions on the face     * follow the repeat  blood cx  * echo  * lumbosacral MRi with albert  * derm eval for the rash  * c/w vanco and zo9syn for now  * vanco level was 12.5 last night but AUC is 62, would switch to 1250 q 24 from tonight which will have AUC of 524  * monitor CBC/diff, CMP and vanco trough    The above assessment and plan was discussed with the primary team    Ilda Gilmore MD  contact on teams  After 5pm and on weekends call [phone]    
Drew Barcenas is a 84 y.o. 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  Found to be septic due to MRSA bacteremia, decubitus ulcers, and multifocal PNA.
Drew Barcenas is a 84 y.o. 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  Found to be septic due to MRSA bacteremia, decubitus ulcers, and multifocal PNA.
Drew Barcenas is a 84 y.o. 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  Found to be septic due to MRSA bacteremia, decubitus ulcers, and multifocal PNA.
Drew Barcenas is a 84 y.o. 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  Found to be septic due to MRSA bacteremia, decubitus ulcers, and multifocal PNA.
Drew Barcenas is a 84 y.o. 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  Found to have multifocal PNA on CT chest imaging. Currently on azithromycin and CTX. Febrile to 103.3 qualifying for sepsis. Most likely secondary to pneumonia.
Drew Barcenas is a 84 y.o. 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  Found to be septic due to MRSA bacteremia, decubitus ulcers, and multifocal PNA.
Drew Barcenas is a 84 y.o. 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  Found to be septic due to MRSA bacteremia, decubitus ulcers, and multifocal PNA.
Drew Barcenas is a 84 y.o. 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  Found to be septic due to MRSA bacteremia, decubitus ulcers, and multifocal PNA.
Drew Barcenas is a 84 y.o. 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  Found to be septic due to MRSA bacteremia, decubitus ulcers, and multifocal PNA.
Drew Barcenas is a 84 y.o. 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  Found to be septic due to MRSA bacteremia, decubitus ulcers, and multifocal PNA.

## 2024-12-31 NOTE — DISCHARGE NOTE NURSING/CASE MANAGEMENT/SOCIAL WORK - NSDCFUADDAPPT_GEN_ALL_CORE_FT
APPTS ARE READY TO BE MADE: [X] YES    Best Family or Patient Contact (if needed):    Additional Information about above appointments (if needed):    1: PCP Francesca  2: ID Mando  3: Derm clinic Carissa Garza  4. Neurosurgery      Other comments or requests:

## 2024-12-31 NOTE — PROGRESS NOTE ADULT - SUBJECTIVE AND OBJECTIVE BOX
INTERVAL HPI/OVERNIGHT EVENTS: None    SUBJECTIVE: Patient seen and examined at bedside. Patient resting comfortably in bed with no acute complaints. Patient denies any fever, chills, chest pain, SOB, palpitations, abdominal pain, nausea, vomiting, or diarrhea.    ROS: All negative except as listed above.    VITAL SIGNS:  ICU Vital Signs Last 24 Hrs  T(C): 36.4 (31 Dec 2024 06:00), Max: 36.6 (30 Dec 2024 14:41)  T(F): 97.6 (31 Dec 2024 06:00), Max: 97.9 (30 Dec 2024 14:41)  HR: 74 (31 Dec 2024 06:00) (74 - 93)  BP: 114/58 (31 Dec 2024 06:00) (108/60 - 134/69)  BP(mean): --  ABP: --  ABP(mean): --  RR: 18 (31 Dec 2024 06:00) (17 - 18)  SpO2: 100% (31 Dec 2024 06:00) (97% - 100%)    O2 Parameters below as of 31 Dec 2024 06:00  Patient On (Oxygen Delivery Method): room air            Plateau pressure:   P/F ratio:     12-30 @ 07:01  -  12-31 @ 07:00  --------------------------------------------------------  IN: 240 mL / OUT: 1200 mL / NET: -960 mL      CAPILLARY BLOOD GLUCOSE          ECG: reviewed.    PHYSICAL EXAM:  General: WN/WD NAD  HEENT: PERRLA, EOMI, moist mucous membranes  Respiratory: CTA B/L, normal respiratory effort, no wheezes, crackles, rales  CV: RRR, S1S2, no murmurs, rubs or gallops  Abdominal: Soft, NT, ND +BS   Extremities: No edema, + peripheral pulses   Neurology: A&Ox3, nonfocal, BALLESTEROS x 4  Skin: No rashes or lesions noted.    MEDICATIONS:  MEDICATIONS  (STANDING):  allopurinol 100 milliGRAM(s) Oral daily  chlorhexidine 2% Cloths 1 Application(s) Topical daily  Dakins Solution - 1/4 Strength 1 Application(s) Topical daily  DULoxetine 20 milliGRAM(s) Oral daily  enoxaparin Injectable 40 milliGRAM(s) SubCutaneous every 24 hours  finasteride 5 milliGRAM(s) Oral daily  gabapentin 600 milliGRAM(s) Oral three times a day  hydrocortisone 2.5% Ointment 1 Application(s) Topical two times a day  influenza  Vaccine (HIGH DOSE) 0.5 milliLiter(s) IntraMuscular once  ketoconazole 2% Cream 1 Application(s) Topical two times a day  ketoconazole 2% Shampoo 1 Application(s) Topical daily  lidocaine   4% Patch 1 Patch Transdermal daily  multivitamin 1 Tablet(s) Oral daily  mupirocin 2% Ointment 1 Application(s) Topical two times a day  polyethylene glycol 3350 17 Gram(s) Oral daily  senna 2 Tablet(s) Oral at bedtime  sodium chloride 2 Gram(s) Oral every 12 hours  sodium chloride 0.9%. 1000 milliLiter(s) (75 mL/Hr) IV Continuous <Continuous>  triamcinolone 0.1% Cream 1 Application(s) Topical two times a day  vancomycin  IVPB 750 milliGRAM(s) IV Intermittent every 24 hours    MEDICATIONS  (PRN):  acetaminophen   Oral Liquid .. 650 milliGRAM(s) Oral every 6 hours PRN Temp greater or equal to 38C (100.4F), Mild Pain (1 - 3)  albuterol    90 MICROgram(s) HFA Inhaler 2 Puff(s) Inhalation every 6 hours PRN Shortness of Breath and/or Wheezing  guaiFENesin Oral Liquid (Sugar-Free) 200 milliGRAM(s) Oral every 6 hours PRN Cough      ALLERGIES:  Allergies    No Known Allergies    Intolerances        LABS:                        11.2   8.60  )-----------( 335      ( 30 Dec 2024 06:22 )             33.0     12-30    134[L]  |  98  |  42[H]  ----------------------------<  88  4.4   |  28  |  1.01    Ca    9.7      30 Dec 2024 06:22  Phos  4.1     12-30  Mg     2.20     12-30    TPro  7.8  /  Alb  3.3  /  TBili  0.4  /  DBili  x   /  AST  34  /  ALT  46[H]  /  AlkPhos  120  12-30    PT/INR - ( 30 Dec 2024 06:22 )   PT: 11.1 sec;   INR: 0.96 ratio         PTT - ( 30 Dec 2024 06:22 )  PTT:41.8 sec  Urinalysis Basic - ( 30 Dec 2024 06:22 )    Color: x / Appearance: x / SG: x / pH: x  Gluc: 88 mg/dL / Ketone: x  / Bili: x / Urobili: x   Blood: x / Protein: x / Nitrite: x   Leuk Esterase: x / RBC: x / WBC x   Sq Epi: x / Non Sq Epi: x / Bacteria: x      ABG:      vBG:    Micro:    Culture - Blood (collected 12-21-24 @ 06:00)  Source: .Blood BLOOD  Final Report (12-26-24 @ 10:00):    No growth at 5 days    Culture - Blood (collected 12-19-24 @ 13:15)  Source: .Blood BLOOD  Gram Stain (12-21-24 @ 06:59):    Growth in aerobic bottle: Gram Positive Cocci in Clusters    Growth in anaerobic bottle: Gram Positive Cocci in Clusters  Final Report (12-22-24 @ 08:28):    Growth in aerobic and anaerobic bottles: Methicillin Resistant    Staphylococcus aureus    See previous culture [id number]    Culture - Blood (collected 12-19-24 @ 13:04)  Source: .Blood BLOOD  Gram Stain (12-20-24 @ 23:25):    Growth in aerobic bottle: Gram Positive Cocci in Clusters    Growth in anaerobic bottle: Gram Positive Cocci in Clusters  Final Report (12-22-24 @ 08:27):    Growth in aerobic and anaerobic bottles: Methicillin Resistant    Staphylococcus aureus    Direct identification is available within approximately 3-5    hours either by Blood Panel Multiplexed PCR or Direct    MALDI-TOF. Details: [email or web address]  Organism: Blood Culture PCR  Methicillin resistant Staphylococcus aureus (12-22-24 @ 08:27)  Organism: Methicillin resistant Staphylococcus aureus (12-22-24 @ 08:27)      Method Type: JULY      -  Clindamycin: S <=0.25      -  Daptomycin: S 1      -  Erythromycin: R >4      -  Gentamicin: S <=4 Should not be used as monotherapy      -  Linezolid: S 2      -  Oxacillin: R >2      -  Penicillin: R >2      -  Rifampin: S <=1 Should not be used as monotherapy      -  Tetracycline: S <=4      -  Trimethoprim/Sulfamethoxazole: S <=0.5/9.5      -  Vancomycin: S 1  Organism: Blood Culture PCR (12-22-24 @ 08:27)      Method Type: PCR      -  Methicillin resistant Staphylococcus aureus (MRSA): Detec       Urinalysis with Rflx Culture (collected 12-20-24 @ 03:00)       Culture - Sputum (collected 12-20-24 @ 19:25)  Source: .Sputum Sputum  Gram Stain (12-21-24 @ 06:38):    Moderate polymorphonuclear leukocytes per low power field    Rare Squamous epithelial cells per low power field    Rare Gram positive cocci in pairs seen per oil power field    Rare Gram Positive Rods seen per oil power field    Rare Gram Negative Rods seen per oil power field    Rare Yeast like cells seen per oil power field  Final Report (12-22-24 @ 11:53):    Commensal loren consistent with body site        RADIOLOGY & ADDITIONAL TESTS: Reviewed.

## 2024-12-31 NOTE — DISCHARGE NOTE NURSING/CASE MANAGEMENT/SOCIAL WORK - FINANCIAL ASSISTANCE
[facility] provides services at a reduced cost to those who are determined to be eligible through [facility]’s financial assistance program. Information regarding [facility]’s financial assistance program can be found by going to [email or web address] or by calling [phone].

## 2024-12-31 NOTE — PROGRESS NOTE ADULT - ATTENDING COMMENTS
84 year old male with a history of T11-T12 osteomyelitis/discitis, epidural abscess s/p evacuation and placement of hardware at T10-L2 for stabilization, RLE DVT, depression, BPH, gout presenting from [facility] for SOB and cough. Admitted for sepsis due to sacral abscess c/b MRSA bacteremia, multifocal pneumonia. S/p Tx of PNA and now with clear blood cultures.  - PICC placement pending Monday  - JAN pending .
84 year old male with a history of T11-T12 osteomyelitis/discitis, epidural abscess s/p evacuation and placement of hardware at T10-L2 for stabilization, RLE DVT, depression, BPH, gout presenting from [facility] for SOB and cough. 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
Initial attending contact date 12/28/24. See resident note written above for details. I reviewed the resident documentation. I have personally seen and examined this patient. I reviewed vitals, labs, medications, and additional imaging. I agree with the above resident's findings and plans as written above with the following additions/statements.    84 year old male with a history of T11-T12 osteomyelitis/discitis, epidural abscess s/p evacuation and placement of hardware at T10-L2 for stabilization, RLE DVT, depression, BPH, gout presenting from Nursing Home for SOB and cough. Admitted for sepsis due to sacral abscess c/b MRSA bacteremia, multifocal pneumonia. S/p Tx of PNA and now with clear blood cultures  - PICC placement pending Monday  - JAN pending
84 year old male with a history of T11-T12 osteomyelitis/discitis, epidural abscess s/p evacuation and placement of hardware at T10-L2 for stabilization, RLE DVT, depression, BPH, gout presenting from [facility] for SOB and cough. 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
84 year old male with a history of T11-T12 osteomyelitis/discitis, epidural abscess s/p evacuation and placement of hardware at T10-L2 for stabilization, RLE DVT, depression, BPH, gout presenting from [facility] for SOB and cough. Admitted for sepsis due to sacral abscess, multifocal pneumonia.     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 C/w Vancomycin for now  -Derm suspecting pemphigus foliaceus vs exuberant seborrheic dermatitis: f/u punch bx results: improving with topical steroids/ketoconazole
84 year old male with a history of T11-T12 osteomyelitis/discitis, epidural abscess s/p evacuation and placement of hardware at T10-L2 for stabilization, RLE DVT, depression, BPH, gout presenting from [facility] for SOB and cough. Admitted for sepsis due to sacral abscess c/b MRSA bacteremia, multifocal pneumonia.     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
84 year old male with a history of T11-T12 osteomyelitis/discitis, epidural abscess s/p evacuation and placement of hardware at T10-L2 for stabilization, RLE DVT, depression, BPH, gout presenting from [facility] for SOB and cough. Admitted for sepsis due to sacral abscess c/b MRSA bacteremia, multifocal pneumonia.     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 Will plan for PICC placement once JAN approved
84 year old male with a history of T11-T12 osteomyelitis/discitis, epidural abscess s/p evacuation and placement of hardware at T10-L2 for stabilization, RLE DVT, depression, BPH, gout presenting from [facility] for SOB and cough. Admitted for sepsis due to sacral abscess, multifocal pneumonia.     Patient seen and examined at bedside. Patient endorses chronic back pain since July, which worsened recently. No bowel or bladder incontinence. Seen by wound care team, who discovered pt has a sacral abscess.     #Sepsis  #Multifocal pneumonia  #History of T11-T12 OM/discitis  #Hyponatremia  #Pemphigus foliaceus  #Depression  #Anemia  #BPH  #Elevated liver enzymes  #History of RLE DVT in 8/2024     -wound care team concerned for sacral abscess  -f/u wound culture  -C/w vancomycin and zosyn for now  -ID recs appreciated: f/u repeat blood cx, echo, lumbosacral MRI, f/u abscess cx and sputum cx, c/w vanc and zosyn  -Neurosurgery following: MRI lumbosacral spine ordered  -Derm consulted for facial rash: suspecting pemphigus foliaceus; recommended to start ketoconazole cream BID and plan for skin biopsy either today or tomorrow (pt and son agreeable)  -Na+ improved with home dose of salt tabs + IV 0.9% NS, monitor BMP.
84 year old male with a history of T11-T12 osteomyelitis/discitis, epidural abscess s/p evacuation and placement of hardware at T10-L2 for stabilization, RLE DVT, depression, BPH, gout presenting from [facility] for SOB and cough. Admitted for sepsis due to sacral abscess, multifocal pneumonia.     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
84 year old male with a history of T11-T12 osteomyelitis/discitis, epidural abscess s/p evacuation and placement of hardware at T10-L2 for stabilization, RLE DVT, depression, BPH, gout presenting from [facility] for SOB and cough. Admitted for sepsis due to sacral abscess, multifocal pneumonia.     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 C/w Vancomycin for now  -Derm suspecting pemphigus foliaceus vs exuberant seborrheic dermatitis: f/u punch bx results: improving with topical steroids/ketoconazole
84 year old male with a history of T11-T12 osteomyelitis/discitis, epidural abscess s/p evacuation and placement of hardware at T10-L2 for stabilization, RLE DVT, depression, BPH, gout presenting from [facility] for SOB and cough. Admitted for sepsis due to sacral abscess, multifocal pneumonia.     Patient seen and examined at bedside. Patient endorses chronic back pain since July, which worsened recently. No bowel or bladder incontinence. Seen by wound care team, who discovered pt has a sacral abscess.     #Sepsis  #Multifocal pneumonia  #History of T11-T12 OM/discitis  #Hyponatremia  #Depression  #Anemia  #BPH  #Elevated liver enzymes  #History of RLE DVT in 8/2024     -wound care team concerned for sacral abscess  -f/u wound culture  -C/w vancomycin and zosyn for now  -ID recs appreciated: f/u repeat blood cx, echo, lumbosacral MRI, f/u abscess cx and sputum cx, c/w vanc and zosyn  -Neurosurgery following: MRI lumbosacral spine ordered  -Derm consulted for facial rash: suspecting pemphigus foliaceus; recommended to start ketoconazole cream BID and plan for skin biopsy tomorrow (pt and son agreeable)  -trend Na, can c/w home dose of salt tabs + IV 0.9% NS, repeat BMP in AM
84 year old male with a history of T11-T12 osteomyelitis/discitis, epidural abscess s/p evacuation and placement of hardware at T10-L2 for stabilization, RLE DVT, depression, BPH, gout presenting from [facility] for SOB and cough. Admitted for sepsis due to sacral abscess, multifocal pneumonia.     patient seen and examined at bedside. Mandarin  [id number] used. patient endorses chronic back pain since July, which worsened recently. no bowel or bladder incontinence. seen by wound care team who discovered pt has a sacral abscess.     #Sepsis  #Multifocal pneumonia  #History of T11-T12 OM/discitis  #Hyponatremia  #Depression  #Anemia  #BPH  #Elevated liver enzymes  #History of RLE DVT in 8/2024     -wound care team concerned for sacral abscess  -f/u wound culture  -abx changed to Zosyn and vancomycin  -ID and neurosurgery consulted  -MRI lumbosacral spine ordered  -trend Na, can c/w home dose of salt tabs  -PT eval    d/w HS 5

## 2024-12-31 NOTE — DISCHARGE NOTE NURSING/CASE MANAGEMENT/SOCIAL WORK - PATIENT PORTAL LINK FT
You can access the FollowMyHealth Patient Portal offered by [facility] by registering at the following website: [email or web address]. By joining [facility] FollowMyHealth portal, you will also be able to view your health information using other applications (apps) compatible with our system.

## 2024-12-31 NOTE — PROGRESS NOTE ADULT - PROVIDER SPECIALTY LIST ADULT
Infectious Disease
Internal Medicine
Wound Care
Infectious Disease
Internal Medicine

## 2024-12-31 NOTE — PROGRESS NOTE ADULT - NUTRITIONAL ASSESSMENT
Recorded as Task  Date: 02/22/2017 12:38 PM, Created By: Miroi  Task Name: 4. Patient Message  Assigned To: Brea Simeon  Regarding Patient: Wendy Lechuga, Status: Active  CommentGagopi [employer] - 22 Feb 2017 12:38 PM    TASK CREATED  Pt called 2/21/17 and left VM saying she was supposed to call office to give Dr update on how she is doing on her medication. I called pt back today. She says she is taking Propranolol 40mg BID for tremor and that it is helping her tremor but she has had 10 pound weight gain in 1 month and she feels dizzy when walking and even when sitting at times (she says it is not just when she first stands up). I told pt I would give message to Dr Curtis Bui. Pt p# [phone]  Miroi - 22 Feb 2017 12:39 PM    TASK EDITED  I sent Dr Curtis Bui a perfect serve message at this time with all details of this task. Brea Simeon - 22 Feb 2017 1:15 PM    TASK EDITED  Spoke with patient 2/22/17 at 1310. Although I'm unsure that propranolol would cause any weight gain, it could definitely be causing her dizziness symptoms. D/C propranolol and start primidone 25mg qhs if covered by insurance. She VU and all questions answered.       Electronically signed Janis Horton DO  Feb 22 2017  1:15PM CST
This patient has been assessed with a concern for Malnutrition and has been determined to have a diagnosis/diagnoses of Severe protein-calorie malnutrition.    This patient is being managed with:   Diet Regular-  Minced and Moist (MINCEDMOIST)  Supplement Feeding Modality:  Oral  Ensure Plus High Protein Cans or Servings Per Day:  1       Frequency:  Two Times a day  Entered: Dec 24 2024 11:49AM  
This patient has been assessed with a concern for Malnutrition and has been determined to have a diagnosis/diagnoses of Severe protein-calorie malnutrition.    This patient is being managed with:   Diet Regular-  Minced and Moist (MINCEDMOIST)  Supplement Feeding Modality:  Oral  Ensure Plus High Protein Cans or Servings Per Day:  1       Frequency:  Two Times a day  Entered: Dec 20 2024  3:31PM  
This patient has been assessed with a concern for Malnutrition and has been determined to have a diagnosis/diagnoses of Severe protein-calorie malnutrition.    This patient is being managed with:   Diet NPO after Midnight-     NPO Start Date: 23-Dec-2024   NPO Start Time: 23:59  Except Medications  Entered: Dec 23 2024  5:08PM    Diet Regular-  Minced and Moist (MINCEDMOIST)  Supplement Feeding Modality:  Oral  Ensure Plus High Protein Cans or Servings Per Day:  1       Frequency:  Two Times a day  Entered: Dec 20 2024  3:31PM  
This patient has been assessed with a concern for Malnutrition and has been determined to have a diagnosis/diagnoses of Severe protein-calorie malnutrition.    This patient is being managed with:   Diet Regular-  Minced and Moist (MINCEDMOIST)  Supplement Feeding Modality:  Oral  Ensure Plus High Protein Cans or Servings Per Day:  1       Frequency:  Two Times a day  Entered: Dec 24 2024 11:49AM  
This patient has been assessed with a concern for Malnutrition and has been determined to have a diagnosis/diagnoses of Severe protein-calorie malnutrition.    This patient is being managed with:   Diet Regular-  Minced and Moist (MINCEDMOIST)  Supplement Feeding Modality:  Oral  Ensure Plus High Protein Cans or Servings Per Day:  1       Frequency:  Two Times a day  Entered: Dec 24 2024 11:49AM  
This patient has been assessed with a concern for Malnutrition and has been determined to have a diagnosis/diagnoses of Severe protein-calorie malnutrition.    This patient is being managed with:   Diet Regular-  Minced and Moist (MINCEDMOIST)  Supplement Feeding Modality:  Oral  Ensure Plus High Protein Cans or Servings Per Day:  1       Frequency:  Two Times a day  Entered: Dec 20 2024  3:31PM  
This patient has been assessed with a concern for Malnutrition and has been determined to have a diagnosis/diagnoses of Severe protein-calorie malnutrition.    This patient is being managed with:   Diet Regular-  Minced and Moist (MINCEDMOIST)  Supplement Feeding Modality:  Oral  Ensure Plus High Protein Cans or Servings Per Day:  1       Frequency:  Two Times a day  Entered: Dec 20 2024  3:31PM

## 2025-01-09 ENCOUNTER — APPOINTMENT (OUTPATIENT)
Dept: CARDIOLOGY | Facility: CLINIC | Age: 85
End: 2025-01-09

## 2025-02-13 ENCOUNTER — LABORATORY RESULT (OUTPATIENT)
Age: 85
End: 2025-02-13

## 2025-02-13 ENCOUNTER — APPOINTMENT (OUTPATIENT)
Dept: INFECTIOUS DISEASE | Facility: CLINIC | Age: 85
End: 2025-02-13
Payer: MEDICARE

## 2025-02-13 VITALS
HEIGHT: 65 IN | TEMPERATURE: 97.8 F | HEART RATE: 91 BPM | WEIGHT: 128 LBS | OXYGEN SATURATION: 95 % | DIASTOLIC BLOOD PRESSURE: 66 MMHG | SYSTOLIC BLOOD PRESSURE: 100 MMHG | BODY MASS INDEX: 21.33 KG/M2

## 2025-02-13 DIAGNOSIS — M46.24 OSTEOMYELITIS OF VERTEBRA, THORACIC REGION: ICD-10-CM

## 2025-02-13 DIAGNOSIS — B95.62 BACTEREMIA: ICD-10-CM

## 2025-02-13 DIAGNOSIS — R78.81 BACTEREMIA: ICD-10-CM

## 2025-02-13 DIAGNOSIS — T14.8XXA OTHER INJURY OF UNSPECIFIED BODY REGION, INITIAL ENCOUNTER: ICD-10-CM

## 2025-02-13 PROCEDURE — 99215 OFFICE O/P EST HI 40 MIN: CPT

## 2025-02-25 ENCOUNTER — APPOINTMENT (OUTPATIENT)
Dept: MRI IMAGING | Facility: CLINIC | Age: 85
End: 2025-02-25
Payer: MEDICARE

## 2025-02-25 ENCOUNTER — OUTPATIENT (OUTPATIENT)
Dept: OUTPATIENT SERVICES | Facility: HOSPITAL | Age: 85
LOS: 1 days | End: 2025-02-25
Payer: MEDICARE

## 2025-02-25 DIAGNOSIS — Z98.890 OTHER SPECIFIED POSTPROCEDURAL STATES: Chronic | ICD-10-CM

## 2025-02-25 DIAGNOSIS — M46.24 OSTEOMYELITIS OF VERTEBRA, THORACIC REGION: ICD-10-CM

## 2025-02-25 PROCEDURE — 72158 MRI LUMBAR SPINE W/O & W/DYE: CPT | Mod: 26

## 2025-02-25 PROCEDURE — 72157 MRI CHEST SPINE W/O & W/DYE: CPT

## 2025-02-25 PROCEDURE — A9585: CPT

## 2025-02-25 PROCEDURE — 72157 MRI CHEST SPINE W/O & W/DYE: CPT | Mod: 26

## 2025-02-25 PROCEDURE — 72158 MRI LUMBAR SPINE W/O & W/DYE: CPT

## 2025-03-04 ENCOUNTER — NON-APPOINTMENT (OUTPATIENT)
Age: 85
End: 2025-03-04

## 2025-05-07 ENCOUNTER — APPOINTMENT (OUTPATIENT)
Dept: NEUROSURGERY | Facility: CLINIC | Age: 85
End: 2025-05-07

## (undated) DEVICE — PACK IV START WITH CHG

## (undated) DEVICE — DRSG CURITY GAUZE SPONGE 4 X 4" 12-PLY NON-STERILE

## (undated) DEVICE — ELCTR GROUNDING PAD ADULT COVIDIEN

## (undated) DEVICE — BIOPSY FORCEP COLD DISP

## (undated) DEVICE — BIOPSY FORCEP RADIAL JAW 4 STANDARD WITH NEEDLE

## (undated) DEVICE — SALIVA EJECTOR (BLUE)

## (undated) DEVICE — TUBING SUCTION NONCONDUCTIVE 6MM X 12FT

## (undated) DEVICE — BASIN EMESIS 10IN GRADUATED MAUVE

## (undated) DEVICE — TUBING IV SET GRAVITY 3Y 100" MACRO

## (undated) DEVICE — DRSG BANDAID 0.75X3"

## (undated) DEVICE — ELCTR ECG CONDUCTIVE ADHESIVE

## (undated) DEVICE — CONTAINER FORMALIN 80ML YELLOW

## (undated) DEVICE — CATH IV SAFE BC 22G X 1" (BLUE)

## (undated) DEVICE — TUBING MEDI-VAC W MAXIGRIP CONNECTORS 1/4"X6'

## (undated) DEVICE — GOWN LG

## (undated) DEVICE — DRSG 2X2

## (undated) DEVICE — LUBRICATING JELLY HR ONE SHOT 3G